# Patient Record
Sex: FEMALE | Race: BLACK OR AFRICAN AMERICAN | NOT HISPANIC OR LATINO | Employment: UNEMPLOYED | URBAN - METROPOLITAN AREA
[De-identification: names, ages, dates, MRNs, and addresses within clinical notes are randomized per-mention and may not be internally consistent; named-entity substitution may affect disease eponyms.]

---

## 2017-01-11 PROBLEM — Z98.84 S/P LAPAROSCOPIC SLEEVE GASTRECTOMY: Status: ACTIVE | Noted: 2017-01-11

## 2017-02-02 ENCOUNTER — TELEPHONE (OUTPATIENT)
Dept: OBSTETRICS AND GYNECOLOGY | Facility: CLINIC | Age: 47
End: 2017-02-02

## 2017-02-02 NOTE — TELEPHONE ENCOUNTER
Pt c/o spotting, cramping x 2 weeks, vaginal discharge and pressure x 2 days.  Appt made for 2/06/2017 at 9am with Sumi, per pt request.  Pt voiced understanding.  BEULAH DAVIS

## 2017-02-02 NOTE — TELEPHONE ENCOUNTER
----- Message from Karla Mejia sent at 2/2/2017  1:59 PM CST -----  needs to be worked in for spotting & cramping/not pregnant but hasn't had cycle..[phone] (refused to make appt, will see anyone)

## 2017-02-15 PROBLEM — N94.6 DYSMENORRHEA: Status: ACTIVE | Noted: 2017-02-15

## 2017-02-17 ENCOUNTER — OFFICE VISIT (OUTPATIENT)
Dept: OBSTETRICS AND GYNECOLOGY | Facility: CLINIC | Age: 47
End: 2017-02-17
Payer: MEDICAID

## 2017-02-17 VITALS
WEIGHT: 205.94 LBS | HEIGHT: 68 IN | SYSTOLIC BLOOD PRESSURE: 110 MMHG | BODY MASS INDEX: 31.21 KG/M2 | DIASTOLIC BLOOD PRESSURE: 70 MMHG

## 2017-02-17 DIAGNOSIS — N92.6 IRREGULAR MENSES: Primary | ICD-10-CM

## 2017-02-17 DIAGNOSIS — Z12.4 PAP SMEAR FOR CERVICAL CANCER SCREENING: ICD-10-CM

## 2017-02-17 PROCEDURE — 58100 BIOPSY OF UTERUS LINING: CPT | Mod: S$PBB,,, | Performed by: OBSTETRICS & GYNECOLOGY

## 2017-02-17 PROCEDURE — 88175 CYTOPATH C/V AUTO FLUID REDO: CPT

## 2017-02-17 PROCEDURE — 99213 OFFICE O/P EST LOW 20 MIN: CPT | Mod: PBBFAC | Performed by: OBSTETRICS & GYNECOLOGY

## 2017-02-17 PROCEDURE — 88305 TISSUE EXAM BY PATHOLOGIST: CPT | Performed by: PATHOLOGY

## 2017-02-17 PROCEDURE — 99214 OFFICE O/P EST MOD 30 MIN: CPT | Mod: 25,S$PBB,, | Performed by: OBSTETRICS & GYNECOLOGY

## 2017-02-17 PROCEDURE — 58100 BIOPSY OF UTERUS LINING: CPT | Mod: PBBFAC | Performed by: OBSTETRICS & GYNECOLOGY

## 2017-02-17 PROCEDURE — 99999 PR PBB SHADOW E&M-EST. PATIENT-LVL III: CPT | Mod: PBBFAC,,, | Performed by: OBSTETRICS & GYNECOLOGY

## 2017-02-17 PROCEDURE — 88305 TISSUE EXAM BY PATHOLOGIST: CPT | Mod: 26,,, | Performed by: PATHOLOGY

## 2017-02-17 RX ORDER — NORETHINDRONE 5 MG/1
5 TABLET ORAL DAILY
Qty: 12 TABLET | Refills: 0 | Status: SHIPPED | OUTPATIENT
Start: 2017-02-17 | End: 2017-07-25

## 2017-02-17 NOTE — PROGRESS NOTES
Subjective:       Patient ID: Joe Ceballos is a 47 y.o. female.    Chief Complaint:  Menorrhagia      History of Present Illness  HPI  Having irregular vaginal bleeding over the past 6 weeks.  Menses was regular for several years.   EMBx in , benign  Ultrasound in the past with small myomas.   Recent, 4 months ago, gastric sleeve, has lost 60 lbs to date.   Needs pap,   Breast screening is up to date at non [facility] facility   Not sexually active for over 2 years.     GYN & OB History  Patient's last menstrual period was 2016 (exact date).   Date of Last Pap: No result found    OB History    Para Term  AB SAB TAB Ectopic Multiple Living   2 2 2       1      # Outcome Date GA Lbr Grant/2nd Weight Sex Delivery Anes PTL Lv   2 Term            1 Term                   Review of Systems  Review of Systems        Objective:    Physical Exam:   Constitutional: She appears well-developed and well-nourished. No distress.      Neck: No JVD present. No thyroid mass and no thyromegaly present.    Cardiovascular: Normal rate and regular rhythm.          Abdominal: Soft. Normal appearance and bowel sounds are normal. There is no hepatosplenomegaly. No hernia. Hernia confirmed negative in the ventral area, confirmed negative in the right inguinal area and confirmed negative in the left inguinal area.     Genitourinary: Rectum normal, vagina normal and uterus normal. There is no rash, tenderness, lesion or injury on the right labia. There is no rash, tenderness, lesion or injury on the left labia. Uterus is not deviated, not enlarged, not fixed, not tender and not experiencing uterine prolapse. Cervix is normal. Right adnexum displays no mass, no tenderness and no fullness. Left adnexum displays no mass, no tenderness and no fullness. No erythema, tenderness or bleeding in the vagina. No foreign body in the vagina. No vaginal discharge found. Labial bartholins normal.Cervix exhibits no motion  tenderness, no discharge and no friability. Additional cervical findings: pap smear done                     Endometrial biopsy    Verbal consent for biopsy done, explained risk of uterine perforation and level of discomfort from the procedure. No alternative available.     Cervix visualized,   Anterior cervical lip grasped with tenaculum   Pipelle passed to 6 cm without resistance   Adequate tissue removed and sent to pathology for evaluation  Instruments remove  Less than 1 cc blood loss     Patient tolerated the procedure well.     Assessment:        1. Irregular menses    2. Pap smear for cervical cancer screening                Plan:      Joe Savage was seen today for menorrhagia.    Diagnoses and all orders for this visit:    Irregular menses  -     Tissue Specimen To Pathology, Obstetrics/Gynecology  -     Endometrial biopsy; Future  -     norethindrone (AYGESTIN) 5 mg Tab; Take 1 tablet (5 mg total) by mouth once daily.    Pap smear for cervical cancer screening  -     Liquid-based pap smear, screening    Will start on one cycle of Aygestin, if biopsy is benign will follow bleeding pattern and restart Aygestin for irregular pattern

## 2017-02-22 ENCOUNTER — TELEPHONE (OUTPATIENT)
Dept: OBSTETRICS AND GYNECOLOGY | Facility: CLINIC | Age: 47
End: 2017-02-22

## 2017-02-22 NOTE — TELEPHONE ENCOUNTER
Pt called to get results for procedure, was notified that colpo biopsy's are still in process and that we will call her if any abnormal results and as far as the medication was advised that it is a hormone and could increase your apatite but is not likely that the medication alone causes you to gain weight ..xochiltt

## 2017-02-22 NOTE — TELEPHONE ENCOUNTER
----- Message from Shavon Ruvalcaba sent at 2/22/2017  8:55 AM CST -----  Patient would like to know the result of her procedure last week.  Also, the prescription she was given states that it could cause you to gain weight.  She has just had bariatric surgery and does not want to take the medication.   Call her at [phone].                                          agu[place]

## 2017-03-14 ENCOUNTER — HOSPITAL ENCOUNTER (EMERGENCY)
Facility: HOSPITAL | Age: 47
Discharge: HOME | End: 2017-03-14
Attending: EMERGENCY MEDICINE
Payer: MEDICAID

## 2017-03-14 VITALS
HEART RATE: 75 BPM | BODY MASS INDEX: 28.44 KG/M2 | HEIGHT: 69 IN | SYSTOLIC BLOOD PRESSURE: 180 MMHG | WEIGHT: 192 LBS | TEMPERATURE: 99 F | DIASTOLIC BLOOD PRESSURE: 108 MMHG | OXYGEN SATURATION: 99 % | RESPIRATION RATE: 19 BRPM

## 2017-03-14 DIAGNOSIS — F41.9 ANXIETY: ICD-10-CM

## 2017-03-14 DIAGNOSIS — Z48.89 ENCOUNTER FOR POST SURGICAL WOUND CHECK: Primary | ICD-10-CM

## 2017-03-14 PROCEDURE — 99283 EMERGENCY DEPT VISIT LOW MDM: CPT

## 2017-03-14 NOTE — ED AVS SNAPSHOT
[facility]  [address]  [place] [address]               Joe Ceballos   3/14/2017  9:51 PM   ED    Description:  Female : 1970   Department:  [facility]            Your Care was Coordinated By:     Provider Role From To    Cheli Franco MD Attending Provider [phone] --      Reason for Visit     Dressing Change           Diagnoses this Visit        Comments    Encounter for post surgical wound check    -  Primary     Anxiety           ED Disposition     None           To Do List           [facility]     [facility] Care Line -  Assistance  Registered nurses in the [facility] provide clinical advisement, health education, appointment booking, and other advisory services.  Call for this free service at [phone].             Medications           Message regarding Medications     Verify the changes and/or additions to your medication regime listed below are the same as discussed with your clinician today.  If any of these changes or additions are incorrect, please notify your healthcare provider.             Verify that the below list of medications is an accurate representation of the medications you are currently taking.  If none reported, the list may be blank. If incorrect, please contact your healthcare provider. Carry this list with you in case of emergency.           Current Medications     cetirizine (ZYRTEC) 10 MG tablet Take 10 mg by mouth.    lisinopril (PRINIVIL,ZESTRIL) 40 MG tablet Take 40 mg by mouth once daily.    methyldopa (ALDOMET) 500 MG tablet Take 500 mg by mouth 2 (two) times daily.    multivitamin (THERAGRAN) per tablet Take 1 tablet by mouth once daily.    norethindrone (AYGESTIN) 5 mg Tab Take 1 tablet (5 mg total) by mouth once daily.           Clinical Reference Information           Your Vitals Were     BP Pulse Temp Resp Height Weight    180/108 (BP Location:  "Right arm, Patient Position: Sitting) 75 98.9 °F (37.2 °C) (Oral) 19 5' 9" (1.753 m) 87.1 kg (192 lb)    Last Period SpO2 BMI          12/31/2016 (Exact Date) 99% 28.35 kg/m2        Allergies as of 3/14/2017        Reactions    Sulfa (Sulfonamide Antibiotics) Anaphylaxis      Immunizations Administered on Date of Encounter - 3/14/2017     None      ED Micro, Lab, POCT     None      ED Imaging Orders     None      Discharge References/Attachments     POST OP WOUND CHECK, PAIN (ENGLISH)      Smoking Cessation     If you would like to quit smoking:   You may be eligible for free services if you are a [place] resident and started smoking cigarettes before September 1, 1988.  Call the [employer]oking [employer]essation [employer] ([facility]) toll free at ([phone] or [phone].   Call 4-299-QUIT-NOW if you do not meet the above criteria.             [facility]  complies with applicable Federal civil rights laws and does not discriminate on the basis of race, color, national origin, age, disability, or sex.        Language Assistance Services     ATTENTION: Language assistance services are available, free of charge. Please call [phone].      ATENCIÓN: Si habla español, tiene a snyder disposición servicios gratuitos de asistencia lingüística. Llame al [phone].     CHÚ Ý: N?u b?n nói Ti?ng Vi?t, có các d?ch v? h? tr? ngôn ng? mi?n phí dành cho b?n. G?i s? [phone].        "

## 2017-03-15 NOTE — ED PROVIDER NOTES
SCRIBE #1 NOTE: I, Alex Paulson, am scribing for, and in the presence of, Cheli Franco MD. I have scribed the entire note.      History      Chief Complaint   Patient presents with    Dressing Change     reports needing new splinting to left arm/wrist.        Review of patient's allergies indicates:   Allergen Reactions    Sulfa (sulfonamide antibiotics) Anaphylaxis        HPI   HPI    3/14/2017, 10:21 PM   History obtained from the patient      History of Present Illness: Joe Ceballos is a 47 y.o. female patient who presents to the Emergency Department for dressing change. Pt reports recent left hand surgery and reports f/u with ortho in 3 days. Pt reports her dressing has moved and the hard part of her splint is now resting on her stitches. Pt requesting dressing change. Sxs are constant and moderate in severity. There are no mitigating or exacerbating factors noted.  Pt denies any fever, N/V/D, new trauma, numbness, arm pain, HA,  and all other sxs at this time. No further complaints or concerns at this time.     Arrival mode: Personal vehicle      PCP: Josse Cloud MD       Past Medical History:  Past Medical History:   Diagnosis Date    Anxiety     Edema     Hypertension     Initial insomnia     Tobacco use        Past Surgical History:  Past Surgical History:   Procedure Laterality Date    BREAST SURGERY       SECTION      x 2    CYST REMOVAL      from left tube    gastric sleeve      TONSILLECTOMY           Family History:  Family History   Problem Relation Age of Onset    Adopted: Yes    Hypertension Mother     Thyroid disease Paternal Aunt     Hypertension Paternal Grandmother     Breast cancer Neg Hx     Colon cancer Neg Hx     Ovarian cancer Neg Hx        Social History:  Social History     Social History Main Topics    Smoking status: Former Smoker     Packs/day: 0.50     Types: Cigarettes    Smokeless tobacco: unknown    Alcohol use No    Drug use:  No    Sexual activity: Not Currently     Partners: Male     Birth control/ protection: None       ROS   Review of Systems   Constitutional: Negative for fever.   HENT: Negative for sore throat.    Respiratory: Negative for shortness of breath.    Cardiovascular: Negative for chest pain.   Gastrointestinal: Negative for nausea.   Genitourinary: Negative for dysuria.   Musculoskeletal: Negative for back pain.   Skin: Negative for rash.        (+) left wrist splint   Neurological: Negative for weakness.   Hematological: Does not bruise/bleed easily.       Physical Exam    Initial Vitals   BP Pulse Resp Temp SpO2   03/14/17 2106 03/14/17 2106 03/14/17 2106 03/14/17 2106 03/14/17 2106   180/108 75 19 98.9 °F (37.2 °C) 99 %      Physical Exam  Nursing Notes and Vital Signs Reviewed.  Constitutional: Patient is in no acute distress. Awake and alert. Well-developed and well-nourished.  Head: Atraumatic. Normocephalic.  Eyes: PERRL. EOM intact. Conjunctivae are not pale. No scleral icterus.  ENT: Mucous membranes are moist. Oropharynx is clear and symmetric.    Neck: Supple. Full ROM. No lymphadenopathy.  Cardiovascular: Regular rate. Regular rhythm. No murmurs, rubs, or gallops. Distal pulses are 2+ and symmetric.  Pulmonary/Chest: No respiratory distress. Clear to auscultation bilaterally. No wheezing, rales, or rhonchi.  Abdominal: Soft and non-distended.  There is no tenderness.  No rebound, guarding, or rigidity. Good bowel sounds.  Genitourinary: No CVA tenderness  Musculoskeletal: Moves all extremities. No obvious deformities. No edema. No calf tenderness.  Skin: Warm and dry. Splint to left wrist taken down 3 small sutures to the inner left wrist, no evidence of swelling or infection. NVI.   Neurological:  Alert, awake, and appropriate.  Normal speech.  No acute focal neurological deficits are appreciated.  Psychiatric: Normal affect. Good eye contact. Appropriate in content.    ED Course    Procedures  ED Vital  "Signs:  Vitals:    03/14/17 2106   BP: (!) 180/108   Pulse: 75   Resp: 19   Temp: 98.9 °F (37.2 °C)   TempSrc: Oral   SpO2: 99%   Weight: 87.1 kg (192 lb)   Height: 5' 9" (1.753 m)              The Emergency Provider reviewed the vital signs and test results, which are outlined above.    ED Discussion     10:31 PM: Discussed plan of treatment with pt. Gave pt all f/u and return to the ED instructions. All questions and concerns were addressed at this time. Pt understands and agrees to plan as discussed. Pt is stable for discharge.     Pre-hypertension/Hypertension: The pt has been informed that they may have pre-hypertension or hypertension based on a blood pressure reading in the ED. I recommend that the pt call the PCP listed on their discharge instructions or a physician of their choice this week to arrange f/u for further evaluation of possible pre-hypertension or hypertension.     ED Medication(s):  Medications - No data to display    Discharge Medication List as of 3/14/2017 10:28 PM                Medical Decision Making              Scribe Attestation:   Scribe #1: I performed the above scribed service and the documentation accurately describes the services I performed. I attest to the accuracy of the note.    Attending:   Physician Attestation Statement for Scribe #1: I, Cheli Franco MD, personally performed the services described in this documentation, as scribed by Alex Paulson, in my presence, and it is both accurate and complete.          Clinical Impression       ICD-10-CM ICD-9-CM   1. Encounter for post surgical wound check Z48.89 V58.49   2. Anxiety F41.9 300.00       Disposition:   Disposition: Discharged  Condition: Stable         Cheli Franco MD  03/15/17 0045    "

## 2017-06-28 PROBLEM — R13.19 ESOPHAGEAL DYSPHAGIA: Status: ACTIVE | Noted: 2017-06-28

## 2017-07-13 ENCOUNTER — TELEPHONE (OUTPATIENT)
Dept: SURGERY | Facility: CLINIC | Age: 47
End: 2017-07-13

## 2017-07-13 NOTE — TELEPHONE ENCOUNTER
----- Message from Quinton Julian sent at 7/13/2017 11:03 AM CDT -----  Contact: Pt   Pt called wanted to know if something was available sooner that 8- pt would like to be called back please..[phone] (home)

## 2017-07-14 ENCOUNTER — TELEPHONE (OUTPATIENT)
Dept: RADIOLOGY | Facility: HOSPITAL | Age: 47
End: 2017-07-14

## 2017-07-14 DIAGNOSIS — Z98.84 S/P LAPAROSCOPIC SLEEVE GASTRECTOMY: Primary | ICD-10-CM

## 2017-07-18 ENCOUNTER — HOSPITAL ENCOUNTER (OUTPATIENT)
Dept: RADIOLOGY | Facility: HOSPITAL | Age: 47
Discharge: HOME | End: 2017-07-18
Attending: SURGERY
Payer: MEDICAID

## 2017-07-18 ENCOUNTER — OFFICE VISIT (OUTPATIENT)
Dept: SURGERY | Facility: CLINIC | Age: 47
End: 2017-07-18
Payer: MEDICAID

## 2017-07-18 VITALS
BODY MASS INDEX: 28.03 KG/M2 | DIASTOLIC BLOOD PRESSURE: 97 MMHG | HEART RATE: 73 BPM | WEIGHT: 189.81 LBS | SYSTOLIC BLOOD PRESSURE: 175 MMHG | TEMPERATURE: 99 F

## 2017-07-18 DIAGNOSIS — K22.2 ESOPHAGEAL STRICTURE: Primary | ICD-10-CM

## 2017-07-18 DIAGNOSIS — Z98.84 S/P LAPAROSCOPIC SLEEVE GASTRECTOMY: ICD-10-CM

## 2017-07-18 PROCEDURE — 99204 OFFICE O/P NEW MOD 45 MIN: CPT | Mod: S$PBB,,, | Performed by: SURGERY

## 2017-07-18 PROCEDURE — 99999 PR PBB SHADOW E&M-EST. PATIENT-LVL III: CPT | Mod: PBBFAC,,, | Performed by: SURGERY

## 2017-07-18 PROCEDURE — 74240 X-RAY XM UPR GI TRC 1CNTRST: CPT | Mod: 26,,, | Performed by: RADIOLOGY

## 2017-07-18 PROCEDURE — 99213 OFFICE O/P EST LOW 20 MIN: CPT | Mod: PBBFAC,25,PO | Performed by: SURGERY

## 2017-07-18 NOTE — PROGRESS NOTES
Joe is 47-year-old -American female patient who is being seen for the evaluation of inability to swallow.  In June 2016, she did undergo laparoscopic sleeve gastrectomy.  The surgery was [facility].  She has been followed by her surgeon since the initial surgery.  According to the patient, 6 weeks after the initial surgery, she began to notice difficulty to keep things down when she resumed soft diet.  With that difficulty, she is been followed by her surgeon who was scheduled to perform upper endoscopic examination.  1 day when she showed up for consultation, she was informed that the surgeon has left the institution.  She continues to have a problem and she had no were to turn.  She saw Dr. Dias her son's pediatrician for help.  The patient was seen today and also had an upper GI study completed.  The upper GI images were carefully reviewed with the radiologist.  The concern is that the patient had a stenosis or stricture at the GE junction.  As to the cause of the stenosis unknown.  It appears that there could be an extrinsic compression causing the stenosis.  This was discussed with the patient.  She will be scheduled for abdominal CT scan with oral contrast.  Once all information saw and, the patient and I will discuss the options again.  Total time approximately 40 minute was spent for this patient, and more than 50% of that was spent for treatment planning and counseling.      Corby Moeller

## 2017-07-24 ENCOUNTER — TELEPHONE (OUTPATIENT)
Dept: RADIOLOGY | Facility: HOSPITAL | Age: 47
End: 2017-07-24

## 2017-07-25 ENCOUNTER — HOSPITAL ENCOUNTER (OUTPATIENT)
Dept: RADIOLOGY | Facility: HOSPITAL | Age: 47
Discharge: HOME | End: 2017-07-25
Attending: SURGERY
Payer: MEDICAID

## 2017-07-25 ENCOUNTER — OFFICE VISIT (OUTPATIENT)
Dept: SURGERY | Facility: CLINIC | Age: 47
End: 2017-07-25
Payer: MEDICAID

## 2017-07-25 VITALS
BODY MASS INDEX: 28.75 KG/M2 | HEART RATE: 90 BPM | TEMPERATURE: 99 F | DIASTOLIC BLOOD PRESSURE: 70 MMHG | WEIGHT: 194.69 LBS | SYSTOLIC BLOOD PRESSURE: 117 MMHG

## 2017-07-25 DIAGNOSIS — K22.2 ESOPHAGEAL STRICTURE: ICD-10-CM

## 2017-07-25 DIAGNOSIS — K22.2 BENIGN ESOPHAGEAL STRICTURE: Primary | ICD-10-CM

## 2017-07-25 PROCEDURE — 99999 PR PBB SHADOW E&M-EST. PATIENT-LVL III: CPT | Mod: PBBFAC,,, | Performed by: SURGERY

## 2017-07-25 PROCEDURE — 74178 CT ABD&PLV WO CNTR FLWD CNTR: CPT | Mod: 26,,, | Performed by: RADIOLOGY

## 2017-07-25 PROCEDURE — 99213 OFFICE O/P EST LOW 20 MIN: CPT | Mod: PBBFAC,25,PO | Performed by: SURGERY

## 2017-07-25 PROCEDURE — 99215 OFFICE O/P EST HI 40 MIN: CPT | Mod: S$PBB,,, | Performed by: SURGERY

## 2017-07-25 RX ORDER — SODIUM CHLORIDE 9 MG/ML
INJECTION, SOLUTION INTRAVENOUS CONTINUOUS
Status: CANCELLED | OUTPATIENT
Start: 2017-07-25

## 2017-07-25 RX ADMIN — IOHEXOL 100 ML: 350 INJECTION, SOLUTION INTRAVENOUS at 01:07

## 2017-07-25 RX ADMIN — IOHEXOL 30 ML: 350 INJECTION, SOLUTION INTRAVENOUS at 12:07

## 2017-07-25 NOTE — PROGRESS NOTES
Joe is 47-year-old -American female patient who was initially seen several weeks ago for known problem with swallowing.  She had her laparoscopic sleeve gastrectomy done by Dr. Jay Epperson in October 2016.  According to the patient, immediately following the surgery she was having difficulty of swallowing which she thought was due to edema from the surgery.  At that symptom persisted since then.  She had seen Dr. Epperson multiple times to address that situation.  She had hoarseness following the surgery which has not really improved over time.  Now the surgery is almost 10 months out.  She has extreme difficulty with nausea and inability to swallow.  She feels severe anterior chest pain following a meal.  And when she lays down she would feel regurgitation of food into her mouth.  She was evaluated last week which included upper GI and abdominal CT scan to delineate the tissue density that was found cause stenosis at the GE junction.  The GE junction stenosis was due to soft tissue density, likely due to scar tissues from previous surgery.  No staples were found in that area.   After reviewing the upper GI and the abdominal CT scan, the stenosis at the GE junction should be dilated.  Of the 2 choices such as balloon versus stent, I would proceed with placement of covered stent endoscopically to manage this a benign esophageal stricture.  If this procedure fails to keep the GE junction open, the patient will likely require resection with gastric interposition.  This is likely in the case left gastric artery was compromised.  This was discussed with the patient in detail by drawing several diagrams.  Her  was present in the room as well.  They both seemed to understand the pathogenesis of her symptoms.  I have proposed to proceed with endoscopic stent placement tomorrow.  But because of the family's home situation, this procedure will be scheduled for Monday on July 31 of 2017 at 11:30 AM.  Total  time approximately 40 minute was spent for this patient, and more than 50% of that was spent for treatment planning and counseling.    Corby Moeller

## 2017-07-26 NOTE — PRE ADMISSION SCREENING
Pre op instructions reviewed with patient per phone:    To confirm, Your surgeon has instructed you:  Surgery is scheduled 7/31/2017 at 1144.      Please report to [facility] main lobby by 1015.      INSTRUCTIONS IMPORTANT!!!  ¨ Do not eat, drink, or smoke after 12 midnight-including water. OK to brush teeth, no gum, candy or mints!    ¨ Take only these medicines with a small swallow of water-morning of surgery.  Lisinopril    ____  Do not wear makeup, including mascara.  ____  No powder, lotions or creams to surgical area.  ____  Please remove all jewelry, including piercings and leave at home.  ____  No money or valuables needed. Please leave at home.  ____  Please bring identification and insurance information to hospital.  ____  If going home the same day, arrange for a ride home. You will not be able to   drive if Anesthesia was used.  ____  Children, under 12 years old, must remain in the waiting room with an adult.  They are not allowed in patient areas.  ____  Wear loose fitting clothing. Allow for dressings, bandages.  ____  Stop Aspirin, Ibuprofen, Motrin and Aleve at least 5-7 days before surgery, unless otherwise instructed by your doctor, or the nurse.   You MAY use Tylenol/acetaminophen until day of surgery.  ____  If you take diabetic medication, do not take am of surgery unless instructed by   Doctor.  ____ Stop taking any Fish Oil supplement or any Vitamins that contain Vitamin E at least 5 days prior to surgery.          Bathing Instructions-- The night before surgery and the morning prior to coming to the hospital:   -Do not shave the surgical area.   -Shower and wash your hair and body as usual with anti-bacterial  soap and shampoo.   -Rinse your hair and body completely.   -Use one packet of hibiclens to wash the surgical site (using your hand) gently for 5 minutes.  Do not scrub you skin too hard.   -Do not use hibiclens on your head, face, or genitals.   -Do  not wash with anti-bacterial soap after you use the hibiclens.   -Rinse your body thoroughly.   -Dry with clean, soft towel.  Do not use lotion, cream, deodorant, or powders on   the surgical site.    Use antibacterial soap in place of hibiclens if your surgery is on the head, face or genitals.         Surgical Site Infection    Prevention of surgical site infections:     -Keep incisions clean and dry.   -Do not soak/submerge incisions in water until completely healed.   -Do not apply lotions, powders, creams, or deodorants to site.   -Always make sure hands are cleaned with antibacterial soap/ alcohol-based   prior to touching the surgical site.  (This includes doctors, nurses, staff, and yourself.)    Signs and symptoms:   -Redness and pain around the area where you had surgery   -Drainage of cloudy fluid from your surgical wound   -Fever over 100.4  I have read or had read and explained to me, and understand the above information.

## 2017-07-28 ENCOUNTER — LAB VISIT (OUTPATIENT)
Dept: LAB | Facility: HOSPITAL | Age: 47
End: 2017-07-28
Attending: SURGERY
Payer: MEDICAID

## 2017-07-28 ENCOUNTER — OFFICE VISIT (OUTPATIENT)
Dept: SURGERY | Facility: CLINIC | Age: 47
End: 2017-07-28
Payer: MEDICAID

## 2017-07-28 ENCOUNTER — ANESTHESIA EVENT (OUTPATIENT)
Dept: SURGERY | Facility: HOSPITAL | Age: 47
End: 2017-07-28
Payer: MEDICAID

## 2017-07-28 ENCOUNTER — CLINICAL SUPPORT (OUTPATIENT)
Dept: CARDIOLOGY | Facility: CLINIC | Age: 47
End: 2017-07-28
Payer: MEDICAID

## 2017-07-28 VITALS
WEIGHT: 192.44 LBS | SYSTOLIC BLOOD PRESSURE: 144 MMHG | HEART RATE: 87 BPM | TEMPERATURE: 99 F | DIASTOLIC BLOOD PRESSURE: 78 MMHG | BODY MASS INDEX: 30.6 KG/M2

## 2017-07-28 DIAGNOSIS — K22.2 BENIGN ESOPHAGEAL STRICTURE: Primary | ICD-10-CM

## 2017-07-28 DIAGNOSIS — K22.2 BENIGN ESOPHAGEAL STRICTURE: ICD-10-CM

## 2017-07-28 LAB
ANION GAP SERPL CALC-SCNC: 11 MMOL/L
BASOPHILS # BLD AUTO: 0.04 K/UL
BASOPHILS NFR BLD: 0.6 %
BUN SERPL-MCNC: 12 MG/DL
CALCIUM SERPL-MCNC: 9 MG/DL
CHLORIDE SERPL-SCNC: 109 MMOL/L
CO2 SERPL-SCNC: 23 MMOL/L
CREAT SERPL-MCNC: 0.9 MG/DL
DIFFERENTIAL METHOD: ABNORMAL
EOSINOPHIL # BLD AUTO: 0.1 K/UL
EOSINOPHIL NFR BLD: 1.1 %
ERYTHROCYTE [DISTWIDTH] IN BLOOD BY AUTOMATED COUNT: 13.9 %
EST. GFR  (AFRICAN AMERICAN): >60 ML/MIN/1.73 M^2
EST. GFR  (NON AFRICAN AMERICAN): >60 ML/MIN/1.73 M^2
GLUCOSE SERPL-MCNC: 88 MG/DL
HCT VFR BLD AUTO: 38.3 %
HGB BLD-MCNC: 13.2 G/DL
LYMPHOCYTES # BLD AUTO: 1.4 K/UL
LYMPHOCYTES NFR BLD: 18.9 %
MCH RBC QN AUTO: 34.5 PG
MCHC RBC AUTO-ENTMCNC: 34.5 G/DL
MCV RBC AUTO: 100 FL
MONOCYTES # BLD AUTO: 0.7 K/UL
MONOCYTES NFR BLD: 9.2 %
NEUTROPHILS # BLD AUTO: 5.1 K/UL
NEUTROPHILS NFR BLD: 70.1 %
PLATELET # BLD AUTO: 296 K/UL
PMV BLD AUTO: 10.5 FL
POTASSIUM SERPL-SCNC: 3.7 MMOL/L
RBC # BLD AUTO: 3.83 M/UL
SODIUM SERPL-SCNC: 143 MMOL/L
WBC # BLD AUTO: 7.2 K/UL

## 2017-07-28 PROCEDURE — 85025 COMPLETE CBC W/AUTO DIFF WBC: CPT

## 2017-07-28 PROCEDURE — 80048 BASIC METABOLIC PNL TOTAL CA: CPT

## 2017-07-28 PROCEDURE — 93010 ELECTROCARDIOGRAM REPORT: CPT | Mod: S$PBB,,, | Performed by: NUCLEAR MEDICINE

## 2017-07-28 PROCEDURE — 36415 COLL VENOUS BLD VENIPUNCTURE: CPT | Mod: PO

## 2017-07-28 PROCEDURE — 99214 OFFICE O/P EST MOD 30 MIN: CPT | Mod: S$PBB,,, | Performed by: SURGERY

## 2017-07-28 PROCEDURE — 99999 PR PBB SHADOW E&M-EST. PATIENT-LVL III: CPT | Mod: PBBFAC,,, | Performed by: SURGERY

## 2017-07-28 RX ORDER — NAPROXEN 500 MG/1
500 TABLET ORAL DAILY PRN
Status: ON HOLD | COMMUNITY
Start: 2016-09-04 | End: 2017-07-31 | Stop reason: HOSPADM

## 2017-07-28 NOTE — H&P
History & Physical    SUBJECTIVE:     History of Present Illness:  Patient is a 47 y.o. female presents with inability to swallow and difficulty and chest pain following a meal.  She has very complex recent surgical events.  In October 2016, she did undergo sleeve gastrectomy laparoscopic by Dr. Ochoa at an outside institution.  Immediately following the surgery she did well but gradually noted difficulty to swallow.  She had been seen by Dr. Ochoa several times to manage that difficulty.  When she was so unable to maintain anything down, Dr. Ochoa had left the institution.  She had no 1 to help her to evaluate and treat her condition.  She had been evaluated and was found to have esophageal stricture at the GE junction.    Chief Complaint   Patient presents with    Pre-op Exam       Review of patient's allergies indicates:   Allergen Reactions    Sulfa (sulfonamide antibiotics) Anaphylaxis       Current Outpatient Prescriptions   Medication Sig Dispense Refill    calcium citrate-vitamin D3 315-200 mg (CITRACAL+D) 315-200 mg-unit per tablet Take 1 tablet by mouth 2 (two) times daily.      cetirizine (ZYRTEC) 10 MG tablet Take 10 mg by mouth.      cyanocobalamin 2000 MCG tablet Take 2,000 mcg by mouth twice a week.      ferrous gluconate (FERGON) 324 MG tablet Take 324 mg by mouth daily with breakfast.      lisinopril (PRINIVIL,ZESTRIL) 40 MG tablet Take 40 mg by mouth once daily.  3    methyldopa (ALDOMET) 500 MG tablet Take 500 mg by mouth 2 (two) times daily.  6    multivitamin (THERAGRAN) per tablet Take 1 tablet by mouth once daily.      naproxen (NAPROSYN) 500 MG tablet Take 500 mg by mouth daily as needed.      promethazine (PHENERGAN) 25 MG tablet Take 1 tablet (25 mg total) by mouth every 6 (six) hours as needed for Nausea. 30 tablet 0     No current facility-administered medications for this visit.        Past Medical History:   Diagnosis Date    Anxiety     Edema     Hypertension      Initial insomnia     Sickle cell trait     Tobacco use      Past Surgical History:   Procedure Laterality Date    BREAST SURGERY       SECTION      x 2    CYST REMOVAL      from left tube    gastric sleeve      TONSILLECTOMY       Family History   Problem Relation Age of Onset    Adopted: Yes    Hypertension Mother     Thyroid disease Paternal Aunt     Hypertension Paternal Grandmother     Breast cancer Neg Hx     Colon cancer Neg Hx     Ovarian cancer Neg Hx      Social History   Substance Use Topics    Smoking status: Former Smoker     Packs/day: 0.50     Types: Cigarettes    Smokeless tobacco: Never Used    Alcohol use No        Review of Systems:  Review of Systems   Constitutional: Negative for chills and fever.   HENT: Negative for sore throat and trouble swallowing.    Eyes: Negative.    Respiratory: Negative for cough and shortness of breath.    Cardiovascular: Negative.    Gastrointestinal: Positive for abdominal pain, nausea and vomiting. Negative for abdominal distention.   Endocrine: Negative.    Genitourinary: Negative.    Musculoskeletal: Negative.    Skin: Negative.    Allergic/Immunologic: Negative.    Neurological: Negative.    Hematological: Does not bruise/bleed easily.   Psychiatric/Behavioral: Negative.        OBJECTIVE:     Vital Signs (Most Recent)  Temp: 99.1 °F (37.3 °C) (17 1202)  Pulse: 87 (17 1202)  BP: (!) 144/78 (17 1202)     87.3 kg (192 lb 7.4 oz)     Physical Exam:  Physical Exam   Constitutional: She is oriented to person, place, and time. She appears well-developed and well-nourished.   HENT:   Head: Normocephalic.   Right Ear: External ear normal.   Left Ear: External ear normal.   Nose: Nose normal.   Eyes: Pupils are equal, round, and reactive to light. No scleral icterus.   Neck: Normal range of motion. Neck supple. No thyromegaly present.   Cardiovascular: Normal rate, regular rhythm and normal heart sounds.    No murmur  heard.  Pulmonary/Chest: Effort normal and breath sounds normal.   Abdominal: Soft. Bowel sounds are normal. There is no tenderness. There is no guarding.   Visible scars from previous laparoscopic surgery.   Musculoskeletal: Normal range of motion.   Lymphadenopathy:     She has no cervical adenopathy.   Neurological: She is alert and oriented to person, place, and time.   Skin: Skin is warm and dry.       Laboratory  Lab Results   Component Value Date    WBC 8.29 10/13/2016    HGB 14.0 10/13/2016    HCT 41.3 10/13/2016     10/13/2016    ALT 27 10/04/2016    AST 22 10/04/2016     10/13/2016    K 3.7 10/13/2016     10/13/2016    CREATININE 0.69 10/13/2016    BUN 8 10/13/2016    CO2 27 10/13/2016    TSH 0.521 03/26/2014       Results for orders placed during the hospital encounter of 07/25/17   CT Abdomen Pelvis W Wo Contrast    Narrative CT of the abdomen and pelvis with and without contrast.    History: Stricture    Comparison: Upper GI series from 07/18/2017    Technique:CT of the abdomen and pelvis was acquired helically from the lung bases through the ischial tuberosities with and without IV contrast.  100 cc of Omnipaque 350 was administered without immediate complication. Oral contrast was administered. Axial and reformatted images were reviewed.     Findings:    ABDOMEN    Lung bases:There are some dependent changes noted within both lung bases.    Liver/gallbladder/biliary:The liver demonstrates no focal abnormality. The gallbladder is present and unremarkable.No biliary ductal dilation.    Pancreas:The pancreas is unremarkable in appearance.    Spleen:The spleen is not enlarged.    Adrenals:There is prominent nodular hypertrophic change associated with both adrenal and superior there is a coarse calcification seen within the superior aspect of the left adrenal gland.  The central portion of the left adrenal gland measures a maximum of 3.2 cm.  The largest nodular area involving the right  adrenal gland is seen involving the lateral, and measures up to 3.1 cm on the right.    Kidneys:The kidneys are equally perfused and demonstrate no solid masses.     Bowel/Mesentery:There is no evidence of bowel obstruction. Just above the level of the diaphragmatic hiatus is the same diverticulum that was seen on the upper GI series.  This diverticulum measures approximately 1.3 x 1.1 cm.  This diverticulum is located just above the level of the suture line along the greater curvature of the stomach.  Just above this diverticulum there is a shelf of soft tissue along the left side of the GE junction that results in narrowing of the lumen.  This is best appreciated on the coronal images series 601 image 76. No mesenteric stranding or adenopathy.    Retroperitoneum:No adenopathy.The aorta demonstrates a normal caliber.    PELVIS:    Genitourinary/Reproductive organs:Unremarkable    Adenopathy:None    Free Fluid:No free fluid    Osseus Structures/Soft tissues:No suspicious appearing osseus lesions. No significant soft tissue abnormality.    Impression 1. Narrowing in the region of the gastroesophageal junction secondary to a shelf of soft tissue along the left side of the GE junction.  Just inferior to this shelf of soft tissue is a diverticulum which is located just above the level of the suture line.    2.  Significant nodularity and enlargement associated with the both adrenal glands with some coarse calcification noted within the superior aspect of the left adrenal gland and also a small calcification seen within the more inferior aspect of the right adrenal gland.  Differential would include adrenal hyperplasia or adrenal involvement by a granulomatous process with remote hemorrhage and metastatic disease thought much less likely.    3.  Remaining findings as discussed above.          Electronically signed by: CHRIS MCGEE D.O.  Date:     07/25/17  Time:    13:40          Diagnostic Results:  Labs:  Reviewed  ECG: Reviewed  CT: Reviewed  Upper GI: Reviewed    None    ASSESSMENT/PLAN:     Esophageal stricture, status post sleeve gastrectomy, laparoscopic (October 2017 by Dr. Ochoa)    PLAN:Plan     The recommendation is proceed with esophageal stenting.  Considering that this is a benign esophageal stricture, the stent may be very effective.  If this stricture recurs, the next option would be surgical intervention with resection and colonic interposition.    Corby Moeller

## 2017-07-31 ENCOUNTER — HOSPITAL ENCOUNTER (OUTPATIENT)
Facility: HOSPITAL | Age: 47
Discharge: HOME | End: 2017-07-31
Attending: SURGERY | Admitting: SURGERY
Payer: MEDICAID

## 2017-07-31 ENCOUNTER — SURGERY (OUTPATIENT)
Age: 47
End: 2017-07-31

## 2017-07-31 ENCOUNTER — ANESTHESIA (OUTPATIENT)
Dept: SURGERY | Facility: HOSPITAL | Age: 47
End: 2017-07-31
Payer: MEDICAID

## 2017-07-31 VITALS
SYSTOLIC BLOOD PRESSURE: 152 MMHG | HEIGHT: 67 IN | RESPIRATION RATE: 23 BRPM | HEART RATE: 62 BPM | OXYGEN SATURATION: 100 % | BODY MASS INDEX: 29.66 KG/M2 | WEIGHT: 189 LBS | TEMPERATURE: 98 F | DIASTOLIC BLOOD PRESSURE: 95 MMHG

## 2017-07-31 DIAGNOSIS — K22.10 ULCER OF ESOPHAGUS WITHOUT BLEEDING: Primary | Chronic | ICD-10-CM

## 2017-07-31 DIAGNOSIS — K22.2 BENIGN ESOPHAGEAL STRICTURE: ICD-10-CM

## 2017-07-31 LAB
B-HCG UR QL: NEGATIVE
CTP QC/QA: YES

## 2017-07-31 PROCEDURE — 81025 URINE PREGNANCY TEST: CPT | Performed by: SURGERY

## 2017-07-31 PROCEDURE — 37000009 HC ANESTHESIA EA ADD 15 MINS: Performed by: SURGERY

## 2017-07-31 PROCEDURE — 63600175 PHARM REV CODE 636 W HCPCS: Performed by: NURSE ANESTHETIST, CERTIFIED REGISTERED

## 2017-07-31 PROCEDURE — 71000015 HC POSTOP RECOV 1ST HR: Performed by: SURGERY

## 2017-07-31 PROCEDURE — 71000033 HC RECOVERY, INTIAL HOUR: Performed by: SURGERY

## 2017-07-31 PROCEDURE — 25000003 PHARM REV CODE 250: Performed by: NURSE ANESTHETIST, CERTIFIED REGISTERED

## 2017-07-31 PROCEDURE — 37000008 HC ANESTHESIA 1ST 15 MINUTES: Performed by: SURGERY

## 2017-07-31 PROCEDURE — 36000706: Performed by: SURGERY

## 2017-07-31 PROCEDURE — 43235 EGD DIAGNOSTIC BRUSH WASH: CPT | Mod: ,,, | Performed by: SURGERY

## 2017-07-31 PROCEDURE — 36000707: Performed by: SURGERY

## 2017-07-31 RX ORDER — FENTANYL CITRATE 50 UG/ML
INJECTION, SOLUTION INTRAMUSCULAR; INTRAVENOUS
Status: DISCONTINUED | OUTPATIENT
Start: 2017-07-31 | End: 2017-07-31

## 2017-07-31 RX ORDER — MIDAZOLAM HYDROCHLORIDE 1 MG/ML
INJECTION, SOLUTION INTRAMUSCULAR; INTRAVENOUS
Status: DISCONTINUED | OUTPATIENT
Start: 2017-07-31 | End: 2017-07-31

## 2017-07-31 RX ORDER — DEXAMETHASONE SODIUM PHOSPHATE 4 MG/ML
INJECTION, SOLUTION INTRA-ARTICULAR; INTRALESIONAL; INTRAMUSCULAR; INTRAVENOUS; SOFT TISSUE
Status: DISCONTINUED | OUTPATIENT
Start: 2017-07-31 | End: 2017-07-31

## 2017-07-31 RX ORDER — PROPOFOL 10 MG/ML
VIAL (ML) INTRAVENOUS
Status: DISCONTINUED | OUTPATIENT
Start: 2017-07-31 | End: 2017-07-31

## 2017-07-31 RX ORDER — SODIUM CHLORIDE 0.9 % (FLUSH) 0.9 %
3 SYRINGE (ML) INJECTION
Status: DISCONTINUED | OUTPATIENT
Start: 2017-07-31 | End: 2017-07-31 | Stop reason: HOSPADM

## 2017-07-31 RX ORDER — ONDANSETRON 2 MG/ML
4 INJECTION INTRAMUSCULAR; INTRAVENOUS DAILY PRN
Status: DISCONTINUED | OUTPATIENT
Start: 2017-07-31 | End: 2017-07-31 | Stop reason: HOSPADM

## 2017-07-31 RX ORDER — MORPHINE SULFATE 10 MG/ML
2 INJECTION INTRAMUSCULAR; INTRAVENOUS; SUBCUTANEOUS EVERY 5 MIN PRN
Status: DISCONTINUED | OUTPATIENT
Start: 2017-07-31 | End: 2017-07-31 | Stop reason: HOSPADM

## 2017-07-31 RX ORDER — CEFAZOLIN SODIUM 2 G/50ML
2 SOLUTION INTRAVENOUS
Status: DISCONTINUED | OUTPATIENT
Start: 2017-07-31 | End: 2017-07-31 | Stop reason: HOSPADM

## 2017-07-31 RX ORDER — ROCURONIUM BROMIDE 10 MG/ML
INJECTION, SOLUTION INTRAVENOUS
Status: DISCONTINUED | OUTPATIENT
Start: 2017-07-31 | End: 2017-07-31

## 2017-07-31 RX ORDER — SODIUM CHLORIDE, SODIUM LACTATE, POTASSIUM CHLORIDE, CALCIUM CHLORIDE 600; 310; 30; 20 MG/100ML; MG/100ML; MG/100ML; MG/100ML
INJECTION, SOLUTION INTRAVENOUS CONTINUOUS
Status: DISCONTINUED | OUTPATIENT
Start: 2017-07-31 | End: 2017-07-31 | Stop reason: HOSPADM

## 2017-07-31 RX ORDER — METOCLOPRAMIDE HYDROCHLORIDE 5 MG/ML
10 INJECTION INTRAMUSCULAR; INTRAVENOUS EVERY 10 MIN PRN
Status: DISCONTINUED | OUTPATIENT
Start: 2017-07-31 | End: 2017-07-31 | Stop reason: HOSPADM

## 2017-07-31 RX ORDER — OXYCODONE HYDROCHLORIDE 5 MG/1
5 TABLET ORAL
Status: DISCONTINUED | OUTPATIENT
Start: 2017-07-31 | End: 2017-07-31 | Stop reason: HOSPADM

## 2017-07-31 RX ORDER — SUCCINYLCHOLINE CHLORIDE 20 MG/ML
INJECTION INTRAMUSCULAR; INTRAVENOUS
Status: DISCONTINUED | OUTPATIENT
Start: 2017-07-31 | End: 2017-07-31

## 2017-07-31 RX ORDER — SODIUM CHLORIDE 0.9 % (FLUSH) 0.9 %
3 SYRINGE (ML) INJECTION EVERY 8 HOURS
Status: DISCONTINUED | OUTPATIENT
Start: 2017-07-31 | End: 2017-07-31 | Stop reason: HOSPADM

## 2017-07-31 RX ORDER — LIDOCAINE HYDROCHLORIDE 10 MG/ML
INJECTION INFILTRATION; PERINEURAL
Status: DISCONTINUED | OUTPATIENT
Start: 2017-07-31 | End: 2017-07-31

## 2017-07-31 RX ORDER — SODIUM CHLORIDE 0.9 % (FLUSH) 0.9 %
3 SYRINGE (ML) INJECTION
Status: DISCONTINUED | OUTPATIENT
Start: 2017-07-31 | End: 2017-07-31

## 2017-07-31 RX ORDER — ONDANSETRON 2 MG/ML
INJECTION INTRAMUSCULAR; INTRAVENOUS
Status: DISCONTINUED | OUTPATIENT
Start: 2017-07-31 | End: 2017-07-31

## 2017-07-31 RX ORDER — HYDROCODONE BITARTRATE AND ACETAMINOPHEN 5; 325 MG/1; MG/1
1 TABLET ORAL EVERY 4 HOURS PRN
Status: CANCELLED | OUTPATIENT
Start: 2017-07-31

## 2017-07-31 RX ORDER — MEPERIDINE HYDROCHLORIDE 50 MG/ML
12.5 INJECTION INTRAMUSCULAR; INTRAVENOUS; SUBCUTANEOUS ONCE AS NEEDED
Status: DISCONTINUED | OUTPATIENT
Start: 2017-07-31 | End: 2017-07-31 | Stop reason: HOSPADM

## 2017-07-31 RX ORDER — SODIUM CHLORIDE 9 MG/ML
INJECTION, SOLUTION INTRAVENOUS CONTINUOUS
Status: DISCONTINUED | OUTPATIENT
Start: 2017-07-31 | End: 2017-07-31 | Stop reason: HOSPADM

## 2017-07-31 RX ORDER — OXYCODONE HYDROCHLORIDE 5 MG/1
5 TABLET ORAL
Status: DISCONTINUED | OUTPATIENT
Start: 2017-07-31 | End: 2017-07-31

## 2017-07-31 RX ORDER — SODIUM CHLORIDE, SODIUM LACTATE, POTASSIUM CHLORIDE, CALCIUM CHLORIDE 600; 310; 30; 20 MG/100ML; MG/100ML; MG/100ML; MG/100ML
INJECTION, SOLUTION INTRAVENOUS CONTINUOUS PRN
Status: DISCONTINUED | OUTPATIENT
Start: 2017-07-31 | End: 2017-07-31

## 2017-07-31 RX ORDER — HYDROMORPHONE HYDROCHLORIDE 2 MG/ML
0.2 INJECTION, SOLUTION INTRAMUSCULAR; INTRAVENOUS; SUBCUTANEOUS EVERY 5 MIN PRN
Status: DISCONTINUED | OUTPATIENT
Start: 2017-07-31 | End: 2017-07-31 | Stop reason: HOSPADM

## 2017-07-31 RX ORDER — SODIUM CHLORIDE 9 MG/ML
INJECTION, SOLUTION INTRAVENOUS CONTINUOUS
Status: CANCELLED | OUTPATIENT
Start: 2017-07-31

## 2017-07-31 RX ORDER — PANTOPRAZOLE SODIUM 40 MG/1
40 TABLET, DELAYED RELEASE ORAL DAILY
Qty: 30 TABLET | Refills: 11 | Status: SHIPPED | OUTPATIENT
Start: 2017-07-31 | End: 2018-02-15 | Stop reason: SDUPTHER

## 2017-07-31 RX ORDER — MORPHINE SULFATE 10 MG/ML
2 INJECTION INTRAMUSCULAR; INTRAVENOUS; SUBCUTANEOUS EVERY 5 MIN PRN
Status: DISCONTINUED | OUTPATIENT
Start: 2017-07-31 | End: 2017-07-31

## 2017-07-31 RX ORDER — HYDROCODONE BITARTRATE AND ACETAMINOPHEN 5; 325 MG/1; MG/1
1 TABLET ORAL EVERY 6 HOURS PRN
Qty: 20 TABLET | Refills: 0 | Status: SHIPPED | OUTPATIENT
Start: 2017-07-31 | End: 2017-08-10

## 2017-07-31 RX ADMIN — PROPOFOL 200 MG: 10 INJECTION, EMULSION INTRAVENOUS at 10:07

## 2017-07-31 RX ADMIN — LIDOCAINE HYDROCHLORIDE 80 MG: 10 INJECTION, SOLUTION INFILTRATION; PERINEURAL at 10:07

## 2017-07-31 RX ADMIN — ONDANSETRON 4 MG: 2 INJECTION, SOLUTION INTRAMUSCULAR; INTRAVENOUS at 11:07

## 2017-07-31 RX ADMIN — PROPOFOL 50 MG: 10 INJECTION, EMULSION INTRAVENOUS at 10:07

## 2017-07-31 RX ADMIN — MIDAZOLAM HYDROCHLORIDE 2 MG: 1 INJECTION, SOLUTION INTRAMUSCULAR; INTRAVENOUS at 10:07

## 2017-07-31 RX ADMIN — SODIUM CHLORIDE, SODIUM LACTATE, POTASSIUM CHLORIDE, AND CALCIUM CHLORIDE: 600; 310; 30; 20 INJECTION, SOLUTION INTRAVENOUS at 10:07

## 2017-07-31 RX ADMIN — ROCURONIUM BROMIDE 5 MG: 10 INJECTION, SOLUTION INTRAVENOUS at 10:07

## 2017-07-31 RX ADMIN — FENTANYL CITRATE 100 MCG: 50 INJECTION, SOLUTION INTRAMUSCULAR; INTRAVENOUS at 10:07

## 2017-07-31 RX ADMIN — DEXAMETHASONE SODIUM PHOSPHATE 8 MG: 4 INJECTION, SOLUTION INTRA-ARTICULAR; INTRALESIONAL; INTRAMUSCULAR; INTRAVENOUS; SOFT TISSUE at 10:07

## 2017-07-31 RX ADMIN — SUCCINYLCHOLINE CHLORIDE 140 MG: 20 INJECTION, SOLUTION INTRAMUSCULAR; INTRAVENOUS at 10:07

## 2017-07-31 NOTE — DISCHARGE SUMMARY
[facility]  Short Stay  Discharge Summary    Admit Date: 7/31/2017    Discharge Date and Time: 7/31/2017      Discharge Attending Physician: Corby Moeller MD     Reason for Admission: Esophageal stricture, post sleeve gastrectomy.    Hospital Course (synopsis of major diagnoses, care, treatment, and services provided during the course of the hospital stay): Uneventful and full recovery    Final Diagnoses:    Principal Problem: Ulcer of esophagus without bleeding   Secondary Diagnoses: Ulcer of esophagus without bleeding    Procedures Performed: Procedure(s) (LRB):  ESOPHAGOGASTRODUODENOSCOPY (EGD) (N/A)      Discharged Condition: good    Disposition: Home or Self Care    Discharge Condition: Stable    Follow up/Patient Instructions:  Follow-up Information     Corby Moeller MD In 6 weeks.    Specialty:  General Surgery  Why:  for a follow up visit.  Contact information:  [address]  [place] [address] [phone]                   Medications:      Medication List      START taking these medications    hydrocodone-acetaminophen 5-325mg 5-325 mg per tablet  Commonly known as:  NORCO  Take 1 tablet by mouth every 6 (six) hours as needed for Pain.     pantoprazole 40 MG tablet  Commonly known as:  PROTONIX  Take 1 tablet (40 mg total) by mouth once daily.        CONTINUE taking these medications    lisinopril 40 MG tablet  Commonly known as:  PRINIVIL,ZESTRIL        STOP taking these medications    calcium citrate-vitamin D3 315-200 mg 315-200 mg-unit per tablet  Commonly known as:  CITRACAL+D     cetirizine 10 MG tablet  Commonly known as:  ZYRTEC     cyanocobalamin 2000 MCG tablet     ferrous gluconate 324 MG tablet  Commonly known as:  FERGON     methyldopa 500 MG tablet  Commonly known as:  ALDOMET     multivitamin per tablet  Commonly known as:  THERAGRAN     naproxen 500 MG tablet  Commonly known as:  NAPROSYN     promethazine 25 MG tablet  Commonly known as:  PHENERGAN           Where to Get Your  Medications      These medications were sent to [facility] - [place], [place] - [address] [facility]  [address] [facility], [facility] [address]    Phone:  [phone]   · hydrocodone-acetaminophen 5-325mg 5-325 mg per tablet  · pantoprazole 40 MG tablet         Discharge Procedure Orders  Diet general       Corby Moeller

## 2017-07-31 NOTE — ANESTHESIA PREPROCEDURE EVALUATION
07/31/2017  Joe Ceballos is a 47 y.o., female.    Pre-op Assessment    I have reviewed the Patient Summary Reports.         Review of Systems  Anesthesia Hx:  History of prior surgery of interest to airway management or planning: Denies Family Hx of Anesthesia complications.   Denies Personal Hx of Anesthesia complications.   Cardiovascular:   Hypertension Denies MI.  Denies CAD.       Pulmonary:   Denies COPD.  Denies Asthma.  Denies Shortness of breath.    Renal/:   Denies Chronic Renal Disease.     Hepatic/GI:   Denies GERD.    Neurological:   Denies CVA. Denies Seizures.    Endocrine:   Denies Diabetes.        Physical Exam  General:  Well nourished    Airway/Jaw/Neck:  Airway Findings: Mouth Opening: Normal General Airway Assessment: Adult  Mallampati: II       Chest/Lungs:  Chest/Lungs Findings: Clear to auscultation, Normal Respiratory Rate     Heart/Vascular:  Heart Findings: Rate: Normal  Rhythm: Regular Rhythm             Anesthesia Plan  Type of Anesthesia, risks & benefits discussed:  Anesthesia Type:  general  Patient's Preference:   Intra-op Monitoring Plan:   Intra-op Monitoring Plan Comments:   Post Op Pain Control Plan:   Post Op Pain Control Plan Comments:   Induction:   IV  Beta Blocker:  Patient is not currently on a Beta-Blocker (No further documentation required).       Informed Consent: Patient understands risks and agrees with Anesthesia plan.  Questions answered.   ASA Score: 2     Day of Surgery Review of History & Physical:

## 2017-07-31 NOTE — OP NOTE
Operative Note       SURGERY DATE:  07/31/2017    PRE-OP DIAGNOSIS:  Benign esophageal stricture [K22.2]    POST-OP DIAGNOSIS:  Benign esophageal stricture [K22.2]    Procedure(s) (LRB):  ESOPHAGOGASTRODUODENOSCOPY (EGD) (N/A)    Surgeon(s) and Role:     * Corby Moeller MD - Primary    ASSISTANTS: None  ANESTHESIA: General    FINDINGS: The esophageal ulcers, two in number were found in the GE junction, likely due to severe reflux following sleeve gastrectomy.    ESTIMATED BLOOD LOSS: 5 mL              COMPLICATIONS:  None    SPECIMEN:  None    Implants: None    INDICATION:  Acute chest pain with inability to swallowing and regurgitation of food.    DESCRIPTION OF PROCEDURE:    The patient was taken to the operating room and under went general anesthesia with orotracheal intubation. The patient was placed in supine position, and the mouth guard was placed. Then EGD scope was advanced and the above findings were noted. Several pictures were taken for the record keeping. During the entire procedure, the patient was stable and the procedure was terminated. The EGD was advanced as far as the second portion of duodenum and pulled back for closer examination to find the above findings. No biopsy was done. The sleeve was found to be very well done. The patient was later awakened and extubated.           CONDITION: Good    DISPOSITION: PACU - hemodynamically stable.     Corby Moeller

## 2017-07-31 NOTE — TRANSFER OF CARE
"Anesthesia Transfer of Care Note    Patient: Joe Ceballos    Procedure(s) Performed: Procedure(s) (LRB):  ESOPHAGOGASTRODUODENOSCOPY (EGD) (N/A)    Patient location: PACU    Anesthesia Type: general    Transport from OR: Transported from OR on room air with adequate spontaneous ventilation    Post pain: adequate analgesia    Post assessment: no apparent anesthetic complications    Post vital signs: stable    Level of consciousness: awake and alert    Nausea/Vomiting: no nausea/vomiting    Complications: none    Transfer of care protocol was followed      Last vitals:   Visit Vitals  /77   Pulse (!) 59   Temp 36.7 °C (98.1 °F) (Temporal)   Resp 20   Ht 5' 6.5" (1.689 m)   Wt 85.7 kg (189 lb)   LMP 07/27/2017 (Approximate)   SpO2 96%   Breastfeeding? No   BMI 30.05 kg/m²     "

## 2017-07-31 NOTE — PLAN OF CARE
Pt resting c/o sore throat. Respirations even and unlabored on room air and tolerating well. VSS. See flow sheet for detailed assessment.

## 2017-07-31 NOTE — ANESTHESIA POSTPROCEDURE EVALUATION
"Anesthesia Post Evaluation    Patient: Joe Ceballos    Procedure(s) Performed: Procedure(s) (LRB):  ESOPHAGOGASTRODUODENOSCOPY (EGD) (N/A)    Final Anesthesia Type: general  Patient location during evaluation: PACU  Patient participation: Yes- Able to Participate  Level of consciousness: awake and alert  Post-procedure vital signs: reviewed and stable  Pain management: adequate  Airway patency: patent  PONV status at discharge: No PONV  Anesthetic complications: no      Cardiovascular status: blood pressure returned to baseline  Respiratory status: unassisted  Hydration status: euvolemic  Follow-up not needed.        Visit Vitals  BP (!) 152/95   Pulse 62   Temp 36.4 °C (97.5 °F) (Temporal)   Resp (!) 23   Ht 5' 6.5" (1.689 m)   Wt 85.7 kg (189 lb)   LMP 07/27/2017 (Approximate)   SpO2 100%   Breastfeeding? No   BMI 30.05 kg/m²       Pain/Roxy Score: Pain Assessment Performed: Yes (7/31/2017 11:57 AM)  Presence of Pain: denies (7/31/2017 11:57 AM)  Roxy Score: 10 (7/31/2017 11:57 AM)      "

## 2017-07-31 NOTE — ANESTHESIA PROCEDURE NOTES
Intubation    Diagnosis: esophageal stricture  Patient location during procedure: done in OR  Procedure start time: 7/31/2017 10:44 AM  Procedure end time: 7/31/2017 10:44 AM  Staffing  Resident/CRNA: ZI ZAVALA  Performed: resident/CRNA   Anesthesiologist was present at the time of the procedure.  Preanesthetic Checklist  Completed: patient identified, site marked, surgical consent, pre-op evaluation, timeout performed, IV checked, risks and benefits discussed, monitors and equipment checked and anesthesia consent given  Intubation  Indication: surgery  Pre-oxygenation. Induction: intravenous, mask ventilation: easy mask.  Intubation: postinduction, laryngoscopy direct, [place] 2.  Endotracheal Tube: oral, 7.0 mm ID, cuffed (inflated to minimal occlusive pressure)  Attempts: 1, Grade I - full view of cords  Tube secured at 21 cm at the lips.  Findings post-intubation: positive ETCO2, bilateral breath sounds, atraumatic / condition of teeth unchanged  Position Confirmation: auscultation  Eye Care: taped closed

## 2017-07-31 NOTE — DISCHARGE INSTRUCTIONS
General Information:    1.  Do not drink alcoholic beverages including beer for 24 hours or as long as you are on pain medication..  2.  Do not drive a motor vehicle, operate machinery or power tools, or signs legal papers for 24 hours or as long as you are on pain medication.   3.  You may experience light-headedness, dizziness, and sleepiness following surgery. Please do not stay alone. A responsible adult should be with you for this 24 hour period.  4.  Go home and rest.    5. Progress slowly to a normal diet unless instructed.  Otherwise, begin with liquids such as soft drinks, then soup and crackers working up to solid foods. Drink plenty of nonalcoholic fluids.  6.  Certain anesthetics and pain medications produce nausea and vomiting in certain       individuals. If nausea becomes a problem at home, call you doctor.    7. Several times every hour while you are awake, take 2-3 deep breaths and cough. If you had stomach surgery hold a pillow or rolled towel firmly against your stomach before you cough. This will help with any pain the cough might cause.  8. Several times every hour while you are awake, pump and flex your feet 5-6 times and do foot circles. This will help prevent blood clots.    9.Call your doctor for severe pain, bleeding, fever, or signs or symptoms of infection (pain, swelling, redness, foul odor, drainage).    10.You can contact your doctor anytime by [phone] for the [facility] (at [facility]) or [phone] for the [facility] on [facility].   [email or web address] is another way to contact your doctor if you are an active participant online with [facility].             Upper GI Endoscopy     During endoscopy, a long, flexible tube is used to view the inside of your upper GI tract.      Upper GI endoscopy allows your healthcare provider to look directly into the beginning of your gastrointestinal (GI) tract. The esophagus, stomach, and duodenum (the first part of the  small intestine) make up the upper GI tract.   Before the exam  Follow these and any other instructions you are given before your endoscopy. If you dont follow the healthcare providers instructions carefully, the test may need to be canceled or done over:  · Don't eat or drink anything after midnight the night before your exam. If your exam is in the afternoon, drink only clear liquids in the morning. Don't eat or drink anything for 8 hours before the exam. In some cases, you may be able to take medicines with sips of water until 2 hours before the procedure. Speak with your healthcare provider about this.   · Bring your X-rays and any other test results you have.  · Because you will be sedated, arrange for an adult to drive you home after the exam.  · Tell your healthcare provider before the exam if you are taking any medicines or have any medical problems.  The procedure  Here is what to expect:  · You will lie on the endoscopy table. Usually patients lie on the left side.  · You will be monitored and given oxygen.  · Your throat may be numbed with a spray or gargle. You are given medicine through an intravenous (IV) line that will help you relax and remain comfortable. You may be awake or asleep during the procedure.  · The healthcare provider will put the endoscope in your mouth and down your esophagus. It is thinner than most pieces of food that you swallow. It will not affect your breathing. The medicine helps keep you from gagging.  · Air is put into your GI tract to expand it. It can make you burp.  · During the procedure, the healthcare provider can take biopsies (tissue samples), remove abnormalities, such as polyps, or treat abnormalities through a variety of devices placed through the endoscope. You will not feel this.   · The endoscope carries images of your upper GI tract to a video screen. If you are awake, you may be able to look at the images.  · After the procedure is done, you will rest for a  time. An adult must drive you home.  When to call your healthcare provider  Contact your healthcare provider if you have:  · Black or tarry stools, or blood in your stool  · Fever  · Pain in your belly that does not go away  · Nausea and vomiting, or vomiting blood   Date Last Reviewed: 7/1/2016  © 1594-8930 [employer]. [address] [place] [address], [place] [address]. All rights reserved. This information is not intended as a substitute for professional medical care. Always follow your healthcare professional's instructions.              Pantoprazole tablets  What is this medicine?  PANTOPRAZOLE (pan TOE pra zole) prevents the production of acid in the stomach. It is used to treat gastroesophageal reflux disease (GERD), inflammation of the esophagus, and Zollinger-Mcarthur syndrome.  How should I use this medicine?  Take this medicine by mouth. Swallow the tablets whole with a drink of water. Follow the directions on the prescription label. Do not crush, break, or chew. Take your medicine at regular intervals. Do not take your medicine more often than directed.  Talk to your pediatrician regarding the use of this medicine in children. While this drug may be prescribed for children as young as 5 years for selected conditions, precautions do apply.  What side effects may I notice from receiving this medicine?  Side effects that you should report to your doctor or health care professional as soon as possible:  · allergic reactions like skin rash, itching or hives, swelling of the face, lips, or tongue  · bone, muscle or joint pain  · breathing problems  · chest pain or chest tightness  · dark yellow or brown urine  · dizziness  · fast, irregular heartbeat  · feeling faint or lightheaded  · fever or sore throat  · muscle spasm  · palpitations  · rash on cheeks or arms that gets worse in the sun  · redness, blistering, peeling or loosening of the skin, including inside the mouth  · seizures  · tremors  · unusual  bleeding or bruising  · unusually weak or tired  · yellowing of the eyes or skin  Side effects that usually do not require medical attention (Report these to your doctor or health care professional if they continue or are bothersome.):  · constipation  · diarrhea  · dry mouth  · headache  · nausea  What may interact with this medicine?  Do not take this medicine with any of the following medications:  · atazanavir  · nelfinavir  This medicine may also interact with the following medications:  · ampicillin  · delavirdine  · erlotinib  · iron salts  · medicines for fungal infections like ketoconazole, itraconazole and voriconazole  · methotrexate  · mycophenolate mofetil  · warfarin  What if I miss a dose?  If you miss a dose, take it as soon as you can. If it is almost time for your next dose, take only that dose. Do not take double or extra doses.  Where should I keep my medicine?  Keep out of the reach of children.  Store at room temperature between 15 and 30 degrees C (59 and 86 degrees F). Protect from light and moisture. Throw away any unused medicine after the expiration date.  What should I tell my health care provider before I take this medicine?  They need to know if you have any of these conditions:  · liver disease  · low levels of magnesium in the blood  · lupus  · an unusual or allergic reaction to omeprazole, lansoprazole, pantoprazole, rabeprazole, other medicines, foods, dyes, or preservatives  · pregnant or trying to get pregnant  · breast-feeding  What should I watch for while using this medicine?  It can take several days before your stomach pain gets better. Check with your doctor or health care professional if your condition does not start to get better, or if it gets worse.  You may need blood work done while you are taking this medicine.  Date Last Reviewed:   NOTE:This sheet is a summary. It may not cover all possible information. If you have questions about this medicine, talk to your doctor,  pharmacist, or health care provider. Copyright© 2016 [employer]

## 2017-08-07 ENCOUNTER — HOSPITAL ENCOUNTER (EMERGENCY)
Facility: HOSPITAL | Age: 47
Discharge: HOME | End: 2017-08-07
Attending: EMERGENCY MEDICINE
Payer: MEDICAID

## 2017-08-07 VITALS
TEMPERATURE: 99 F | SYSTOLIC BLOOD PRESSURE: 139 MMHG | HEIGHT: 68 IN | BODY MASS INDEX: 28.34 KG/M2 | DIASTOLIC BLOOD PRESSURE: 64 MMHG | RESPIRATION RATE: 17 BRPM | OXYGEN SATURATION: 100 % | WEIGHT: 187 LBS | HEART RATE: 67 BPM

## 2017-08-07 DIAGNOSIS — T78.3XXA ANGIOEDEMA, INITIAL ENCOUNTER: Primary | ICD-10-CM

## 2017-08-07 PROCEDURE — 25000003 PHARM REV CODE 250: Performed by: EMERGENCY MEDICINE

## 2017-08-07 PROCEDURE — 96375 TX/PRO/DX INJ NEW DRUG ADDON: CPT

## 2017-08-07 PROCEDURE — 99284 EMERGENCY DEPT VISIT MOD MDM: CPT | Mod: 25

## 2017-08-07 PROCEDURE — 63600175 PHARM REV CODE 636 W HCPCS: Performed by: EMERGENCY MEDICINE

## 2017-08-07 PROCEDURE — 96374 THER/PROPH/DIAG INJ IV PUSH: CPT

## 2017-08-07 PROCEDURE — S0028 INJECTION, FAMOTIDINE, 20 MG: HCPCS | Performed by: EMERGENCY MEDICINE

## 2017-08-07 RX ORDER — METHYLPREDNISOLONE SOD SUCC 125 MG
125 VIAL (EA) INJECTION
Status: COMPLETED | OUTPATIENT
Start: 2017-08-07 | End: 2017-08-07

## 2017-08-07 RX ORDER — DIPHENHYDRAMINE HYDROCHLORIDE 50 MG/ML
12.5 INJECTION INTRAMUSCULAR; INTRAVENOUS
Status: COMPLETED | OUTPATIENT
Start: 2017-08-07 | End: 2017-08-07

## 2017-08-07 RX ORDER — FAMOTIDINE 10 MG/ML
20 INJECTION INTRAVENOUS
Status: COMPLETED | OUTPATIENT
Start: 2017-08-07 | End: 2017-08-07

## 2017-08-07 RX ADMIN — DIPHENHYDRAMINE HYDROCHLORIDE 12.5 MG: 50 INJECTION, SOLUTION INTRAMUSCULAR; INTRAVENOUS at 12:08

## 2017-08-07 RX ADMIN — FAMOTIDINE 20 MG: 10 INJECTION, SOLUTION INTRAVENOUS at 12:08

## 2017-08-07 RX ADMIN — METHYLPREDNISOLONE SODIUM SUCCINATE 125 MG: 125 INJECTION, POWDER, FOR SOLUTION INTRAMUSCULAR; INTRAVENOUS at 12:08

## 2017-08-07 NOTE — ED NOTES
Pt sitting up in bed. No acute distress. Denies any needs. Patient okay for discharge per provider.

## 2017-08-07 NOTE — ED PROVIDER NOTES
SCRIBE #1 NOTE: I, Pia Chong, am scribing for, and in the presence of, Raad Del Toro MD. I have scribed the entire note.      History      Chief Complaint   Patient presents with    Allergic Reaction     pt is having a reaction to lisinopril at 6am       Review of patient's allergies indicates:   Allergen Reactions    Sulfa (sulfonamide antibiotics) Anaphylaxis        HPI   HPI    2017, 12:15 PM   History obtained from the patient      History of Present Illness: Joe Ceballos is a 47 y.o. female patient with HTN  who presents to the Emergency Department for upper and lower lip edema which onset gradually this morning. Pt states that she has been taking Lisinopril for years to tx HTN. Symptoms are constant and moderate in severity. No mitigating or exacerbating factors reported. No associated sxs reported. Patient denies fever, chills, CP, SOB, cough, sore throat, tongue/throat edema, voice change, stridor, wheezing, drooling, difficulty swallowing, and all other sxs at this time. No further complaints or concerns at this time.     Arrival mode: Personal vehicle    PCP: Josse Cloud MD       Past Medical History:  Past Medical History:   Diagnosis Date    Anxiety     Edema     Hypertension     Initial insomnia     Sickle cell trait     Tobacco use        Past Surgical History:  Past Surgical History:   Procedure Laterality Date    BREAST SURGERY       SECTION      x 2    CYST REMOVAL      from left tube    gastric sleeve      TONSILLECTOMY           Family History:  Family History   Problem Relation Age of Onset    Adopted: Yes    Hypertension Mother     Thyroid disease Paternal Aunt     Hypertension Paternal Grandmother     Breast cancer Neg Hx     Colon cancer Neg Hx     Ovarian cancer Neg Hx        Social History:  Social History     Social History Main Topics    Smoking status: Former Smoker     Packs/day: 0.50     Types: Cigarettes    Smokeless tobacco:  Never Used    Alcohol use No    Drug use: No    Sexual activity: Not Currently     Partners: Male     Birth control/ protection: None       ROS   Review of Systems   Constitutional: Negative for chills and fever.   HENT: Positive for facial swelling (upper and lower lip). Negative for drooling, sore throat, trouble swallowing and voice change.    Respiratory: Negative for cough, shortness of breath, wheezing and stridor.    Cardiovascular: Negative for chest pain.   Gastrointestinal: Negative for nausea.   Genitourinary: Negative for dysuria.   Musculoskeletal: Negative for back pain.   Skin: Negative for rash.   Neurological: Negative for weakness.   Hematological: Does not bruise/bleed easily.   All other systems reviewed and are negative.      Physical Exam      Initial Vitals [08/07/17 1148]   BP Pulse Resp Temp SpO2   96/65 68 18 99 °F (37.2 °C) 100 %      MAP       75.33          Physical Exam  Nursing Notes and Vital Signs Reviewed.  Constitutional: Patient is in no acute distress. Awake and alert. Well-developed and well-nourished.  Head: Atraumatic. Normocephalic.  Eyes: PERRL. EOM intact. Conjunctivae are not pale. No scleral icterus.  Mouth/ENT: Upper and lower lip edema. No tongue or oropharyngeal edema. Airway is clear and patent. Patient handles secretions normally. No stridor.  Neck: Supple. Full ROM. No lymphadenopathy.  Cardiovascular: Regular rate. Regular rhythm. No murmurs, rubs, or gallops.   Pulmonary/Chest: No respiratory distress. Clear to auscultation bilaterally. No wheezing, rales, or rhonchi.  Abdominal: Soft and non-distended.  There is no tenderness.  No rebound, guarding, or rigidity.  Good bowel sounds.    Musculoskeletal: Moves all extremities. No obvious deformities.   Skin: Warm and dry.  Neurological:  Alert, awake, and appropriate.  Normal speech.  No acute focal neurological deficits are appreciated.  Psychiatric: Normal affect. Good eye contact. Appropriate in content.    ED  "Course    Procedures  ED Vital Signs:  Vitals:    08/07/17 1148 08/07/17 1220 08/07/17 1229 08/07/17 1321   BP: 96/65  136/76 138/72   Pulse: 68 63 64 61   Resp: 18  18 18   Temp: 99 °F (37.2 °C)      TempSrc: Oral      SpO2: 100%  100% 100%   Weight: 84.8 kg (187 lb)      Height: 5' 8" (1.727 m)       08/07/17 1425   BP: 139/64   Pulse: 67   Resp: 17   Temp: 98.9 °F (37.2 °C)   TempSrc:    SpO2: 100%   Weight:    Height:        The Emergency Provider reviewed the vital signs and test results, which are outlined above.    ED Discussion     1:34 PM: Discussed pt dx. Patient's edema has improved. Pt does not want to go home on steroids secondary to her gastric sleeve procedure done in Oct 2017.  Informed patient to discontinue Lisinopril and to f/u with PCP to be started on another HTN medication.  All questions and concerns were addressed at this time. Pt expresses understanding of information and instructions, and is comfortable with plan to discharge. Pt is stable for discharge.    Patient is safe for discharge. There is no suggestion of airway or ENT emergency. Patient is hemodynamically stable and there is no suggestion of active anaphylaxis or progressive worsening of current symptoms.    ED Medication(s):  Medications   methylPREDNISolone sodium succinate injection 125 mg (125 mg Intravenous Given 8/7/17 1217)   famotidine (PF) injection 20 mg (20 mg Intravenous Given 8/7/17 1220)   diphenhydrAMINE injection 12.5 mg (12.5 mg Intravenous Given 8/7/17 1214)       Discharge Medication List as of 8/7/2017  1:33 PM          Follow-up Information     Josse Cloud MD. Call in 1 day.    Specialty:  Internal Medicine  Contact information:  [address] [facility] [address] [phone]             [facility].    Specialty:  Emergency Medicine  Why:  If symptoms worsen  Contact information:  [address]  [facility] [address] [phone]                  Medical " Decision Making              Scribe Attestation:   Scribe #1: I performed the above scribed service and the documentation accurately describes the services I performed. I attest to the accuracy of the note.    Attending:   Physician Attestation Statement for Scribe #1: I, Raad Del Toro MD, personally performed the services described in this documentation, as scribed by Pia Chong, in my presence, and it is both accurate and complete.          Clinical Impression       ICD-10-CM ICD-9-CM   1. Angioedema, initial encounter T78.3XXA 995.1       Disposition:   Disposition: Discharged  Condition: Stable         Raad Del Toro MD  08/15/17 0614

## 2017-08-07 NOTE — ED NOTES
Pt sitting up in bed. No acute distress. Respirations even and unlabored. Will continue to monitor.

## 2017-08-11 ENCOUNTER — TELEPHONE (OUTPATIENT)
Dept: SURGERY | Facility: CLINIC | Age: 47
End: 2017-08-11

## 2017-08-11 NOTE — TELEPHONE ENCOUNTER
Patient called stating that she has been having nausea and vomiting and the feeling of food stuck in chest after trying regular diet today, patient still taking protonix for ulcers, spoke with Dr. Moeller, new orders given for patient to go back on liquid diet and to schedule an appointment for patient to come in on Tuesday, patient already has an appointment scheduled.

## 2017-08-15 ENCOUNTER — TELEPHONE (OUTPATIENT)
Dept: SURGERY | Facility: CLINIC | Age: 47
End: 2017-08-15

## 2017-08-15 NOTE — TELEPHONE ENCOUNTER
S/W pt via phone in rg to bart appt, during conversation appt was bart to 8/18/17 @ 3:20PM with Dr. Moeller/Emre. Voiced understanding

## 2017-08-18 ENCOUNTER — OFFICE VISIT (OUTPATIENT)
Dept: SURGERY | Facility: CLINIC | Age: 47
End: 2017-08-18
Payer: MEDICAID

## 2017-08-18 VITALS
DIASTOLIC BLOOD PRESSURE: 106 MMHG | WEIGHT: 191.13 LBS | HEART RATE: 82 BPM | TEMPERATURE: 98 F | BODY MASS INDEX: 29.06 KG/M2 | SYSTOLIC BLOOD PRESSURE: 159 MMHG

## 2017-08-18 DIAGNOSIS — Z98.84 S/P LAPAROSCOPIC SLEEVE GASTRECTOMY: Primary | ICD-10-CM

## 2017-08-18 PROCEDURE — 3077F SYST BP >= 140 MM HG: CPT | Mod: ,,, | Performed by: SURGERY

## 2017-08-18 PROCEDURE — 99214 OFFICE O/P EST MOD 30 MIN: CPT | Mod: S$PBB,,, | Performed by: SURGERY

## 2017-08-18 PROCEDURE — 3080F DIAST BP >= 90 MM HG: CPT | Mod: ,,, | Performed by: SURGERY

## 2017-08-18 PROCEDURE — 3008F BODY MASS INDEX DOCD: CPT | Mod: ,,, | Performed by: SURGERY

## 2017-08-18 PROCEDURE — 99213 OFFICE O/P EST LOW 20 MIN: CPT | Mod: PBBFAC,PO | Performed by: SURGERY

## 2017-08-18 PROCEDURE — 99999 PR PBB SHADOW E&M-EST. PATIENT-LVL III: CPT | Mod: PBBFAC,,, | Performed by: SURGERY

## 2017-08-18 RX ORDER — SUCRALFATE 1 G/10ML
1 SUSPENSION ORAL
Qty: 200 ML | Refills: 6 | Status: SHIPPED | OUTPATIENT
Start: 2017-08-18 | End: 2018-02-15

## 2017-08-18 NOTE — PROGRESS NOTES
Joe has return to my office for a follow-up visit since the last encounter.  She did have a laparoscopic sleeve gastrectomy elsewhere and had developed GE junction stricture.  She was unable to swallow and had been struggling for several month.  She was seen and evaluated with EGD which showed that she had severe esophageal ulcer with swelling.  She had a good patency of the esophagus.  She was placed on Protonix.  And she was instructed to follow-up in 6 weeks for a follow-up EGD.  She has returned to me today for reexamination because of severe heartburn as well as inability to lay down on her back because of the regurgitation of food.  From the clinical presentation, it appears that patient has severe reflux as well.  This explains her condition.  She is currently on Protonix and will add Carafate to the drug regiment.  After the 6 weeks of treatment, she will have an evaluation with EGD.  If the acute stage is controlled, she will need to go through high-definition impedance manometry as well as pH monitoring.  Once the reflux is diagnosed, the patient's option is Raheel-en-Y gastric bypass.  Because of the complexity of the case, this patient may be need to be referred to tertiary care center.  Total time approximately half an hour was spent for this patient, and more than 50% of that was spent for treatment planning and counseling.    Corby Moellre

## 2017-08-20 ENCOUNTER — HOSPITAL ENCOUNTER (EMERGENCY)
Facility: HOSPITAL | Age: 47
Discharge: HOME | End: 2017-08-20
Attending: EMERGENCY MEDICINE
Payer: MEDICAID

## 2017-08-20 VITALS
SYSTOLIC BLOOD PRESSURE: 125 MMHG | DIASTOLIC BLOOD PRESSURE: 82 MMHG | HEART RATE: 69 BPM | TEMPERATURE: 98 F | OXYGEN SATURATION: 100 % | RESPIRATION RATE: 13 BRPM | HEIGHT: 68 IN

## 2017-08-20 DIAGNOSIS — R60.0 FACIAL EDEMA: Primary | ICD-10-CM

## 2017-08-20 PROCEDURE — 96372 THER/PROPH/DIAG INJ SC/IM: CPT

## 2017-08-20 PROCEDURE — 99283 EMERGENCY DEPT VISIT LOW MDM: CPT | Mod: 25

## 2017-08-20 PROCEDURE — 63600175 PHARM REV CODE 636 W HCPCS: Performed by: EMERGENCY MEDICINE

## 2017-08-20 RX ORDER — VALSARTAN 80 MG/1
80 TABLET ORAL DAILY
Qty: 90 TABLET | Refills: 3 | Status: SHIPPED | OUTPATIENT
Start: 2017-08-20 | End: 2018-02-15

## 2017-08-20 RX ORDER — METHYLPREDNISOLONE ACETATE 40 MG/ML
40 INJECTION, SUSPENSION INTRA-ARTICULAR; INTRALESIONAL; INTRAMUSCULAR; SOFT TISSUE
Status: COMPLETED | OUTPATIENT
Start: 2017-08-20 | End: 2017-08-20

## 2017-08-20 RX ORDER — DIPHENHYDRAMINE HYDROCHLORIDE 50 MG/ML
25 INJECTION INTRAMUSCULAR; INTRAVENOUS
Status: COMPLETED | OUTPATIENT
Start: 2017-08-20 | End: 2017-08-20

## 2017-08-20 RX ADMIN — METHYLPREDNISOLONE ACETATE 40 MG: 40 INJECTION, SUSPENSION INTRA-ARTICULAR; INTRALESIONAL; INTRAMUSCULAR; SOFT TISSUE at 11:08

## 2017-08-20 RX ADMIN — DIPHENHYDRAMINE HYDROCHLORIDE 25 MG: 50 INJECTION, SOLUTION INTRAMUSCULAR; INTRAVENOUS at 11:08

## 2017-08-20 NOTE — ED PROVIDER NOTES
"SCRIBE #1 NOTE: I, Vicki Jana, am scribing for, and in the presence of, Julio Brooks MD. I have scribed the entire note.      History      Chief Complaint   Patient presents with    Angioedema     " taking lisinopril"       Review of patient's allergies indicates:   Allergen Reactions    Lisinopril Anaphylaxis     This is only possible agent at the time.    Sulfa (sulfonamide antibiotics) Anaphylaxis        HPI   HPI    2017, 11:38 AM   History obtained from the patient      History of Present Illness: Joe Ceballos is a 47 y.o. female patient who presents to the Emergency Department for facial angioedema which onset suddenly this morning. Symptoms are moderate in severity. Associated sxs include SOB and diarrhea. No mitigating or exacerbating factors reported. Patient denies any trouble swallowing, n/v, fever, chills, skin rash, voice change, CP, and all other sxs at this time. Pt states she had a similar episode with the same sxs 6 days ago.  Pt takes lisinopril, and started taking Protonix 3 weeks ago. Pt was also prescribed Carafate by Dr. Moeller (Surgery), which pt has not filled. No further complaints or concerns at this time.         Arrival mode: Personal vehicle     PCP: Josse Cloud MD       Past Medical History:  Past Medical History:   Diagnosis Date    Anxiety     Edema     Hypertension     Initial insomnia     Sickle cell trait     Tobacco use        Past Surgical History:  Past Surgical History:   Procedure Laterality Date    BREAST SURGERY       SECTION      x 2    CYST REMOVAL      from left tube    gastric sleeve      TONSILLECTOMY           Family History:  Family History   Problem Relation Age of Onset    Adopted: Yes    Hypertension Mother     Thyroid disease Paternal Aunt     Hypertension Paternal Grandmother     Breast cancer Neg Hx     Colon cancer Neg Hx     Ovarian cancer Neg Hx        Social History:  Social History     Social History Main Topics "    Smoking status: Former Smoker     Packs/day: 0.50     Types: Cigarettes    Smokeless tobacco: Never Used    Alcohol use No    Drug use: No    Sexual activity: Not Currently     Partners: Male     Birth control/ protection: None       ROS   Review of Systems   Constitutional: Negative for chills and fever.   HENT: Positive for facial swelling. Negative for congestion, sore throat, trouble swallowing and voice change.         (-) tongue swelling     Respiratory: Positive for shortness of breath. Negative for cough.    Cardiovascular: Negative for chest pain.   Gastrointestinal: Positive for diarrhea. Negative for abdominal pain, nausea and vomiting.   Genitourinary: Negative for dysuria.   Musculoskeletal: Negative for back pain.   Skin: Negative for rash.   Neurological: Negative for weakness.   Hematological: Does not bruise/bleed easily.   All other systems reviewed and are negative.    Physical Exam      Initial Vitals [08/20/17 1027]   BP Pulse Resp Temp SpO2   120/63 97 18 98.1 °F (36.7 °C) 98 %      MAP       82          Physical Exam  Nursing Notes and Vital Signs Reviewed.  Vital signs and nursing notes reviewed.  Constitutional: Patient is in no acute distress. Awake and alert. Well-developed and well-nourished.  Head: Atraumatic. Normocephalic. Mild erythema to face and lips. No tenderness  Eyes: PERRL. EOM intact. Conjunctivae nl. No scleral icterus.  Ears: Right TM normal. Left TM normal. No erythema. No bulging. No effusion or air-fluid levels. No perforation.   Nose: Patent nares. Turbinates are normal. No drainage.   Throat: Moist mucous membranes. Posterior oropharynx is symmetric without erythema. Tonsillar exudate is not present. No trismus. Normal handling of secretions. No stridor.  Neck: Supple. Full ROM. No lymphadenopathy.  Cardiovascular: Regular rate and rhythm. No murmurs, rubs, or gallops. Distal pulses are 2+ and symmetric .  Pulmonary/Chest: No respiratory distress. Clear to  "auscultation bilaterally. No wheezing, rales, or rhonchi.  Abdominal: Soft. Non-distended. No TTP. No rebound, guarding, or rigidity. Good bowel sounds.  Genitourinary: No CVA tenderness  Musculoskeletal: Moves all extremities. No edema. No calf tenderness.  Skin: Warm and dry.  Neurological: Awake and alert. No acute focal neurological deficits are appreciated.  Psychiatric: Normal affect. Good eye contact. Appropriate in content.      ED Course    Procedures  ED Vital Signs:  Vitals:    08/20/17 1027 08/20/17 1128 08/20/17 1158 08/20/17 1212   BP: 120/63 (!) 93/57 (!) 108/58 120/83   Pulse: 97 83 72 84   Resp: 18 16 20 (!) 23   Temp: 98.1 °F (36.7 °C)      TempSrc: Oral      SpO2: 98% 99% 100% 99%   Height: 5' 8" (1.727 m)       08/20/17 1228 08/20/17 1236   BP: 125/82 125/82   Pulse: 69 69   Resp: (!) 7 13   Temp:     TempSrc:     SpO2: 100% 100%   Height:           All Lab Results:  Results for orders placed or performed during the hospital encounter of 07/31/17   POCT urine pregnancy   Result Value Ref Range    POC Preg Test, Ur Negative Negative     Acceptable Yes          Imaging Results:  None ordered.           The Emergency Provider reviewed the vital signs and test results, which are outlined above.    ED Discussion     12:27 PM: Reassessed pt at this time.  Pt states her condition has improved at this time. Discussed with pt all pertinent ED information and results. Discussed pt dx and plan of tx. Gave pt all f/u and return to the ED instructions. All questions and concerns were addressed at this time. Pt expresses understanding of information and instructions, and is comfortable with plan to discharge. Pt is stable for discharge.    I discussed with patient that evaluation in the ED does not suggest any emergent or life threatening medical conditions requiring immediate intervention beyond what was provided in the ED, and I believe patient is safe for discharge.  Regardless, an " unremarkable evaluation in the ED does not preclude the development or presence of a serious of life threatening condition. As such, patient was instructed to return immediately for any worsening or change in current symptoms.        ED Medication(s):  Medications   methylPREDNISolone acetate injection 40 mg (40 mg Intramuscular Given 8/20/17 1153)   diphenhydrAMINE injection 25 mg (25 mg Intramuscular Given 8/20/17 1152)       Discharge Medication List as of 8/20/2017 12:30 PM      START taking these medications    Details   valsartan (DIOVAN) 80 MG tablet Take 1 tablet (80 mg total) by mouth once daily., Starting Sun 8/20/2017, Until Mon 8/20/2018, Print             Follow-up Information     Kiersten Sharma MD In 2 days.    Specialty:  Family Medicine  Contact information:  [address] [facility] [address] [phone]             [facility]    Specialty:  Emergency Medicine  Why:  As needed, If symptoms worsen  Contact information:  [address] [facility] [address] [phone]                   Medical Decision Making    Medical Decision Making:   Clinical Tests:   Lab Tests: Ordered and Reviewed           Scribe Attestation:   Scribe #1: I performed the above scribed service and the documentation accurately describes the services I performed. I attest to the accuracy of the note.    Attending:   Physician Attestation Statement for Scribe #1: I, Julio Brooks MD, personally performed the services described in this documentation, as scribed by Vicki Bates, in my presence, and it is both accurate and complete.          Clinical Impression       ICD-10-CM ICD-9-CM   1. Facial edema R60.0 782.3       Disposition:   Disposition: Discharged  Condition: Stable         Julio Brooks MD  08/22/17 0524

## 2017-08-20 NOTE — ED NOTES
Asked patient if she had allergies and she gave no response, I waited a few moments while gathering my IV supplies and asked her again, she told me that she wanted someone else to come in that I was being rude to her, informed charge nurse,  Kay MITCHELL to go speak with patient.

## 2017-08-20 NOTE — ED NOTES
As charge nurse I was asked to go to the room bc the patient was refusing labs to be drawn. Upon entering the room the patient complained that the nurse was being rude by asking her what she was allergic to. I explained that was normal protocol and it was not to be rude. Pt  Stated she wanted another nurse.     Sandeep was then assigned to patient. The patient did not like the bathrooms condition near her room, she wanted to go to the [facility] restroom, sandeep and patient was stopped in the hallway, introduced and i explained where patient was going. Pt was stable at that time and remained stable. Holley gave report on the patients issues to sandeep.     Upon returning to the room. Dr. Brooks came in to assess patient and she said she was going to another hospital, before he could assess her.  (at this time the restroom near her room was being cleaned)     Pt went to registration and asked how she could give a complaint. I walked to the [facility] where she said she didn't know why I was even out there. Security handed patient the number to patient relations and pt asked for someone in charge, other than myself. Codi Toussaint was called and asked her to come speak to a patient that was not happy with her care/.     Pt had swelling to face but was in NAD.

## 2017-08-20 NOTE — ED NOTES
Patient came into the room and left out for the bathroom, stated she couldn't use the one we have in the ER, I explained I would have it addressed and was directing her to another bathroom when she walked passed me to the lobby.

## 2017-08-21 ENCOUNTER — TELEPHONE (OUTPATIENT)
Dept: SURGERY | Facility: CLINIC | Age: 47
End: 2017-08-21

## 2017-08-21 NOTE — TELEPHONE ENCOUNTER
----- Message from Sharif Mar sent at 8/21/2017  8:16 AM CDT -----  Contact: [phone]  Pt would like to consult with the nurse about a Rx medication and reaction and ER visit.  Please call back at [phone].  Thx-AMH

## 2017-08-21 NOTE — TELEPHONE ENCOUNTER
"Returned call pt stated,"she went to ED/[facility]- on 8/20/17 with swelling in the face and lips was told to stop the Protonix but to consult with Dr. Moeller first, and the "Carafate" that was prescribed by Dr. Moeller insurance does not cover it." Informed pt Dr. Moeller is not in clinic today will return on 8/22/2017,  upon his return this issue will be addressed, and a msg will be sent to him in regards to this matter. Voiced understanding  "

## 2017-08-23 ENCOUNTER — TELEPHONE (OUTPATIENT)
Dept: SURGERY | Facility: CLINIC | Age: 47
End: 2017-08-23

## 2017-08-23 NOTE — TELEPHONE ENCOUNTER
S/W pt via phone informed, the her PCP can have her to do a Release of information/Consent requesting her medical records. Voiced understanding

## 2017-08-23 NOTE — TELEPHONE ENCOUNTER
----- Message from Sharif Mar sent at 8/23/2017 12:22 PM CDT -----  Contact: [phone]   Returning call, please call back at [phone].  Please fax Doctors notes and diagnosis to Dr. Cloud office to [phone].  Please call back at [phone].  Thx-AMH

## 2017-08-25 ENCOUNTER — TELEPHONE (OUTPATIENT)
Dept: SURGERY | Facility: CLINIC | Age: 47
End: 2017-08-25

## 2017-08-28 ENCOUNTER — TELEPHONE (OUTPATIENT)
Dept: SURGERY | Facility: CLINIC | Age: 47
End: 2017-08-28

## 2017-08-28 NOTE — TELEPHONE ENCOUNTER
"Called the number provided no ans left msg via v/m the medication "Carafate" prescribed by Dr. Moeller was Approved for coverage by per Correspondence/Letter [facility] File # [id number]  "

## 2017-09-07 ENCOUNTER — TELEPHONE (OUTPATIENT)
Dept: SURGERY | Facility: CLINIC | Age: 47
End: 2017-09-07

## 2017-09-07 NOTE — TELEPHONE ENCOUNTER
----- Message from Brittney Mendez sent at 9/7/2017  3:43 PM CDT -----  Would like to consult with nurse regarding personal matter.Patient states call dropped. Please call back at [phone].      Thanks,  Brittney Mendez

## 2017-09-07 NOTE — TELEPHONE ENCOUNTER
"S/W pt stated,"she filled a consent to release MR to her PCP." Informed her consent to release MR has not been received, once received requested documents will be sent to her PCP. Voiced understanding   "

## 2017-09-07 NOTE — TELEPHONE ENCOUNTER
----- Message from Taylor Rubio sent at 9/7/2017  3:19 PM CDT -----  Call pt at [phone]//pt say call her would not tell me what she want because she say you all gave her the direct # to surgery//cassie betancur

## 2017-09-07 NOTE — TELEPHONE ENCOUNTER
S/W pt via phone in regards to clearance letter for employment, informed pt Dr. Moeller is not in clinic today will return on 9/12/17, and upon his return this matter will be addressed. Voiced understanding

## 2017-09-21 ENCOUNTER — TELEPHONE (OUTPATIENT)
Dept: SURGERY | Facility: CLINIC | Age: 47
End: 2017-09-21

## 2017-09-21 NOTE — TELEPHONE ENCOUNTER
----- Message from Taylor Rubio sent at 9/21/2017  3:54 PM CDT -----  Call pt at [phone]//calling to see where she need to go to get her vitiamins///cassie betancur

## 2017-09-29 ENCOUNTER — OFFICE VISIT (OUTPATIENT)
Dept: SURGERY | Facility: CLINIC | Age: 47
End: 2017-09-29
Payer: MEDICAID

## 2017-09-29 VITALS
BODY MASS INDEX: 28.89 KG/M2 | DIASTOLIC BLOOD PRESSURE: 93 MMHG | WEIGHT: 190.06 LBS | SYSTOLIC BLOOD PRESSURE: 148 MMHG | HEART RATE: 86 BPM | TEMPERATURE: 99 F

## 2017-09-29 DIAGNOSIS — K21.00 GASTROESOPHAGEAL REFLUX DISEASE WITH ESOPHAGITIS: Primary | ICD-10-CM

## 2017-09-29 PROCEDURE — 3008F BODY MASS INDEX DOCD: CPT | Mod: ,,, | Performed by: SURGERY

## 2017-09-29 PROCEDURE — 99212 OFFICE O/P EST SF 10 MIN: CPT | Mod: PBBFAC,PO | Performed by: SURGERY

## 2017-09-29 PROCEDURE — 3077F SYST BP >= 140 MM HG: CPT | Mod: ,,, | Performed by: SURGERY

## 2017-09-29 PROCEDURE — 99999 PR PBB SHADOW E&M-EST. PATIENT-LVL II: CPT | Mod: PBBFAC,,, | Performed by: SURGERY

## 2017-09-29 PROCEDURE — 3080F DIAST BP >= 90 MM HG: CPT | Mod: ,,, | Performed by: SURGERY

## 2017-09-29 PROCEDURE — 99213 OFFICE O/P EST LOW 20 MIN: CPT | Mod: S$PBB,,, | Performed by: SURGERY

## 2017-09-29 NOTE — PROGRESS NOTES
Joe is 47-year-old -American female patient who is well-known to me from previous encounters.  She did have laparoscopic sleeve gastrectomy by Dr. Ochoa several years ago and had a complicated with reflux and severe esophagitis.  She did undergo EGD evaluation was found to have an ulcerated esophagus and has been placed on Protonix with Carafate.  She had a symptomatic improvement but continues to have difficulty eating solid food.  The patient has lost a significant amount of weight as result.  She is currently back to work and able to function.  She is to be scheduled for diagnostic follow-up EGD on October 25 of 2017.  The patient is expected to follow-up.  Total time approximately 15 minute was spent for this patient, and more than 50% of that was spent for treatment planning and counseling.    Corby Moeller

## 2017-10-11 ENCOUNTER — TELEPHONE (OUTPATIENT)
Dept: SURGERY | Facility: CLINIC | Age: 47
End: 2017-10-11

## 2017-10-11 NOTE — TELEPHONE ENCOUNTER
S/W pt here at the [facility] informed pt Dr. Moeller is not in today will return on 10/13/17 upon his return this matter will be addressed, and advised pt to speak with Neela Chan to see if she can help with this issue. Voiced understanding

## 2017-10-11 NOTE — TELEPHONE ENCOUNTER
----- Message from Lili Landis sent at 10/11/2017 10:30 AM CDT -----  Contact: Pt  Pt called and stated she needed to speak to the nurse. She stated that she needs a [id number] slip today stating she does not have any restrictions. She can be reached at [phone],    Thanks,  TF

## 2017-10-25 ENCOUNTER — HOSPITAL ENCOUNTER (OUTPATIENT)
Facility: HOSPITAL | Age: 47
Discharge: HOME | End: 2017-10-25
Attending: SURGERY | Admitting: SURGERY
Payer: MEDICAID

## 2017-10-25 ENCOUNTER — ANESTHESIA (OUTPATIENT)
Dept: ENDOSCOPY | Facility: HOSPITAL | Age: 47
End: 2017-10-25
Payer: MEDICAID

## 2017-10-25 ENCOUNTER — SURGERY (OUTPATIENT)
Age: 47
End: 2017-10-25

## 2017-10-25 ENCOUNTER — ANESTHESIA EVENT (OUTPATIENT)
Dept: ENDOSCOPY | Facility: HOSPITAL | Age: 47
End: 2017-10-25
Payer: MEDICAID

## 2017-10-25 VITALS — RESPIRATION RATE: 18 BRPM

## 2017-10-25 DIAGNOSIS — K22.10 ULCER OF ESOPHAGUS WITHOUT BLEEDING: Primary | Chronic | ICD-10-CM

## 2017-10-25 DIAGNOSIS — R13.10 DYSPHAGIA, UNSPECIFIED(787.20): ICD-10-CM

## 2017-10-25 LAB
B-HCG UR QL: NEGATIVE
CTP QC/QA: YES

## 2017-10-25 PROCEDURE — 63600175 PHARM REV CODE 636 W HCPCS: Performed by: NURSE ANESTHETIST, CERTIFIED REGISTERED

## 2017-10-25 PROCEDURE — 43235 EGD DIAGNOSTIC BRUSH WASH: CPT | Mod: ,,, | Performed by: SURGERY

## 2017-10-25 PROCEDURE — 81025 URINE PREGNANCY TEST: CPT | Performed by: SURGERY

## 2017-10-25 PROCEDURE — 43235 EGD DIAGNOSTIC BRUSH WASH: CPT | Performed by: SURGERY

## 2017-10-25 PROCEDURE — 37000008 HC ANESTHESIA 1ST 15 MINUTES: Performed by: SURGERY

## 2017-10-25 PROCEDURE — 37000009 HC ANESTHESIA EA ADD 15 MINS: Performed by: SURGERY

## 2017-10-25 PROCEDURE — 25000003 PHARM REV CODE 250: Performed by: SURGERY

## 2017-10-25 RX ORDER — SODIUM CHLORIDE, SODIUM LACTATE, POTASSIUM CHLORIDE, CALCIUM CHLORIDE 600; 310; 30; 20 MG/100ML; MG/100ML; MG/100ML; MG/100ML
INJECTION, SOLUTION INTRAVENOUS CONTINUOUS
Status: DISCONTINUED | OUTPATIENT
Start: 2017-10-25 | End: 2017-10-25 | Stop reason: HOSPADM

## 2017-10-25 RX ORDER — PROPOFOL 10 MG/ML
INJECTION, EMULSION INTRAVENOUS
Status: DISCONTINUED | OUTPATIENT
Start: 2017-10-25 | End: 2017-10-25

## 2017-10-25 RX ORDER — LIDOCAINE HCL/PF 100 MG/5ML
SYRINGE (ML) INTRAVENOUS
Status: DISCONTINUED | OUTPATIENT
Start: 2017-10-25 | End: 2017-10-25

## 2017-10-25 RX ADMIN — PROPOFOL 50 MG: 10 INJECTION, EMULSION INTRAVENOUS at 12:10

## 2017-10-25 RX ADMIN — PROPOFOL 80 MG: 10 INJECTION, EMULSION INTRAVENOUS at 12:10

## 2017-10-25 RX ADMIN — PROPOFOL 30 MG: 10 INJECTION, EMULSION INTRAVENOUS at 12:10

## 2017-10-25 RX ADMIN — PROPOFOL 70 MG: 10 INJECTION, EMULSION INTRAVENOUS at 12:10

## 2017-10-25 RX ADMIN — PROPOFOL 40 MG: 10 INJECTION, EMULSION INTRAVENOUS at 12:10

## 2017-10-25 RX ADMIN — SODIUM CHLORIDE, POTASSIUM CHLORIDE, SODIUM LACTATE AND CALCIUM CHLORIDE: 600; 310; 30; 20 INJECTION, SOLUTION INTRAVENOUS at 11:10

## 2017-10-25 RX ADMIN — LIDOCAINE HYDROCHLORIDE 40 MG: 20 INJECTION, SOLUTION INTRAVENOUS at 12:10

## 2017-10-25 NOTE — H&P
Short Stay Endoscopy History and Physical    PCP - Josse Cloud MD    Procedure - EGD for surveillance  ASA - 2  Mallampati - per anesthesia  History of Anesthesia problems - no  Family history Anesthesia problems -  no     HPI:  This is a 47 y.o.female here for evaluation of :     Reflux - no  Dysphagia - no  Abdominal pain - no  Diarrhea - no  Anemia - no  GI bleeding - no  Nausea and vomiting-no  Early satiety-no  aversion to sight or smell of food-no    ROS:  Constitutional: No fevers, chills, No weight loss  ENT: No allergies  CV: No chest pain  Pulm: No cough, No shortness of breath  Ophtho: No vision changes  GI: see HPI  Derm: No rash  Heme: No lymphadenopathy, No bruising  MSK: No arthritis  : No dysuria, No hematuria  Endo: No hot or cold intolerance  Neuro: No syncope, No seizure  Psych: No anxiety, No depression    Medical History:  Past Medical History:   Diagnosis Date    Anxiety     Edema     Hypertension     Initial insomnia     Sickle cell trait     Tobacco use        Surgical History:  Past Surgical History:   Procedure Laterality Date    BREAST SURGERY       SECTION      x 2    CYST REMOVAL      from left tube    gastric sleeve      TONSILLECTOMY         Family History:  Family History   Problem Relation Age of Onset    Adopted: Yes    Hypertension Mother     Thyroid disease Paternal Aunt     Hypertension Paternal Grandmother     Breast cancer Neg Hx     Colon cancer Neg Hx     Ovarian cancer Neg Hx        Social History:  Social History     Social History    Marital status: Single     Spouse name: N/A    Number of children: N/A    Years of education: N/A     Occupational History    Not on file.     Social History Main Topics    Smoking status: Former Smoker     Packs/day: 0.50     Types: Cigarettes    Smokeless tobacco: Never Used    Alcohol use No    Drug use: No    Sexual activity: Not Currently     Partners: Male     Birth control/ protection: None      Other Topics Concern    Not on file     Social History Narrative    No narrative on file       Allergies:   Review of patient's allergies indicates:   Allergen Reactions    Lisinopril Anaphylaxis     This is only possible agent at the time.    Sulfa (sulfonamide antibiotics) Anaphylaxis       Medications:   No current facility-administered medications on file prior to encounter.      Current Outpatient Prescriptions on File Prior to Encounter   Medication Sig Dispense Refill    pantoprazole (PROTONIX) 40 MG tablet Take 1 tablet (40 mg total) by mouth once daily. 30 tablet 11    sucralfate (CARAFATE) 100 mg/mL suspension Take 10 mLs (1 g total) by mouth 4 (four) times daily with meals and nightly. 200 mL 6    valsartan (DIOVAN) 80 MG tablet Take 1 tablet (80 mg total) by mouth once daily. 90 tablet 3       Objective Findings:    Vital Signs:  Vitals:    10/25/17 1146   BP: (!) 177/102   Pulse: 84   Resp: 18   Temp: 98.7 °F (37.1 °C)           Physical Exam:  General Appearance: Well appearing in no acute distress  Eyes:    No scleral icterus  ENT: Neck supple, Lips, mucosa, and tongue normal; teeth and gums normal  Lungs: CTA bilaterally in anterior and posterior fields, no wheezes, no crackles.  Heart:  Regular rate, S1, S2 normal, no murmurs heard.  Abdomen: Soft, non tender, non distended with normal bowel sounds. No hepatosplenomegaly, ascites, or mass.  Extremities: No clubbing, cyanosis or edema  Skin: No rash    Labs:  Reviewed    Plan:  I have explained the risks and benefits of endoscopy procedures to the patient including but not limited to bleeding, perforation, infection, and death. The patient wishes to proceed.     Corby Moeller

## 2017-10-25 NOTE — DISCHARGE INSTRUCTIONS

## 2017-10-25 NOTE — ANESTHESIA POSTPROCEDURE EVALUATION
"Anesthesia Post Evaluation    Patient: Joe Ceballos    Procedure(s) Performed: Procedure(s) (LRB):  ESOPHAGOGASTRODUODENOSCOPY (EGD) (N/A)    Final Anesthesia Type: MAC  Patient location during evaluation: GI PACU  Patient participation: Yes- Able to Participate  Level of consciousness: awake and alert, oriented and awake  Post-procedure vital signs: reviewed and stable  Pain management: adequate  Airway patency: patent  PONV status at discharge: No PONV  Anesthetic complications: no      Cardiovascular status: blood pressure returned to baseline and hemodynamically stable  Respiratory status: unassisted, spontaneous ventilation and room air  Hydration status: euvolemic  Follow-up not needed.        Visit Vitals  BP (!) 177/102 (BP Location: Right arm, Patient Position: Lying)   Pulse 84   Temp 37.1 °C (98.7 °F) (Oral)   Resp 18   Ht 5' 8.5" (1.74 m)   Wt 86.2 kg (190 lb)   LMP 09/25/2017   SpO2 97%   Breastfeeding? No   BMI 28.47 kg/m²       Pain/Roxy Score: Pain Assessment Performed: Yes (10/25/2017 12:55 PM)  Presence of Pain: non-verbal indicators absent (10/25/2017 12:55 PM)  Roxy Score: 9 (10/25/2017 12:55 PM)      "

## 2017-10-25 NOTE — TRANSFER OF CARE
"Anesthesia Transfer of Care Note    Patient: Joe Ceballos    Procedure(s) Performed: Procedure(s) (LRB):  ESOPHAGOGASTRODUODENOSCOPY (EGD) (N/A)    Patient location: GI    Anesthesia Type: MAC    Transport from OR: Transported from OR on room air with adequate spontaneous ventilation    Post pain: adequate analgesia    Post assessment: no apparent anesthetic complications and tolerated procedure well    Post vital signs: stable    Level of consciousness: awake, alert and oriented    Nausea/Vomiting: no nausea/vomiting    Complications: none    Transfer of care protocol was followed      Last vitals:   Visit Vitals  BP (!) 177/102 (BP Location: Right arm, Patient Position: Lying)   Pulse 84   Temp 37.1 °C (98.7 °F) (Oral)   Resp 18   Ht 5' 8.5" (1.74 m)   Wt 86.2 kg (190 lb)   LMP 09/25/2017   SpO2 97%   Breastfeeding? No   BMI 28.47 kg/m²     "

## 2017-10-25 NOTE — DISCHARGE SUMMARY
[facility]  Short Stay  Discharge Summary    Admit Date: 10/25/2017    Discharge Date and Time: 10/25/2017      Discharge Attending Physician: Corby Moeller MD     Reason for Admission: reflux with severe esophagitis following sleeve gastrectomy.    Hospital Course (synopsis of major diagnoses, care, treatment, and services provided during the course of the hospital stay): Uneventful and full recovery    Final Diagnoses:    Principal Problem: <principal problem not specified>   Secondary Diagnoses: <principal problem not specified>    Procedures Performed: Procedure(s) (LRB):  ESOPHAGOGASTRODUODENOSCOPY (EGD) (N/A)      Discharged Condition: good    Disposition: Home or Self Care    Discharge Condition: Stable    Follow up/Patient Instructions:      Medications:      Medication List      ASK your doctor about these medications    pantoprazole 40 MG tablet  Commonly known as:  PROTONIX  Take 1 tablet (40 mg total) by mouth once daily.     sucralfate 100 mg/mL suspension  Commonly known as:  CARAFATE  Take 10 mLs (1 g total) by mouth 4 (four) times daily with meals and nightly.     valsartan 80 MG tablet  Commonly known as:  DIOVAN  Take 1 tablet (80 mg total) by mouth once daily.          No discharge procedures on file.    Corby Moeller

## 2017-10-25 NOTE — ANESTHESIA PREPROCEDURE EVALUATION
10/25/2017  Joe Ceballos is a 47 y.o., female.    Anesthesia Evaluation    I have reviewed the Patient Summary Reports.        Review of Systems  Anesthesia Hx:  History of prior surgery of interest to airway management or planning: Denies Family Hx of Anesthesia complications.   Denies Personal Hx of Anesthesia complications.   Cardiovascular:   Hypertension Denies MI.  Denies CAD.       Pulmonary:   Denies COPD.  Denies Asthma.  Denies Shortness of breath.    Renal/:   Denies Chronic Renal Disease.     Hepatic/GI:   Denies GERD.    Neurological:   Denies CVA. Denies Seizures.    Endocrine:   Denies Diabetes.        Physical Exam  General:  Well nourished    Airway/Jaw/Neck:  Airway Findings: Mouth Opening: Normal General Airway Assessment: Adult  Mallampati: II      Dental:  Dental Findings: In tact   Chest/Lungs:  Chest/Lungs Findings: Clear to auscultation, Normal Respiratory Rate     Heart/Vascular:  Heart Findings: Rate: Normal  Rhythm: Regular Rhythm             Anesthesia Plan  Type of Anesthesia, risks & benefits discussed:  Anesthesia Type:  MAC  Patient's Preference:   Intra-op Monitoring Plan: standard ASA monitors  Intra-op Monitoring Plan Comments:   Post Op Pain Control Plan:   Post Op Pain Control Plan Comments:   Induction:   IV  Beta Blocker:  Patient is not currently on a Beta-Blocker (No further documentation required).       Informed Consent: Patient understands risks and agrees with Anesthesia plan.  Questions answered. Anesthesia consent signed with patient.  ASA Score: 2     Day of Surgery Review of History & Physical: I have interviewed and examined the patient. I have reviewed the patient's H&P dated:            Ready For Surgery From Anesthesia Perspective.

## 2017-10-26 VITALS
SYSTOLIC BLOOD PRESSURE: 180 MMHG | OXYGEN SATURATION: 98 % | HEIGHT: 69 IN | HEART RATE: 72 BPM | TEMPERATURE: 99 F | BODY MASS INDEX: 28.14 KG/M2 | WEIGHT: 190 LBS | DIASTOLIC BLOOD PRESSURE: 92 MMHG | RESPIRATION RATE: 18 BRPM

## 2017-10-30 ENCOUNTER — TELEPHONE (OUTPATIENT)
Dept: SURGERY | Facility: CLINIC | Age: 47
End: 2017-10-30

## 2017-10-30 NOTE — TELEPHONE ENCOUNTER
"S/W pt via phone in rg to problems post EGD, pt stated,"she is experiencing burning of the throat, stomach/gurgling sound, N/V, unable to keep solid foods down,  has to sleep sitting up in chair, and needs something called in for N/V. Informed pt Dr. Moeller is not in clinic today will ret on 10/31/17, upon his this matter will be addressed, and a msg sent to Dr. Moeller. Encouraged pt if conditions worsen seek medical treatment. Pt voiced understanding. A msg was sent to Dr Moeller in rg to this matter  "

## 2017-10-30 NOTE — TELEPHONE ENCOUNTER
----- Message from Luis Daniel Trejo sent at 10/30/2017  1:45 PM CDT -----  Contact: Pt  Pt is calling to speak with nurse regarding issue she's having after her procedure. Please give pt a call at ..[phone] (home)

## 2017-11-03 ENCOUNTER — OFFICE VISIT (OUTPATIENT)
Dept: SURGERY | Facility: CLINIC | Age: 47
End: 2017-11-03
Payer: MEDICAID

## 2017-11-03 VITALS
HEART RATE: 78 BPM | BODY MASS INDEX: 28.08 KG/M2 | SYSTOLIC BLOOD PRESSURE: 140 MMHG | WEIGHT: 187.38 LBS | DIASTOLIC BLOOD PRESSURE: 100 MMHG

## 2017-11-03 DIAGNOSIS — R13.10 DYSPHAGIA, UNSPECIFIED TYPE: Primary | ICD-10-CM

## 2017-11-03 DIAGNOSIS — K21.00 GASTRO-ESOPHAGEAL REFLUX DISEASE WITH ESOPHAGITIS: Primary | ICD-10-CM

## 2017-11-03 DIAGNOSIS — Z98.84 S/P LAPAROSCOPIC SLEEVE GASTRECTOMY: ICD-10-CM

## 2017-11-03 PROCEDURE — 99999 PR PBB SHADOW E&M-EST. PATIENT-LVL III: CPT | Mod: PBBFAC,,, | Performed by: SURGERY

## 2017-11-03 PROCEDURE — 99213 OFFICE O/P EST LOW 20 MIN: CPT | Mod: PBBFAC,PO | Performed by: SURGERY

## 2017-11-03 PROCEDURE — 99214 OFFICE O/P EST MOD 30 MIN: CPT | Mod: S$PBB,,, | Performed by: SURGERY

## 2017-11-03 NOTE — PROGRESS NOTES
Joe is 47-year-old -American female patient who is well known to me from previous encounters.  She is status post laparoscopic sleeve gastrectomy for weight control.  She had her initial surgery at another institution and developed a severe reflux esophagitis.  Because of her surgeons departure from the practice, the patient had no other surgeon to follow-up with instead.  The patient was seen by me and had been working closely to treat her condition.  She had multiple endoscopic procedures to monitor her reflux esophagitis.  She had severe esophagitis with ulceration.   At this point, her next step is to proceed with gastric bypass, Raheel-en-Y.  This would be the only procedure that would prevent further reflux and to prevent recurrence of esophagitis.  She was instructed to continue with Protonix and Carafate by mouth intake.  Because of her insurance status, the surgery for Raheel-en-Y gastric bypass is not offered.  I have reached out to other bariatric surgeons who had been performing Raheel-en-Y gastric bypass and wall is [facility] also.  But because of the insurance, the patient was declined.  I have also reached out to the  and outpatient referral service to coordinate the proper care for the patient.  The patient was instructed to follow-up with me on a when necessary basis.  She was strongly recommended to proceed with Raheel-en-Y gastric bypass if that is available at other institution.  Total time approximately half an hour was spent for this patient, and more than 50% of that was spent for treatment planning and counseling.    Corby Moeller

## 2017-11-07 ENCOUNTER — TELEPHONE (OUTPATIENT)
Dept: SURGERY | Facility: CLINIC | Age: 47
End: 2017-11-07

## 2017-11-07 NOTE — TELEPHONE ENCOUNTER
"Informed pt nurse called and spoke with Deisy/ with Dr. Audi Ryan's/[facility] @ [phone] in rg to a referral via Dr. Moeller/[facility]. Deisy/, stated,"Bariatric program is closed as of now,  a referral would be needed from PCP with most recent BMI , and Dr. Ryan is not doing Gastric Bypass Sx." Encouraged pt to call with any other questions/concerns. Voiced understanding  "

## 2017-11-07 NOTE — TELEPHONE ENCOUNTER
----- Message from Lili Landis sent at 11/7/2017  9:51 AM CST -----  Contact: Pt  Pt called and stated she needed to speak to the nurse. She stated that she needs to discuss info she has regarding a bariatric doctor. She can be reached at [phone].    Thanks,  TF

## 2017-11-07 NOTE — TELEPHONE ENCOUNTER
----- Message from Nay Sharma sent at 11/7/2017  9:48 AM CST -----  Contact: justice/[facility]   Call caller regarding getting a referral for pt to see another doctor.   ..[phone]

## 2017-11-07 NOTE — TELEPHONE ENCOUNTER
----- Message from Jairo Baker sent at 11/7/2017 10:25 AM CST -----  Contact: Pt  Pt states she is returning the nurse call, please contact the pt [phone]

## 2017-11-09 ENCOUNTER — TELEPHONE (OUTPATIENT)
Dept: SURGERY | Facility: CLINIC | Age: 47
End: 2017-11-09

## 2017-11-09 NOTE — TELEPHONE ENCOUNTER
----- Message from Jann Oconnor sent at 11/9/2017  8:49 AM CST -----  Contact: Jillian--[facility] [phone]  Would like to consult with nurse regarding patient's treatment plan.  Please call back Jillian [phone].   Md Yancy

## 2017-11-09 NOTE — TELEPHONE ENCOUNTER
"Returned call to Bryan with [facility] in regards to a referral/Bryan Salter, stated," I am here utilization and we will continue the search in finding a Bariatric Surgeon who accepts patient's insurance." informed Bryan Moeller is not in clinic today will return on 11/14/17, and upon his return this matter will be addressed. Verbalized understanding  "

## 2017-11-13 ENCOUNTER — OUTPATIENT CASE MANAGEMENT (OUTPATIENT)
Dept: ADMINISTRATIVE | Facility: OTHER | Age: 47
End: 2017-11-13

## 2017-11-13 NOTE — PROGRESS NOTES
Please note the following patient information has been forwarded to [facility]/ Medicaid for Case Management or .    Please see the media section of the patient's chart for additional details.    Please contact Outpatient Complex Care Management at ext [phone] with any questions.    Thank you    Elham Mack, SSC

## 2017-12-06 ENCOUNTER — TELEPHONE (OUTPATIENT)
Dept: SURGERY | Facility: CLINIC | Age: 47
End: 2017-12-06

## 2017-12-06 NOTE — TELEPHONE ENCOUNTER
----- Message from Feli Evans sent at 12/6/2017  1:03 PM CST -----  Pt at [phone]//states she saw a Dr today in [place]//which her insurance covers//he has diagnosed her with a Hiatal Hernia//and she is saying Dr Moeller has not told her she has a Hiatal Hernia////she is wanting to discuss with your office//please call asap///misha/becky

## 2017-12-06 NOTE — TELEPHONE ENCOUNTER
"S/W pt via phone pt stated," I just seen a doctor here in [place] and he told me I have a Hiatal Hernia and Dr. Moeller never told me that." Informed pt Dr. Moeller is not in clinic will return on 12/19/2017, and upon his return this issue will be addressed. Patient voiced understanding  "

## 2017-12-23 ENCOUNTER — HOSPITAL ENCOUNTER (EMERGENCY)
Facility: HOSPITAL | Age: 47
Discharge: HOME | End: 2017-12-23
Payer: MEDICAID

## 2017-12-23 VITALS
WEIGHT: 188.06 LBS | RESPIRATION RATE: 18 BRPM | BODY MASS INDEX: 27.85 KG/M2 | SYSTOLIC BLOOD PRESSURE: 166 MMHG | OXYGEN SATURATION: 99 % | TEMPERATURE: 99 F | HEART RATE: 84 BPM | HEIGHT: 69 IN | DIASTOLIC BLOOD PRESSURE: 106 MMHG

## 2017-12-23 DIAGNOSIS — J32.9 SINUSITIS, UNSPECIFIED CHRONICITY, UNSPECIFIED LOCATION: Primary | ICD-10-CM

## 2017-12-23 DIAGNOSIS — B34.9 VIRAL ILLNESS: ICD-10-CM

## 2017-12-23 DIAGNOSIS — K08.89 ODONTALGIA: ICD-10-CM

## 2017-12-23 LAB
FLUAV AG SPEC QL IA: NEGATIVE
FLUBV AG SPEC QL IA: NEGATIVE
SPECIMEN SOURCE: NORMAL

## 2017-12-23 PROCEDURE — 25000003 PHARM REV CODE 250: Performed by: PHYSICIAN ASSISTANT

## 2017-12-23 PROCEDURE — 99284 EMERGENCY DEPT VISIT MOD MDM: CPT

## 2017-12-23 PROCEDURE — 87400 INFLUENZA A/B EACH AG IA: CPT

## 2017-12-23 RX ORDER — GUAIFENESIN/DEXTROMETHORPHAN 100-10MG/5
5 SYRUP ORAL 4 TIMES DAILY PRN
Qty: 120 ML | Refills: 0 | Status: SHIPPED | OUTPATIENT
Start: 2017-12-23 | End: 2018-01-02

## 2017-12-23 RX ORDER — ONDANSETRON 4 MG/1
4 TABLET, FILM COATED ORAL EVERY 6 HOURS
Qty: 12 TABLET | Refills: 0 | Status: SHIPPED | OUTPATIENT
Start: 2017-12-23 | End: 2018-02-15

## 2017-12-23 RX ORDER — ONDANSETRON 8 MG/1
8 TABLET, ORALLY DISINTEGRATING ORAL
Status: COMPLETED | OUTPATIENT
Start: 2017-12-23 | End: 2017-12-23

## 2017-12-23 RX ORDER — METHYLPREDNISOLONE 4 MG/1
TABLET ORAL
Qty: 1 PACKAGE | Refills: 0 | Status: SHIPPED | OUTPATIENT
Start: 2017-12-23 | End: 2018-01-13

## 2017-12-23 RX ORDER — AMOXICILLIN AND CLAVULANATE POTASSIUM 875; 125 MG/1; MG/1
1 TABLET, FILM COATED ORAL 2 TIMES DAILY
Qty: 20 TABLET | Refills: 0 | Status: SHIPPED | OUTPATIENT
Start: 2017-12-23 | End: 2018-01-02

## 2017-12-23 RX ADMIN — ONDANSETRON 8 MG: 8 TABLET, ORALLY DISINTEGRATING ORAL at 05:12

## 2017-12-23 NOTE — ED PROVIDER NOTES
"Encounter Date: 2017       History     Chief Complaint   Patient presents with    General Illness     body aches, diarrhea, one episode of blood in stool this morning, stomach "turning"; scheduled for gastric bypass in January due to ulcer and history of gastric sleeve     Patient reports that she has been feeling bad x2 days. C/o facial pain and dental pain. She had one episode of diarrhea with small amount of blood. She also complains of nausea and generalized abdominal pain which she describes as crampy.         URI   The primary symptoms include fever, fatigue, headaches, sore throat, cough, abdominal pain, nausea, vomiting and myalgias. Primary symptoms do not include ear pain, swollen glands, wheezing, arthralgias or rash. The current episode started several days ago. This is a new problem. The problem has been gradually worsening. The maximum temperature recorded prior to her arrival was unknown.   The sore throat is not accompanied by trouble swallowing, drooling or hoarse voice.   The cough is non-productive.   The abdominal pain is generalized. The abdominal pain does not radiate.   The myalgias are not associated with weakness.     Review of patient's allergies indicates:   Allergen Reactions    Lisinopril Anaphylaxis     This is only possible agent at the time.    Sulfa (sulfonamide antibiotics) Anaphylaxis     Past Medical History:   Diagnosis Date    Anxiety     Edema     Hypertension     Initial insomnia     Sickle cell trait     Tobacco use      Past Surgical History:   Procedure Laterality Date    BREAST SURGERY       SECTION      x 2    CYST REMOVAL      from left tube    gastric sleeve      TONSILLECTOMY       Family History   Problem Relation Age of Onset    Adopted: Yes    Hypertension Mother     Thyroid disease Paternal Aunt     Hypertension Paternal Grandmother     Breast cancer Neg Hx     Colon cancer Neg Hx     Ovarian cancer Neg Hx      Social History "   Substance Use Topics    Smoking status: Former Smoker     Packs/day: 0.50     Types: Cigarettes    Smokeless tobacco: Never Used    Alcohol use No     Review of Systems   Constitutional: Positive for fatigue and fever.   HENT: Positive for sore throat. Negative for drooling, ear pain, hoarse voice and trouble swallowing.    Eyes: Negative.    Respiratory: Positive for cough. Negative for shortness of breath and wheezing.    Cardiovascular: Negative.  Negative for chest pain.   Gastrointestinal: Positive for abdominal pain, blood in stool, diarrhea, nausea and vomiting. Negative for abdominal distention.   Endocrine: Negative.    Genitourinary: Negative.  Negative for dysuria and pelvic pain.   Musculoskeletal: Positive for myalgias. Negative for arthralgias and back pain.   Skin: Negative for rash.   Allergic/Immunologic: Negative.    Neurological: Positive for headaches. Negative for weakness and numbness.   Hematological: Negative.  Does not bruise/bleed easily.   Psychiatric/Behavioral: Negative.    All other systems reviewed and are negative.      Physical Exam     Initial Vitals [12/23/17 1609]   BP Pulse Resp Temp SpO2   (!) 167/119 84 18 99.2 °F (37.3 °C) 99 %      MAP       135         Physical Exam    Nursing note and vitals reviewed.  Constitutional: She appears well-developed and well-nourished. She is not diaphoretic. No distress.   HENT:   Head: Normocephalic and atraumatic.   Right Ear: External ear normal.   Left Ear: External ear normal.   Nose: Nose normal.   Mouth/Throat: Oropharynx is clear and moist. No oropharyngeal exudate.   Eyes: Conjunctivae and EOM are normal. Pupils are equal, round, and reactive to light. Right eye exhibits no discharge. Left eye exhibits no discharge. No scleral icterus.   Neck: Normal range of motion. Neck supple.   Cardiovascular: Normal rate, regular rhythm and normal heart sounds.   No murmur heard.  Pulmonary/Chest: Breath sounds normal. No respiratory  distress. She has no wheezes. She has no rhonchi. She has no rales.   Abdominal: Soft. Bowel sounds are normal. She exhibits no distension and no mass. There is generalized tenderness. There is no rigidity, no rebound, no guarding and negative Jordan's sign.   Musculoskeletal: Normal range of motion. She exhibits no edema or tenderness.   Lymphadenopathy:     She has no cervical adenopathy.   Neurological: She is alert and oriented to person, place, and time. She has normal strength. She displays normal reflexes. No sensory deficit.   Skin: Skin is warm and dry. Capillary refill takes less than 2 seconds.   Psychiatric: She has a normal mood and affect. Her behavior is normal. Thought content normal.         ED Course   Procedures  Labs Reviewed   INFLUENZA A AND B ANTIGEN           Patient offered CXR and CT Abdomen/pelvis. Declined. She only wants to be tested for flu. No imaging at this time. Explained the risks to patient and gave return to ED instructions. Patient still declined imaging at this time. Will return to ED if symptoms worsen.     Medical Decision Making:   Clinical Tests:   Lab Tests: Ordered and Reviewed  ED Management:  Patient declined all imaging.                    ED Course      Clinical Impression:   The primary encounter diagnosis was Sinusitis, unspecified chronicity, unspecified location. Diagnoses of Odontalgia and Viral illness were also pertinent to this visit.    Disposition:   Disposition: Discharged  Condition: Stable                        Leonora Lu PA-C  12/24/17 2034

## 2018-02-07 ENCOUNTER — TELEPHONE (OUTPATIENT)
Dept: SURGERY | Facility: CLINIC | Age: 48
End: 2018-02-07

## 2018-02-07 NOTE — TELEPHONE ENCOUNTER
----- Message from Daisy Castro sent at 2/6/2018  4:43 PM CST -----  Contact: patient  Returning your call please call patient @ [phone]. Thanks, bell

## 2018-02-07 NOTE — TELEPHONE ENCOUNTER
S/W pt via phone in regards to cont. Care with Dr. Moeller. Informed pt Dr. Moeller is not in clinic today will return in mid March and upon his return this matter will be addressed. Patient verbalized an understanding

## 2018-02-15 ENCOUNTER — OFFICE VISIT (OUTPATIENT)
Dept: SURGERY | Facility: CLINIC | Age: 48
End: 2018-02-15
Payer: MEDICAID

## 2018-02-15 VITALS
HEART RATE: 71 BPM | SYSTOLIC BLOOD PRESSURE: 144 MMHG | WEIGHT: 173.5 LBS | BODY MASS INDEX: 25.7 KG/M2 | TEMPERATURE: 100 F | HEIGHT: 69 IN | DIASTOLIC BLOOD PRESSURE: 90 MMHG

## 2018-02-15 DIAGNOSIS — E66.3 OVERWEIGHT (BMI 25.0-29.9): Primary | ICD-10-CM

## 2018-02-15 PROCEDURE — 99999 PR PBB SHADOW E&M-EST. PATIENT-LVL III: CPT | Mod: PBBFAC,,, | Performed by: SURGERY

## 2018-02-15 PROCEDURE — 99213 OFFICE O/P EST LOW 20 MIN: CPT | Mod: PBBFAC,PO | Performed by: SURGERY

## 2018-02-15 PROCEDURE — 3008F BODY MASS INDEX DOCD: CPT | Mod: ,,, | Performed by: SURGERY

## 2018-02-15 PROCEDURE — 99212 OFFICE O/P EST SF 10 MIN: CPT | Mod: S$PBB,,, | Performed by: SURGERY

## 2018-02-15 RX ORDER — VALSARTAN 40 MG/1
40 TABLET ORAL DAILY
COMMUNITY
End: 2020-08-16 | Stop reason: CLARIF

## 2018-02-15 RX ORDER — NICOTINE 7MG/24HR
1 PATCH, TRANSDERMAL 24 HOURS TRANSDERMAL DAILY
Qty: 14 PATCH | Refills: 0 | Status: SHIPPED | OUTPATIENT
Start: 2018-02-15 | End: 2018-02-15 | Stop reason: SDUPTHER

## 2018-02-15 RX ORDER — PANTOPRAZOLE SODIUM 40 MG/1
40 TABLET, DELAYED RELEASE ORAL DAILY
Qty: 30 TABLET | Refills: 11 | Status: SHIPPED | OUTPATIENT
Start: 2018-02-15 | End: 2020-08-16

## 2018-02-15 RX ORDER — NICOTINE 7MG/24HR
1 PATCH, TRANSDERMAL 24 HOURS TRANSDERMAL DAILY
Qty: 14 PATCH | Refills: 1 | Status: SHIPPED | OUTPATIENT
Start: 2018-02-15 | End: 2019-02-05 | Stop reason: ALTCHOICE

## 2018-02-15 NOTE — PROGRESS NOTES
Chief Complaint   Patient presents with    Wound Check       HISTORY OF PRESENT ILLNESS: Joe Ceballos is a 48 y.o. female is s/p gastric bypass in [place] 2/2/18.  C/o pain after eating.    PHYSICAL EXAMINATION:  CONSTITUTIONAL:  Well-nourished, well developed, in no acute distress.  CARDIOVASCULAR:  No extremity edema or varicosities.  RESPIRATORY:   Good respiratory effort.  MUSCULOSKELETAL:  Normal strength and tone in all four extremities.  SKIN:  Incisions c/d/i  NEUROLOGIC:  Normal gait.  PSYCH:  Sound judgment and insight, oriented to time, place, and person.    ASSESSMENT/PLAN:    1. Overweight (BMI 25.0-29.9)  FL Upper GI Water Soluble Without KUB

## 2018-02-16 ENCOUNTER — TELEPHONE (OUTPATIENT)
Dept: SURGERY | Facility: CLINIC | Age: 48
End: 2018-02-16

## 2018-02-16 NOTE — TELEPHONE ENCOUNTER
Fax number   [phone]    Patient has a follow-up appointment with Dr. Rubi on 02/19/2018 @ 9:15    Dr. Rubi's office is wanting to notes from the patient's visit with you on 02/15/2018.

## 2018-03-27 ENCOUNTER — TELEPHONE (OUTPATIENT)
Dept: SURGERY | Facility: CLINIC | Age: 48
End: 2018-03-27

## 2018-03-27 NOTE — TELEPHONE ENCOUNTER
----- Message from Rocío Bah LPN sent at 3/27/2018 12:11 PM CDT -----  Contact: Pt      ----- Message -----  From: Luis Daniel Trejo  Sent: 3/27/2018   8:44 AM  To: Sunni Tavarez Staff    Please give pt a call at [phone] regarding her appt, she has to pick her son up from school and wants to see if she can be a few minutes late.

## 2018-03-28 ENCOUNTER — TELEPHONE (OUTPATIENT)
Dept: SURGERY | Facility: CLINIC | Age: 48
End: 2018-03-28

## 2018-03-28 NOTE — TELEPHONE ENCOUNTER
----- Message from Nay Sharma sent at 3/27/2018  1:28 PM CDT -----  Contact: pt   Call pt regarding appt. Pt states that son is very sick after leaving urgent care and was told to take him home and not to bring him around other patients. Pt states that she need to get a sooner appt than 05/01/18.    .[phone] (home)

## 2018-03-28 NOTE — TELEPHONE ENCOUNTER
S/W pt via phone in regards to bart missed appt 3/27/18/Dr. Moeller, during the conversation appt was bart to 4/3/18 @ 2:40 PM/Dr. Moeller-Jc. Encouraged pt to call with any other questions/concerns. Pt verbalized an understanding

## 2018-03-28 NOTE — TELEPHONE ENCOUNTER
"S/W Onah/Pharmacist with [facility] in regards to Rx "Carafate" is not cover under insurance. Informed Onah Dr. Moeller is not in clinic today, and a msg will be sent to him in regards to this issue. Verbalized an understanding  "

## 2018-03-28 NOTE — TELEPHONE ENCOUNTER
----- Message from Lyla Sharma sent at 3/28/2018  1:28 PM CDT -----  Contact: Pharmacy  Rx Carafate is not cover under her insurance. Please call the pharmacy  at [phone].    Thanks  td

## 2018-04-03 ENCOUNTER — OFFICE VISIT (OUTPATIENT)
Dept: SURGERY | Facility: CLINIC | Age: 48
End: 2018-04-03
Payer: MEDICAID

## 2018-04-03 VITALS
BODY MASS INDEX: 24.25 KG/M2 | HEART RATE: 74 BPM | WEIGHT: 164.25 LBS | TEMPERATURE: 99 F | DIASTOLIC BLOOD PRESSURE: 95 MMHG | SYSTOLIC BLOOD PRESSURE: 140 MMHG

## 2018-04-03 DIAGNOSIS — Z98.84 STATUS POST GASTRIC BYPASS FOR OBESITY: Primary | ICD-10-CM

## 2018-04-03 PROCEDURE — 99999 PR PBB SHADOW E&M-EST. PATIENT-LVL II: CPT | Mod: PBBFAC,,, | Performed by: SURGERY

## 2018-04-03 PROCEDURE — 99212 OFFICE O/P EST SF 10 MIN: CPT | Mod: PBBFAC,PO | Performed by: SURGERY

## 2018-04-03 PROCEDURE — 99214 OFFICE O/P EST MOD 30 MIN: CPT | Mod: S$PBB,,, | Performed by: SURGERY

## 2018-04-03 RX ORDER — ERGOCALCIFEROL 1.25 MG/1
50000 CAPSULE ORAL
COMMUNITY
End: 2020-08-16 | Stop reason: CLARIF

## 2018-04-03 RX ORDER — BUSPIRONE HYDROCHLORIDE 15 MG/1
15 TABLET ORAL 3 TIMES DAILY
COMMUNITY
End: 2019-08-14

## 2018-04-03 RX ORDER — CITALOPRAM 10 MG/1
10 TABLET ORAL DAILY
COMMUNITY
End: 2019-08-14

## 2018-04-03 RX ORDER — HYDROCHLOROTHIAZIDE 25 MG/1
25 TABLET ORAL DAILY
COMMUNITY
End: 2020-08-16 | Stop reason: CLARIF

## 2018-04-03 NOTE — PROGRESS NOTES
Joe is 48-year-old -American female patient who is known to me from previous encounters.  She had sleeve gastrectomy from outside institution by another surgeon and had a severe reflux.  Because of the her medical insurance status being medicated, the patient could not be operated at this institution.  She was then referred to have Raheel-en-Y gastric bypass at another outside institution in [place].  The surgery was performed recently less than a month ago.  She has returned to my office for postop follow-up.    The patient was advised that the right postop follow-up should be with her surgeon in [place].  I would be happy to see her for other surgical issues.  The patient had a many questions regarding medication such as vitamins following the operation.  I have answered most questions as best as I can.  But still the patient was recommended to follow-up with her primary surgeon.  Total time approximately half an hour was spent for this patient, and more than 50% of that was spent for treatment planning and counseling.    Corby Moeller

## 2018-04-16 ENCOUNTER — TELEPHONE (OUTPATIENT)
Dept: UROLOGY | Facility: CLINIC | Age: 48
End: 2018-04-16

## 2018-04-16 NOTE — TELEPHONE ENCOUNTER
----- Message from Darvin Coleman sent at 4/16/2018  1:21 PM CDT -----  Pt is returning a call from nurse.        Please call pt back at [phone]

## 2018-10-05 ENCOUNTER — HOSPITAL ENCOUNTER (EMERGENCY)
Facility: HOSPITAL | Age: 48
Discharge: HOME | End: 2018-10-06
Attending: EMERGENCY MEDICINE
Payer: MEDICAID

## 2018-10-05 VITALS
DIASTOLIC BLOOD PRESSURE: 89 MMHG | BODY MASS INDEX: 27.52 KG/M2 | RESPIRATION RATE: 20 BRPM | TEMPERATURE: 99 F | OXYGEN SATURATION: 99 % | HEIGHT: 68 IN | SYSTOLIC BLOOD PRESSURE: 177 MMHG | WEIGHT: 181.56 LBS | HEART RATE: 69 BPM

## 2018-10-05 DIAGNOSIS — M25.572 ACUTE BILATERAL ANKLE PAIN: Primary | ICD-10-CM

## 2018-10-05 DIAGNOSIS — S92.511A CLOSED DISPLACED FRACTURE OF PROXIMAL PHALANX OF LESSER TOE OF RIGHT FOOT, INITIAL ENCOUNTER: ICD-10-CM

## 2018-10-05 DIAGNOSIS — M25.571 ACUTE BILATERAL ANKLE PAIN: Primary | ICD-10-CM
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
## 2018-10-05 PROCEDURE — 99283 EMERGENCY DEPT VISIT LOW MDM: CPT | Mod: 25

## 2018-10-05 PROCEDURE — 28510 TREATMENT OF TOE FRACTURE: CPT | Mod: T9

## 2018-10-05 RX ORDER — HYDROCODONE BITARTRATE AND ACETAMINOPHEN 5; 325 MG/1; MG/1
1 TABLET ORAL EVERY 4 HOURS PRN
Qty: 18 TABLET | Refills: 0 | Status: SHIPPED | OUTPATIENT
Start: 2018-10-05 | End: 2018-10-24 | Stop reason: SDUPTHER

## 2018-10-05 RX ORDER — ACETAMINOPHEN 500 MG
1000 TABLET ORAL
Status: DISCONTINUED | OUTPATIENT
Start: 2018-10-06 | End: 2018-10-06 | Stop reason: HOSPADM

## 2018-10-06 NOTE — ED PROVIDER NOTES
SCRIBE #1 NOTE: I, Liana Sandhu, am scribing for, and in the presence of, Radames Chong Jr., MD. I have scribed the entire note.         History     Chief Complaint   Patient presents with    Fall     pt states she slipped in bathtub and having pain to R pinky toe and L ankle       Review of patient's allergies indicates:   Allergen Reactions    Lisinopril Anaphylaxis     This is only possible agent at the time.    Sulfa (sulfonamide antibiotics) Anaphylaxis       History of Present Illness   HPI    10/5/2018, 10:58 PM  History obtained from the patient      History of Present Illness: Joe Ceballos is a 48 y.o. female patient who presents to the Emergency Department for evaluation of bilat ankle pain and R fifth toe pain which onset suddenly 2 hours ago after her fall in the bathtub. Symptoms are constant and moderate in severity.  No mitigating or exacerbating factors reported. Patient denies any head injury/trauma, LOC, HA, dizziness, back pain, neck pain, knee pain, hip pain, abd pain, CP, SOB, and all other sxs at this time. No tx PTA. No further complaints or concerns at this time.         Arrival mode: Personal vehicle     PCP: Josse Cloud MD        Past Medical History:  Past Medical History:   Diagnosis Date    Anxiety     Edema     Hypertension     Initial insomnia     Sickle cell trait     Tobacco use        Past Surgical History:  Past Surgical History:   Procedure Laterality Date    BREAST SURGERY       SECTION      x 2    CYST REMOVAL      from left tube    ESOPHAGOGASTRODUODENOSCOPY (EGD) N/A 10/25/2017    [facility]    ESOPHAGOGASTRODUODENOSCOPY (EGD) N/A 2017    [facility] OR    GASTRECTOMY-SLEEVE-LAPAROSCOPIC N/A 10/12/2016    [facility]    gastric sleeve      TONSILLECTOMY           Family History:  Family History   Adopted: Yes   Problem Relation Age of Onset     Hypertension Mother     Thyroid disease Paternal Aunt     Hypertension Paternal Grandmother     Breast cancer Neg Hx     Colon cancer Neg Hx     Ovarian cancer Neg Hx        Social History:  Social History     Tobacco Use    Smoking status: Former Smoker     Packs/day: 0.50     Types: Cigarettes    Smokeless tobacco: Never Used   Substance and Sexual Activity    Alcohol use: No    Drug use: No    Sexual activity: Not Currently     Partners: Male     Birth control/protection: None        Review of Systems   Review of Systems   Constitutional: Negative for activity change, appetite change, chills, diaphoresis, fatigue and fever.   HENT: Negative for congestion, ear pain, nosebleeds, rhinorrhea, sinus pain, sore throat and trouble swallowing.    Eyes: Negative for pain and discharge.   Respiratory: Negative for cough, chest tightness, shortness of breath, wheezing and stridor.    Cardiovascular: Negative for chest pain, palpitations and leg swelling.   Gastrointestinal: Negative for abdominal distention, abdominal pain, blood in stool, constipation, diarrhea, nausea and vomiting.   Genitourinary: Negative for difficulty urinating, dysuria, flank pain, frequency, hematuria and urgency.   Musculoskeletal: Negative for back pain, myalgias and neck pain.        (+) bilat ankle pain  (+) R fifth toe pain  (-) knee pain  (-) hip pain   Skin: Negative for pallor, rash and wound.   Neurological: Negative for dizziness, syncope, weakness, light-headedness, numbness and headaches.        (-) head injury/trauma   Hematological: Does not bruise/bleed easily.   Psychiatric/Behavioral: Negative for confusion and self-injury.   All other systems reviewed and are negative.       Physical Exam     Initial Vitals [10/05/18 2205]   BP Pulse Resp Temp SpO2   (!) 177/89 69 20 98.9 °F (37.2 °C) 99 %      MAP       --          Physical Exam  Nursing Notes and Vital Signs Reviewed.  Constitutional: Patient is in no acute distress.  "Well-developed and well-nourished.  Head: Atraumatic. Normocephalic.  Eyes: PERRL. EOM intact. Conjunctivae are not pale. No scleral icterus.  ENT: Mucous membranes are moist. Oropharynx is clear and symmetric.    Neck: Supple. Full ROM. No lymphadenopathy.  Cardiovascular: Regular rate. Regular rhythm. No murmurs, rubs, or gallops. Distal pulses are 2+ and symmetric.  Pulmonary/Chest: No respiratory distress. Clear to auscultation bilaterally. No wheezing or rales.  Abdominal: Soft and non-distended.  There is no tenderness.  No rebound, guarding, or rigidity. Good bowel sounds.  Genitourinary: No CVA tenderness  Musculoskeletal: Moves all extremities. No obvious deformities. No edema. No calf tenderness. Bilat ankle tenderness but no fracture. Fracture to the R fifth toe proximal phalanx. Able to ambulate.   Skin: Warm and dry.  Neurological:  Alert, awake, and appropriate.  Normal speech.  No acute focal neurological deficits are appreciated.  Psychiatric: Normal affect. Good eye contact. Appropriate in content.     ED Course   Procedures  ED Vital Signs:  Vitals:    10/05/18 2205   BP: (!) 177/89   Pulse: 69   Resp: 20   Temp: 98.9 °F (37.2 °C)   TempSrc: Oral   SpO2: 99%   Weight: 82.3 kg (181 lb 8.8 oz)   Height: 5' 8" (1.727 m)       Abnormal Lab Results:  Labs Reviewed - No data to display         Imaging Results:  Imaging Results          X-Ray Toe 2 or More Views Right (In process)                X-Ray Ankle Complete Bilateral (In process)               X-Ray Toe 2 or More View Right:   Fracture to the R fifth toe proximal phalanx.  Read by: Dr. Chong    X-Ray Ankle Complete Bilateral:  No fractures  Read by: Dr. Chong         The Emergency Provider reviewed the vital signs and test results, which are outlined above.     ED Discussion     11:53 PM: Re-evaluated pt. Pt is placed in a walking boot. Pt is resting comfortably and is in no acute distress.  D/w pt all pertinent results. D/w pt any concerns " expressed at this time. Answered all questions. Pt expresses understanding at this time.    12:08 AM: Reassessed pt at this time.  Pt states her condition has improved at this time. Discussed with pt all pertinent ED information and results. Discussed pt dx and plan of tx. Gave pt all f/u and return to the ED instructions. All questions and concerns were addressed at this time. Pt expresses understanding of information and instructions, and is comfortable with plan to discharge. Pt is stable for discharge.      ED Medication(s):  Medications   acetaminophen tablet 1,000 mg (not administered)     Current Discharge Medication List   none       Follow-up Information     Josse Cloud MD. Call in 3 days.    Specialty:  Internal Medicine  Why:  to schedule appt as needed to obtaine referral to see orthopedic specialist  Contact information:  [address] [facility] [address]  [phone]             Taurus Her Sr, MD. Call in 3 days.    Specialty:  Orthopedic Surgery  Why:  Call Dr. Her or make appt with Orthopedic Specialist as refrred to by Dr. Cloud for recheck of your ankles and for treatment of left 5th toe fracture  Contact information:  [address] [facility] [address]  [phone]                      Medical Decision Making     Medical Decision Making:   Clinical Tests:   Radiological Study: Ordered and Reviewed             Scribe Attestation:   Scribe #1: I performed the above scribed service and the documentation accurately describes the services I performed. I attest to the accuracy of the note. 10/05/2018     Attending:   Physician Attestation Statement for Scribe #1: I, Radames Chong Jr., MD, personally performed the services described in this documentation, as scribed by Liana Sandhu, in my presence, and it is both accurate and complete.           Clinical Impression       ICD-10-CM ICD-9-CM   1. Acute bilateral ankle pain M25.571 719.47    M25.572 338.19   2. Fall W19.XXXA E888.9   3. Closed  displaced fracture of proximal phalanx of lesser toe of right foot, initial encounter S92.511A 826.0       Disposition:   Disposition: Discharged  Condition: Stable             Radames Chong Jr., MD  10/06/18 0154

## 2018-10-15 ENCOUNTER — HOSPITAL ENCOUNTER (OUTPATIENT)
Dept: RADIOLOGY | Facility: HOSPITAL | Age: 48
Discharge: HOME | End: 2018-10-15
Attending: PHYSICIAN ASSISTANT
Payer: MEDICAID

## 2018-10-15 ENCOUNTER — TELEPHONE (OUTPATIENT)
Dept: ORTHOPEDICS | Facility: CLINIC | Age: 48
End: 2018-10-15

## 2018-10-15 ENCOUNTER — OFFICE VISIT (OUTPATIENT)
Dept: ORTHOPEDICS | Facility: CLINIC | Age: 48
End: 2018-10-15
Payer: MEDICAID

## 2018-10-15 VITALS — DIASTOLIC BLOOD PRESSURE: 111 MMHG | HEART RATE: 79 BPM | RESPIRATION RATE: 12 BRPM | SYSTOLIC BLOOD PRESSURE: 164 MMHG

## 2018-10-15 DIAGNOSIS — M79.671 RIGHT FOOT PAIN: ICD-10-CM

## 2018-10-15 DIAGNOSIS — M79.671 RIGHT FOOT PAIN: Primary | ICD-10-CM

## 2018-10-15 DIAGNOSIS — S92.511A CLOSED DISPLACED FRACTURE OF PROXIMAL PHALANX OF LESSER TOE OF RIGHT FOOT, INITIAL ENCOUNTER: Primary | ICD-10-CM

## 2018-10-15 PROCEDURE — 28510 TREATMENT OF TOE FRACTURE: CPT | Mod: PBBFAC | Performed by: PHYSICIAN ASSISTANT

## 2018-10-15 PROCEDURE — 99999 PR PBB SHADOW E&M-EST. PATIENT-LVL IV: CPT | Mod: PBBFAC,,, | Performed by: PHYSICIAN ASSISTANT

## 2018-10-15 PROCEDURE — 99214 OFFICE O/P EST MOD 30 MIN: CPT | Mod: PBBFAC,25 | Performed by: PHYSICIAN ASSISTANT

## 2018-10-15 PROCEDURE — 28510 TREATMENT OF TOE FRACTURE: CPT | Mod: S$PBB,T9,, | Performed by: PHYSICIAN ASSISTANT

## 2018-10-15 PROCEDURE — 73630 X-RAY EXAM OF FOOT: CPT | Mod: TC,RT

## 2018-10-15 PROCEDURE — 99204 OFFICE O/P NEW MOD 45 MIN: CPT | Mod: S$PBB,57,, | Performed by: PHYSICIAN ASSISTANT

## 2018-10-15 PROCEDURE — 73630 X-RAY EXAM OF FOOT: CPT | Mod: 26,RT,, | Performed by: RADIOLOGY

## 2018-10-15 NOTE — TELEPHONE ENCOUNTER
Spoke with the supervisor she put patient back on the schedule to be seen at [facility]. F/U with  if the patient is surgical...

## 2018-10-15 NOTE — LETTER
October 15, 2018      [facility]  [address] [place] [id number]  Phone: [phone]  Fax: [phone]       Patient: Joe Ceballos   YOB: 1970  Date of Visit: 10/15/2018    To Whom It May Concern:    Maria Isabel Ceballos  was at [facility] on 10/15/2018. She may return to work after being assessed after her next visit. If you have any questions or concerns, or if I can be of further assistance, please do not hesitate to contact me.    Sincerely,        Vadim Caldera PA-C

## 2018-10-15 NOTE — PROGRESS NOTES
Subjective:     Patient ID: Joe Ceballos is a 48 y.o. female.    Chief Complaint: Pain and Injury of the Right Foot    HPI  The patient is seen today for evaluation of R little toe pain. She is an ER follow-up. She was informed to follow-up with Dr. Her on her paperwork however there was some miscommunication with scheduling her today.   She hit her right foot on the bed in the dark on Friday. States that her little toe turned to the right (ER) and she reduced it (she thought it was okay). Then, on Saturday, she took a shower, slipped and fell. The patient was seen in the ED that evening. She states it was swollen, painful and very bruised.     Her current pain is an 8/10. She was given cherelle tape and CAM boot while in the ER. 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 The patient also has a special needs child (besides working two other jobs).    X-rays were taken.   Past Medical History:   Diagnosis Date    Anxiety     Edema     Hypertension     Initial insomnia     Sickle cell trait     Tobacco use      Past Surgical History:   Procedure Laterality Date    BREAST SURGERY       SECTION      x 2    CYST REMOVAL      from left tube    ESOPHAGOGASTRODUODENOSCOPY (EGD) N/A 10/25/2017    [facility]    ESOPHAGOGASTRODUODENOSCOPY (EGD) N/A 2017    [facility] at [facility] OR    GASTRECTOMY-SLEEVE-LAPAROSCOPIC N/A 10/12/2016    [facility] by [facility] at [facility] OR    gastric sleeve      TONSILLECTOMY       Family History   Adopted: Yes   Problem Relation Age of Onset    Hypertension Mother     Thyroid disease Paternal Aunt     Hypertension Paternal Grandmother     Breast cancer Neg Hx     Colon cancer Neg Hx     Ovarian cancer Neg Hx      Social History     Socioeconomic History    Marital status: Single     Spouse name: Not on file    Number of children: Not on file    Years of  education: Not on file    Highest education level: Not on file   Social Needs    Financial resource strain: Not on file    Food insecurity - worry: Not on file    Food insecurity - inability: Not on file    Transportation needs - medical: Not on file    Transportation needs - non-medical: Not on file   Occupational History    Not on file   Tobacco Use    Smoking status: Former Smoker     Packs/day: 0.50     Types: Cigarettes    Smokeless tobacco: Never Used   Substance and Sexual Activity    Alcohol use: No    Drug use: No    Sexual activity: Not Currently     Partners: Male     Birth control/protection: None   Other Topics Concern    Not on file   Social History Narrative    Not on file        Medication List           Accurate as of 10/15/18 11:59 PM. If you have any questions, ask your nurse or doctor.               CONTINUE taking these medications    busPIRone 15 MG tablet  Commonly known as:  BUSPAR     citalopram 10 MG tablet  Commonly known as:  CELEXA     hydroCHLOROthiazide 25 MG tablet  Commonly known as:  HYDRODIURIL     HYDROcodone-acetaminophen 5-325 mg per tablet  Commonly known as:  NORCO  Take 1 tablet by mouth every 4 (four) hours as needed for Pain.     nicotine 7 mg/24 hr  Commonly known as:  NICODERM CQ  Place 1 patch onto the skin once daily.     pantoprazole 40 MG tablet  Commonly known as:  PROTONIX  Take 1 tablet (40 mg total) by mouth once daily.     valsartan 80 MG tablet  Commonly known as:  DIOVAN     VITAMIN D2 50,000 unit Cap  Generic drug:  ergocalciferol          Review of patient's allergies indicates:   Allergen Reactions    Lisinopril Anaphylaxis     This is only possible agent at the time.    Sulfa (sulfonamide antibiotics) Anaphylaxis     Review of Systems   Constitution: Negative for chills and fever.   Musculoskeletal: Positive for falls, joint pain and joint swelling.   Gastrointestinal: Negative for abdominal pain, diarrhea, nausea and vomiting.        Objective:   There is no height or weight on file to calculate BMI.  Vitals:    10/15/18 1154   BP: (!) 164/111   Pulse:    Resp: 12       General: Joe Savage is well-developed, well-nourished, appears stated age, in no acute distress, alert and oriented to time, place and person.       General    Nursing note and vitals reviewed.  Constitutional: She is oriented to person, place, and time. She appears well-developed and well-nourished.   HENT:   Head: Atraumatic.   Eyes: EOM are normal.   Neck: Neck supple.   Pulmonary/Chest: Effort normal.   Neurological: She is alert and oriented to person, place, and time.   Psychiatric: She has a normal mood and affect. Her behavior is normal.     General Musculoskeletal Exam   Gait: abnormal     Right Ankle/Foot Exam     Inspection   Erythema: absent    Tests   Heel Walk: able to perform  Tiptoe Walk: unable to perform  Squeeze Test: negative    Other   Sensation: normal    Comments:  +EHL/FHL/DF/PF    Pain, swelling and tenderness over fracture site - proximal phalanx of fifth toe.          Vascular Exam     Right Pulses  Dorsalis Pedis:      2+            EXAMINATION:  XR FOOT COMPLETE 3 VIEW RIGHT    CLINICAL HISTORY:  . Pain in right foot    TECHNIQUE:  AP, lateral, and oblique views of the right foot were performed.    COMPARISON:  10/05/2018 right 5th toe radiograph    FINDINGS:  Again seen is a obliquely oriented fracture through the proximal phalanx 5th digit with evidence of interval healing.  No new abnormality.      Impression       As above       Assessment:     Encounter Diagnosis   Name Primary?    Closed displaced fracture of proximal phalanx of lesser toe of right foot, initial encounter Yes        Plan:     1. [place] tape little toe to fourth toe (at the base)- using tape provided in clinic    2. Patient can be heel weightbearing as tolerated and Use CAM boot on right foot    3. Apply ice several times a day- 30 min on/ 30 min off    4. Follow-up in  2.5 -3 weeks and repeat x-rays

## 2018-10-15 NOTE — LETTER
October 16, 2018      Radames Chong Jr., MD  [id number] [place] [address] Dr Fallon IBANEZ [id number]           '[facility]  [address] [place] [address]  [place] [address]  Phone: [phone]  Fax: [phone]          Patient: Joe Ceballos   MR Number: [id number]   YOB: 1970   Date of Visit: 10/15/2018       Dear Dr. Radames Chong Jr.:    Thank you for referring Joe Ceballos to me for evaluation. Attached you will find relevant portions of my assessment and plan of care.    If you have questions, please do not hesitate to call me. I look forward to following Joe Ceballos along with you.    Sincerely,    Vadim Caldera PA-C    Enclosure  CC:  No Recipients    If you would like to receive this communication electronically, please contact [email or web address] or [phone] to request more information on [employer] Link access.    For providers and/or their staff who would like to refer a patient to [facility], please contact us through our one-stop-shop provider referral line, [facility], at [phone].    If you feel you have received this communication in error or would no longer like to receive these types of communications, please e-mail [email or web address]

## 2018-10-24 ENCOUNTER — TELEPHONE (OUTPATIENT)
Dept: ORTHOPEDICS | Facility: CLINIC | Age: 48
End: 2018-10-24

## 2018-10-24 RX ORDER — HYDROCODONE BITARTRATE AND ACETAMINOPHEN 5; 325 MG/1; MG/1
1 TABLET ORAL EVERY 6 HOURS PRN
Qty: 20 TABLET | Refills: 0 | Status: SHIPPED | OUTPATIENT
Start: 2018-10-24 | End: 2018-11-27 | Stop reason: SDUPTHER

## 2018-10-24 NOTE — TELEPHONE ENCOUNTER
----- Message from Nuria Han MA sent at 10/24/2018  1:43 PM CDT -----  Contact: pt    medication  ----- Message -----  From: Nay Sharma  Sent: 10/24/2018  12:59 PM  To: Verenice GILL Staff    1. What is the name of the medication you are requesting? HYDROcodone-acetaminophen (NORCO)  2. What is the dose? 5-325 mg per tablet  3. How do you take the medication? Orally, topically, etc? Oral   4. How often do you take this medication?   5. Do you need a 30 day or 90 day supply?   6. How many refills are you requesting? 1  7. What is your preferred pharmacy and location of the pharmacy?   [facility] [address] [address] [facility] - [place] - [address] [facility]  [address] [facility] [address]  Phone: [phone] Fax: [phone]  8. Who can we contact with further questions? .the patient

## 2018-10-24 NOTE — TELEPHONE ENCOUNTER
I informed her that we are sending her medication request. Pt understood.      ----- Message from Nay Zachary sent at 10/24/2018 12:59 PM CDT -----  Contact: pt   1. What is the name of the medication you are requesting? HYDROcodone-acetaminophen (NORCO)  2. What is the dose? 5-325 mg per tablet  3. How do you take the medication? Orally, topically, etc? Oral   4. How often do you take this medication?   5. Do you need a 30 day or 90 day supply?   6. How many refills are you requesting? 1  7. What is your preferred pharmacy and location of the pharmacy?   [facility] [address] [address] [facility] - [place] [address]  [address]  [facility] [place] [address]  Phone: [phone] Fax: [phone]  8. Who can we contact with further questions? .the patient

## 2018-10-24 NOTE — TELEPHONE ENCOUNTER
----- Message from Brittney Mendez sent at 10/24/2018  3:00 PM CDT -----  Contact: [facility] pharmacy  Requesting call back for diagnosis or PA for hydrocodone 5 w/ tylenol. Please call back at   [phone].      Pt uses:  [facility] [address] [address] [facility] - [place] - [address] [facility]  [address] [facility] [address]  Phone: [phone] Fax: [phone]        Thanks,  Brittney Mendez

## 2018-10-25 ENCOUNTER — TELEPHONE (OUTPATIENT)
Dept: ORTHOPEDICS | Facility: CLINIC | Age: 48
End: 2018-10-25

## 2018-10-25 NOTE — TELEPHONE ENCOUNTER
Called pharmacy back, they informed us that the code we gave them didn't work for the pt insurance. Pharmacy stated that we can do a prior authorization or the pt will have to pay out of pocket. I informed the pharmacy they can fax us over everything and we will handle it. Pharmacy understood and will be faxing over everything shortly.        ----- Message from Zeyad Luna sent at 10/25/2018  4:23 PM CDT -----  Contact: [facility]  Caller is requesting a call back from the nurse in regards to them needing a diagnosis code for the pt [facility] [address] [address] [employer] - [place] - [address] [facility]  [address] [facility] [place] [address]  Phone: [phone] Fax: [phone]

## 2018-10-30 NOTE — PROGRESS NOTES
"Subjective:     Patient ID: Joe Ceballos is a 48 y.o. female.    Chief Complaint: Pain and Swelling of the Right Foot    HPI  The patient is seen today for a follow-up regarding proximal phalanx fracture of fifth toe.   Patient has been compliant wearing her tall CAM boot. She expressed to me that she wanted another boot because her current boot was "cheesy" and she is in a wedding on Saturday and will have to bedazzle her boot. Patient also expressed frustration about getting her Norco 5's. She was prescribed this medication in the ER and after her last office visit. Our office had to do a prior authorization for it but we haven't received any further information on the status of it. According to the pharmacist at [facility], the patient could not get any add'l pain medication because she exceeded 28 tablets in 26 days.     She is an ER follow-up. She was informed to follow-up with Dr. Her on her paperwork however there was some miscommunication with scheduling her during her office visit 2.5 weeks ago.   She hit her right foot on the bed in the dark on Friday (10/12/18). States that her little toe turned to the right (ER) and she reduced it (she thought it was okay). Then, on Saturday, she took a shower, slipped and fell. The patient was seen in the ED that evening. She states it was swollen, painful and very bruised.     X-rays were obtained today.     Past Medical History:   Diagnosis Date    Anxiety     Edema     Hypertension     Initial insomnia     Sickle cell trait     Tobacco use      Past Surgical History:   Procedure Laterality Date    BREAST SURGERY       SECTION      x 2    CYST REMOVAL      from left tube    ESOPHAGOGASTRODUODENOSCOPY (EGD) N/A 10/25/2017    [facility] at [facility] ENDO    ESOPHAGOGASTRODUODENOSCOPY (EGD) N/A 2017    [facility] by [facility]eller MD at [facility] OR    GASTRECTOMY-SLEEVE-LAPAROSCOPIC N/A 10/12/2016    Performed by Jay Epperson MD at " [facility] OR    gastric sleeve      TONSILLECTOMY       Family History   Adopted: Yes   Problem Relation Age of Onset    Hypertension Mother     Thyroid disease Paternal Aunt     Hypertension Paternal Grandmother     Breast cancer Neg Hx     Colon cancer Neg Hx     Ovarian cancer Neg Hx      Social History     Socioeconomic History    Marital status: Single     Spouse name: Not on file    Number of children: Not on file    Years of education: Not on file    Highest education level: Not on file   Social Needs    Financial resource strain: Not on file    Food insecurity - worry: Not on file    Food insecurity - inability: Not on file    Transportation needs - medical: Not on file    Transportation needs - non-medical: Not on file   Occupational History    Not on file   Tobacco Use    Smoking status: Former Smoker     Packs/day: 0.50     Types: Cigarettes    Smokeless tobacco: Never Used   Substance and Sexual Activity    Alcohol use: No    Drug use: No    Sexual activity: Not Currently     Partners: Male     Birth control/protection: None   Other Topics Concern    Not on file   Social History Narrative    Not on file        Medication List           Accurate as of 10/31/18 11:36 AM. If you have any questions, ask your nurse or doctor.               CONTINUE taking these medications    busPIRone 15 MG tablet  Commonly known as:  BUSPAR     citalopram 10 MG tablet  Commonly known as:  CELEXA     hydroCHLOROthiazide 25 MG tablet  Commonly known as:  HYDRODIURIL     HYDROcodone-acetaminophen 5-325 mg per tablet  Commonly known as:  NORCO  Take 1 tablet by mouth every 6 (six) hours as needed for Pain.     nicotine 7 mg/24 hr  Commonly known as:  NICODERM CQ  Place 1 patch onto the skin once daily.     pantoprazole 40 MG tablet  Commonly known as:  PROTONIX  Take 1 tablet (40 mg total) by mouth once daily.     valsartan 80 MG tablet  Commonly known as:  DIOVAN     VITAMIN D2 50,000 unit Cap  Generic  drug:  ergocalciferol          Review of patient's allergies indicates:   Allergen Reactions    Lisinopril Anaphylaxis     This is only possible agent at the time.    Sulfa (sulfonamide antibiotics) Anaphylaxis     Review of Systems   Constitution: Negative for chills and fever.   Cardiovascular: Negative for chest pain.   Respiratory: Negative for shortness of breath.    Skin: Negative for color change.   Musculoskeletal: Positive for joint pain and joint swelling.   Gastrointestinal: Negative for abdominal pain, diarrhea, nausea and vomiting.   Neurological: Negative for numbness and paresthesias.       Objective:   Body mass index is 26.3 kg/m².  Vitals:    10/31/18 1032   BP: (!) 145/98   Pulse: 75   Resp: 18       General: Joe Savage is well-developed, well-nourished, appears stated age, in no acute distress, alert and oriented to time, place and person.       General    Constitutional: She is oriented to person, place, and time. She appears well-developed and well-nourished.   Neck: Normal range of motion.   Cardiovascular: Normal rate and regular rhythm.    Pulmonary/Chest: Effort normal.   Abdominal: Soft.   Neurological: She is alert and oriented to person, place, and time.   Psychiatric: She has a normal mood and affect. Her behavior is normal.     General Musculoskeletal Exam   Gait: normal     Right Ankle/Foot Exam     Inspection   Deformity: absent  Erythema: absent  Effusion: Ankle - absent Foot - absent    Tests   Squeeze Test: negative    Other   Sensation: normal    Comments:  +DF/PF/EHL/FHL    Mild tenderness over 5th MTP and IP joint.   No rotational deformity of fifth metatarsal. Swelling has greatly improved. Patient is able to walk today without any significant pain.         Vascular Exam     Right Pulses  Dorsalis Pedis:      2+          Edema  Right Lower Leg: absent       EXAMINATION:  XR FOOT COMPLETE 3 VIEW RIGHT    CLINICAL HISTORY:  . Pain in right foot    TECHNIQUE:  AP, lateral,  and oblique views of the right foot were performed.    COMPARISON:  Exam from 16 days prior    FINDINGS:  Again seen is a fracture of the proximal phalanx 5th digit fracture which does not appear significantly changed in appearance since the comparison study.  There is slight decrease surrounding soft tissue swelling.  No new abnormality.      Impression       As above         Assessment:     Encounter Diagnoses   Name Primary?    Closed displaced fracture of proximal phalanx of lesser toe of right foot with routine healing, subsequent encounter Yes    Right foot pain         Plan:     1. Patient will be WBAT on RLE in her CAM boot or Darco shoe. She was issued a Darco shoe today. I would like for her to use either of these for the next two weeks and then transition to good, orthopedic shoes if she is comfortable doing so. She was advised to not wear any flip-flops or sandals.     2. She can still use ice- applying to area for 30 min on and 30 min off.     3. She will follow-up at our [place] location in 3.5 weeks and repeat x-rays.

## 2018-10-31 ENCOUNTER — OFFICE VISIT (OUTPATIENT)
Dept: ORTHOPEDICS | Facility: CLINIC | Age: 48
End: 2018-10-31
Payer: MEDICAID

## 2018-10-31 ENCOUNTER — HOSPITAL ENCOUNTER (OUTPATIENT)
Dept: RADIOLOGY | Facility: HOSPITAL | Age: 48
Discharge: HOME | End: 2018-10-31
Attending: PHYSICIAN ASSISTANT
Payer: MEDICAID

## 2018-10-31 VITALS
RESPIRATION RATE: 18 BRPM | WEIGHT: 173 LBS | HEART RATE: 75 BPM | DIASTOLIC BLOOD PRESSURE: 98 MMHG | BODY MASS INDEX: 26.22 KG/M2 | HEIGHT: 68 IN | SYSTOLIC BLOOD PRESSURE: 145 MMHG

## 2018-10-31 DIAGNOSIS — S92.511D CLOSED DISPLACED FRACTURE OF PROXIMAL PHALANX OF LESSER TOE OF RIGHT FOOT WITH ROUTINE HEALING, SUBSEQUENT ENCOUNTER: Primary | ICD-10-CM

## 2018-10-31 DIAGNOSIS — M79.671 RIGHT FOOT PAIN: ICD-10-CM

## 2018-10-31 PROCEDURE — 73630 X-RAY EXAM OF FOOT: CPT | Mod: TC,RT

## 2018-10-31 PROCEDURE — 99213 OFFICE O/P EST LOW 20 MIN: CPT | Mod: PBBFAC,25 | Performed by: PHYSICIAN ASSISTANT

## 2018-10-31 PROCEDURE — 99999 PR PBB SHADOW E&M-EST. PATIENT-LVL III: CPT | Mod: PBBFAC,,, | Performed by: PHYSICIAN ASSISTANT

## 2018-10-31 PROCEDURE — 99024 POSTOP FOLLOW-UP VISIT: CPT | Mod: S$PBB,,, | Performed by: PHYSICIAN ASSISTANT

## 2018-10-31 PROCEDURE — 73630 X-RAY EXAM OF FOOT: CPT | Mod: 26,RT,, | Performed by: RADIOLOGY

## 2018-11-05 ENCOUNTER — TELEPHONE (OUTPATIENT)
Dept: ORTHOPEDICS | Facility: CLINIC | Age: 48
End: 2018-11-05

## 2018-11-05 NOTE — TELEPHONE ENCOUNTER
Called pharmacy to give them the DX code. Pharmacy stated that it was accepted by pt insurance        ----- Message from Jessica Pierce sent at 11/5/2018 12:41 PM CST -----  Contact: [facility] pharmacy - [place]  Stated they are calling for DX code for pt medication, and can be reached at [phone] Thanks

## 2018-11-27 ENCOUNTER — HOSPITAL ENCOUNTER (OUTPATIENT)
Dept: RADIOLOGY | Facility: HOSPITAL | Age: 48
Discharge: HOME | End: 2018-11-27
Attending: FAMILY MEDICINE
Payer: MEDICAID

## 2018-11-27 ENCOUNTER — OFFICE VISIT (OUTPATIENT)
Dept: ORTHOPEDICS | Facility: CLINIC | Age: 48
End: 2018-11-27
Payer: MEDICAID

## 2018-11-27 VITALS — BODY MASS INDEX: 26.22 KG/M2 | WEIGHT: 173 LBS | HEIGHT: 68 IN | HEART RATE: 72 BPM

## 2018-11-27 DIAGNOSIS — S92.511D CLOSED DISPLACED FRACTURE OF PROXIMAL PHALANX OF LESSER TOE OF RIGHT FOOT WITH ROUTINE HEALING, SUBSEQUENT ENCOUNTER: ICD-10-CM

## 2018-11-27 DIAGNOSIS — S92.511D CLOSED DISPLACED FRACTURE OF PROXIMAL PHALANX OF LESSER TOE OF RIGHT FOOT WITH ROUTINE HEALING, SUBSEQUENT ENCOUNTER: Primary | ICD-10-CM

## 2018-11-27 PROCEDURE — 73630 X-RAY EXAM OF FOOT: CPT | Mod: 26,RT,, | Performed by: RADIOLOGY

## 2018-11-27 PROCEDURE — 99213 OFFICE O/P EST LOW 20 MIN: CPT | Mod: PBBFAC,25 | Performed by: FAMILY MEDICINE

## 2018-11-27 PROCEDURE — 99999 PR PBB SHADOW E&M-EST. PATIENT-LVL III: CPT | Mod: PBBFAC,,, | Performed by: FAMILY MEDICINE

## 2018-11-27 PROCEDURE — 73630 X-RAY EXAM OF FOOT: CPT | Mod: TC,RT

## 2018-11-27 PROCEDURE — 99214 OFFICE O/P EST MOD 30 MIN: CPT | Mod: S$PBB,,, | Performed by: FAMILY MEDICINE

## 2018-11-27 RX ORDER — HYDROCODONE BITARTRATE AND ACETAMINOPHEN 5; 325 MG/1; MG/1
1 TABLET ORAL EVERY 12 HOURS PRN
Qty: 12 TABLET | Refills: 0 | Status: SHIPPED | OUTPATIENT
Start: 2018-11-27 | End: 2019-07-15

## 2018-11-27 NOTE — PROGRESS NOTES
Subjective:     Patient ID: Joe Ceballos is a 48 y.o. female.    Chief Complaint: Injury of the Right Foot and Follow-up    Patient is 48-year-old female presents today for follow-up of the right 5th proximal phalanx fracture.  Patient has been wearing a hard-soled postop shoe.  Patient states that in the hard-soled postop shoe or in her walking boot, she does not have symptoms.  States his been mildly painful to try to put on a regular tennis shoe.  States she is trying to transition from the walking boot to a normal shoe.  States she really only needs the boot while she is at work and having to stand on her feet all day.         Past Medical History:   Diagnosis Date    Anxiety     Edema     Hypertension     Initial insomnia     Sickle cell trait     Tobacco use      Past Surgical History:   Procedure Laterality Date    BREAST SURGERY       SECTION      x 2    CYST REMOVAL      from left tube    ESOPHAGOGASTRODUODENOSCOPY (EGD) N/A 10/25/2017    [facility]    ESOPHAGOGASTRODUODENOSCOPY (EGD) N/A 2017    [facility] OR    GASTRECTOMY-SLEEVE-LAPAROSCOPIC N/A 10/12/2016    [facility]    gastric sleeve      TONSILLECTOMY       Family History   Adopted: Yes   Problem Relation Age of Onset    Hypertension Mother     Thyroid disease Paternal Aunt     Hypertension Paternal Grandmother     Breast cancer Neg Hx     Colon cancer Neg Hx     Ovarian cancer Neg Hx      Social History     Socioeconomic History    Marital status: Single     Spouse name: Not on file    Number of children: Not on file    Years of education: Not on file    Highest education level: Not on file   Social Needs    Financial resource strain: Not on file    Food insecurity - worry: Not on file    Food insecurity - inability: Not on file    Transportation needs - medical: Not on file    Transportation needs - non-medical:  Not on file   Occupational History    Not on file   Tobacco Use    Smoking status: Former Smoker     Packs/day: 0.50     Types: Cigarettes    Smokeless tobacco: Never Used   Substance and Sexual Activity    Alcohol use: No    Drug use: No    Sexual activity: Not Currently     Partners: Male     Birth control/protection: None   Other Topics Concern    Not on file   Social History Narrative    Not on file        Medication List           Accurate as of 11/27/18 11:59 PM. If you have any questions, ask your nurse or doctor.               CHANGE how you take these medications    HYDROcodone-acetaminophen 5-325 mg per tablet  Commonly known as:  NORCO  Take 1 tablet by mouth every 12 (twelve) hours as needed for Pain.  What changed:  when to take this  Changed by:  Live Mluligan MD        CONTINUE taking these medications    busPIRone 15 MG tablet  Commonly known as:  BUSPAR     citalopram 10 MG tablet  Commonly known as:  CELEXA     hydroCHLOROthiazide 25 MG tablet  Commonly known as:  HYDRODIURIL     nicotine 7 mg/24 hr  Commonly known as:  NICODERM CQ  Place 1 patch onto the skin once daily.     pantoprazole 40 MG tablet  Commonly known as:  PROTONIX  Take 1 tablet (40 mg total) by mouth once daily.     valsartan 80 MG tablet  Commonly known as:  DIOVAN     VITAMIN D2 50,000 unit Cap  Generic drug:  ergocalciferol           Where to Get Your Medications      These medications were sent to [place] [id number] [place] [facility] [place] [facility] [place] [address]  [address] [address]    Phone:  [phone]   · HYDROcodone-acetaminophen 5-325 mg per tablet       Review of patient's allergies indicates:   Allergen Reactions    Lisinopril Anaphylaxis     This is only possible agent at the time.    Sulfa (sulfonamide antibiotics) Anaphylaxis     Review of Systems   Constitutional: Negative for chills and fever.   Cardiovascular: Negative for leg swelling.   Gastrointestinal: Negative for nausea  "and vomiting.   Musculoskeletal: Positive for joint pain. Negative for back pain, falls and myalgias.   Skin: Negative for rash.   Neurological: Negative for tingling, sensory change, focal weakness and weakness.        Objective:   Body mass index is 26.3 kg/m².  Vitals:    11/27/18 1127   Pulse: 72   Weight: 78.5 kg (173 lb)   Height: 5' 8" (1.727 m)   PainSc:   8   PainLoc: Foot           General    Nursing note and vitals reviewed.  Constitutional: She is oriented to person, place, and time. She appears well-developed and well-nourished. No distress.   Eyes: Conjunctivae are normal. No scleral icterus.   Pulmonary/Chest: Effort normal.   Neurological: She is alert and oriented to person, place, and time.   Psychiatric: She has a normal mood and affect. Her behavior is normal. Judgment and thought content normal.         Right Ankle/Foot Exam     Inspection   Erythema: absent  Bruising: Foot - absent  Effusion: Foot - absent    Range of Motion   The patient has normal right ankle ROM.    Other   Sensation: normal    Comments:  Mild tenderness to palpation over proximal phalanx of the 5th toe           Narrative     EXAMINATION:  XR FOOT COMPLETE 3 VIEW RIGHT    CLINICAL HISTORY:  . Displaced fracture of proximal phalanx of right lesser toe(s), subsequent encounter for fracture with routine healing    TECHNIQUE:  AP, lateral, and oblique views of the right foot were performed.    COMPARISON:  10/31/2018    FINDINGS:  Previously described 5th proximal phalanx fracture again noted.  Fragment positioning is unchanged.  Increased interval marginal callus production noted.  No new fractures or dislocations visualized.  Joint spaces are preserved.  No erosive changes demonstrated.      Impression       As above.      Electronically signed by: Live Atkinson MD  Date: 11/27/2018  Time: 12:18           Joe Savage was seen today for follow-up and injury.    Diagnoses and all orders for this visit:    Closed displaced " fracture of proximal phalanx of lesser toe of right foot with routine healing, subsequent encounter  -     X-Ray Foot Complete Right; Future  -     HYDROcodone-acetaminophen (NORCO) 5-325 mg per tablet; Take 1 tablet by mouth every 12 (twelve) hours as needed for Pain.    -advised patient that her fracture appears to be well healing.  Advised her that she does not need a boot, but may wear one for comfort, however, instructed her that if she does not transition to a normal shoe, she will have a difficult time getting full range of motion and trust back in the toe.  -what the patient follow up in 4 weeks for recheck    -right foot five view Xray images were independently viewed and read by me showing a well healing (with good callus formation), minimally displaced fracture of the proximal 5th phalanx.  -Formal read by radiologist is as described above  -Discussed findings with patient  -Treatment options and alternatives were discussed with the patient. Patient expressed understanding. Patient was given the opportunity to ask questions and be an active participant in their medical care. Patient had no further questions or concerns at this time.   -Patient is an overall moderate risk for health complications from their medical conditions.

## 2018-12-11 ENCOUNTER — TELEPHONE (OUTPATIENT)
Dept: ORTHOPEDICS | Facility: CLINIC | Age: 48
End: 2018-12-11

## 2018-12-11 NOTE — TELEPHONE ENCOUNTER
----- Message from Stephanie Samaniego sent at 12/11/2018  8:27 AM CST -----  Contact: self [phone]  States that she needs a letter of release from [employer]. Please call back at [phone]//thank you acc

## 2018-12-29 ENCOUNTER — HOSPITAL ENCOUNTER (EMERGENCY)
Facility: HOSPITAL | Age: 48
Discharge: HOME | End: 2018-12-29
Attending: EMERGENCY MEDICINE
Payer: MEDICAID

## 2018-12-29 VITALS
HEART RATE: 83 BPM | RESPIRATION RATE: 20 BRPM | SYSTOLIC BLOOD PRESSURE: 168 MMHG | WEIGHT: 180.56 LBS | BODY MASS INDEX: 27.36 KG/M2 | DIASTOLIC BLOOD PRESSURE: 98 MMHG | HEIGHT: 68 IN | OXYGEN SATURATION: 96 % | TEMPERATURE: 99 F

## 2018-12-29 DIAGNOSIS — J40 BRONCHITIS: Primary | ICD-10-CM

## 2018-12-29 DIAGNOSIS — I10 HYPERTENSION, UNSPECIFIED TYPE: ICD-10-CM

## 2018-12-29 DIAGNOSIS — J32.9 SINUSITIS, UNSPECIFIED CHRONICITY, UNSPECIFIED LOCATION: ICD-10-CM

## 2018-12-29 PROCEDURE — 99283 EMERGENCY DEPT VISIT LOW MDM: CPT

## 2018-12-29 RX ORDER — AZITHROMYCIN 250 MG/1
250 TABLET, FILM COATED ORAL DAILY
Qty: 6 TABLET | Refills: 0 | Status: SHIPPED | OUTPATIENT
Start: 2018-12-29 | End: 2019-02-05 | Stop reason: ALTCHOICE

## 2018-12-29 RX ORDER — PROMETHAZINE HYDROCHLORIDE AND DEXTROMETHORPHAN HYDROBROMIDE 6.25; 15 MG/5ML; MG/5ML
5 SYRUP ORAL 4 TIMES DAILY PRN
Qty: 240 ML | Refills: 0 | Status: SHIPPED | OUTPATIENT
Start: 2018-12-29 | End: 2019-01-08

## 2018-12-29 NOTE — ED NOTES
Patient examined, evaluated, and educated on discharge instructions and prescriptions by Aayush DAVIS without nursing assistance. Patient discharged to [facility] by PA.

## 2018-12-29 NOTE — ED PROVIDER NOTES
Encounter Date: 2018       History     Chief Complaint   Patient presents with    Generalized Body Aches     The history is provided by the patient.   URI   The primary symptoms include fatigue, sore throat, cough and myalgias. Primary symptoms do not include fever, headaches, abdominal pain, nausea, arthralgias or rash. The current episode started several days ago. This is a new problem. The problem has been gradually worsening.   The cough is productive. The sputum is green.   The myalgias are generalized. The myalgias are associated with weakness.   The onset of the illness is associated with exposure to sick contacts. Symptoms associated with the illness include plugged ear sensation, facial pain, sinus pressure, congestion and rhinorrhea. The illness is not associated with chills. The following treatments were addressed: Acetaminophen was effective.     Review of patient's allergies indicates:   Allergen Reactions    Lisinopril Anaphylaxis     This is only possible agent at the time.    Sulfa (sulfonamide antibiotics) Anaphylaxis     Past Medical History:   Diagnosis Date    Anxiety     Edema     Hypertension     Initial insomnia     Sickle cell trait     Tobacco use      Past Surgical History:   Procedure Laterality Date    BREAST SURGERY       SECTION      x 2    CYST REMOVAL      from left tube    ESOPHAGOGASTRODUODENOSCOPY (EGD) N/A 10/25/2017    [facility] ENDO    ESOPHAGOGASTRODUODENOSCOPY (EGD) N/A 2017    [facility] at [facility] OR    GASTRECTOMY-SLEEVE-LAPAROSCOPIC N/A 10/12/2016    [facility] at [facility] OR    gastric sleeve      TONSILLECTOMY       Family History   Adopted: Yes   Problem Relation Age of Onset    Hypertension Mother     Thyroid disease Paternal Aunt     Hypertension Paternal Grandmother     Breast cancer Neg Hx     Colon cancer Neg Hx     Ovarian cancer Neg Hx      Social History      Tobacco Use    Smoking status: Former Smoker     Packs/day: 0.50     Types: Cigarettes    Smokeless tobacco: Never Used   Substance Use Topics    Alcohol use: No    Drug use: No     Review of Systems   Constitutional: Positive for fatigue. Negative for chills and fever.   HENT: Positive for congestion, rhinorrhea, sinus pressure and sore throat.    Eyes: Negative for pain, discharge and itching.   Respiratory: Positive for cough. Negative for shortness of breath.    Cardiovascular: Negative for chest pain.   Gastrointestinal: Negative for abdominal pain, diarrhea and nausea.   Genitourinary: Negative for dysuria.   Musculoskeletal: Positive for myalgias. Negative for arthralgias and back pain.   Skin: Negative for rash.   Neurological: Positive for weakness. Negative for headaches.   Hematological: Does not bruise/bleed easily.   Psychiatric/Behavioral: The patient is not nervous/anxious.    All other systems reviewed and are negative.      Physical Exam     Initial Vitals [12/29/18 1331]   BP Pulse Resp Temp SpO2   (!) 168/98 83 20 98.7 °F (37.1 °C) 96 %      MAP       --         Physical Exam    Nursing note and vitals reviewed.  Constitutional: Vital signs are normal. She appears well-developed and well-nourished. No distress.   HENT:   Head: Normocephalic and atraumatic.   Right Ear: External ear normal.   Left Ear: External ear normal.   Nose: Mucosal edema and rhinorrhea present. Right sinus exhibits frontal sinus tenderness. Left sinus exhibits frontal sinus tenderness.   Mouth/Throat: Oropharynx is clear and moist. No oropharyngeal exudate, posterior oropharyngeal edema or posterior oropharyngeal erythema.   Eyes: Conjunctivae, EOM and lids are normal. Pupils are equal, round, and reactive to light. Right eye exhibits no discharge. Left eye exhibits no discharge. No scleral icterus.   Neck: Normal range of motion and full passive range of motion without pain. Neck supple.   Cardiovascular: Normal  rate, regular rhythm, S1 normal, S2 normal, normal heart sounds, intact distal pulses and normal pulses.   Pulmonary/Chest: Breath sounds normal. No respiratory distress. She has no wheezes. She has no rales.   Abdominal: Soft. Normal appearance and bowel sounds are normal. She exhibits no distension. There is no tenderness.   Musculoskeletal: Normal range of motion.   Lymphadenopathy:     She has no cervical adenopathy.   Neurological: She is alert and oriented to person, place, and time. She has normal strength. No cranial nerve deficit or sensory deficit. Coordination and gait normal.   Skin: Skin is warm, dry and intact.   Psychiatric: She has a normal mood and affect. Her speech is normal and behavior is normal. Judgment and thought content normal. Cognition and memory are normal.         ED Course   Procedures  Labs Reviewed - No data to display       Imaging Results    None             Additional MDM:   Hypertension: The patient has hypertension (no treatment required at this time). The patient's condition was felt to be stable.                    Clinical Impression:   The primary encounter diagnosis was Bronchitis. Diagnoses of Sinusitis, unspecified chronicity, unspecified location and Hypertension, unspecified type were also pertinent to this visit.      Disposition:   Disposition: Discharged  Condition: Stable                        RYAN Jean Baptiste  12/29/18 6093

## 2019-02-04 ENCOUNTER — TELEPHONE (OUTPATIENT)
Dept: SURGERY | Facility: CLINIC | Age: 49
End: 2019-02-04

## 2019-02-04 NOTE — TELEPHONE ENCOUNTER
----- Message from Radha Clayton sent at 2/4/2019  8:36 AM CST -----  Contact: [phone]  WOULD LIKE  TO CONSULT WITH THE NURSE, PATIENTS WOULD LIKE  TO GET AN APPT TO BE SEEN, PATIENTS STATES, HER STOMACH IS SWOLLEN AND HURTING,  PATIENTS WOULD LIKE TO COME IN AS SOON AS POSSIBLE, PATIENTS STATES SHE ALSO HAS HEMORRHOIDS, PLEASE CALL BACK -[phone], THANKS SJ

## 2019-02-04 NOTE — TELEPHONE ENCOUNTER
"Returned call pt stated," My stomach is hurting/swollen, and hemorrhoids is out."  During conversation appt was scheduled for 02/05/19 @ 2:20PM with Dr Moeller/LILI. Patient verbalized an understanding.   "

## 2019-02-05 ENCOUNTER — OFFICE VISIT (OUTPATIENT)
Dept: SURGERY | Facility: CLINIC | Age: 49
End: 2019-02-05
Payer: MEDICAID

## 2019-02-05 VITALS
SYSTOLIC BLOOD PRESSURE: 182 MMHG | TEMPERATURE: 99 F | DIASTOLIC BLOOD PRESSURE: 108 MMHG | HEIGHT: 68 IN | WEIGHT: 177.94 LBS | HEART RATE: 77 BPM | BODY MASS INDEX: 26.97 KG/M2

## 2019-02-05 DIAGNOSIS — Z98.0 STATUS POST BYPASS GASTROJEJUNOSTOMY: Primary | ICD-10-CM

## 2019-02-05 PROCEDURE — 99213 PR OFFICE/OUTPT VISIT, EST, LEVL III, 20-29 MIN: ICD-10-PCS | Mod: S$PBB,,, | Performed by: SURGERY

## 2019-02-05 PROCEDURE — 99213 OFFICE O/P EST LOW 20 MIN: CPT | Mod: S$PBB,,, | Performed by: SURGERY

## 2019-02-05 PROCEDURE — 99213 OFFICE O/P EST LOW 20 MIN: CPT | Mod: PBBFAC,PN | Performed by: SURGERY

## 2019-02-05 PROCEDURE — 99999 PR PBB SHADOW E&M-EST. PATIENT-LVL III: CPT | Mod: PBBFAC,,, | Performed by: SURGERY

## 2019-02-05 PROCEDURE — 99999 PR PBB SHADOW E&M-EST. PATIENT-LVL III: ICD-10-PCS | Mod: PBBFAC,,, | Performed by: SURGERY

## 2019-02-05 NOTE — PROGRESS NOTES
Joe is well known to me from previous encounters.  She was initially seen for the complications following her initial sleeve gastrectomy done by another surgeon at an outside institution.  She was managed any evaluated then referred for completion surgery:  Raheel-en-Y gastric bypass.  She was able to secure a surgeon in the [facility] and underwent Raheel-en-Y gastric bypass and all her symptoms has improved since.  She comes to my office today with various questions about the surgery as well as her symptoms related to her recent flu.  Patient was advised that she needs to follow up with her primary surgeon as well as her primary care provider to address those 2 issues.  For her acute situation, she was recommended to follow up in the emergency department for further evaluation.  Total time approximately 15 min was spent with this patient, and more than 50% of that was spent for treatment planning and counseling.    Corby Moeller

## 2019-03-27 ENCOUNTER — PATIENT MESSAGE (OUTPATIENT)
Dept: OPHTHALMOLOGY | Facility: CLINIC | Age: 49
End: 2019-03-27

## 2019-04-02 ENCOUNTER — OFFICE VISIT (OUTPATIENT)
Dept: OBSTETRICS AND GYNECOLOGY | Facility: CLINIC | Age: 49
End: 2019-04-02
Payer: MEDICAID

## 2019-04-02 VITALS — BODY MASS INDEX: 27.05 KG/M2 | SYSTOLIC BLOOD PRESSURE: 144 MMHG | HEIGHT: 68 IN | DIASTOLIC BLOOD PRESSURE: 86 MMHG

## 2019-04-02 DIAGNOSIS — N95.1 PERIMENOPAUSE: ICD-10-CM

## 2019-04-02 DIAGNOSIS — Z01.419 WELL WOMAN EXAM WITH ROUTINE GYNECOLOGICAL EXAM: Primary | ICD-10-CM

## 2019-04-02 PROCEDURE — 81025 URINE PREGNANCY TEST: CPT | Mod: PBBFAC,PN | Performed by: OBSTETRICS & GYNECOLOGY

## 2019-04-02 PROCEDURE — 99999 PR PBB SHADOW E&M-EST. PATIENT-LVL II: CPT | Mod: PBBFAC,,, | Performed by: OBSTETRICS & GYNECOLOGY

## 2019-04-02 PROCEDURE — 99212 OFFICE O/P EST SF 10 MIN: CPT | Mod: PBBFAC,PN | Performed by: OBSTETRICS & GYNECOLOGY

## 2019-04-02 PROCEDURE — 99396 PR PREVENTIVE VISIT,EST,40-64: ICD-10-PCS | Mod: S$PBB,,, | Performed by: OBSTETRICS & GYNECOLOGY

## 2019-04-02 PROCEDURE — 81002 URINALYSIS NONAUTO W/O SCOPE: CPT | Mod: PBBFAC,PN | Performed by: OBSTETRICS & GYNECOLOGY

## 2019-04-02 PROCEDURE — 99396 PREV VISIT EST AGE 40-64: CPT | Mod: S$PBB,,, | Performed by: OBSTETRICS & GYNECOLOGY

## 2019-04-02 PROCEDURE — 99999 PR PBB SHADOW E&M-EST. PATIENT-LVL II: ICD-10-PCS | Mod: PBBFAC,,, | Performed by: OBSTETRICS & GYNECOLOGY

## 2019-04-02 RX ORDER — IBUPROFEN 200 MG
TABLET ORAL
Refills: 2 | COMMUNITY
Start: 2019-03-24 | End: 2020-08-16 | Stop reason: CLARIF

## 2019-04-02 RX ORDER — GENTAMICIN SULFATE 3 MG/ML
SOLUTION/ DROPS OPHTHALMIC
Refills: 0 | COMMUNITY
Start: 2019-02-28 | End: 2019-04-09

## 2019-04-02 RX ORDER — CHLORTHALIDONE 25 MG/1
25 TABLET ORAL DAILY
Refills: 11 | COMMUNITY
Start: 2019-03-01 | End: 2019-08-14
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
## 2019-04-09 ENCOUNTER — OFFICE VISIT (OUTPATIENT)
Dept: OPHTHALMOLOGY | Facility: CLINIC | Age: 49
End: 2019-04-09
Attending: OPHTHALMOLOGY
Payer: MEDICAID

## 2019-04-09 DIAGNOSIS — H52.13 MYOPIA OF BOTH EYES: Primary | ICD-10-CM

## 2019-04-09 PROCEDURE — 99999 PR PBB SHADOW E&M-EST. PATIENT-LVL II: ICD-10-PCS | Mod: PBBFAC,,, | Performed by: OPHTHALMOLOGY

## 2019-04-09 PROCEDURE — 99241 PR OFFICE CONSULT, COSMETIC: ICD-10-PCS | Mod: S$PBB,CSM,, | Performed by: OPHTHALMOLOGY

## 2019-04-09 PROCEDURE — 99999 PR PBB SHADOW E&M-EST. PATIENT-LVL II: CPT | Mod: PBBFAC,,, | Performed by: OPHTHALMOLOGY

## 2019-04-09 PROCEDURE — 99241 PR OFFICE CONSULT, COSMETIC: CPT | Mod: S$PBB,CSM,, | Performed by: OPHTHALMOLOGY

## 2019-04-09 PROCEDURE — 99212 OFFICE O/P EST SF 10 MIN: CPT | Mod: PBBFAC | Performed by: OPHTHALMOLOGY

## 2019-04-09 NOTE — PROGRESS NOTES
HPI     49 y.o pt here for lasik eval    Pt states that they are running out of contacts for eyes, and due to job   have to wear safety goggles and have trouble seeing. Also have trouble   with allergy eyes today itching a little. And have white stuff in the a.m.       Eye med(s) - ran out    none    Last edited by Ching Padilla MA on 4/9/2019 11:55 AM. (History)            Assessment /Plan     For exam results, see Encounter Report.    Myopia of both eyes      Asymetric astigmatism, not a candidate for LASIK  Needs referal to optometry in [place].

## 2019-07-15 ENCOUNTER — HOSPITAL ENCOUNTER (EMERGENCY)
Facility: HOSPITAL | Age: 49
Discharge: HOME | End: 2019-07-15
Attending: EMERGENCY MEDICINE
Payer: MEDICAID

## 2019-07-15 VITALS
WEIGHT: 186.5 LBS | TEMPERATURE: 98 F | DIASTOLIC BLOOD PRESSURE: 88 MMHG | HEIGHT: 68 IN | OXYGEN SATURATION: 100 % | RESPIRATION RATE: 18 BRPM | HEART RATE: 88 BPM | SYSTOLIC BLOOD PRESSURE: 168 MMHG | BODY MASS INDEX: 28.26 KG/M2

## 2019-07-15 DIAGNOSIS — S00.83XA CONTUSION OF FACE, INITIAL ENCOUNTER: Primary | ICD-10-CM

## 2019-07-15 PROCEDURE — 99284 EMERGENCY DEPT VISIT MOD MDM: CPT | Mod: 25

## 2019-07-15 PROCEDURE — 25000003 PHARM REV CODE 250: Performed by: EMERGENCY MEDICINE

## 2019-07-15 RX ORDER — KETOROLAC TROMETHAMINE 10 MG/1
10 TABLET, FILM COATED ORAL
Status: COMPLETED | OUTPATIENT
Start: 2019-07-15 | End: 2019-07-15

## 2019-07-15 RX ORDER — KETOROLAC TROMETHAMINE 10 MG/1
10 TABLET, FILM COATED ORAL EVERY 8 HOURS PRN
Qty: 9 TABLET | Refills: 0 | Status: SHIPPED | OUTPATIENT
Start: 2019-07-15 | End: 2019-08-14

## 2019-07-15 RX ADMIN — KETOROLAC TROMETHAMINE 10 MG: 10 TABLET, FILM COATED ORAL at 06:07
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
## 2019-07-15 NOTE — ED NOTES
Pt resting with her eyes closed in the bed. NAD noted, VVS, RR are equal and unlabored at this time. Side rails up x 2 and call light within reach.

## 2020-03-16 ENCOUNTER — OFFICE VISIT (OUTPATIENT)
Dept: INTERNAL MEDICINE | Facility: CLINIC | Age: 50
End: 2020-03-16
Payer: MEDICAID

## 2020-03-16 VITALS
DIASTOLIC BLOOD PRESSURE: 92 MMHG | OXYGEN SATURATION: 97 % | BODY MASS INDEX: 28.97 KG/M2 | SYSTOLIC BLOOD PRESSURE: 130 MMHG | TEMPERATURE: 97 F | WEIGHT: 191.13 LBS | HEIGHT: 68 IN | HEART RATE: 91 BPM

## 2020-03-16 DIAGNOSIS — B37.0 THRUSH: ICD-10-CM

## 2020-03-16 DIAGNOSIS — J06.9 VIRAL URI WITH COUGH: Primary | ICD-10-CM

## 2020-03-16 DIAGNOSIS — J10.1 INFLUENZA B: ICD-10-CM

## 2020-03-16 LAB
GROUP A STREP, MOLECULAR: NEGATIVE
INFLUENZA A, MOLECULAR: NEGATIVE
INFLUENZA B, MOLECULAR: POSITIVE
SPECIMEN SOURCE: ABNORMAL

## 2020-03-16 PROCEDURE — 87502 INFLUENZA DNA AMP PROBE: CPT

## 2020-03-16 PROCEDURE — 99214 OFFICE O/P EST MOD 30 MIN: CPT | Mod: S$PBB,,, | Performed by: PHYSICIAN ASSISTANT

## 2020-03-16 PROCEDURE — 99214 OFFICE O/P EST MOD 30 MIN: CPT | Mod: PBBFAC | Performed by: PHYSICIAN ASSISTANT

## 2020-03-16 PROCEDURE — 99999 PR PBB SHADOW E&M-EST. PATIENT-LVL IV: CPT | Mod: PBBFAC,,, | Performed by: PHYSICIAN ASSISTANT

## 2020-03-16 PROCEDURE — 99214 PR OFFICE/OUTPT VISIT, EST, LEVL IV, 30-39 MIN: ICD-10-PCS | Mod: S$PBB,,, | Performed by: PHYSICIAN ASSISTANT

## 2020-03-16 PROCEDURE — 99999 PR PBB SHADOW E&M-EST. PATIENT-LVL IV: ICD-10-PCS | Mod: PBBFAC,,, | Performed by: PHYSICIAN ASSISTANT

## 2020-03-16 PROCEDURE — 87651 STREP A DNA AMP PROBE: CPT

## 2020-03-16 RX ORDER — NYSTATIN 100000 [USP'U]/ML
4 SUSPENSION ORAL 4 TIMES DAILY
Qty: 160 ML | Refills: 0 | Status: SHIPPED | OUTPATIENT
Start: 2020-03-16 | End: 2020-03-26

## 2020-03-16 RX ORDER — OSELTAMIVIR PHOSPHATE 75 MG/1
75 CAPSULE ORAL 2 TIMES DAILY
Qty: 10 CAPSULE | Refills: 0 | Status: SHIPPED | OUTPATIENT
Start: 2020-03-16 | End: 2020-03-21

## 2020-03-16 NOTE — PROGRESS NOTES
Subjective:      Patient ID: Joe Ceballos is a 50 y.o. female.    Chief Complaint: Generalized Body Aches; Nasal Congestion; and Cough    URI    This is a new problem. The current episode started in the past 7 days. The problem has been gradually worsening. The maximum temperature recorded prior to her arrival was 101 - 101.9 F. Associated symptoms include chest pain, congestion, coughing, headaches, a plugged ear sensation, rhinorrhea and a sore throat. Pertinent negatives include no abdominal pain, diarrhea, dysuria, joint pain, joint swelling, nausea, neck pain, rash, sinus pain, sneezing, swollen glands, vomiting or wheezing. Treatments tried: otc multisymptom relief meds.   Pt reports having fever this morning of 100.9. She states that she took a tylenol and her fever resolved.   Concerned about COVID19. Denies any know exposure or travel.    Patient Active Problem List   Diagnosis    Essential hypertension    Metrorrhagia    Overweight (BMI 25.0-29.9)    S/P laparoscopic sleeve gastrectomy    Dysmenorrhea    Esophageal dysphagia    Benign esophageal stricture    Ulcer of esophagus without bleeding       Review of Systems   Constitutional: Positive for chills and fever. Negative for activity change, appetite change, diaphoresis, fatigue and unexpected weight change.   HENT: Positive for congestion, postnasal drip, rhinorrhea, sinus pressure and sore throat. Negative for hearing loss, sinus pain, sneezing, trouble swallowing and voice change.    Eyes: Negative.  Negative for visual disturbance.   Respiratory: Positive for cough. Negative for apnea, choking, chest tightness, shortness of breath, wheezing and stridor.    Cardiovascular: Positive for chest pain. Negative for palpitations and leg swelling.   Gastrointestinal: Negative for abdominal distention, abdominal pain, blood in stool, constipation, diarrhea, nausea and vomiting.   Endocrine: Negative for cold intolerance, heat intolerance,  "polydipsia and polyuria.   Genitourinary: Negative.  Negative for difficulty urinating, dysuria and frequency.   Musculoskeletal: Negative for arthralgias, back pain, gait problem, joint pain, joint swelling, myalgias and neck pain.   Skin: Negative for color change, pallor, rash and wound.   Neurological: Positive for headaches. Negative for dizziness, tremors, weakness, light-headedness and numbness.   Hematological: Negative for adenopathy.   Psychiatric/Behavioral: Negative for behavioral problems, confusion, self-injury, sleep disturbance and suicidal ideas. The patient is not nervous/anxious.      Objective:   BP (!) 130/92 (BP Location: Left arm, Patient Position: Sitting, BP Method: Medium (Manual))   Pulse 91   Temp 96.7 °F (35.9 °C) (Tympanic)   Ht 5' 8" (1.727 m)   Wt 86.7 kg (191 lb 2.2 oz)   SpO2 97%   BMI 29.06 kg/m²     Physical Exam   Constitutional: She is oriented to person, place, and time. She appears well-developed and well-nourished. No distress.   HENT:   Head: Normocephalic and atraumatic.   Right Ear: Hearing, external ear and ear canal normal. No tenderness. Tympanic membrane is not erythematous and not bulging. A middle ear effusion is present.   Left Ear: Hearing, external ear and ear canal normal. No tenderness. Tympanic membrane is not erythematous and not bulging. A middle ear effusion is present.   Nose: Mucosal edema and rhinorrhea present. No sinus tenderness. Right sinus exhibits no maxillary sinus tenderness and no frontal sinus tenderness. Left sinus exhibits no maxillary sinus tenderness and no frontal sinus tenderness.   Mouth/Throat: Uvula is midline and mucous membranes are normal. No oral lesions. Normal dentition. No dental abscesses, uvula swelling or dental caries. Oropharyngeal exudate and posterior oropharyngeal erythema present. No posterior oropharyngeal edema or tonsillar abscesses. No tonsillar exudate.   Eyes: Pupils are equal, round, and reactive to light. " Conjunctivae and EOM are normal. Right eye exhibits no discharge. Left eye exhibits no discharge.   Neck: Normal range of motion. Neck supple.   Cardiovascular: Normal rate, regular rhythm and normal heart sounds. Exam reveals no gallop and no friction rub.   No murmur heard.  Pulmonary/Chest: Effort normal and breath sounds normal. No respiratory distress. She has no wheezes. She has no rales.   Lymphadenopathy:     She has no cervical adenopathy.   Neurological: She is alert and oriented to person, place, and time.   Skin: Skin is warm. No rash noted. She is not diaphoretic. No erythema. No pallor.   Psychiatric: She has a normal mood and affect. Her behavior is normal. Judgment and thought content normal.   Nursing note and vitals reviewed.    Office Visit on 03/16/2020   Component Date Value Ref Range Status    Influenza A, Molecular 03/16/2020 Negative  Negative Final    Influenza B, Molecular 03/16/2020 Positive* Negative Corrected    Comment: called to Sherrie Trentgers @15:54  Corrected result; previously reported as Negative on 03/16/2020 at   15:30.      Flu A & B Source 03/16/2020 Nasal swab   Final    Group A Strep, Molecular 03/16/2020 Negative  Negative Final       Assessment:     1. Viral URI with cough    2. Thrush    3. Influenza B      Plan:   Viral URI with cough  -     Influenza A & B by Molecular  -     Group A Strep, Molecular  -OTC multi symptom relief medications.   -COVID testing not meeting criteria. Positive for influenza B. Tamiflu sent to pharmacy on file.     Thrush  -     nystatin (MYCOSTATIN) 100,000 unit/mL suspension; Take 4 mLs (400,000 Units total) by mouth 4 (four) times daily. for 10 days  Dispense: 160 mL; Refill: 0    Follow up if symptoms worsen or fail to improve.

## 2020-03-17 ENCOUNTER — TELEPHONE (OUTPATIENT)
Dept: INTERNAL MEDICINE | Facility: CLINIC | Age: 50
End: 2020-03-17

## 2020-03-17 NOTE — TELEPHONE ENCOUNTER
S/w patient. Patient states she received a call from someone on yesterday. Advised it was the ENT doctor calling for her son. Patient verbalized understanding.

## 2020-03-17 NOTE — TELEPHONE ENCOUNTER
----- Message from Gabby Peters sent at 3/17/2020 11:14 AM CDT -----  Contact: pt  .Type:  Patient Returning Call    Who Called: pt  Who Left Message for Patient:   Does the patient know what this is regarding?: no   Would the patient rather a call back or a response via [employer]?  Call back   Best Call Back Number: [phone] (home)   Additional Information:

## 2020-08-16 ENCOUNTER — HOSPITAL ENCOUNTER (EMERGENCY)
Facility: HOSPITAL | Age: 50
Discharge: HOME | End: 2020-08-17
Attending: EMERGENCY MEDICINE
Payer: MEDICAID

## 2020-08-16 VITALS
HEART RATE: 58 BPM | SYSTOLIC BLOOD PRESSURE: 170 MMHG | DIASTOLIC BLOOD PRESSURE: 102 MMHG | TEMPERATURE: 98 F | RESPIRATION RATE: 15 BRPM | OXYGEN SATURATION: 99 %

## 2020-08-16 DIAGNOSIS — I10 ESSENTIAL HYPERTENSION: Primary | ICD-10-CM

## 2020-08-16 DIAGNOSIS — S82.831A CLOSED FRACTURE OF DISTAL END OF RIGHT FIBULA, UNSPECIFIED FRACTURE MORPHOLOGY, INITIAL ENCOUNTER: ICD-10-CM

## 2020-08-16 DIAGNOSIS — R55 SYNCOPE: ICD-10-CM

## 2020-08-16 DIAGNOSIS — R52 PAIN: ICD-10-CM

## 2020-08-16 DIAGNOSIS — M79.604 ACUTE LEG PAIN, RIGHT: ICD-10-CM

## 2020-08-16 LAB
ALBUMIN SERPL BCP-MCNC: 3.2 G/DL (ref 3.5–5.2)
ALP SERPL-CCNC: 104 U/L (ref 55–135)
ALT SERPL W/O P-5'-P-CCNC: 25 U/L (ref 10–44)
ANION GAP SERPL CALC-SCNC: 11 MMOL/L (ref 8–16)
AST SERPL-CCNC: 23 U/L (ref 10–40)
BASOPHILS # BLD AUTO: 0.04 K/UL (ref 0–0.2)
BASOPHILS NFR BLD: 0.3 % (ref 0–1.9)
BILIRUB SERPL-MCNC: 0.6 MG/DL (ref 0.1–1)
BNP SERPL-MCNC: 182 PG/ML (ref 0–99)
BUN SERPL-MCNC: 19 MG/DL (ref 6–20)
CALCIUM SERPL-MCNC: 9 MG/DL (ref 8.7–10.5)
CHLORIDE SERPL-SCNC: 109 MMOL/L (ref 95–110)
CO2 SERPL-SCNC: 24 MMOL/L (ref 23–29)
CREAT SERPL-MCNC: 0.8 MG/DL (ref 0.5–1.4)
D DIMER PPP IA.FEU-MCNC: 3.42 MG/L FEU
DIFFERENTIAL METHOD: ABNORMAL
EOSINOPHIL # BLD AUTO: 0 K/UL (ref 0–0.5)
EOSINOPHIL NFR BLD: 0.3 % (ref 0–8)
ERYTHROCYTE [DISTWIDTH] IN BLOOD BY AUTOMATED COUNT: 17.9 % (ref 11.5–14.5)
EST. GFR  (AFRICAN AMERICAN): >60 ML/MIN/1.73 M^2
EST. GFR  (NON AFRICAN AMERICAN): >60 ML/MIN/1.73 M^2
GLUCOSE SERPL-MCNC: 90 MG/DL (ref 70–110)
HCT VFR BLD AUTO: 31.9 % (ref 37–48.5)
HGB BLD-MCNC: 10 G/DL (ref 12–16)
IMM GRANULOCYTES # BLD AUTO: 0.07 K/UL (ref 0–0.04)
IMM GRANULOCYTES NFR BLD AUTO: 0.6 % (ref 0–0.5)
LYMPHOCYTES # BLD AUTO: 1.2 K/UL (ref 1–4.8)
LYMPHOCYTES NFR BLD: 10.6 % (ref 18–48)
MAGNESIUM SERPL-MCNC: 2 MG/DL (ref 1.6–2.6)
MCH RBC QN AUTO: 25.3 PG (ref 27–31)
MCHC RBC AUTO-ENTMCNC: 31.3 G/DL (ref 32–36)
MCV RBC AUTO: 81 FL (ref 82–98)
MONOCYTES # BLD AUTO: 0.8 K/UL (ref 0.3–1)
MONOCYTES NFR BLD: 6.5 % (ref 4–15)
NEUTROPHILS # BLD AUTO: 9.4 K/UL (ref 1.8–7.7)
NEUTROPHILS NFR BLD: 81.7 % (ref 38–73)
NRBC BLD-RTO: 0 /100 WBC
PLATELET # BLD AUTO: 371 K/UL (ref 150–350)
PMV BLD AUTO: 9.5 FL (ref 9.2–12.9)
POTASSIUM SERPL-SCNC: 2.9 MMOL/L (ref 3.5–5.1)
PROT SERPL-MCNC: 7.4 G/DL (ref 6–8.4)
RBC # BLD AUTO: 3.95 M/UL (ref 4–5.4)
SODIUM SERPL-SCNC: 144 MMOL/L (ref 136–145)
TROPONIN I SERPL DL<=0.01 NG/ML-MCNC: 0.02 NG/ML (ref 0–0.03)
WBC # BLD AUTO: 11.56 K/UL (ref 3.9–12.7)

## 2020-08-16 PROCEDURE — 80053 COMPREHEN METABOLIC PANEL: CPT

## 2020-08-16 PROCEDURE — 29515 APPLICATION SHORT LEG SPLINT: CPT | Mod: RT

## 2020-08-16 PROCEDURE — 25000003 PHARM REV CODE 250: Performed by: EMERGENCY MEDICINE

## 2020-08-16 PROCEDURE — 84484 ASSAY OF TROPONIN QUANT: CPT

## 2020-08-16 PROCEDURE — 96375 TX/PRO/DX INJ NEW DRUG ADDON: CPT

## 2020-08-16 PROCEDURE — 85379 FIBRIN DEGRADATION QUANT: CPT

## 2020-08-16 PROCEDURE — 99285 EMERGENCY DEPT VISIT HI MDM: CPT | Mod: 25

## 2020-08-16 PROCEDURE — 83735 ASSAY OF MAGNESIUM: CPT

## 2020-08-16 PROCEDURE — 96374 THER/PROPH/DIAG INJ IV PUSH: CPT | Mod: 59

## 2020-08-16 PROCEDURE — 93005 ELECTROCARDIOGRAM TRACING: CPT | Mod: 59

## 2020-08-16 PROCEDURE — 25500020 PHARM REV CODE 255: Performed by: EMERGENCY MEDICINE

## 2020-08-16 PROCEDURE — 93010 ELECTROCARDIOGRAM REPORT: CPT | Mod: ,,, | Performed by: INTERNAL MEDICINE

## 2020-08-16 PROCEDURE — 83880 ASSAY OF NATRIURETIC PEPTIDE: CPT

## 2020-08-16 PROCEDURE — 85025 COMPLETE CBC W/AUTO DIFF WBC: CPT

## 2020-08-16 PROCEDURE — 63600175 PHARM REV CODE 636 W HCPCS: Performed by: EMERGENCY MEDICINE

## 2020-08-16 PROCEDURE — 93010 EKG 12-LEAD: ICD-10-PCS | Mod: ,,, | Performed by: INTERNAL MEDICINE

## 2020-08-16 RX ORDER — BUMETANIDE 1 MG/1
1 TABLET ORAL DAILY
COMMUNITY
End: 2021-09-29 | Stop reason: ALTCHOICE

## 2020-08-16 RX ORDER — POTASSIUM CHLORIDE 1.5 G/1.58G
40 POWDER, FOR SOLUTION ORAL
Status: DISCONTINUED | OUTPATIENT
Start: 2020-08-16 | End: 2020-08-17 | Stop reason: HOSPADM

## 2020-08-16 RX ORDER — AMOXICILLIN AND CLAVULANATE POTASSIUM 875; 125 MG/1; MG/1
1 TABLET, FILM COATED ORAL 2 TIMES DAILY
Qty: 14 TABLET | Refills: 0 | Status: SHIPPED | OUTPATIENT
Start: 2020-08-16 | End: 2020-08-23

## 2020-08-16 RX ORDER — CARVEDILOL 25 MG/1
25 TABLET ORAL
COMMUNITY
End: 2020-08-16 | Stop reason: CLARIF

## 2020-08-16 RX ORDER — FLUCONAZOLE 100 MG/1
100 TABLET ORAL DAILY
Qty: 7 TABLET | Refills: 0 | Status: SHIPPED | OUTPATIENT
Start: 2020-08-16 | End: 2020-08-23

## 2020-08-16 RX ORDER — PANTOPRAZOLE SODIUM 20 MG/1
20 TABLET, DELAYED RELEASE ORAL
COMMUNITY
End: 2021-11-17 | Stop reason: SDUPTHER

## 2020-08-16 RX ORDER — POTASSIUM CHLORIDE 1.5 G/1.58G
20 POWDER, FOR SOLUTION ORAL
COMMUNITY
End: 2020-08-16 | Stop reason: CLARIF

## 2020-08-16 RX ORDER — ONDANSETRON 2 MG/ML
4 INJECTION INTRAMUSCULAR; INTRAVENOUS
Status: COMPLETED | OUTPATIENT
Start: 2020-08-16 | End: 2020-08-16

## 2020-08-16 RX ORDER — BUMETANIDE 1 MG/1
1 TABLET ORAL
COMMUNITY
End: 2020-08-16 | Stop reason: CLARIF

## 2020-08-16 RX ORDER — CLONIDINE HYDROCHLORIDE 0.2 MG/1
0.1 TABLET ORAL
COMMUNITY
End: 2020-08-16 | Stop reason: CLARIF

## 2020-08-16 RX ORDER — MORPHINE SULFATE 4 MG/ML
4 INJECTION, SOLUTION INTRAMUSCULAR; INTRAVENOUS
Status: COMPLETED | OUTPATIENT
Start: 2020-08-16 | End: 2020-08-16

## 2020-08-16 RX ORDER — HYDRALAZINE HYDROCHLORIDE 20 MG/ML
10 INJECTION INTRAMUSCULAR; INTRAVENOUS
Status: COMPLETED | OUTPATIENT
Start: 2020-08-16 | End: 2020-08-16

## 2020-08-16 RX ORDER — POTASSIUM CHLORIDE 1500 MG/1
20 TABLET, EXTENDED RELEASE ORAL 2 TIMES DAILY
Qty: 6 TABLET | Refills: 0 | Status: SHIPPED | OUTPATIENT
Start: 2020-08-16 | End: 2020-08-19

## 2020-08-16 RX ORDER — HYDROCODONE BITARTRATE AND ACETAMINOPHEN 5; 325 MG/1; MG/1
1 TABLET ORAL
Status: COMPLETED | OUTPATIENT
Start: 2020-08-16 | End: 2020-08-16

## 2020-08-16 RX ORDER — AMOXICILLIN AND CLAVULANATE POTASSIUM 875; 125 MG/1; MG/1
1 TABLET, FILM COATED ORAL 2 TIMES DAILY
Qty: 14 TABLET | Refills: 0 | Status: SHIPPED | OUTPATIENT
Start: 2020-08-16 | End: 2020-08-16 | Stop reason: SDUPTHER

## 2020-08-16 RX ORDER — PROMETHAZINE HYDROCHLORIDE 25 MG/1
25 TABLET ORAL EVERY 4 HOURS
COMMUNITY
End: 2021-11-17 | Stop reason: SDUPTHER

## 2020-08-16 RX ORDER — POTASSIUM CHLORIDE 1500 MG/1
20 TABLET, EXTENDED RELEASE ORAL ONCE
COMMUNITY
End: 2020-08-16 | Stop reason: SDUPTHER

## 2020-08-16 RX ORDER — HYDROCODONE BITARTRATE AND ACETAMINOPHEN 5; 325 MG/1; MG/1
1 TABLET ORAL EVERY 6 HOURS PRN
Qty: 12 TABLET | Refills: 0 | Status: SHIPPED | OUTPATIENT
Start: 2020-08-16 | End: 2021-08-10

## 2020-08-16 RX ORDER — ERGOCALCIFEROL 1.25 MG/1
50000 CAPSULE ORAL
COMMUNITY
End: 2021-11-17 | Stop reason: SDUPTHER

## 2020-08-16 RX ORDER — NYSTATIN 100000 [USP'U]/ML
500000 SUSPENSION ORAL 4 TIMES DAILY
Qty: 60 ML | Refills: 0 | Status: SHIPPED | OUTPATIENT
Start: 2020-08-16 | End: 2022-03-18

## 2020-08-16 RX ORDER — CLONIDINE HYDROCHLORIDE 0.2 MG/1
0.2 TABLET ORAL 2 TIMES DAILY
COMMUNITY
End: 2021-11-17 | Stop reason: SDUPTHER

## 2020-08-16 RX ORDER — IBUPROFEN 800 MG/1
800 TABLET ORAL EVERY 6 HOURS PRN
COMMUNITY
End: 2020-08-16 | Stop reason: CLARIF

## 2020-08-16 RX ORDER — CARVEDILOL 25 MG/1
25 TABLET ORAL 2 TIMES DAILY WITH MEALS
COMMUNITY
End: 2021-09-29 | Stop reason: ALTCHOICE

## 2020-08-16 RX ADMIN — IOHEXOL 100 ML: 350 INJECTION, SOLUTION INTRAVENOUS at 10:08

## 2020-08-16 RX ADMIN — MORPHINE SULFATE 4 MG: 4 INJECTION, SOLUTION INTRAMUSCULAR; INTRAVENOUS at 11:08

## 2020-08-16 RX ADMIN — HYDROCODONE BITARTRATE AND ACETAMINOPHEN 1 TABLET: 5; 325 TABLET ORAL at 08:08

## 2020-08-16 RX ADMIN — ONDANSETRON 4 MG: 2 INJECTION INTRAMUSCULAR; INTRAVENOUS at 11:08

## 2020-08-16 RX ADMIN — HYDRALAZINE HYDROCHLORIDE 10 MG: 20 INJECTION INTRAMUSCULAR; INTRAVENOUS at 10:08

## 2020-08-17 NOTE — ED NOTES
Patient placed on continuous cardiac monitor, automatic blood pressure cuff and continuous pulse oximeter.     Tawnya Bird, Patient Care Assistant  08/16/20 1954

## 2020-08-17 NOTE — ED NOTES
When I went back to place the patient on the monitor and get labs the patient apologized.  She is now pleasant.

## 2020-08-17 NOTE — ED NOTES
Anuradha is at the bedside looking at the pt's laceration and she is becoming very aggressive and yelling.  She ripped off her blood pressure cuff and I told her the cuff has to stay on and she yelled that because she is in too much pain right now she will put it back on when Anuradha is done looking at her head.

## 2020-08-17 NOTE — ED NOTES
"I just knocked on the door to bring a patient a blanket that she requested and she yelled "I'm peeing"  "
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
## 2020-08-17 NOTE — ED NOTES
Pt became very combative and aggressive when I tried to offer her a bedside commode because pt states she cannot walk.  She cut me off and began yelling at me before I could finish offering and stated she did not want me to be her nurse.  I went to Krysta to tell her what had happened and then Krysta went to speak to her.

## 2020-08-17 NOTE — ED PROVIDER NOTES
"SCRIBE #1 NOTE: I, Marleen Garcia, am scribing for, and in the presence of, Salas Velez MD. I have scribed the entire note.       History     Chief Complaint   Patient presents with    Fall     Fall at home from standing position. Possible syncopal episodes. Reports hitting head, HA, and right ankle pain - 10/10.      Review of patient's allergies indicates:   Allergen Reactions    Lisinopril Anaphylaxis     This is only possible agent at the time.    Sulfa (sulfonamide antibiotics) Anaphylaxis         History of Present Illness     HPI    8/16/2020, 7:15 PM  History obtained from the patient      History of Present Illness: Joe Ceballos is a 50 y.o. female patient with a PMHx of Anxiety, Edema, and HTN, who presents to the Emergency Department for evaluation s/p a fall and syncopal episode that occurred PTA. Pt reports that she felt like she was having chest pressure earlier today before syncope.She stood up from a seated position and then she woke up on the floor to her child standing over her but cannot recall anything before that. Pt notes head impact, stating that she blood on the couch but is unsure where the source of the blood is. Pt reports that she saw "blue specks", had a headache, couldn't stand up, and felt like she needed to "pop her ears" after the fall. Associated sxs include dizziness, visual disturbance, headaches, difficulty ambulating, RLE pain, and R ankle swelling. Pt also c/o of decreased hearing in the L ear, feeling "off-balance" when she walks, and oral thrush with dental and sinus pain. Patient is adamant that she get antibiotics for her teeth and thrush medication. No mitigating or exacerbating factors reported. Patient denies any n/v/d, CP, SOB, fever, chills, weakness, speech difficulty, numbness, and all other sxs at this time. Pt reports a history of similar sxs (dizziness, falls, headaches, and possible syncope), stating that she's been having episodes of dizziness since " she changed medications last month. Per pt's PCP's recommendation, she increased her dosage of Clonidine to 0.2 mg BID and started taking Bumex 1 mg BID and Carvedilol 25 mg BID. Pt was recently diagnosed with a right midfoot fracture of the R first metatarsal, and fracture of the distal L radius.  She denies having any pain at these locations, but states that she does have pain in her right fibula region now. This pain is new from this most recent fall.  No further complaints or concerns at this time.     Arrival mode: EMS    PCP: Josse Cloud MD        Past Medical History:  Past Medical History:   Diagnosis Date    Anxiety     Edema     Hypertension     Initial insomnia     Sickle cell trait     Tobacco use        Past Surgical History:  Past Surgical History:   Procedure Laterality Date    BREAST SURGERY       SECTION      x 2    CYST REMOVAL      from left tube    gastric sleeve      Raheel-en-Y gastric bypass      TONSILLECTOMY           Family History:  Family History   Adopted: Yes   Problem Relation Age of Onset    Hypertension Mother     Thyroid disease Paternal Aunt     Hypertension Paternal Grandmother     Breast cancer Neg Hx     Colon cancer Neg Hx     Ovarian cancer Neg Hx        Social History:  Social History     Tobacco Use    Smoking status: Former Smoker     Packs/day: 0.50     Types: Cigarettes    Smokeless tobacco: Never Used   Substance and Sexual Activity    Alcohol use: Yes    Drug use: No    Sexual activity: Not Currently     Partners: Male     Birth control/protection: None        Review of Systems     Review of Systems   Constitutional: Negative for chills and fever.   HENT: Positive for hearing loss (L ear), sinus pressure and sinus pain. Negative for sore throat.         (+) oral thrush with pain   Eyes: Positive for visual disturbance.   Respiratory: Negative for cough and shortness of breath.    Cardiovascular: Negative for chest pain.    Gastrointestinal: Negative for diarrhea, nausea and vomiting.   Genitourinary: Negative for dysuria.   Musculoskeletal: Positive for gait problem (difficulty ambulating s/p fall), joint swelling (L ankle) and myalgias (LLE). Negative for back pain.   Skin: Negative for rash.   Neurological: Positive for dizziness, syncope (possible) and headaches. Negative for speech difficulty, weakness and numbness.        (+) head injury   Hematological: Does not bruise/bleed easily.   All other systems reviewed and are negative.     Physical Exam     Initial Vitals [08/16/20 1831]   BP Pulse Resp Temp SpO2   (!) 197/98 86 20 99.5 °F (37.5 °C) 98 %      MAP       --          Physical Exam  Nursing Notes and Vital Signs Reviewed.  Constitutional: Patient is in no acute distress. Well-developed and well-nourished.  Head: normocephalic, no scalp laceration, minor scalp abrasion  Eyes: PERRL. EOM intact. Conjunctivae are not pale. No scleral icterus.  Ears: Right TM normal. Left TM normal. No erythema. No bulging. No effusion or air-fluid levels. No perforation.   Nose: Patent nares. Turbinates are normal. No drainage.   Throat: Moist mucous membranes. Posterior oropharynx is symmetric without erythema. Tonsillar exudate is not present. No trismus. Normal handling of secretions. No stridor.  Mouth: No evident facial swelling. Poor dentition throughout with gingival edema. Sinus TTP. No dental abscess.    Neck: Supple. Full ROM. No lymphadenopathy.  Cardiovascular: Regular rate. Regular rhythm. No murmurs, rubs, or gallops. Distal pulses are 2+ and symmetric.  Pulmonary/Chest: No respiratory distress. Clear to auscultation bilaterally. No wheezing or rales.  Abdominal: Soft and non-distended.  There is no tenderness.  No rebound, guarding, or rigidity.  Musculoskeletal: right lateral malleoli edema and TTP. No TTP anywhere else MSL  Skin: Warm and dry.  Neurological:  Alert, awake, and appropriate.  Normal speech.  No acute focal  neurological deficits are appreciated.  Psychiatric: Normal affect. Good eye contact. Appropriate in content.     ED Course   Splint Application    Date/Time: 2020 9:00 PM  Performed by: Salas Velez MD  Authorized by: Salas Velez MD   Consent Done: Yes  Consent: Verbal consent obtained.  Risks and benefits: risks, benefits and alternatives were discussed  Consent given by: patient  Patient understanding: patient states understanding of the procedure being performed  Patient consent: the patient's understanding of the procedure matches consent given  Procedure consent: procedure consent matches procedure scheduled  Relevant documents: relevant documents present and verified  Test results: test results available and properly labeled  Site marked: the operative site was marked  Imaging studies: imaging studies available  Required items: required blood products, implants, devices, and special equipment available  Patient identity confirmed: provided demographic data, , MRN, verbally with patient and name  Location details: right leg  Splint type: Short leg posterior splint with ankle stirrup.  Supplies used: Ortho-Glass  Post-procedure: The splinted body part was neurovascularly unchanged following the procedure.  Patient tolerance: Patient tolerated the procedure well with no immediate complications        ED Vital Signs:  Vitals:    20 1831 20 1930 20 19420   BP: (!) 197/98 (!) 228/125 (!) 208/88 (!) 226/96   Pulse: 86   (!) 57   Resp: 20   20   Temp: 99.5 °F (37.5 °C)      TempSrc: Oral      SpO2: 98%   97%    20   BP:   (!) 209/101 (!) 220/100   Pulse:  60 (!) 57 (!) 54   Resp: 18      Temp:       TempSrc:       SpO2:  99% 100% 99%    20   BP: (!) 208/107 (!) 213/104 (!) 216/96    Pulse: (!) 58 (!) 54 61    Resp:  20 18 20   Temp:       TempSrc:       SpO2: 99% 99%  99%     08/16/20 2315 08/16/20 2322 08/16/20 2340   BP: (!) 157/112  (!) 170/102   Pulse: 84  (!) 58   Resp:  18 15   Temp:   98 °F (36.7 °C)   TempSrc:   Oral   SpO2: 99%  99%       Abnormal Lab Results:  Labs Reviewed   B-TYPE NATRIURETIC PEPTIDE - Abnormal; Notable for the following components:       Result Value     (*)     All other components within normal limits   COMPREHENSIVE METABOLIC PANEL - Abnormal; Notable for the following components:    Potassium 2.9 (*)     Albumin 3.2 (*)     All other components within normal limits   CBC W/ AUTO DIFFERENTIAL - Abnormal; Notable for the following components:    RBC 3.95 (*)     Hemoglobin 10.0 (*)     Hematocrit 31.9 (*)     Mean Corpuscular Volume 81 (*)     Mean Corpuscular Hemoglobin 25.3 (*)     Mean Corpuscular Hemoglobin Conc 31.3 (*)     RDW 17.9 (*)     Platelets 371 (*)     Immature Granulocytes 0.6 (*)     Gran # (ANC) 9.4 (*)     Immature Grans (Abs) 0.07 (*)     Gran% 81.7 (*)     Lymph% 10.6 (*)     All other components within normal limits   D DIMER, QUANTITATIVE - Abnormal; Notable for the following components:    D-Dimer 3.42 (*)     All other components within normal limits   TROPONIN I   MAGNESIUM        All Lab Results:  Results for orders placed or performed during the hospital encounter of 08/16/20   Brain natriuretic peptide   Result Value Ref Range     (H) 0 - 99 pg/mL   Comprehensive metabolic panel   Result Value Ref Range    Sodium 144 136 - 145 mmol/L    Potassium 2.9 (L) 3.5 - 5.1 mmol/L    Chloride 109 95 - 110 mmol/L    CO2 24 23 - 29 mmol/L    Glucose 90 70 - 110 mg/dL    BUN, Bld 19 6 - 20 mg/dL    Creatinine 0.8 0.5 - 1.4 mg/dL    Calcium 9.0 8.7 - 10.5 mg/dL    Total Protein 7.4 6.0 - 8.4 g/dL    Albumin 3.2 (L) 3.5 - 5.2 g/dL    Total Bilirubin 0.6 0.1 - 1.0 mg/dL    Alkaline Phosphatase 104 55 - 135 U/L    AST 23 10 - 40 U/L    ALT 25 10 - 44 U/L    Anion Gap 11 8 - 16 mmol/L    eGFR if African American >60 >60  mL/min/1.73 m^2    eGFR if non African American >60 >60 mL/min/1.73 m^2   CBC auto differential   Result Value Ref Range    WBC 11.56 3.90 - 12.70 K/uL    RBC 3.95 (L) 4.00 - 5.40 M/uL    Hemoglobin 10.0 (L) 12.0 - 16.0 g/dL    Hematocrit 31.9 (L) 37.0 - 48.5 %    Mean Corpuscular Volume 81 (L) 82 - 98 fL    Mean Corpuscular Hemoglobin 25.3 (L) 27.0 - 31.0 pg    Mean Corpuscular Hemoglobin Conc 31.3 (L) 32.0 - 36.0 g/dL    RDW 17.9 (H) 11.5 - 14.5 %    Platelets 371 (H) 150 - 350 K/uL    MPV 9.5 9.2 - 12.9 fL    Immature Granulocytes 0.6 (H) 0.0 - 0.5 %    Gran # (ANC) 9.4 (H) 1.8 - 7.7 K/uL    Immature Grans (Abs) 0.07 (H) 0.00 - 0.04 K/uL    Lymph # 1.2 1.0 - 4.8 K/uL    Mono # 0.8 0.3 - 1.0 K/uL    Eos # 0.0 0.0 - 0.5 K/uL    Baso # 0.04 0.00 - 0.20 K/uL    nRBC 0 0 /100 WBC    Gran% 81.7 (H) 38.0 - 73.0 %    Lymph% 10.6 (L) 18.0 - 48.0 %    Mono% 6.5 4.0 - 15.0 %    Eosinophil% 0.3 0.0 - 8.0 %    Basophil% 0.3 0.0 - 1.9 %    Differential Method Automated    Troponin I   Result Value Ref Range    Troponin I 0.024 0.000 - 0.026 ng/mL   D dimer, quantitative   Result Value Ref Range    D-Dimer 3.42 (H) <0.50 mg/L FEU   Magnesium   Result Value Ref Range    Magnesium 2.0 1.6 - 2.6 mg/dL         Imaging Results:  Imaging Results          CTA Chest Non-Coronary (PE Study) (Final result)  Result time 08/16/20 23:10:50    Final result by Paresh Germain MD (08/16/20 23:10:50)                 Impression:      No CT evidence of pulmonary thromboembolus.    Small area of soft tissue consolidation in the left lower lobe concerning for aspiration or pneumonia.  There are bibasilar dependent atelectatic changes.    Stable, lobular low-density enlargement of both adrenal glands as described on prior CTs.    Postsurgical changes of a Raheel-en-Y gastric bypass.  Small hiatal hernia.    All CT scans at this facility use dose modulation, iterative reconstruction, and/or weight based dosing when appropriate to reduce radiation dose  to as low as reasonable achievable.      Electronically signed by: Paresh Germain  Date:    08/16/2020  Time:    23:10             Narrative:    EXAMINATION:  CTA CHEST NON CORONARY    CLINICAL HISTORY:  PE suspected, intermediate prob, positive D-dimer;    TECHNIQUE:  Low dose axial images, sagittal and coronal reformations were obtained from the thoracic inlet to the lung bases.  One hundred cc Omnipaque 350 intravenous contrast administered according to PE protocol..    COMPARISON:  Chest radiograph earlier same date; CT abdomen pelvis 07/25/2017    FINDINGS:  Base of Neck: No significant abnormality.    Aorta: Left-sided aortic arch.  No aneurysm or dissection.  No significant atherosclerotic calcification.    Heart: Normal in size.  Mild coronary atherosclerotic disease.  No pericardial effusion.    Pulmonary vasculature: Pulmonary arteries distribute normally.  Suboptimal opacification of the pulmonary arteries.  No filling defects identified to suggest pulmonary thromboembolus.    Lizette/Mediastinum: No pathologic damari enlargement.    Trachea and Proximal airways: Patent.    Lungs/Pleura: Lungs are symmetrically expanded.  Respiratory motion artifact slightly limits evaluation.  There is a small area of consolidation within the left lower lobe.  Small paraseptal blebs identified within the left upper lobe.  There are bibasilar dependent atelectatic changes.  No pleural effusion or pneumothorax.    Esophagus: Postsurgical changes of a Raheel-en-Y gastric bypass.  There is a small hiatal hernia.  Esophagus is otherwise within normal limits.    Upper Abdomen: Significant lobular enlargement of both adrenal glands measuring 4.8 x 3.2 cm on the right and 5.4 x 3.3 cm on the left.  There are associated calcifications.  Appearance is similar to prior CT abdomen pelvis 07/25/2017    Bones: No acute fracture. No suspicious lytic or sclerotic lesions.    Thoracic soft tissues: Small calcifications in the left breast.                                CT Head Without Contrast (Final result)  Result time 08/16/20 20:33:49    Final result by Paresh Germain MD (08/16/20 20:33:49)                 Impression:      No CT evidence of acute intracranial abnormality.    Mild chronic microvascular ischemic changes.    All CT scans at this facility use dose modulation, iterative reconstruction, and/or weight base dosing when appropriate to reduce radiation dose to as low as reasonably achievable.      Electronically signed by: Paresh Germain  Date:    08/16/2020  Time:    20:33             Narrative:    EXAMINATION:  CT HEAD WITHOUT CONTRAST    CLINICAL HISTORY:  Headache, post traumatic;    TECHNIQUE:  Contiguous axial images were obtained from the skull base through the vertex without intravenous contrast.    COMPARISON:  Head CT 11/10/2013    FINDINGS:  No intracranial hemorrhage. No mass effect or midline shift. Few small areas of periventricular and subcortical hypodensity are nonspecific, but most suggestive of chronic microvascular ischemic change.  No abnormal parenchymal hypoattenuation to suggest acute or recent major vascular territory cerebral infarct.  The ventricles and sulci are normal in size and configuration. Minor mucosal thickening at the base of the right maxillary sinus.  Remaining visualized paranasal sinuses and bilateral mastoid air cells are predominantly clear.  No concerning osseous findings.                               X-Ray Wrist Complete Left (Final result)  Result time 08/16/20 20:27:31    Final result by Paresh Germain MD (08/16/20 20:27:31)                 Impression:      No acute radiographic abnormality of the left wrist.    Healing fracture of the distal radial metaphysis.  Additional corticated, ossific fragment projects dorsally on the lateral oblique view concerning for additional, likely remote triquetral fracture.      Electronically signed by: Paresh Germain  Date:    08/16/2020  Time:    20:27              Narrative:    EXAMINATION:  XR WRIST COMPLETE 3 VIEWS LEFT    CLINICAL HISTORY:  Pain, unspecified    TECHNIQUE:  PA, lateral, and oblique views of the left wrist were performed.    COMPARISON:  None    FINDINGS:  Healing fracture of the distal left radial metaphysis.  Tiny, corticated focus visualized on lateral oblique radiographs concerning for possible additional remote carpal fracture.  Joint alignment is anatomic.  No acute fracture appreciated.                               X-Ray Chest AP Portable (Final result)  Result time 08/16/20 20:20:42    Final result by Paresh Germain MD (08/16/20 20:20:42)                 Impression:      No acute radiographic abnormality in the chest.      Electronically signed by: Paresh Germain  Date:    08/16/2020  Time:    20:20             Narrative:    EXAMINATION:  XR CHEST AP PORTABLE    CLINICAL HISTORY:  chest pain;    TECHNIQUE:  Single frontal view of the chest was performed.    COMPARISON:  Chest radiograph 10/04/2016    FINDINGS:  Cardiac leads project over the chest.  Cardiomediastinal silhouette is within normal limits and unchanged.  Lungs are symmetrically expanded.  No acute or new infiltrate.  No large effusion.  No pneumothorax.  The visualized osseous structures appear intact.                               X-Ray Ankle Complete Right (Final result)  Result time 08/16/20 20:30:16    Final result by Paresh Germain MD (08/16/20 20:30:16)                 Impression:      Acute, minimally displaced fracture of the distal fibula above the level of the talar dome.  Minor widening of the tibiofibular syndesmosis.      Electronically signed by: Paresh Germain  Date:    08/16/2020  Time:    20:30             Narrative:    EXAMINATION:  XR ANKLE COMPLETE 3 VIEW RIGHT    CLINICAL HISTORY:  Pain in right leg    TECHNIQUE:  AP, lateral, and oblique images of the right ankle were performed.    COMPARISON:  None    FINDINGS:  Acute, minimally displaced  fracture of the distal fibula above the level of the talar dome.  Minor widening of the tibiofibular syndesmosis.  The medial malleolus is intact.  Prominent soft tissue swelling noted of the ankle.                               X-Ray Tibia Fibula 2 View Right (Final result)  Result time 08/16/20 20:30:36    Final result by Paresh Germain MD (08/16/20 20:30:36)                 Impression:      Acute, minimally displaced fracture of the distal fibula above the level of the talar dome.      Electronically signed by: Paresh Germain  Date:    08/16/2020  Time:    20:30             Narrative:    EXAMINATION:  XR TIBIA FIBULA 2 VIEW RIGHT    CLINICAL HISTORY:  Pain in right leg    TECHNIQUE:  AP and lateral views of the right tibia and fibula were performed.    COMPARISON:  None.    FINDINGS:  New joint appears intact.  Acute fracture of the distal fibula above the level of the talar dome.  Tibia appears intact.  No radiopaque foreign body.  Soft tissue swelling at the ankle.                                 The EKG was ordered, reviewed, and independently interpreted by the ED provider.  Interpretation time: 1926  Rate: 57 BPM  Rhythm: sinus bradycardia  Interpretation: No acute ST changes. No STEMI.           The Emergency Provider reviewed the vital signs and test results, which are outlined above.     ED Discussion     11:39 PM: Reassessed pt at this time.  Pt states her condition has improved at this time. Discussed with pt all pertinent ED information and results. Discussed pt dx and plan of tx. Gave pt all f/u and return to the ED instructions. All questions and concerns were addressed at this time. Pt expresses understanding of information and instructions, and is comfortable with plan to discharge. Pt is stable for discharge.    I discussed with patient and/or family/caretaker that evaluation in the ED does not suggest any emergent or life threatening medical conditions requiring immediate intervention beyond  what was provided in the ED, and I believe patient is safe for discharge.  Regardless, an unremarkable evaluation in the ED does not preclude the development or presence of a serious of life threatening condition. As such, patient was instructed to return immediately for any worsening or change in current symptoms.         Medical Decision Making:   Clinical Tests:   Lab Tests: Ordered and Reviewed  Radiological Study: Ordered and Reviewed  Medical Tests: Ordered and Reviewed           ED Medication(s):  Medications   HYDROcodone-acetaminophen 5-325 mg per tablet 1 tablet (1 tablet Oral Given 8/16/20 2048)   hydrALAZINE injection 10 mg (10 mg Intravenous Given 8/16/20 2237)   iohexoL (OMNIPAQUE 350) injection 100 mL (100 mLs Intravenous Given 8/16/20 2252)   morphine injection 4 mg (4 mg Intravenous Given 8/16/20 2322)   ondansetron injection 4 mg (4 mg Intravenous Given 8/16/20 2322)       Discharge Medication List as of 8/16/2020 11:46 PM      START taking these medications    Details   HYDROcodone-acetaminophen (NORCO) 5-325 mg per tablet Take 1 tablet by mouth every 6 (six) hours as needed for Pain., Starting Sun 8/16/2020, Print      nystatin (MYCOSTATIN) 100,000 unit/mL suspension Take 5 mLs (500,000 Units total) by mouth 4 (four) times daily., Starting Sun 8/16/2020, Print      potassium chloride (K-TAB) 20 mEq Take 1 tablet (20 mEq total) by mouth 2 (two) times daily. for 3 days, Starting Sun 8/16/2020, Until Wed 8/19/2020, Print      amoxicillin-clavulanate 875-125mg (AUGMENTIN) 875-125 mg per tablet Take 1 tablet by mouth 2 (two) times daily. for 7 days, Starting Sun 8/16/2020, Until Sun 8/23/2020, Print             Follow-up Information     Schedule an appointment as soon as possible for a visit  with Follow-up with your orthopedic.           [facility]    Specialty: Emergency Medicine  Why: As needed, If symptoms worsen  Contact information:  [address]  [place]  [place] [address]  [phone]           Follow-up with primary care physician.                     Scribe Attestation:   Scribe #1: I performed the above scribed service and the documentation accurately describes the services I performed. I attest to the accuracy of the note.     Attending:   Physician Attestation Statement for Scribe #1: I, Salas Velez MD, personally performed the services described in this documentation, as scribed by Marleen Garcia, in my presence, and it is both accurate and complete.           Clinical Impression       ICD-10-CM ICD-9-CM   1. Essential hypertension  I10 401.9   2. Syncope  R55 780.2   3. Acute leg pain, right  M79.604 729.5   4. Pain  R52 780.96   5. Closed fracture of distal end of right fibula, unspecified fracture morphology, initial encounter  S82.831A 824.8       Disposition:   Disposition: Discharged  Condition: Stable         Salas Velez MD  08/17/20 [id number]

## 2020-08-17 NOTE — ED NOTES
Pt states she fell at home and was found by her son's home health nurse.  She has a laceration on the top of her head, her right ankle is swollen and she has a bruise on her upper right leg.  She also states that she has thrush and was prescribed antibiotics to treat it by urgent care.

## 2020-09-04 ENCOUNTER — HOSPITAL ENCOUNTER (EMERGENCY)
Facility: HOSPITAL | Age: 50
Discharge: LEFT BEFORE DISCHARGE | End: 2020-09-04
Attending: EMERGENCY MEDICINE
Payer: MEDICAID

## 2020-09-04 VITALS
RESPIRATION RATE: 20 BRPM | TEMPERATURE: 99 F | HEART RATE: 67 BPM | OXYGEN SATURATION: 100 % | DIASTOLIC BLOOD PRESSURE: 104 MMHG | SYSTOLIC BLOOD PRESSURE: 196 MMHG

## 2020-09-04 DIAGNOSIS — M23.91 ACUTE INTERNAL DERANGEMENT OF RIGHT KNEE: ICD-10-CM

## 2020-09-04 DIAGNOSIS — I16.0 HYPERTENSIVE URGENCY: ICD-10-CM

## 2020-09-04 DIAGNOSIS — R55 SYNCOPE: Primary | ICD-10-CM

## 2020-09-04 LAB
ALBUMIN SERPL BCP-MCNC: 3.2 G/DL (ref 3.5–5.2)
ALP SERPL-CCNC: 106 U/L (ref 55–135)
ALT SERPL W/O P-5'-P-CCNC: 19 U/L (ref 10–44)
ANION GAP SERPL CALC-SCNC: 8 MMOL/L (ref 8–16)
AST SERPL-CCNC: 16 U/L (ref 10–40)
BASOPHILS # BLD AUTO: 0.05 K/UL (ref 0–0.2)
BASOPHILS NFR BLD: 0.7 % (ref 0–1.9)
BILIRUB SERPL-MCNC: 0.2 MG/DL (ref 0.1–1)
BUN SERPL-MCNC: 16 MG/DL (ref 6–20)
CALCIUM SERPL-MCNC: 8.7 MG/DL (ref 8.7–10.5)
CHLORIDE SERPL-SCNC: 108 MMOL/L (ref 95–110)
CO2 SERPL-SCNC: 25 MMOL/L (ref 23–29)
CREAT SERPL-MCNC: 1.1 MG/DL (ref 0.5–1.4)
DIFFERENTIAL METHOD: ABNORMAL
EOSINOPHIL # BLD AUTO: 0 K/UL (ref 0–0.5)
EOSINOPHIL NFR BLD: 0.3 % (ref 0–8)
ERYTHROCYTE [DISTWIDTH] IN BLOOD BY AUTOMATED COUNT: 17.8 % (ref 11.5–14.5)
EST. GFR  (AFRICAN AMERICAN): >60 ML/MIN/1.73 M^2
EST. GFR  (NON AFRICAN AMERICAN): 59 ML/MIN/1.73 M^2
GLUCOSE SERPL-MCNC: 111 MG/DL (ref 70–110)
HCT VFR BLD AUTO: 31.9 % (ref 37–48.5)
HGB BLD-MCNC: 10 G/DL (ref 12–16)
IMM GRANULOCYTES # BLD AUTO: 0.04 K/UL (ref 0–0.04)
IMM GRANULOCYTES NFR BLD AUTO: 0.5 % (ref 0–0.5)
LIPASE SERPL-CCNC: 21 U/L (ref 4–60)
LYMPHOCYTES # BLD AUTO: 1.5 K/UL (ref 1–4.8)
LYMPHOCYTES NFR BLD: 19.3 % (ref 18–48)
MCH RBC QN AUTO: 25.1 PG (ref 27–31)
MCHC RBC AUTO-ENTMCNC: 31.3 G/DL (ref 32–36)
MCV RBC AUTO: 80 FL (ref 82–98)
MONOCYTES # BLD AUTO: 0.6 K/UL (ref 0.3–1)
MONOCYTES NFR BLD: 8.1 % (ref 4–15)
NEUTROPHILS # BLD AUTO: 5.3 K/UL (ref 1.8–7.7)
NEUTROPHILS NFR BLD: 71.1 % (ref 38–73)
NRBC BLD-RTO: 0 /100 WBC
PLATELET # BLD AUTO: 370 K/UL (ref 150–350)
PMV BLD AUTO: 9.5 FL (ref 9.2–12.9)
POTASSIUM SERPL-SCNC: 3.8 MMOL/L (ref 3.5–5.1)
PROT SERPL-MCNC: 7.4 G/DL (ref 6–8.4)
RBC # BLD AUTO: 3.99 M/UL (ref 4–5.4)
SODIUM SERPL-SCNC: 141 MMOL/L (ref 136–145)
TROPONIN I SERPL DL<=0.01 NG/ML-MCNC: <0.006 NG/ML (ref 0–0.03)
WBC # BLD AUTO: 7.5 K/UL (ref 3.9–12.7)

## 2020-09-04 PROCEDURE — 96374 THER/PROPH/DIAG INJ IV PUSH: CPT

## 2020-09-04 PROCEDURE — 93010 EKG 12-LEAD: ICD-10-PCS | Mod: ,,, | Performed by: INTERNAL MEDICINE

## 2020-09-04 PROCEDURE — 99291 CRITICAL CARE FIRST HOUR: CPT | Mod: 25

## 2020-09-04 PROCEDURE — 93005 ELECTROCARDIOGRAM TRACING: CPT

## 2020-09-04 PROCEDURE — 80053 COMPREHEN METABOLIC PANEL: CPT

## 2020-09-04 PROCEDURE — 93010 ELECTROCARDIOGRAM REPORT: CPT | Mod: ,,, | Performed by: INTERNAL MEDICINE

## 2020-09-04 PROCEDURE — 84484 ASSAY OF TROPONIN QUANT: CPT

## 2020-09-04 PROCEDURE — 85025 COMPLETE CBC W/AUTO DIFF WBC: CPT

## 2020-09-04 PROCEDURE — 83690 ASSAY OF LIPASE: CPT

## 2020-09-04 PROCEDURE — 63600175 PHARM REV CODE 636 W HCPCS: Performed by: EMERGENCY MEDICINE

## 2020-09-04 PROCEDURE — 25000003 PHARM REV CODE 250: Performed by: EMERGENCY MEDICINE

## 2020-09-04 RX ORDER — CLONIDINE HYDROCHLORIDE 0.2 MG/1
0.2 TABLET ORAL
Status: COMPLETED | OUTPATIENT
Start: 2020-09-04 | End: 2020-09-04

## 2020-09-04 RX ORDER — HYDRALAZINE HYDROCHLORIDE 20 MG/ML
10 INJECTION INTRAMUSCULAR; INTRAVENOUS
Status: COMPLETED | OUTPATIENT
Start: 2020-09-04 | End: 2020-09-04

## 2020-09-04 RX ORDER — ACETAMINOPHEN 500 MG
1000 TABLET ORAL
Status: COMPLETED | OUTPATIENT
Start: 2020-09-04 | End: 2020-09-04

## 2020-09-04 RX ADMIN — CLONIDINE HYDROCHLORIDE 0.2 MG: 0.2 TABLET ORAL at 08:09

## 2020-09-04 RX ADMIN — ACETAMINOPHEN 1000 MG: 500 TABLET ORAL at 08:09

## 2020-09-04 RX ADMIN — HYDRALAZINE HYDROCHLORIDE 10 MG: 20 INJECTION INTRAMUSCULAR; INTRAVENOUS at 07:09

## 2020-09-04 NOTE — ED NOTES
MD notified of BP. States check again in 15 minutes and if BP still high then place order for 20mg hydralazine.

## 2020-09-04 NOTE — ED NOTES
Pt states she needs to use restroom before anything is done. Pt assisted to restroom via wheelchair.

## 2020-09-04 NOTE — ED PROVIDER NOTES
SCRIBE #1 NOTE: I, Freedom Rosas, am scribing for, and in the presence of, Rafiq Bryant Jr., MD. I have scribed the entire note.      History      Chief Complaint   Patient presents with    Fall     dizziness, fall, HTN en route       Review of patient's allergies indicates:   Allergen Reactions    Lisinopril Anaphylaxis     This is only possible agent at the time.  This is only possible agent at the time.  This is only possible agent at the time.    Sulfa (sulfonamide antibiotics) Anaphylaxis        HPI   HPI    2020, 6:51 AM   History obtained from the patient      History of Present Illness: Joe Ceballos is a 50 y.o. female patient who presents to the Emergency Department for evaluation following a fall just PTA. Pt states that she had a fall on 20 after becoming dizzy, and presented to the ED for evaluation. Pt was dx with a closed R fibula fracture, and was discharged for outpatient Orthopedic Surgery follow up. Pt states that she followed with Cardiology 4 days ago and had her BP medications adjusted to prevent further dizzy spells, but that she became dizzy again this morning and fell. Pt currently reports RLE pain, R knee pain, and R knee swelling. Symptoms are constant and moderate in severity. No mitigating or exacerbating factors reported. Patient denies any LOC, fever, chills, n/v/d ,SOB, CP, weakness, numbness, headache, and all other sxs at this time. Pt is scheduled to follow with Orthopedic Surgery on 20. No further complaints or concerns at this time.     Arrival mode: EMS    PCP: Josse Cloud MD       Past Medical History:  Past Medical History:   Diagnosis Date    Anxiety     Edema     Hypertension     Initial insomnia     Sickle cell trait     Tobacco use        Past Surgical History:  Past Surgical History:   Procedure Laterality Date    BREAST SURGERY       SECTION      x 2    CYST REMOVAL      from left tube    gastric sleeve      Raheel-en-Y gastric  bypass  02/[id number]    TONSILLECTOMY           Family History:  Family History   Adopted: Yes   Problem Relation Age of Onset    Hypertension Mother     Thyroid disease Paternal Aunt     Hypertension Paternal Grandmother     Breast cancer Neg Hx     Colon cancer Neg Hx     Ovarian cancer Neg Hx        Social History:  Social History     Tobacco Use    Smoking status: Former Smoker     Packs/day: 0.50     Types: Cigarettes    Smokeless tobacco: Never Used   Substance and Sexual Activity    Alcohol use: Yes    Drug use: No    Sexual activity: Not Currently     Partners: Male     Birth control/protection: None       ROS   Review of Systems   Constitutional: Negative for chills and fever.   HENT: Negative for sore throat.    Respiratory: Negative for shortness of breath.    Cardiovascular: Negative for chest pain.   Gastrointestinal: Negative for diarrhea, nausea and vomiting.   Genitourinary: Negative for dysuria.   Musculoskeletal: Positive for arthralgias (R knee), joint swelling (R knee) and myalgias (RLE). Negative for back pain.   Skin: Negative for rash.   Neurological: Negative for dizziness, weakness, light-headedness, numbness and headaches.   Hematological: Does not bruise/bleed easily.   All other systems reviewed and are negative.    Physical Exam      Initial Vitals [09/04/20 0637]   BP Pulse Resp Temp SpO2   (!) 195/80 (!) 58 18 99 °F (37.2 °C) 99 %      MAP       --          Physical Exam  Nursing Notes and Vital Signs Reviewed.  Constitutional: Patient is in no acute distress. Well-developed and well-nourished.  Head: Atraumatic. Normocephalic.  Eyes: EOM intact. Conjunctivae are not pale. No scleral icterus.  ENT: Mucous membranes are moist.  Nares clear  Neck: Supple. Full ROM. No lymphadenopathy.  Cardiovascular: Bradycardic. Regular rhythm. No murmurs, rubs, or gallops. Distal pulses are 2+ and symmetric.  Pulmonary/Chest: No respiratory distress. Clear to auscultation bilaterally. No  wheezing or rales.  Abdominal: Soft and non-distended.  There is no tenderness.  No rebound, guarding, or rigidity.   Musculoskeletal: Moves all extremities. No obvious deformities.  RLE: No evident deformity. Patient arrives with a cam walker boot on the right leg which she has removed for exam.  There is swelling to the right knee with diffuse joint line tenderness.  No crepitus.  Decreased range of motion secondary to pain.  She has tenderness over the lateral malleolus.  Has normal pulses in the foot.  No hip tenderness.  Skin: Warm and dry.  No rash or laceration.  Neurological:  Alert, awake, and appropriate.  Normal speech.  No acute focal neurological deficits are appreciated.  Psychiatric: Normal affect. Good eye contact. Appropriate in content.    ED Course    Critical Care    Date/Time: 9/4/2020 8:38 AM  Performed by: Rafiq Bryant Jr., MD  Authorized by: Rafiq Bryant Jr., MD   Direct patient critical care time: 14 minutes  Additional history critical care time: 4 minutes  Ordering / reviewing critical care time: 6 minutes  Documentation critical care time: 10 minutes  Total critical care time (exclusive of procedural time) : 34 minutes  Critical care time was exclusive of separately billable procedures and treating other patients and teaching time.  Critical care was necessary to treat or prevent imminent or life-threatening deterioration of the following conditions: cardiac failure.  Critical care was time spent personally by me on the following activities: blood draw for specimens, evaluation of patient's response to treatment, examination of patient, obtaining history from patient or surrogate, ordering and performing treatments and interventions, ordering and review of laboratory studies, ordering and review of radiographic studies, pulse oximetry, re-evaluation of patient's condition and review of old charts.        ED Vital Signs:  Vitals:    09/04/20 0637 09/04/20 0719 09/04/20 0731  09/04/20 0737   BP: (!) 195/80 (!) 237/121 (!) 190/110 (!) 199/120   Pulse: (!) 58 (!) 57 (!) 55 61   Resp: 18 18 20    Temp: 99 °F (37.2 °C)      TempSrc: Oral      SpO2: 99% 100% 100% 100%    09/04/20 0739 09/04/20 0748 09/04/20 0815   BP: (!) 194/112 (!) 227/92 (!) 196/104   Pulse: 65 68 67   Resp:  20 20   Temp:      TempSrc:      SpO2: 100% 100% 100%       Abnormal Lab Results:  Labs Reviewed   CBC W/ AUTO DIFFERENTIAL - Abnormal; Notable for the following components:       Result Value    RBC 3.99 (*)     Hemoglobin 10.0 (*)     Hematocrit 31.9 (*)     Mean Corpuscular Volume 80 (*)     Mean Corpuscular Hemoglobin 25.1 (*)     Mean Corpuscular Hemoglobin Conc 31.3 (*)     RDW 17.8 (*)     Platelets 370 (*)     All other components within normal limits   COMPREHENSIVE METABOLIC PANEL - Abnormal; Notable for the following components:    Glucose 111 (*)     Albumin 3.2 (*)     eGFR if non  59 (*)     All other components within normal limits   TROPONIN I   LIPASE        All Lab Results:  Results for orders placed or performed during the hospital encounter of 09/04/20   CBC auto differential   Result Value Ref Range    WBC 7.50 3.90 - 12.70 K/uL    RBC 3.99 (L) 4.00 - 5.40 M/uL    Hemoglobin 10.0 (L) 12.0 - 16.0 g/dL    Hematocrit 31.9 (L) 37.0 - 48.5 %    Mean Corpuscular Volume 80 (L) 82 - 98 fL    Mean Corpuscular Hemoglobin 25.1 (L) 27.0 - 31.0 pg    Mean Corpuscular Hemoglobin Conc 31.3 (L) 32.0 - 36.0 g/dL    RDW 17.8 (H) 11.5 - 14.5 %    Platelets 370 (H) 150 - 350 K/uL    MPV 9.5 9.2 - 12.9 fL    Immature Granulocytes 0.5 0.0 - 0.5 %    Gran # (ANC) 5.3 1.8 - 7.7 K/uL    Immature Grans (Abs) 0.04 0.00 - 0.04 K/uL    Lymph # 1.5 1.0 - 4.8 K/uL    Mono # 0.6 0.3 - 1.0 K/uL    Eos # 0.0 0.0 - 0.5 K/uL    Baso # 0.05 0.00 - 0.20 K/uL    nRBC 0 0 /100 WBC    Gran% 71.1 38.0 - 73.0 %    Lymph% 19.3 18.0 - 48.0 %    Mono% 8.1 4.0 - 15.0 %    Eosinophil% 0.3 0.0 - 8.0 %    Basophil% 0.7 0.0 - 1.9 %     Differential Method Automated    Comprehensive metabolic panel   Result Value Ref Range    Sodium 141 136 - 145 mmol/L    Potassium 3.8 3.5 - 5.1 mmol/L    Chloride 108 95 - 110 mmol/L    CO2 25 23 - 29 mmol/L    Glucose 111 (H) 70 - 110 mg/dL    BUN, Bld 16 6 - 20 mg/dL    Creatinine 1.1 0.5 - 1.4 mg/dL    Calcium 8.7 8.7 - 10.5 mg/dL    Total Protein 7.4 6.0 - 8.4 g/dL    Albumin 3.2 (L) 3.5 - 5.2 g/dL    Total Bilirubin 0.2 0.1 - 1.0 mg/dL    Alkaline Phosphatase 106 55 - 135 U/L    AST 16 10 - 40 U/L    ALT 19 10 - 44 U/L    Anion Gap 8 8 - 16 mmol/L    eGFR if African American >60 >60 mL/min/1.73 m^2    eGFR if non African American 59 (A) >60 mL/min/1.73 m^2   Troponin I   Result Value Ref Range    Troponin I <0.006 0.000 - 0.026 ng/mL   Lipase   Result Value Ref Range    Lipase 21 4 - 60 U/L     Imaging Results:  Imaging Results          X-Ray Knee 1 or 2 View Right (Final result)  Result time 09/04/20 08:23:09    Final result by Salas Hurst MD (09/04/20 08:23:09)                 Impression:      Small joint effusion.    Incidental bipartite patella.    Moderate soft tissue edema.      Electronically signed by: Salas Hurst MD  Date:    09/04/2020  Time:    08:23             Narrative:    EXAMINATION:  XR KNEE 1 OR 2 VIEW RIGHT    CLINICAL HISTORY:  fall;    COMPARISON:  08/16/2020.    FINDINGS:  Results:  No evidence of acute fracture or dislocation.  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  Bony mineralization is normal.  Diffuse soft tissue swelling.  Lateral view of the right knee demonstrates small joint effusion.    Mild medial compartment narrowing and sclerosis.                               X-Ray Chest AP Portable (Final result)  Result time 09/04/20 08:18:55    Final result by Jared Mathis III, MD (09/04/20 08:18:55)                 Impression:      No acute abnormality identified in the chest.      Electronically signed by: Jared Mathis MD  Date:    09/04/2020  Time:    08:18        "      Narrative:    EXAMINATION:  XR CHEST AP PORTABLE    CLINICAL HISTORY:  syncope;    COMPARISON:  08/16/2020    FINDINGS:  The cardiomediastinal silhouette is within normal limits for AP technique. The lungs appear clear of active disease. No acute appearing infiltrate, pleural effusion or pneumothorax identified.                               The EKG was ordered, reviewed, and independently interpreted by the ED provider.  Interpretation time: 07:35  Rate: 56 BPM  Rhythm: sinus bradycardia  Interpretation: Possible left atrial enlargement. No STEMI.           The Emergency Provider reviewed the vital signs and test results, which are outlined above.    ED Discussion     8:41 AM   patient is stable nontoxic.  Patient is syncopal episode with hypertensive urgency.  Attempted blood pressure control without success here in the department.  The patient became agitated and was screaming and multiple people in the department.  We attempted to understand what her underlying issue is however she refuses all of us of being racist and "not giving her a fair shake "period attempted discussed with findings to this point however she stated she was leaving here abruptly.  I discontinued her IV and she walked out of the department.  I am unsure what her underlying issue is.  I believe there may be a psychiatric component of this however she did not make committal criteria at this time.  She is alert oriented person place and time not a danger to herself or others.  The patient's departure was so abrupt that in AMA was not obtained.  She has eloped.    8:43 AM: Patient has informed nursing staff She is leaving.Patient disconnected self from monitor, walked out without waiting for further workup. Able to ambulate without assistance or difficulty. Patient has eloped at this time.   The patient has been verbally combative with most of her staff throughout her stay here.  She has accused me personally of being a "racist "as she was " "leaving.  She states that she "cannot get a fair shake here "and that she is going somewhere else.  I am unsure why she has been this way throughout her stay.  After discharge, the patient called patient relations and threatened to "kick the ass of "our charge nurse.         ED Medication(s):  Medications   hydrALAZINE injection 10 mg (10 mg Intravenous Given 9/4/20 0723)   cloNIDine tablet 0.2 mg (0.2 mg Oral Given 9/4/20 0828)   acetaminophen tablet 1,000 mg (1,000 mg Oral Given 9/4/20 0828)          Discharge Medication List as of 9/4/2020  8:40 AM            Medical Decision Making    Medical Decision Making:   Clinical Tests:   Lab Tests: Ordered and Reviewed  Radiological Study: Ordered and Reviewed  Medical Tests: Ordered and Reviewed           Scribe Attestation:   Scribe #1: I performed the above scribed service and the documentation accurately describes the services I performed. I attest to the accuracy of the note.    Attending:   Physician Attestation Statement for Scribe #1: I, Rafiq Bryant Jr., MD, personally performed the services described in this documentation, as scribed by Freedom Rosas, in my presence, and it is both accurate and complete.          Clinical Impression       ICD-10-CM ICD-9-CM   1. Syncope  R55 780.2   2. Acute internal derangement of right knee  M23.91 717.9   3. Hypertensive urgency  I16.0 401.9       Disposition:   Disposition: Eloped         Rafiq Bryant Jr., MD  09/04/20 0845       Rafiq Bryant Jr., MD  09/04/20 0926    "

## 2020-09-04 NOTE — ED NOTES
MD notified of BP and states to give 0.2mg clonidine. Pt requesting tylenol; MD states to place order for 1g tylenol

## 2020-09-04 NOTE — ED NOTES
Pt verbally aggressive to staff and agitated due to being told her knee is not broken. Security called. Pt took self off all monitors and walked out of room stating she wants to leave due to us not helping her and calling the police on her instead. MD notified.

## 2020-09-07 NOTE — ED NOTES
9/7/2020: Elope status noted, as well as patient threat to staff which has been escalated to security.

## 2020-09-09 ENCOUNTER — TELEPHONE (OUTPATIENT)
Dept: ORTHOPEDICS | Facility: CLINIC | Age: 50
End: 2020-09-09

## 2020-09-09 DIAGNOSIS — S92.511D CLOSED DISPLACED FRACTURE OF PROXIMAL PHALANX OF LESSER TOE OF RIGHT FOOT WITH ROUTINE HEALING, SUBSEQUENT ENCOUNTER: Primary | ICD-10-CM

## 2020-09-14 ENCOUNTER — HOSPITAL ENCOUNTER (OUTPATIENT)
Dept: RADIOLOGY | Facility: HOSPITAL | Age: 50
Discharge: HOME | End: 2020-09-14
Attending: FAMILY MEDICINE
Payer: MEDICAID

## 2020-09-14 ENCOUNTER — OFFICE VISIT (OUTPATIENT)
Dept: ORTHOPEDICS | Facility: CLINIC | Age: 50
End: 2020-09-14
Payer: MEDICAID

## 2020-09-14 VITALS
HEIGHT: 68 IN | HEART RATE: 98 BPM | DIASTOLIC BLOOD PRESSURE: 78 MMHG | SYSTOLIC BLOOD PRESSURE: 134 MMHG | BODY MASS INDEX: 28.97 KG/M2 | WEIGHT: 191.13 LBS

## 2020-09-14 DIAGNOSIS — S92.511D CLOSED DISPLACED FRACTURE OF PROXIMAL PHALANX OF LESSER TOE OF RIGHT FOOT WITH ROUTINE HEALING, SUBSEQUENT ENCOUNTER: ICD-10-CM

## 2020-09-14 DIAGNOSIS — M79.605 LEFT LEG PAIN: Primary | ICD-10-CM

## 2020-09-14 PROCEDURE — 99214 OFFICE O/P EST MOD 30 MIN: CPT | Mod: PBBFAC,25 | Performed by: FAMILY MEDICINE

## 2020-09-14 PROCEDURE — 73590 X-RAY EXAM OF LOWER LEG: CPT | Mod: TC,RT

## 2020-09-14 PROCEDURE — 99499 UNLISTED E&M SERVICE: CPT | Mod: S$PBB,,, | Performed by: FAMILY MEDICINE

## 2020-09-14 PROCEDURE — 99999 PR PBB SHADOW E&M-EST. PATIENT-LVL IV: CPT | Mod: PBBFAC,,, | Performed by: FAMILY MEDICINE

## 2020-09-14 PROCEDURE — 73590 XR TIBIA FIBULA 2 VIEW RIGHT: ICD-10-PCS | Mod: 26,RT,, | Performed by: RADIOLOGY

## 2020-09-14 PROCEDURE — 99499 NO LOS: ICD-10-PCS | Mod: S$PBB,,, | Performed by: FAMILY MEDICINE

## 2020-09-14 PROCEDURE — 73590 X-RAY EXAM OF LOWER LEG: CPT | Mod: 26,RT,, | Performed by: RADIOLOGY

## 2020-09-14 PROCEDURE — 99999 PR PBB SHADOW E&M-EST. PATIENT-LVL IV: ICD-10-PCS | Mod: PBBFAC,,, | Performed by: FAMILY MEDICINE

## 2020-09-14 RX ORDER — IBUPROFEN 800 MG/1
800 TABLET ORAL 3 TIMES DAILY
COMMUNITY
Start: 2020-08-21 | End: 2020-12-31

## 2020-09-14 NOTE — PROGRESS NOTES
Subjective:     Patient ID: Joe Ceballos is a 50 y.o. female.    Chief Complaint: Pain of the Right Knee      HPI:  Problem - patient was scheduled for right fibula fracture but stated to nurse that her knee was the biggest problem and she wanted it evaluated today.    Duration -     Severity -    Pain -    Limitations -     Previous Treatment -     Other Information -       Past Medical History:   Diagnosis Date    Anxiety     Edema     Hypertension     Initial insomnia     Sickle cell trait     Tobacco use      Past Surgical History:   Procedure Laterality Date    BREAST SURGERY       SECTION      x 2    CYST REMOVAL      from left tube    gastric sleeve      Raheel-en-Y gastric bypass      TONSILLECTOMY       Family History   Adopted: Yes   Problem Relation Age of Onset    Hypertension Mother     Thyroid disease Paternal Aunt     Hypertension Paternal Grandmother     Breast cancer Neg Hx     Colon cancer Neg Hx     Ovarian cancer Neg Hx      Social History     Socioeconomic History    Marital status: Single     Spouse name: Not on file    Number of children: Not on file    Years of education: Not on file    Highest education level: Not on file   Occupational History    Not on file   Social Needs    Financial resource strain: Not on file    Food insecurity     Worry: Not on file     Inability: Not on file    Transportation needs     Medical: Not on file     Non-medical: Not on file   Tobacco Use    Smoking status: Former Smoker     Packs/day: 0.50     Types: Cigarettes    Smokeless tobacco: Never Used   Substance and Sexual Activity    Alcohol use: Yes    Drug use: No    Sexual activity: Not Currently     Partners: Male     Birth control/protection: None   Lifestyle    Physical activity     Days per week: Not on file     Minutes per session: Not on file    Stress: Not on file   Relationships    Social connections     Talks on phone: Not on file     Gets  together: Not on file     Attends Holiness service: Not on file     Active member of club or organization: Not on file     Attends meetings of clubs or organizations: Not on file     Relationship status: Not on file   Other Topics Concern    Not on file   Social History Narrative    Not on file       Current Outpatient Medications:     cholecalciferol, vitamin D3, 50,000 unit capsule, TAKE 1 CAPSULE ONCE A WEEK ORALLY 28 DAYS, Disp: , Rfl: 3    cloNIDine (CATAPRES) 0.2 MG tablet, Take 0.2 mg by mouth 2 (two) times daily., Disp: , Rfl:     baclofen (LIORESAL) 10 MG tablet, Take 1 tablet (10 mg total) by mouth 3 (three) times daily. (Patient not taking: Reported on 9/14/2020), Disp: 90 tablet, Rfl: 0    bumetanide (BUMEX) 1 MG tablet, Take 1 mg by mouth once daily., Disp: , Rfl:     carvediloL (COREG) 25 MG tablet, Take 25 mg by mouth 2 (two) times daily with meals., Disp: , Rfl:     cetirizine (ZYRTEC) 10 MG tablet, Take 10 mg by mouth once daily., Disp: , Rfl: 2    ergocalciferol (ERGOCALCIFEROL) 50,000 unit Cap, Take 50,000 Units by mouth., Disp: , Rfl:     HYDROcodone-acetaminophen (NORCO) 5-325 mg per tablet, Take 1 tablet by mouth every 6 (six) hours as needed for Pain. (Patient not taking: Reported on 9/14/2020), Disp: 12 tablet, Rfl: 0    ibuprofen (ADVIL,MOTRIN) 800 MG tablet, Take 800 mg by mouth 3 (three) times daily., Disp: , Rfl:     nystatin (MYCOSTATIN) 100,000 unit/mL suspension, Take 5 mLs (500,000 Units total) by mouth 4 (four) times daily. (Patient not taking: Reported on 9/14/2020), Disp: 60 mL, Rfl: 0    pantoprazole (PROTONIX) 20 MG tablet, Take 20 mg by mouth., Disp: , Rfl:     promethazine (PHENERGAN) 25 MG tablet, Take 25 mg by mouth every 4 (four) hours., Disp: , Rfl:     sucralfate (CARAFATE) 1 gram tablet, TAKE 1 TABLET BY MOUTH 4 TIMES A DAY BEFORE MEALS AND AT BEDTIME, Disp: , Rfl: 3  Review of patient's allergies indicates:   Allergen Reactions    Lisinopril Anaphylaxis      This is only possible agent at the time.  This is only possible agent at the time.  This is only possible agent at the time.    Sulfa (sulfonamide antibiotics) Anaphylaxis    Beta-blockers (beta-adrenergic blocking agts)      ROS    Objective:   Body mass index is 29.06 kg/m².  Vitals:    09/14/20 1540   BP: 134/78   Pulse: 98           Ortho/SPM Exam  General - A&O, NAD.     Respiratory Effort - Normal    Extremity (Body Part) -      -No acute deformity/swelling    ROM -     TTP -     Stability -    Strength -     Neurovascular Intact -     Other -     Psychiatric - Affect & Cognition WNL      Plan for Improvement -       IMAGING: X-Ray Tibia Fibula 2 View Right  Narrative: EXAMINATION:  XR TIBIA FIBULA 2 VIEW RIGHT    CLINICAL HISTORY:  Displaced fracture of proximal phalanx of right lesser toe(s), subsequent encounter for fracture with routine healing    TECHNIQUE:  AP and lateral views of the right tibia and fibula were performed.    COMPARISON:  08/16/2020    FINDINGS:  There is redemonstration of the known fracture of the distal fibula as seen on dedicated ankle radiographs.  Regional soft tissue edema is present.  Remaining visualized osseous structures appear intact.  Bipartite patella noted.  Impression: As above    Electronically signed by: Live Atkinson MD  Date:    09/14/2020  Time:    15:59     Note-20 min spent by me face-to-face with patient trying to be helpful in evaluating her knee and leg problem and offering recommendations and treatment but patient was very belligerent to nurse from the time she  walked in to the office to the time that she left before the end of the visit, using profanity towards me and aggressive physical gestures  and the clinic visit was not completed since she walked out of her own accord and under her on power without difficulty in her walking boot.    Security was notified.  Also clinic supervisors were notified and appropriate paperwork  completed.        Radiographs / Imaging : Relevant imaging results reviewed by me and interpreted by me, discussed with the patient and / or family     Note-patient verbally abusive to physician and staff and left without signing out AMA and clinic supervisors notified.  Assessment:     No diagnosis found.     Plan:   There are no diagnoses linked to this encounter.    The patient verbalized good understanding of the medical issues discussed today and expressed appreciation for the care provided.  Patient was given the opportunity to ask questions and be an active participant in their medical care. Patient had no further questions or concerns at this time.     The patient was encouraged to participate in appropriate physical activity.      Raj Swenson M.D.  [facility]        This note was dictated using voice recognition software and may have sound a like errors.

## 2020-12-08 ENCOUNTER — CLINICAL SUPPORT (OUTPATIENT)
Dept: URGENT CARE | Facility: CLINIC | Age: 50
End: 2020-12-08
Payer: MEDICAID

## 2020-12-08 DIAGNOSIS — Z03.818 ENCOUNTER FOR OBSERVATION FOR SUSPECTED EXPOSURE TO OTHER BIOLOGICAL AGENTS RULED OUT: Primary | ICD-10-CM

## 2020-12-08 LAB
CTP QC/QA: YES
SARS-COV-2 RDRP RESP QL NAA+PROBE: NEGATIVE

## 2020-12-08 PROCEDURE — 99211 OFF/OP EST MAY X REQ PHY/QHP: CPT | Mod: S$GLB,,, | Performed by: PHYSICIAN ASSISTANT

## 2020-12-08 PROCEDURE — 99211 PR OFFICE/OUTPT VISIT, EST, LEVL I: ICD-10-PCS | Mod: S$GLB,,, | Performed by: PHYSICIAN ASSISTANT

## 2020-12-08 PROCEDURE — 87635: ICD-10-PCS | Mod: QW,S$GLB,, | Performed by: PHYSICIAN ASSISTANT

## 2020-12-08 PROCEDURE — 87635 SARS-COV-2 COVID-19 AMP PRB: CPT | Mod: QW,S$GLB,, | Performed by: PHYSICIAN ASSISTANT

## 2020-12-08 NOTE — PROGRESS NOTES
CDC Testing and Quarantine Guidelines for patients with exposure to a known-positive COVID-19 person:  A close exposure is defined as anyone who has had an exposure (masked or unmasked) to a known COVID -19 positive person within 6 ft for longer than 15 minutes. If your exposure meets this definition you are required by CDC guidelines to quarantine for at least 7-10 days from time of exposure. The CDC states that a test can be performed for an asymptomatic patient (someone who does not have any symptoms) after a close exposure, and that a test should be done if you develop symptoms after a close exposure as described above.  Specifically, you can test at day 5 or later if asymptomatic, in order to get released from quarantine on day 7 or later.  If you develop symptoms sooner, you should test when your symptoms start.    If you meet the definition of a close exposure, it will not matter whether you are experiencing symptoms- a quarantine for at least 7-10 days after a close exposure is required by CDC guidelines.    Please note, if you decide to test as an asymptomatic during your quarantine and you are positive, you will be restarting your quarantine and moving from a possible 10 day quarantine (if you do not test), to a 11 day or greater quarantine.    The CDC also suggests people still monitor for symptoms for a full 14 days and remember that the shorter quarantine options do not replace initial CDC guidance.  The CDC continues to recommend quarantining for 14 days as the best way to reduce risk for spreading COVID-19 - however, this is only a recommendation.    If your exposure does not meet the above definition, you can return to your normal daily activities to include social distancing, wearing a mask and frequent handwashing.

## 2020-12-30 ENCOUNTER — OFFICE VISIT (OUTPATIENT)
Dept: URGENT CARE | Facility: CLINIC | Age: 50
End: 2020-12-30
Payer: MEDICAID

## 2020-12-30 ENCOUNTER — HOSPITAL ENCOUNTER (OUTPATIENT)
Dept: RADIOLOGY | Facility: CLINIC | Age: 50
Discharge: HOME | End: 2020-12-30
Attending: EMERGENCY MEDICINE
Payer: MEDICAID

## 2020-12-30 VITALS
OXYGEN SATURATION: 99 % | HEART RATE: 70 BPM | RESPIRATION RATE: 18 BRPM | WEIGHT: 172 LBS | DIASTOLIC BLOOD PRESSURE: 91 MMHG | TEMPERATURE: 98 F | SYSTOLIC BLOOD PRESSURE: 171 MMHG | BODY MASS INDEX: 26.07 KG/M2 | HEIGHT: 68 IN

## 2020-12-30 DIAGNOSIS — M25.561 ACUTE PAIN OF RIGHT KNEE: Primary | ICD-10-CM

## 2020-12-30 DIAGNOSIS — M25.571 ACUTE RIGHT ANKLE PAIN: ICD-10-CM

## 2020-12-30 PROCEDURE — 73610 X-RAY EXAM OF ANKLE: CPT | Mod: RT,S$GLB,, | Performed by: RADIOLOGY

## 2020-12-30 PROCEDURE — 73610 XR ANKLE COMPLETE 3 VIEW RIGHT: ICD-10-PCS | Mod: RT,S$GLB,, | Performed by: RADIOLOGY

## 2020-12-30 PROCEDURE — 99213 PR OFFICE/OUTPT VISIT, EST, LEVL III, 20-29 MIN: ICD-10-PCS | Mod: S$GLB,,, | Performed by: EMERGENCY MEDICINE

## 2020-12-30 PROCEDURE — 73562 X-RAY EXAM OF KNEE 3: CPT | Mod: RT,S$GLB,, | Performed by: RADIOLOGY

## 2020-12-30 PROCEDURE — 99213 OFFICE O/P EST LOW 20 MIN: CPT | Mod: S$GLB,,, | Performed by: EMERGENCY MEDICINE

## 2020-12-30 PROCEDURE — 73562 XR KNEE 3 VIEW RIGHT: ICD-10-PCS | Mod: RT,S$GLB,, | Performed by: RADIOLOGY

## 2020-12-31 RX ORDER — IBUPROFEN 800 MG/1
TABLET ORAL
Qty: 21 TABLET | Refills: 0 | Status: SHIPPED | OUTPATIENT
Start: 2020-12-31 | End: 2021-08-10

## 2020-12-31 NOTE — PATIENT INSTRUCTIONS
Continue ibuprofen as previously prescribed with food on the stomach.  Wear the boot for up to 1 more week while there is still pain in the ankle.  If you still have pain in the ankle 1 week from now, please see your orthopedist and have the area re-x-rayed.

## 2020-12-31 NOTE — PROGRESS NOTES
"Subjective:       Patient ID: Joe Ceballos is a 50 y.o. female.    Vitals:  height is 5' 8" (1.727 m) and weight is 78 kg (172 lb). Her tympanic temperature is 98.2 °F (36.8 °C). Her blood pressure is 171/91 (abnormal) and her pulse is 70. Her respiration is 18 and oxygen saturation is 99%.     Chief Complaint: Leg Injury    Patient presents complaining of right knee and ankle pain after slipping in her shower and striking her leg on the wall of the shower.  She has been ambulatory since that time.  States that the pain was worse this morning in the knee and ankle.  She reports that she has been compliant with her blood pressure medication although it is elevated today.  Has been taking ibuprofen for pain without relief    Leg Pain   The incident occurred 12 to 24 hours ago. The incident occurred at home. The injury mechanism was a twisting injury. The pain is present in the right ankle, right knee and right leg. The quality of the pain is described as shooting and aching. The pain is at a severity of 10/10. The pain is severe. The pain has been constant since onset. Associated symptoms include an inability to bear weight. She reports no foreign bodies present. The symptoms are aggravated by movement and weight bearing. She has tried nothing for the symptoms. The treatment provided no relief.       Constitution: Negative for chills, fatigue and fever.   HENT: Negative for congestion and sore throat.    Neck: Negative for painful lymph nodes.   Cardiovascular: Negative for chest pain and leg swelling.   Eyes: Negative for double vision and blurred vision.   Respiratory: Negative for cough and shortness of breath.    Gastrointestinal: Negative for nausea, vomiting and diarrhea.   Genitourinary: Negative for dysuria, frequency, urgency and history of kidney stones.   Musculoskeletal: Positive for pain, trauma, joint pain, joint swelling and abnormal ROM of joint. Negative for muscle cramps and muscle ache.        " Right leg injury   Skin: Negative for color change, pale, rash and bruising.   Allergic/Immunologic: Negative for seasonal allergies.   Neurological: Negative for dizziness, history of vertigo, light-headedness, passing out and headaches.   Hematologic/Lymphatic: Negative for swollen lymph nodes.   Psychiatric/Behavioral: Negative for nervous/anxious, sleep disturbance and depression. The patient is not nervous/anxious.        Objective:      Physical Exam   Constitutional: She is oriented to person, place, and time.   HENT:   Head: Normocephalic and atraumatic.   Cardiovascular: Normal rate, regular rhythm and normal pulses.   Pulmonary/Chest: Effort normal.   Abdominal: Normal appearance.   Musculoskeletal:      Comments: No joint laxity of the right knee.  There is some warmth with a little bit of soft tissue swelling on the medial aspect of the knee.  The ankle has moderate soft tissue swelling laterally without definite bony tenderness.  No joint laxity there either   Neurological: She is alert and oriented to person, place, and time.   Skin: Skin is warm and dry.   Nursing note and vitals reviewed.        Assessment:       1. Acute pain of right knee    2. Acute right ankle pain        Xr Knee 3 View Right    Result Date: 12/30/2020  EXAMINATION: XR KNEE 3 VIEW RIGHT CLINICAL HISTORY: Pain in right knee TECHNIQUE: AP, lateral, and Merchant views of the right knee were performed. COMPARISON: Right knee series 09/04/2020 FINDINGS: Bipartite patella similar to prior.  Bones are well mineralized.  Overall alignment is within normal limits.  No displaced fracture, dislocation or destructive osseous process.  No large suprapatellar joint effusion.  Joint spaces appear maintained.  No subcutaneous emphysema or radiodense retained foreign body.     No acute displaced fracture-dislocation identified. Electronically signed by: Johan Zaragoza MD Date:    12/30/2020 Time:    20:57    Xr Ankle Complete 3 View Right    Result  Date: 12/30/2020  EXAMINATION: XR ANKLE COMPLETE 3 VIEW RIGHT CLINICAL HISTORY: Pain in right ankle and joints of right foot TECHNIQUE: AP, lateral, and oblique images of the right ankle were performed. COMPARISON: 09/14/2020, 08/16/2020 FINDINGS: There is a fracture of the distal fibular shaft with associated callus formation indicating interval healing from the prior study.  The fracture line remains visible, similar to prior studies and could be associated with incomplete healing. An acute on chronic injury is not completely excluded.  Alignment and configuration of the osseous structures is similar to prior studies with stable minimal displacement. Soft tissue thickening/edema is noted laterally.     Incompletely healed fracture of the distal fibular shaft with associated callus formation.  No detrimental changes in the osseous structures.  See above comments. Soft tissue edema/thickening laterally, increased from the prior study. Electronically signed by: Adarsh Camarena Date:    12/30/2020 Time:    21:02    Plan:         Acute pain of right knee  -     XR KNEE 3 VIEW RIGHT; Future; Expected date: 12/30/2020  -     ibuprofen (ADVIL,MOTRIN) 800 MG tablet; Take 1 tablet by mouth every 8 (eight) hours as needed for up to 7 days  Dispense: 21 tablet; Refill: 0    Acute right ankle pain  -     XR ANKLE COMPLETE 3 VIEW RIGHT; Future; Expected date: 12/30/2020  -     AIR CAST WALKER BOOT FOR HOME USE  -     ibuprofen (ADVIL,MOTRIN) 800 MG tablet; Take 1 tablet by mouth every 8 (eight) hours as needed for up to 7 days  Dispense: 21 tablet; Refill: 0

## 2021-02-02 ENCOUNTER — TELEPHONE (OUTPATIENT)
Dept: OTOLARYNGOLOGY | Facility: CLINIC | Age: 51
End: 2021-02-02

## 2021-02-02 ENCOUNTER — TELEPHONE (OUTPATIENT)
Dept: ORTHOPEDICS | Facility: CLINIC | Age: 51
End: 2021-02-02

## 2021-02-03 ENCOUNTER — TELEPHONE (OUTPATIENT)
Dept: CARDIOLOGY | Facility: CLINIC | Age: 51
End: 2021-02-03

## 2021-02-03 ENCOUNTER — TELEPHONE (OUTPATIENT)
Dept: ORTHOPEDICS | Facility: CLINIC | Age: 51
End: 2021-02-03

## 2021-02-04 ENCOUNTER — TELEPHONE (OUTPATIENT)
Dept: OBSTETRICS AND GYNECOLOGY | Facility: CLINIC | Age: 51
End: 2021-02-04

## 2021-08-05 ENCOUNTER — OFFICE VISIT (OUTPATIENT)
Dept: PRIMARY CARE CLINIC | Facility: CLINIC | Age: 51
End: 2021-08-05
Payer: MEDICAID

## 2021-08-05 VITALS
HEIGHT: 68 IN | DIASTOLIC BLOOD PRESSURE: 78 MMHG | HEART RATE: 88 BPM | SYSTOLIC BLOOD PRESSURE: 137 MMHG | TEMPERATURE: 99 F | BODY MASS INDEX: 29.98 KG/M2 | WEIGHT: 197.81 LBS | OXYGEN SATURATION: 96 %

## 2021-08-05 DIAGNOSIS — Z13.220 ENCOUNTER FOR LIPID SCREENING FOR CARDIOVASCULAR DISEASE: ICD-10-CM

## 2021-08-05 DIAGNOSIS — M25.571 ACUTE RIGHT ANKLE PAIN: ICD-10-CM

## 2021-08-05 DIAGNOSIS — I10 ESSENTIAL HYPERTENSION: Primary | ICD-10-CM

## 2021-08-05 DIAGNOSIS — R60.9 EDEMA, UNSPECIFIED TYPE: ICD-10-CM

## 2021-08-05 DIAGNOSIS — S72.051A CLOSED FRACTURE OF HEAD OF RIGHT FEMUR, INITIAL ENCOUNTER: ICD-10-CM

## 2021-08-05 DIAGNOSIS — Z86.39 HISTORY OF METABOLIC AND NUTRITIONAL DISORDER: ICD-10-CM

## 2021-08-05 DIAGNOSIS — M87.00 AVASCULAR NECROSIS: ICD-10-CM

## 2021-08-05 DIAGNOSIS — M25.561 ACUTE PAIN OF RIGHT KNEE: ICD-10-CM

## 2021-08-05 DIAGNOSIS — Z13.6 ENCOUNTER FOR LIPID SCREENING FOR CARDIOVASCULAR DISEASE: ICD-10-CM

## 2021-08-05 PROCEDURE — 99214 PR OFFICE/OUTPT VISIT, EST, LEVL IV, 30-39 MIN: ICD-10-PCS | Mod: S$PBB,,, | Performed by: NURSE PRACTITIONER

## 2021-08-05 PROCEDURE — 99999 PR PBB SHADOW E&M-EST. PATIENT-LVL III: CPT | Mod: PBBFAC,,, | Performed by: NURSE PRACTITIONER

## 2021-08-05 PROCEDURE — 99214 OFFICE O/P EST MOD 30 MIN: CPT | Mod: S$PBB,,, | Performed by: NURSE PRACTITIONER

## 2021-08-05 PROCEDURE — 99999 PR PBB SHADOW E&M-EST. PATIENT-LVL III: ICD-10-PCS | Mod: PBBFAC,,, | Performed by: NURSE PRACTITIONER

## 2021-08-05 PROCEDURE — 99213 OFFICE O/P EST LOW 20 MIN: CPT | Mod: PBBFAC,PN | Performed by: NURSE PRACTITIONER

## 2021-08-05 RX ORDER — FUROSEMIDE 40 MG/1
40 TABLET ORAL 2 TIMES DAILY
Qty: 6 TABLET | Refills: 0 | Status: SHIPPED | OUTPATIENT
Start: 2021-08-05 | End: 2021-11-17 | Stop reason: SDUPTHER

## 2021-08-05 RX ORDER — FUROSEMIDE 40 MG/1
40 TABLET ORAL DAILY
COMMUNITY
Start: 2021-07-20 | End: 2021-08-05 | Stop reason: SDUPTHER

## 2021-08-06 ENCOUNTER — TELEPHONE (OUTPATIENT)
Dept: ORTHOPEDICS | Facility: CLINIC | Age: 51
End: 2021-08-06

## 2021-08-06 ENCOUNTER — TELEPHONE (OUTPATIENT)
Dept: PRIMARY CARE CLINIC | Facility: CLINIC | Age: 51
End: 2021-08-06

## 2021-08-09 ENCOUNTER — TELEPHONE (OUTPATIENT)
Dept: ORTHOPEDICS | Facility: CLINIC | Age: 51
End: 2021-08-09

## 2021-08-09 ENCOUNTER — TELEPHONE (OUTPATIENT)
Dept: PRIMARY CARE CLINIC | Facility: CLINIC | Age: 51
End: 2021-08-09

## 2021-08-09 ENCOUNTER — PATIENT OUTREACH (OUTPATIENT)
Dept: ADMINISTRATIVE | Facility: HOSPITAL | Age: 51
End: 2021-08-09

## 2021-08-09 ENCOUNTER — LAB VISIT (OUTPATIENT)
Dept: LAB | Facility: HOSPITAL | Age: 51
End: 2021-08-09
Attending: NURSE PRACTITIONER
Payer: MEDICAID

## 2021-08-09 ENCOUNTER — PATIENT MESSAGE (OUTPATIENT)
Dept: ADMINISTRATIVE | Facility: HOSPITAL | Age: 51
End: 2021-08-09

## 2021-08-09 DIAGNOSIS — I10 ESSENTIAL HYPERTENSION: ICD-10-CM

## 2021-08-09 DIAGNOSIS — Z11.59 ENCOUNTER FOR HEPATITIS C SCREENING TEST FOR LOW RISK PATIENT: ICD-10-CM

## 2021-08-09 DIAGNOSIS — R60.9 EDEMA, UNSPECIFIED TYPE: ICD-10-CM

## 2021-08-09 DIAGNOSIS — Z86.39 HISTORY OF METABOLIC AND NUTRITIONAL DISORDER: ICD-10-CM

## 2021-08-09 DIAGNOSIS — Z13.6 ENCOUNTER FOR LIPID SCREENING FOR CARDIOVASCULAR DISEASE: ICD-10-CM

## 2021-08-09 DIAGNOSIS — Z11.59 ENCOUNTER FOR HEPATITIS C SCREENING TEST FOR LOW RISK PATIENT: Primary | ICD-10-CM

## 2021-08-09 DIAGNOSIS — Z13.220 ENCOUNTER FOR LIPID SCREENING FOR CARDIOVASCULAR DISEASE: ICD-10-CM

## 2021-08-09 PROCEDURE — 80061 LIPID PANEL: CPT | Performed by: NURSE PRACTITIONER

## 2021-08-09 PROCEDURE — 84439 ASSAY OF FREE THYROXINE: CPT | Performed by: NURSE PRACTITIONER

## 2021-08-09 PROCEDURE — 83880 ASSAY OF NATRIURETIC PEPTIDE: CPT | Performed by: NURSE PRACTITIONER

## 2021-08-09 PROCEDURE — 36415 COLL VENOUS BLD VENIPUNCTURE: CPT | Performed by: NURSE PRACTITIONER

## 2021-08-09 PROCEDURE — 85025 COMPLETE CBC W/AUTO DIFF WBC: CPT | Performed by: NURSE PRACTITIONER

## 2021-08-09 PROCEDURE — 84443 ASSAY THYROID STIM HORMONE: CPT | Performed by: NURSE PRACTITIONER

## 2021-08-09 PROCEDURE — 84481 FREE ASSAY (FT-3): CPT | Performed by: NURSE PRACTITIONER

## 2021-08-09 PROCEDURE — 80053 COMPREHEN METABOLIC PANEL: CPT | Performed by: NURSE PRACTITIONER

## 2021-08-09 PROCEDURE — 83036 HEMOGLOBIN GLYCOSYLATED A1C: CPT | Performed by: NURSE PRACTITIONER

## 2021-08-09 PROCEDURE — 86803 HEPATITIS C AB TEST: CPT | Performed by: FAMILY MEDICINE

## 2021-08-10 ENCOUNTER — TELEPHONE (OUTPATIENT)
Dept: PRIMARY CARE CLINIC | Facility: CLINIC | Age: 51
End: 2021-08-10

## 2021-08-10 ENCOUNTER — PATIENT MESSAGE (OUTPATIENT)
Dept: PRIMARY CARE CLINIC | Facility: CLINIC | Age: 51
End: 2021-08-10

## 2021-08-10 DIAGNOSIS — M25.559 HIP PAIN: ICD-10-CM

## 2021-08-10 DIAGNOSIS — S72.051A CLOSED FRACTURE OF HEAD OF RIGHT FEMUR, INITIAL ENCOUNTER: Primary | ICD-10-CM

## 2021-08-10 LAB
ALBUMIN SERPL BCP-MCNC: 3.4 G/DL (ref 3.5–5.2)
ALP SERPL-CCNC: 152 U/L (ref 55–135)
ALT SERPL W/O P-5'-P-CCNC: 20 U/L (ref 10–44)
ANION GAP SERPL CALC-SCNC: 15 MMOL/L (ref 8–16)
AST SERPL-CCNC: 34 U/L (ref 10–40)
BASOPHILS # BLD AUTO: 0.07 K/UL (ref 0–0.2)
BASOPHILS NFR BLD: 0.7 % (ref 0–1.9)
BILIRUB SERPL-MCNC: 0.4 MG/DL (ref 0.1–1)
BNP SERPL-MCNC: 70 PG/ML (ref 0–99)
BUN SERPL-MCNC: 20 MG/DL (ref 6–20)
CALCIUM SERPL-MCNC: 9.6 MG/DL (ref 8.7–10.5)
CHLORIDE SERPL-SCNC: 102 MMOL/L (ref 95–110)
CHOLEST SERPL-MCNC: 166 MG/DL (ref 120–199)
CHOLEST/HDLC SERPL: 3 {RATIO} (ref 2–5)
CO2 SERPL-SCNC: 18 MMOL/L (ref 23–29)
CREAT SERPL-MCNC: 1.2 MG/DL (ref 0.5–1.4)
DIFFERENTIAL METHOD: ABNORMAL
EOSINOPHIL # BLD AUTO: 0 K/UL (ref 0–0.5)
EOSINOPHIL NFR BLD: 0.4 % (ref 0–8)
ERYTHROCYTE [DISTWIDTH] IN BLOOD BY AUTOMATED COUNT: 19.7 % (ref 11.5–14.5)
EST. GFR  (AFRICAN AMERICAN): >60 ML/MIN/1.73 M^2
EST. GFR  (NON AFRICAN AMERICAN): 52.8 ML/MIN/1.73 M^2
ESTIMATED AVG GLUCOSE: 114 MG/DL (ref 68–131)
GLUCOSE SERPL-MCNC: 86 MG/DL (ref 70–110)
HBA1C MFR BLD: 5.6 % (ref 4–5.6)
HCT VFR BLD AUTO: 29.8 % (ref 37–48.5)
HDLC SERPL-MCNC: 55 MG/DL (ref 40–75)
HDLC SERPL: 33.1 % (ref 20–50)
HGB BLD-MCNC: 8.7 G/DL (ref 12–16)
IMM GRANULOCYTES # BLD AUTO: 0.06 K/UL (ref 0–0.04)
IMM GRANULOCYTES NFR BLD AUTO: 0.6 % (ref 0–0.5)
LDLC SERPL CALC-MCNC: 80.6 MG/DL (ref 63–159)
LYMPHOCYTES # BLD AUTO: 1.5 K/UL (ref 1–4.8)
LYMPHOCYTES NFR BLD: 16.2 % (ref 18–48)
MCH RBC QN AUTO: 22.4 PG (ref 27–31)
MCHC RBC AUTO-ENTMCNC: 29.2 G/DL (ref 32–36)
MCV RBC AUTO: 77 FL (ref 82–98)
MONOCYTES # BLD AUTO: 0.8 K/UL (ref 0.3–1)
MONOCYTES NFR BLD: 8.3 % (ref 4–15)
NEUTROPHILS # BLD AUTO: 7 K/UL (ref 1.8–7.7)
NEUTROPHILS NFR BLD: 73.8 % (ref 38–73)
NONHDLC SERPL-MCNC: 111 MG/DL
NRBC BLD-RTO: 1 /100 WBC
PLATELET # BLD AUTO: 463 K/UL (ref 150–450)
PMV BLD AUTO: 10.7 FL (ref 9.2–12.9)
POTASSIUM SERPL-SCNC: 4.2 MMOL/L (ref 3.5–5.1)
PROT SERPL-MCNC: 8.3 G/DL (ref 6–8.4)
RBC # BLD AUTO: 3.88 M/UL (ref 4–5.4)
SODIUM SERPL-SCNC: 135 MMOL/L (ref 136–145)
T3FREE SERPL-MCNC: 2.4 PG/ML (ref 2.3–4.2)
T4 FREE SERPL-MCNC: 0.78 NG/DL (ref 0.71–1.51)
TRIGL SERPL-MCNC: 152 MG/DL (ref 30–150)
TSH SERPL DL<=0.005 MIU/L-ACNC: 0.54 UIU/ML (ref 0.4–4)
WBC # BLD AUTO: 9.49 K/UL (ref 3.9–12.7)

## 2021-08-10 RX ORDER — HYDROCODONE BITARTRATE AND ACETAMINOPHEN 7.5; 325 MG/1; MG/1
1 TABLET ORAL EVERY 8 HOURS PRN
Qty: 21 TABLET | Refills: 0 | Status: SHIPPED | OUTPATIENT
Start: 2021-08-10 | End: 2021-08-17

## 2021-08-10 RX ORDER — IBUPROFEN 800 MG/1
800 TABLET ORAL 3 TIMES DAILY
Qty: 30 TABLET | Refills: 0 | Status: SHIPPED | OUTPATIENT
Start: 2021-08-10 | End: 2021-08-20

## 2021-08-11 DIAGNOSIS — D64.9 ANEMIA, UNSPECIFIED TYPE: Primary | ICD-10-CM

## 2021-08-11 LAB — HCV AB SERPL QL IA: NEGATIVE

## 2021-08-11 RX ORDER — FERROUS SULFATE 325(65) MG
325 TABLET ORAL 2 TIMES DAILY
Qty: 60 TABLET | Refills: 3 | Status: SHIPPED | OUTPATIENT
Start: 2021-08-11 | End: 2021-11-17 | Stop reason: SDUPTHER

## 2021-08-15 ENCOUNTER — NURSE TRIAGE (OUTPATIENT)
Dept: ADMINISTRATIVE | Facility: CLINIC | Age: 51
End: 2021-08-15

## 2021-08-15 RX ORDER — FUROSEMIDE 40 MG/1
40 TABLET ORAL 2 TIMES DAILY
Qty: 6 TABLET | Refills: 0 | OUTPATIENT
Start: 2021-08-15 | End: 2021-08-18

## 2021-08-17 ENCOUNTER — OFFICE VISIT (OUTPATIENT)
Dept: ORTHOPEDICS | Facility: CLINIC | Age: 51
End: 2021-08-17
Payer: MEDICAID

## 2021-08-17 ENCOUNTER — TELEPHONE (OUTPATIENT)
Dept: PRIMARY CARE CLINIC | Facility: CLINIC | Age: 51
End: 2021-08-17

## 2021-08-17 DIAGNOSIS — M87.051 AVASCULAR NECROSIS OF BONE OF HIP, RIGHT: Primary | ICD-10-CM

## 2021-08-17 PROCEDURE — 99213 OFFICE O/P EST LOW 20 MIN: CPT | Mod: S$PBB,,, | Performed by: ORTHOPAEDIC SURGERY

## 2021-08-17 PROCEDURE — 99213 PR OFFICE/OUTPT VISIT, EST, LEVL III, 20-29 MIN: ICD-10-PCS | Mod: S$PBB,,, | Performed by: ORTHOPAEDIC SURGERY

## 2021-08-19 ENCOUNTER — OFFICE VISIT (OUTPATIENT)
Dept: PRIMARY CARE CLINIC | Facility: CLINIC | Age: 51
End: 2021-08-19
Payer: MEDICAID

## 2021-08-19 DIAGNOSIS — M87.00 AVASCULAR NECROSIS: Primary | ICD-10-CM
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
## 2021-08-19 DIAGNOSIS — S72.051A CLOSED FRACTURE OF HEAD OF RIGHT FEMUR, INITIAL ENCOUNTER: ICD-10-CM

## 2021-08-19 DIAGNOSIS — M25.559 HIP PAIN: ICD-10-CM

## 2021-08-19 PROCEDURE — 99213 PR OFFICE/OUTPT VISIT, EST, LEVL III, 20-29 MIN: ICD-10-PCS | Mod: S$PBB,,, | Performed by: NURSE PRACTITIONER

## 2021-08-19 PROCEDURE — 99999 PR PBB SHADOW E&M-EST. PATIENT-LVL II: CPT | Mod: PBBFAC,,, | Performed by: NURSE PRACTITIONER

## 2021-08-19 PROCEDURE — 99213 OFFICE O/P EST LOW 20 MIN: CPT | Mod: S$PBB,,, | Performed by: NURSE PRACTITIONER

## 2021-08-19 PROCEDURE — 99999 PR PBB SHADOW E&M-EST. PATIENT-LVL II: ICD-10-PCS | Mod: PBBFAC,,, | Performed by: NURSE PRACTITIONER

## 2021-08-19 PROCEDURE — 99212 OFFICE O/P EST SF 10 MIN: CPT | Mod: PBBFAC,PN | Performed by: NURSE PRACTITIONER

## 2021-08-20 ENCOUNTER — TELEPHONE (OUTPATIENT)
Dept: PRIMARY CARE CLINIC | Facility: CLINIC | Age: 51
End: 2021-08-20

## 2021-08-20 RX ORDER — FUROSEMIDE 40 MG/1
40 TABLET ORAL 2 TIMES DAILY
Qty: 6 TABLET | Refills: 0 | OUTPATIENT
Start: 2021-08-20 | End: 2021-08-23

## 2021-08-23 ENCOUNTER — TELEPHONE (OUTPATIENT)
Dept: PRIMARY CARE CLINIC | Facility: CLINIC | Age: 51
End: 2021-08-23

## 2021-08-23 DIAGNOSIS — M87.00 AVASCULAR NECROSIS: Primary | ICD-10-CM

## 2021-08-23 DIAGNOSIS — M25.559 HIP PAIN: ICD-10-CM
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
## 2021-08-24 ENCOUNTER — TELEPHONE (OUTPATIENT)
Dept: PRIMARY CARE CLINIC | Facility: CLINIC | Age: 51
End: 2021-08-24

## 2021-08-25 ENCOUNTER — TELEPHONE (OUTPATIENT)
Dept: PRIMARY CARE CLINIC | Facility: CLINIC | Age: 51
End: 2021-08-25

## 2021-08-26 RX ORDER — OXYCODONE AND ACETAMINOPHEN 7.5; 325 MG/1; MG/1
TABLET ORAL
Status: CANCELLED | OUTPATIENT
Start: 2021-08-26

## 2021-08-27 ENCOUNTER — HOSPITAL ENCOUNTER (EMERGENCY)
Facility: HOSPITAL | Age: 51
Discharge: HOME | End: 2021-08-28
Attending: FAMILY MEDICINE
Payer: MEDICAID

## 2021-08-27 VITALS
HEIGHT: 68 IN | DIASTOLIC BLOOD PRESSURE: 81 MMHG | OXYGEN SATURATION: 100 % | SYSTOLIC BLOOD PRESSURE: 175 MMHG | BODY MASS INDEX: 26.53 KG/M2 | TEMPERATURE: 100 F | HEART RATE: 90 BPM | RESPIRATION RATE: 18 BRPM | WEIGHT: 175.06 LBS

## 2021-08-27 DIAGNOSIS — M25.559 HIP PAIN: Primary | ICD-10-CM

## 2021-08-27 PROCEDURE — 85025 COMPLETE CBC W/AUTO DIFF WBC: CPT | Performed by: FAMILY MEDICINE

## 2021-08-27 PROCEDURE — 63600175 PHARM REV CODE 636 W HCPCS: Performed by: FAMILY MEDICINE

## 2021-08-27 PROCEDURE — 25000003 PHARM REV CODE 250: Performed by: FAMILY MEDICINE

## 2021-08-27 PROCEDURE — 96372 THER/PROPH/DIAG INJ SC/IM: CPT

## 2021-08-27 PROCEDURE — 99284 EMERGENCY DEPT VISIT MOD MDM: CPT | Mod: 25

## 2021-08-27 RX ORDER — HYDRALAZINE HYDROCHLORIDE 25 MG/1
100 TABLET, FILM COATED ORAL
Status: DISCONTINUED | OUTPATIENT
Start: 2021-08-27 | End: 2021-08-27

## 2021-08-27 RX ORDER — HYDRALAZINE HYDROCHLORIDE 20 MG/ML
10 INJECTION INTRAMUSCULAR; INTRAVENOUS
Status: DISCONTINUED | OUTPATIENT
Start: 2021-08-27 | End: 2021-08-28 | Stop reason: HOSPADM

## 2021-08-27 RX ORDER — DICLOFENAC SODIUM 75 MG/1
75 TABLET, DELAYED RELEASE ORAL 2 TIMES DAILY PRN
Qty: 30 TABLET | Refills: 2 | Status: SHIPPED | OUTPATIENT
Start: 2021-08-27 | End: 2022-03-18

## 2021-08-27 RX ORDER — KETOROLAC TROMETHAMINE 30 MG/ML
60 INJECTION, SOLUTION INTRAMUSCULAR; INTRAVENOUS
Status: COMPLETED | OUTPATIENT
Start: 2021-08-27 | End: 2021-08-27

## 2021-08-27 RX ORDER — HYDRALAZINE HYDROCHLORIDE 25 MG/1
100 TABLET, FILM COATED ORAL
Status: COMPLETED | OUTPATIENT
Start: 2021-08-27 | End: 2021-08-27

## 2021-08-27 RX ADMIN — KETOROLAC TROMETHAMINE 60 MG: 60 INJECTION, SOLUTION INTRAMUSCULAR at 09:08

## 2021-08-27 RX ADMIN — HYDRALAZINE HYDROCHLORIDE 100 MG: 25 TABLET, FILM COATED ORAL at 09:08

## 2021-08-28 LAB
ANISOCYTOSIS BLD QL SMEAR: SLIGHT
BASOPHILS # BLD AUTO: 0.06 K/UL (ref 0–0.2)
BASOPHILS NFR BLD: 0.6 % (ref 0–1.9)
DACRYOCYTES BLD QL SMEAR: ABNORMAL
DIFFERENTIAL METHOD: ABNORMAL
EOSINOPHIL # BLD AUTO: 0 K/UL (ref 0–0.5)
EOSINOPHIL NFR BLD: 0.2 % (ref 0–8)
ERYTHROCYTE [DISTWIDTH] IN BLOOD BY AUTOMATED COUNT: 19.5 % (ref 11.5–14.5)
HCT VFR BLD AUTO: 31.7 % (ref 37–48.5)
HGB BLD-MCNC: 9.4 G/DL (ref 12–16)
HYPOCHROMIA BLD QL SMEAR: ABNORMAL
IMM GRANULOCYTES # BLD AUTO: 0.05 K/UL (ref 0–0.04)
IMM GRANULOCYTES NFR BLD AUTO: 0.5 % (ref 0–0.5)
LYMPHOCYTES # BLD AUTO: 1.9 K/UL (ref 1–4.8)
LYMPHOCYTES NFR BLD: 17.7 % (ref 18–48)
MCH RBC QN AUTO: 22.2 PG (ref 27–31)
MCHC RBC AUTO-ENTMCNC: 29.7 G/DL (ref 32–36)
MCV RBC AUTO: 75 FL (ref 82–98)
MONOCYTES # BLD AUTO: 0.8 K/UL (ref 0.3–1)
MONOCYTES NFR BLD: 7.3 % (ref 4–15)
NEUTROPHILS # BLD AUTO: 7.9 K/UL (ref 1.8–7.7)
NEUTROPHILS NFR BLD: 73.7 % (ref 38–73)
NRBC BLD-RTO: 0 /100 WBC
OVALOCYTES BLD QL SMEAR: ABNORMAL
PLATELET # BLD AUTO: 510 K/UL (ref 150–450)
PLATELET BLD QL SMEAR: ABNORMAL
PMV BLD AUTO: 9.5 FL (ref 9.2–12.9)
POIKILOCYTOSIS BLD QL SMEAR: SLIGHT
POLYCHROMASIA BLD QL SMEAR: ABNORMAL
RBC # BLD AUTO: 4.23 M/UL (ref 4–5.4)
STOMATOCYTES BLD QL SMEAR: PRESENT
TARGETS BLD QL SMEAR: ABNORMAL
WBC # BLD AUTO: 10.72 K/UL (ref 3.9–12.7)

## 2021-09-01 ENCOUNTER — TELEPHONE (OUTPATIENT)
Dept: PRIMARY CARE CLINIC | Facility: CLINIC | Age: 51
End: 2021-09-01

## 2021-09-22 ENCOUNTER — TELEPHONE (OUTPATIENT)
Dept: PRIMARY CARE CLINIC | Facility: CLINIC | Age: 51
End: 2021-09-22

## 2021-09-29 ENCOUNTER — OFFICE VISIT (OUTPATIENT)
Dept: PRIMARY CARE CLINIC | Facility: CLINIC | Age: 51
End: 2021-09-29
Payer: MEDICAID

## 2021-09-29 VITALS
BODY MASS INDEX: 29.88 KG/M2 | WEIGHT: 197.19 LBS | HEART RATE: 90 BPM | OXYGEN SATURATION: 99 % | HEIGHT: 68 IN | SYSTOLIC BLOOD PRESSURE: 188 MMHG | TEMPERATURE: 98 F | DIASTOLIC BLOOD PRESSURE: 109 MMHG

## 2021-09-29 DIAGNOSIS — Z86.39 HISTORY OF METABOLIC AND NUTRITIONAL DISORDER: ICD-10-CM

## 2021-09-29 DIAGNOSIS — I82.5Y9 CHRONIC DEEP VEIN THROMBOSIS (DVT) OF PROXIMAL VEIN OF LOWER EXTREMITY, UNSPECIFIED LATERALITY: ICD-10-CM

## 2021-09-29 DIAGNOSIS — M79.89 RIGHT LEG SWELLING: ICD-10-CM

## 2021-09-29 DIAGNOSIS — M87.00 AVASCULAR NECROSIS: ICD-10-CM

## 2021-09-29 DIAGNOSIS — F41.1 GAD (GENERALIZED ANXIETY DISORDER): ICD-10-CM

## 2021-09-29 DIAGNOSIS — E87.6 DIURETIC-INDUCED HYPOKALEMIA: ICD-10-CM

## 2021-09-29 DIAGNOSIS — Z96.641 HISTORY OF TOTAL RIGHT HIP ARTHROPLASTY: ICD-10-CM

## 2021-09-29 DIAGNOSIS — M25.551 ACUTE HIP PAIN, RIGHT: ICD-10-CM

## 2021-09-29 DIAGNOSIS — T50.2X5A DIURETIC-INDUCED HYPOKALEMIA: ICD-10-CM

## 2021-09-29 DIAGNOSIS — Z09 HOSPITAL DISCHARGE FOLLOW-UP: Primary | ICD-10-CM

## 2021-09-29 DIAGNOSIS — B37.9 YEAST INFECTION: ICD-10-CM

## 2021-09-29 DIAGNOSIS — Z98.84 HISTORY OF GASTRIC BYPASS: ICD-10-CM

## 2021-09-29 PROCEDURE — 99215 OFFICE O/P EST HI 40 MIN: CPT | Mod: S$PBB,,, | Performed by: FAMILY MEDICINE

## 2021-09-29 PROCEDURE — 99214 OFFICE O/P EST MOD 30 MIN: CPT | Mod: PBBFAC,PN | Performed by: FAMILY MEDICINE

## 2021-09-29 PROCEDURE — 99215 PR OFFICE/OUTPT VISIT, EST, LEVL V, 40-54 MIN: ICD-10-PCS | Mod: S$PBB,,, | Performed by: FAMILY MEDICINE

## 2021-09-29 PROCEDURE — 99999 PR PBB SHADOW E&M-EST. PATIENT-LVL IV: CPT | Mod: PBBFAC,,, | Performed by: FAMILY MEDICINE

## 2021-09-29 PROCEDURE — 99999 PR PBB SHADOW E&M-EST. PATIENT-LVL IV: ICD-10-PCS | Mod: PBBFAC,,, | Performed by: FAMILY MEDICINE

## 2021-09-29 RX ORDER — CYCLOBENZAPRINE HCL 10 MG
10 TABLET ORAL 3 TIMES DAILY PRN
COMMUNITY
End: 2021-09-29 | Stop reason: SDUPTHER

## 2021-09-29 RX ORDER — POTASSIUM CHLORIDE 20 MEQ/1
20 TABLET, EXTENDED RELEASE ORAL DAILY
Qty: 30 TABLET | Refills: 2 | Status: SHIPPED | OUTPATIENT
Start: 2021-09-29 | End: 2021-10-29

## 2021-09-29 RX ORDER — LORAZEPAM 0.5 MG/1
0.5 TABLET ORAL DAILY PRN
Qty: 14 TABLET | Refills: 0 | Status: SHIPPED | OUTPATIENT
Start: 2021-09-29 | End: 2021-11-17 | Stop reason: SDUPTHER

## 2021-09-29 RX ORDER — LORAZEPAM 0.5 MG/1
0.5 TABLET ORAL 2 TIMES DAILY
COMMUNITY
End: 2021-09-29 | Stop reason: DRUGHIGH

## 2021-09-29 RX ORDER — DULOXETIN HYDROCHLORIDE 30 MG/1
30 CAPSULE, DELAYED RELEASE ORAL DAILY
Qty: 30 CAPSULE | Refills: 2 | Status: SHIPPED | OUTPATIENT
Start: 2021-09-29 | End: 2021-11-17 | Stop reason: SDUPTHER

## 2021-09-29 RX ORDER — FLUCONAZOLE 150 MG/1
150 TABLET ORAL
Qty: 3 TABLET | Refills: 0 | Status: SHIPPED | OUTPATIENT
Start: 2021-09-29 | End: 2021-10-06

## 2021-09-29 RX ORDER — CYCLOBENZAPRINE HCL 10 MG
10 TABLET ORAL EVERY 8 HOURS PRN
Qty: 90 TABLET | Refills: 0 | Status: SHIPPED | OUTPATIENT
Start: 2021-09-29 | End: 2021-10-29

## 2021-09-30 DIAGNOSIS — Z12.31 OTHER SCREENING MAMMOGRAM: ICD-10-CM

## 2021-10-01 ENCOUNTER — TELEPHONE (OUTPATIENT)
Dept: PRIMARY CARE CLINIC | Facility: CLINIC | Age: 51
End: 2021-10-01

## 2021-10-05 ENCOUNTER — PATIENT MESSAGE (OUTPATIENT)
Dept: ADMINISTRATIVE | Facility: HOSPITAL | Age: 51
End: 2021-10-05

## 2021-10-06 ENCOUNTER — TELEPHONE (OUTPATIENT)
Dept: PRIMARY CARE CLINIC | Facility: CLINIC | Age: 51
End: 2021-10-06

## 2021-10-12 RX ORDER — IBUPROFEN 800 MG/1
800 TABLET ORAL 3 TIMES DAILY
Qty: 30 TABLET | Refills: 0 | Status: SHIPPED | OUTPATIENT
Start: 2021-10-12 | End: 2022-03-18

## 2021-10-19 ENCOUNTER — TELEPHONE (OUTPATIENT)
Dept: PRIMARY CARE CLINIC | Facility: CLINIC | Age: 51
End: 2021-10-19

## 2021-10-19 DIAGNOSIS — I10 ESSENTIAL HYPERTENSION: Primary | ICD-10-CM

## 2021-10-19 RX ORDER — AMLODIPINE BESYLATE 5 MG/1
5 TABLET ORAL DAILY
Qty: 30 TABLET | Refills: 2 | Status: SHIPPED | OUTPATIENT
Start: 2021-10-19 | End: 2021-11-17

## 2021-10-20 ENCOUNTER — PATIENT OUTREACH (OUTPATIENT)
Dept: ADMINISTRATIVE | Facility: HOSPITAL | Age: 51
End: 2021-10-20

## 2021-10-20 DIAGNOSIS — Z12.11 COLON CANCER SCREENING: Primary | ICD-10-CM

## 2021-10-26 ENCOUNTER — DOCUMENTATION ONLY (OUTPATIENT)
Dept: PRIMARY CARE CLINIC | Facility: CLINIC | Age: 51
End: 2021-10-26
Payer: MEDICAID

## 2021-10-26 ENCOUNTER — TELEPHONE (OUTPATIENT)
Dept: PRIMARY CARE CLINIC | Facility: CLINIC | Age: 51
End: 2021-10-26
Payer: MEDICAID

## 2021-11-16 ENCOUNTER — TELEPHONE (OUTPATIENT)
Dept: PRIMARY CARE CLINIC | Facility: CLINIC | Age: 51
End: 2021-11-16
Payer: MEDICAID

## 2021-11-16 DIAGNOSIS — R19.7 DIARRHEA, UNSPECIFIED TYPE: Primary | ICD-10-CM

## 2021-11-17 ENCOUNTER — OFFICE VISIT (OUTPATIENT)
Dept: PRIMARY CARE CLINIC | Facility: CLINIC | Age: 51
End: 2021-11-17
Payer: MEDICAID

## 2021-11-17 VITALS
OXYGEN SATURATION: 97 % | HEART RATE: 78 BPM | TEMPERATURE: 97 F | DIASTOLIC BLOOD PRESSURE: 92 MMHG | WEIGHT: 202.38 LBS | BODY MASS INDEX: 30.67 KG/M2 | HEIGHT: 68 IN | SYSTOLIC BLOOD PRESSURE: 188 MMHG

## 2021-11-17 DIAGNOSIS — F41.9 ANXIETY: ICD-10-CM

## 2021-11-17 DIAGNOSIS — J31.0 RHINITIS, UNSPECIFIED TYPE: ICD-10-CM

## 2021-11-17 DIAGNOSIS — E55.9 VITAMIN D DEFICIENCY: ICD-10-CM

## 2021-11-17 DIAGNOSIS — D64.9 ANEMIA, UNSPECIFIED TYPE: ICD-10-CM

## 2021-11-17 DIAGNOSIS — R60.9 EDEMA, UNSPECIFIED TYPE: ICD-10-CM

## 2021-11-17 DIAGNOSIS — F41.1 GAD (GENERALIZED ANXIETY DISORDER): ICD-10-CM

## 2021-11-17 DIAGNOSIS — F39 MOOD DISORDER: ICD-10-CM

## 2021-11-17 DIAGNOSIS — I10 ESSENTIAL HYPERTENSION: Primary | ICD-10-CM

## 2021-11-17 DIAGNOSIS — E87.6 DIURETIC-INDUCED HYPOKALEMIA: ICD-10-CM

## 2021-11-17 DIAGNOSIS — K21.9 GASTROESOPHAGEAL REFLUX DISEASE, UNSPECIFIED WHETHER ESOPHAGITIS PRESENT: ICD-10-CM

## 2021-11-17 DIAGNOSIS — R11.0 NAUSEA: ICD-10-CM

## 2021-11-17 DIAGNOSIS — T50.2X5A DIURETIC-INDUCED HYPOKALEMIA: ICD-10-CM

## 2021-11-17 PROCEDURE — 99999 PR PBB SHADOW E&M-EST. PATIENT-LVL III: ICD-10-PCS | Mod: PBBFAC,,, | Performed by: NURSE PRACTITIONER

## 2021-11-17 PROCEDURE — 99213 PR OFFICE/OUTPT VISIT, EST, LEVL III, 20-29 MIN: ICD-10-PCS | Mod: S$PBB,,, | Performed by: NURSE PRACTITIONER

## 2021-11-17 PROCEDURE — 99213 OFFICE O/P EST LOW 20 MIN: CPT | Mod: PBBFAC,PN | Performed by: NURSE PRACTITIONER

## 2021-11-17 PROCEDURE — 99213 OFFICE O/P EST LOW 20 MIN: CPT | Mod: S$PBB,,, | Performed by: NURSE PRACTITIONER

## 2021-11-17 PROCEDURE — 99999 PR PBB SHADOW E&M-EST. PATIENT-LVL III: CPT | Mod: PBBFAC,,, | Performed by: NURSE PRACTITIONER

## 2021-11-17 RX ORDER — FERROUS SULFATE 325(65) MG
325 TABLET ORAL 2 TIMES DAILY
Qty: 60 TABLET | Refills: 3 | Status: SHIPPED | OUTPATIENT
Start: 2021-11-17 | End: 2022-03-18

## 2021-11-17 RX ORDER — PROMETHAZINE HYDROCHLORIDE 25 MG/1
25 TABLET ORAL EVERY 6 HOURS PRN
Qty: 30 TABLET | Refills: 1 | Status: SHIPPED | OUTPATIENT
Start: 2021-11-17 | End: 2022-03-18

## 2021-11-17 RX ORDER — POTASSIUM CHLORIDE 20 MEQ/1
20 TABLET, EXTENDED RELEASE ORAL DAILY
Qty: 30 TABLET | Refills: 1 | Status: SHIPPED | OUTPATIENT
Start: 2021-11-17 | End: 2021-12-17

## 2021-11-17 RX ORDER — PANTOPRAZOLE SODIUM 20 MG/1
20 TABLET, DELAYED RELEASE ORAL DAILY
Qty: 30 TABLET | Refills: 3 | Status: SHIPPED | OUTPATIENT
Start: 2021-11-17 | End: 2022-07-12 | Stop reason: ALTCHOICE

## 2021-11-17 RX ORDER — SUCRALFATE 1 G/1
TABLET ORAL
Qty: 120 TABLET | Refills: 3 | Status: SHIPPED | OUTPATIENT
Start: 2021-11-17 | End: 2023-01-04 | Stop reason: SDUPTHER

## 2021-11-17 RX ORDER — APIXABAN 5 MG/1
5 TABLET, FILM COATED ORAL 2 TIMES DAILY
Qty: 30 TABLET | Refills: 3 | Status: SHIPPED | OUTPATIENT
Start: 2021-11-17 | End: 2022-03-18

## 2021-11-17 RX ORDER — CETIRIZINE HYDROCHLORIDE 10 MG/1
10 TABLET ORAL NIGHTLY
Qty: 30 TABLET | Refills: 2 | Status: SHIPPED | OUTPATIENT
Start: 2021-11-17 | End: 2022-03-18

## 2021-11-17 RX ORDER — LORAZEPAM 0.5 MG/1
0.5 TABLET ORAL EVERY 8 HOURS PRN
Qty: 30 TABLET | Refills: 1 | Status: SHIPPED | OUTPATIENT
Start: 2021-11-17 | End: 2022-01-25 | Stop reason: SDUPTHER

## 2021-11-17 RX ORDER — ERGOCALCIFEROL 1.25 MG/1
50000 CAPSULE ORAL
Qty: 4 CAPSULE | Refills: 3 | Status: SHIPPED | OUTPATIENT
Start: 2021-11-17 | End: 2022-03-18

## 2021-11-17 RX ORDER — FUROSEMIDE 40 MG/1
40 TABLET ORAL DAILY
Qty: 30 TABLET | Refills: 1 | Status: SHIPPED | OUTPATIENT
Start: 2021-11-17 | End: 2022-02-22

## 2021-11-17 RX ORDER — DULOXETIN HYDROCHLORIDE 30 MG/1
30 CAPSULE, DELAYED RELEASE ORAL DAILY
Qty: 30 CAPSULE | Refills: 2 | Status: SHIPPED | OUTPATIENT
Start: 2021-11-17 | End: 2021-12-17

## 2021-11-17 RX ORDER — CLONIDINE HYDROCHLORIDE 0.2 MG/1
0.2 TABLET ORAL 3 TIMES DAILY
Qty: 90 TABLET | Refills: 3 | Status: SHIPPED | OUTPATIENT
Start: 2021-11-17 | End: 2022-03-18

## 2021-12-20 ENCOUNTER — PATIENT MESSAGE (OUTPATIENT)
Dept: PRIMARY CARE CLINIC | Facility: CLINIC | Age: 51
End: 2021-12-20
Payer: MEDICAID

## 2021-12-22 ENCOUNTER — TELEPHONE (OUTPATIENT)
Dept: PRIMARY CARE CLINIC | Facility: CLINIC | Age: 51
End: 2021-12-22
Payer: MEDICAID

## 2021-12-22 DIAGNOSIS — I10 ESSENTIAL HYPERTENSION: Primary | ICD-10-CM

## 2021-12-22 RX ORDER — FELODIPINE 5 MG/1
5 TABLET, EXTENDED RELEASE ORAL DAILY
Qty: 90 TABLET | Refills: 1 | Status: SHIPPED | OUTPATIENT
Start: 2021-12-22 | End: 2022-03-18

## 2022-01-24 ENCOUNTER — TELEPHONE (OUTPATIENT)
Dept: PRIMARY CARE CLINIC | Facility: CLINIC | Age: 52
End: 2022-01-24
Payer: MEDICAID

## 2022-01-24 NOTE — TELEPHONE ENCOUNTER
Called patient to notify her that an appointment is needed so that her ortho referral can be sent. She can schedule a virtual or in clinic appointment with a provider in our office.         ----- Message from Jeff Torres DNP sent at 1/24/2022  3:49 PM CST -----  Regarding: RE: Ortho referral  Contact: Pt [phone]  Patient needs appointment  ----- Message -----  From: Asia Hughes MA  Sent: 1/19/2022   4:48 PM CST  To: Jeff Torres DNP  Subject: Ortho referral                                   Spoke with patient, she needs an ortho referral for post surgical care and check ups. Per the patient: the orthopedic doctor who did her surgery no longer accepts her insurance.   ----- Message -----  From: Annita Marcus  Sent: 1/19/2022   4:01 PM CST  To: Sly Shelley Staff    Patient would like to get a referral.  Referral to what specialty:  Orthopedic Doctor  Does the patient want the referral with a specific physician:  one that take Medicaid  Is the specialist an [facility]:    Reason (be specific):  Recent hip surgery  Does the patient already have the specialty clinic appointment scheduled:  no  If yes, what date is the appointment scheduled:     Is the insurance listed in Epic correct? (this is important for a referral):  yes  Advised patient that once provider approves this either a nurse or  will return their call?:   Would the patient like a call back, or a response through their [facility] portal?:   Call back  Comments:      Please call and advise.    Thank You

## 2022-01-25 ENCOUNTER — NURSE TRIAGE (OUTPATIENT)
Dept: ADMINISTRATIVE | Facility: CLINIC | Age: 52
End: 2022-01-25
Payer: MEDICAID

## 2022-01-25 DIAGNOSIS — F41.1 GAD (GENERALIZED ANXIETY DISORDER): ICD-10-CM

## 2022-01-25 RX ORDER — LORAZEPAM 0.5 MG/1
0.5 TABLET ORAL EVERY 8 HOURS PRN
Qty: 30 TABLET | Refills: 1 | Status: SHIPPED | OUTPATIENT
Start: 2022-01-25 | End: 2022-03-18

## 2022-01-25 RX ORDER — CYCLOBENZAPRINE HCL 10 MG
10 TABLET ORAL 3 TIMES DAILY PRN
Qty: 30 TABLET | Refills: 2 | Status: SHIPPED | OUTPATIENT
Start: 2022-01-25 | End: 2022-02-04

## 2022-01-25 NOTE — TELEPHONE ENCOUNTER
OOC RN Patient.    B/p is high  192/102 after meds.  7:00,  Have been having HA, starting 2 weeks during the covid.    Went to UC,  For ab pain, throat, tired, coughing.  About 2-3 weeks ago UC.  COVID POSITIVE.  1/1/22  Patient rec'd a call while she was talking.  She was returning a doctor's call about her Rehab.   I asked her if the doctor would address her b/p,  If she does not to call back.   Patient states that she needed to go and hung up the connection.    Could not complete triage.   Asked her to call back.      Reason for Disposition   Caller hangs up    Protocols used: DIFFICULT CALLER-A-AH

## 2022-01-26 DIAGNOSIS — I10 ESSENTIAL HYPERTENSION: Primary | ICD-10-CM

## 2022-01-26 RX ORDER — ISOSORBIDE MONONITRATE 30 MG/1
30 TABLET, EXTENDED RELEASE ORAL DAILY
Qty: 30 TABLET | Refills: 2 | Status: SHIPPED | OUTPATIENT
Start: 2022-01-26 | End: 2022-02-25

## 2022-01-26 NOTE — TELEPHONE ENCOUNTER
Placed call to patient to assess blood pressure over the phone the reading was 159/102- patient has not taken hypertensive medication.

## 2022-02-16 NOTE — TELEPHONE ENCOUNTER
----- Message from Tangela Peña sent at 11/9/2017  9:15 AM CST -----  Contact: [phone]  Would like to consult with nurse about pt visit on 11/3; has questions about procedure pt needs. Please call bk at [phone]. thx lj    Clinic Care Coordination Contact  Community Health Worker Initial Outreach     CHW Initial Information Gathering:  Referral Source: PCP  Preferred Hospital: [facility]  [phone]  Preferred Urgent Care: [facility] [place], [phone]  Current living arrangement:: I live in a private home with family  Type of residence:: Apartment  Community Resources: None  Supplies used at home:: None  Equipment Currently Used at Home: none  Informal Support system:: Family  No PCP office visit in Past Year: No  Transportation means:: Friend  CHW Additional Questions  If ED/Hospital discharge, follow-up appointment scheduled as recommended?: N/A  Medication changes made following ED/Hospital discharge?: N/A  MyChart active?: No  Patient agreeable to assistance with activating MyChart?: No     Patient accepts CC: Yes. Patient scheduled for assessment with CCC SW on 3/07/2022 at 4:00 PM. Patient noted desire to discuss Aeh Deonte is giving birth tomorrow. She wants to give her baby away (to a relative?). Please offer adoption resources, postpartum depression, domestic partner violence resources.         Patient was no showed with CCC SW on 2/14/2022, patient agreed and would like to reschedule to meet with CCC SW.

## 2022-03-09 ENCOUNTER — TELEPHONE (OUTPATIENT)
Dept: PRIMARY CARE CLINIC | Facility: CLINIC | Age: 52
End: 2022-03-09
Payer: MEDICAID

## 2022-03-09 NOTE — TELEPHONE ENCOUNTER
Informed pt that we received message. Pt said she is waiting to hear back from the neuro medical liaison to see if she gets approved. I told her if she doesn't to give us a list of where she would like to go and we would try to find out where she would be approved.    ----- Message from Daphney Heart MD sent at 3/9/2022  4:18 PM CST -----  Regarding: FW: Pt in [facility]  Contact: [phone] Patient  -Thank you for the update. We will be able to follow up with you when you are discharged from the rehab. Let us know if you have any particular question.  ----- Message -----  From: Genesis Baez MA  Sent: 3/9/2022   9:52 AM CST  To: Daphney Heart MD  Subject: Pt in [facility]                                      Please advise.  ----- Message -----  From: Judy Chavarria  Sent: 3/9/2022   9:27 AM CST  To: Sly Shelley Staff    Pt states she is in [facility] in [facility]. Pt states she had a stroke effecting her left side. Pt states [facility] is getting a care plan together for her. Pt also states [facility] is trying to get the pt in Neuro Medical in  for inpatient rehab. Pt states she is under the care of Dr Raad Zapien in cardiology. Please call and advise.

## 2022-03-15 ENCOUNTER — TELEPHONE (OUTPATIENT)
Dept: PRIMARY CARE CLINIC | Facility: CLINIC | Age: 52
End: 2022-03-15
Payer: MEDICAID

## 2022-03-15 NOTE — TELEPHONE ENCOUNTER
Attempted to call and check on patient as well as get an update on her BP no answer. Unable to leave message.

## 2022-03-15 NOTE — TELEPHONE ENCOUNTER
Spoke with Patty who states patient called yesterday and BP was 99/53, double vision and feeling dizzy. She stated she was on the phone with patient and 15 minutes later BP was 130/? But was in normal range. She advised Shari to call and inform provider.  She will  Advise Toya or [facility]  staff to call with an update. Provider informed.    ----- Message from Judy Chavarria sent at 3/15/2022  4:19 PM CDT -----  Contact: Shari with [facility]    Shari is requesting a call back about the pt blood pressure issues and swelling in the pt lower extremities since the pt was taken off of her b/p medication.

## 2022-03-16 ENCOUNTER — TELEPHONE (OUTPATIENT)
Dept: PRIMARY CARE CLINIC | Facility: CLINIC | Age: 52
End: 2022-03-16
Payer: MEDICAID

## 2022-03-16 NOTE — TELEPHONE ENCOUNTER
Received incoming call from Sudha Home Health Nurse  Who was present with Patient Joe Ceballos. Per Home Health Nurse she requested refill on medications. Patient needed to be scheduled for Hospital F/u after having stroke and elevated blood pressure patient is scheduled for Friday @8:30      ----- Message from Lorena Segovia sent at 3/16/2022 10:46 AM CDT -----  Contact: Ministerio Ceballos @[phone]  Please call patient in ref to her Lasix and Muscle relaxer. She requested a fluid pill. Just released from hospital from a stroke.           [facility]  Phone: [phone] Fax: [phone]  / Home health and PT are there with and stated what she needed.  Please call ASAP/.

## 2022-03-17 ENCOUNTER — TELEPHONE (OUTPATIENT)
Dept: PRIMARY CARE CLINIC | Facility: CLINIC | Age: 52
End: 2022-03-17
Payer: MEDICAID

## 2022-03-17 NOTE — TELEPHONE ENCOUNTER
----- Message from Lorena Segovia sent at 3/17/2022  3:08 PM CDT -----  Contact: Joe King @ [phone]  Patient had a stroke in [place] last. Need to referral to seer a Neurologist and Cardiologist. Please call her.

## 2022-03-18 ENCOUNTER — OFFICE VISIT (OUTPATIENT)
Dept: PRIMARY CARE CLINIC | Facility: CLINIC | Age: 52
End: 2022-03-18
Payer: MEDICAID

## 2022-03-18 VITALS
RESPIRATION RATE: 18 BRPM | OXYGEN SATURATION: 95 % | DIASTOLIC BLOOD PRESSURE: 96 MMHG | WEIGHT: 216 LBS | HEIGHT: 68 IN | HEART RATE: 89 BPM | TEMPERATURE: 97 F | SYSTOLIC BLOOD PRESSURE: 158 MMHG | BODY MASS INDEX: 32.74 KG/M2

## 2022-03-18 DIAGNOSIS — I10 ESSENTIAL HYPERTENSION: ICD-10-CM

## 2022-03-18 DIAGNOSIS — Z86.73 RECENT CEREBROVASCULAR ACCIDENT: ICD-10-CM

## 2022-03-18 DIAGNOSIS — M25.472 ANKLE EDEMA, BILATERAL: ICD-10-CM

## 2022-03-18 DIAGNOSIS — Z09 HOSPITAL DISCHARGE FOLLOW-UP: Primary | ICD-10-CM

## 2022-03-18 DIAGNOSIS — M62.838 MUSCLE SPASM: ICD-10-CM

## 2022-03-18 DIAGNOSIS — M25.471 ANKLE EDEMA, BILATERAL: ICD-10-CM

## 2022-03-18 PROCEDURE — 3080F PR MOST RECENT DIASTOLIC BLOOD PRESSURE >= 90 MM HG: ICD-10-PCS | Mod: CPTII,,, | Performed by: NURSE PRACTITIONER

## 2022-03-18 PROCEDURE — 3080F DIAST BP >= 90 MM HG: CPT | Mod: CPTII,,, | Performed by: NURSE PRACTITIONER

## 2022-03-18 PROCEDURE — 99214 OFFICE O/P EST MOD 30 MIN: CPT | Mod: S$PBB,,, | Performed by: NURSE PRACTITIONER

## 2022-03-18 PROCEDURE — 99215 OFFICE O/P EST HI 40 MIN: CPT | Mod: PBBFAC,PN | Performed by: NURSE PRACTITIONER

## 2022-03-18 PROCEDURE — 3077F SYST BP >= 140 MM HG: CPT | Mod: CPTII,,, | Performed by: NURSE PRACTITIONER

## 2022-03-18 PROCEDURE — 99214 PR OFFICE/OUTPT VISIT, EST, LEVL IV, 30-39 MIN: ICD-10-PCS | Mod: S$PBB,,, | Performed by: NURSE PRACTITIONER

## 2022-03-18 PROCEDURE — 3008F PR BODY MASS INDEX (BMI) DOCUMENTED: ICD-10-PCS | Mod: CPTII,,, | Performed by: NURSE PRACTITIONER

## 2022-03-18 PROCEDURE — 4010F ACE/ARB THERAPY RXD/TAKEN: CPT | Mod: CPTII,,, | Performed by: NURSE PRACTITIONER

## 2022-03-18 PROCEDURE — 1160F PR REVIEW ALL MEDS BY PRESCRIBER/CLIN PHARMACIST DOCUMENTED: ICD-10-PCS | Mod: CPTII,,, | Performed by: NURSE PRACTITIONER

## 2022-03-18 PROCEDURE — 3077F PR MOST RECENT SYSTOLIC BLOOD PRESSURE >= 140 MM HG: ICD-10-PCS | Mod: CPTII,,, | Performed by: NURSE PRACTITIONER

## 2022-03-18 PROCEDURE — 3008F BODY MASS INDEX DOCD: CPT | Mod: CPTII,,, | Performed by: NURSE PRACTITIONER

## 2022-03-18 PROCEDURE — 1160F RVW MEDS BY RX/DR IN RCRD: CPT | Mod: CPTII,,, | Performed by: NURSE PRACTITIONER

## 2022-03-18 PROCEDURE — 1159F MED LIST DOCD IN RCRD: CPT | Mod: CPTII,,, | Performed by: NURSE PRACTITIONER

## 2022-03-18 PROCEDURE — 99999 PR PBB SHADOW E&M-EST. PATIENT-LVL V: CPT | Mod: PBBFAC,,, | Performed by: NURSE PRACTITIONER

## 2022-03-18 PROCEDURE — 4010F PR ACE/ARB THEARPY RXD/TAKEN: ICD-10-PCS | Mod: CPTII,,, | Performed by: NURSE PRACTITIONER

## 2022-03-18 PROCEDURE — 1159F PR MEDICATION LIST DOCUMENTED IN MEDICAL RECORD: ICD-10-PCS | Mod: CPTII,,, | Performed by: NURSE PRACTITIONER

## 2022-03-18 PROCEDURE — 99999 PR PBB SHADOW E&M-EST. PATIENT-LVL V: ICD-10-PCS | Mod: PBBFAC,,, | Performed by: NURSE PRACTITIONER

## 2022-03-18 RX ORDER — HYDROCHLOROTHIAZIDE 12.5 MG/1
12.5 TABLET ORAL DAILY
Qty: 30 TABLET | Refills: 1 | Status: SHIPPED | OUTPATIENT
Start: 2022-03-18 | End: 2022-07-12 | Stop reason: ALTCHOICE

## 2022-03-18 RX ORDER — CYCLOBENZAPRINE HCL 10 MG
10 TABLET ORAL 3 TIMES DAILY PRN
Qty: 30 TABLET | Refills: 0 | Status: SHIPPED | OUTPATIENT
Start: 2022-03-18 | End: 2022-07-12 | Stop reason: ALTCHOICE

## 2022-03-18 RX ORDER — PANTOPRAZOLE SODIUM 40 MG/1
40 TABLET, DELAYED RELEASE ORAL DAILY
COMMUNITY
Start: 2022-03-12 | End: 2022-07-12 | Stop reason: SDUPTHER

## 2022-03-18 RX ORDER — CLOPIDOGREL BISULFATE 75 MG/1
75 TABLET ORAL DAILY
COMMUNITY
Start: 2022-03-12 | End: 2022-07-12 | Stop reason: SDUPTHER

## 2022-03-18 RX ORDER — AMLODIPINE BESYLATE 10 MG/1
10 TABLET ORAL DAILY
COMMUNITY
Start: 2022-03-12 | End: 2022-06-07

## 2022-03-18 RX ORDER — ISOSORBIDE MONONITRATE 60 MG/1
60 TABLET, EXTENDED RELEASE ORAL DAILY
COMMUNITY
Start: 2022-03-12 | End: 2022-07-12 | Stop reason: SDUPTHER

## 2022-03-18 RX ORDER — ASPIRIN 81 MG/1
81 TABLET ORAL DAILY
COMMUNITY
End: 2022-07-12 | Stop reason: SDUPTHER

## 2022-03-18 RX ORDER — ATORVASTATIN CALCIUM 80 MG/1
80 TABLET, FILM COATED ORAL NIGHTLY
COMMUNITY
Start: 2022-03-12 | End: 2022-07-12 | Stop reason: SDUPTHER

## 2022-03-18 RX ORDER — LOSARTAN POTASSIUM 100 MG/1
100 TABLET ORAL DAILY
COMMUNITY
Start: 2022-03-12 | End: 2022-07-12 | Stop reason: SDUPTHER

## 2022-03-18 RX ORDER — DULOXETIN HYDROCHLORIDE 60 MG/1
60 CAPSULE, DELAYED RELEASE ORAL 2 TIMES DAILY
COMMUNITY
Start: 2022-03-12 | End: 2022-07-12 | Stop reason: SDUPTHER

## 2022-03-18 NOTE — PATIENT INSTRUCTIONS
Follow up with your cardiology and neurology referrals. Call [phone].  Take all medications as prescribed.  Elevate your legs as needed to help with swelling.  Continue with rehab therapy and home health.  Report to the ER for substernal crushing chest pain, loss of consciousness or numbness and weakness of either side.

## 2022-03-18 NOTE — PROGRESS NOTES
"Subjective:      Patient ID: Joe Ceballos is a 52 y.o. female.    Chief Complaint: No chief complaint on file.    BP (!) 158/96 (BP Location: Left arm, Patient Position: Sitting, BP Method: Large (Automatic))   Pulse 89   Temp 97 °F (36.1 °C) (Temporal)   Resp 18   Ht 5' 8" (1.727 m)   Wt 98 kg (216 lb)   SpO2 95%   BMI 32.84 kg/m²     Pt presenting for f/u for hospital discharge after having a stroke with left sided weakness a little over one week ago. Pt states she is currently being seen by home health and receiving inpatient rehab. Also says her medication was adjusted by the physician in the hospital and she is feeling much better than she did before she had the stroke. Requesting a referral to cardiology and neurology as well as refill of muscle relaxant for muscle spasms of left hand and left leg that occurs after PT/OT. Also mentioned she is having a breast reduction soon but was told she only needed cardiology clearance prior to having it. Pt also requesting referral for smoking cessation.      Review of patient's allergies indicates:   Allergen Reactions    Lisinopril Anaphylaxis     This is only possible agent at the time.  This is only possible agent at the time.  This is only possible agent at the time.    Sulfa (sulfonamide antibiotics) Anaphylaxis    Beta-blockers (beta-adrenergic blocking agts)      Other reaction(s): Other (See Comments)  Syncope & Collapse        Review of Systems   Constitutional: Negative for chills and fever.   HENT: Negative for congestion and sore throat.    Respiratory: Negative for cough and shortness of breath.    Cardiovascular: Negative for chest pain and palpitations.   Gastrointestinal: Negative for nausea and vomiting.   Genitourinary: Negative.    Skin: Negative.    Neurological: Negative for headaches.      Objective:      Physical Exam  Vitals reviewed.   Constitutional:       Appearance: She is well-developed.   HENT:      Head: Normocephalic and " atraumatic.      Right Ear: External ear normal.      Left Ear: External ear normal.      Nose: Nose normal. No mucosal edema or rhinorrhea.      Mouth/Throat:      Mouth: Mucous membranes are moist.      Pharynx: Uvula midline.   Eyes:      General: Lids are normal.      Conjunctiva/sclera: Conjunctivae normal.      Pupils: Pupils are equal, round, and reactive to light.   Cardiovascular:      Rate and Rhythm: Normal rate and regular rhythm.      Heart sounds: Normal heart sounds.   Pulmonary:      Effort: Pulmonary effort is normal.      Breath sounds: Normal breath sounds. No decreased breath sounds or wheezing.   Musculoskeletal:         General: Normal range of motion.      Cervical back: Normal range of motion and neck supple.   Lymphadenopathy:      Cervical: No cervical adenopathy.   Skin:     General: Skin is warm and dry.      Capillary Refill: Capillary refill takes less than 2 seconds.      Coloration: Skin is not cyanotic or pale.   Neurological:      Mental Status: She is alert and oriented to person, place, and time.      Cranial Nerves: No cranial nerve deficit.      Comments: Left sided weakness noted with bilateral hand    Psychiatric:         Attention and Perception: Attention normal.         Mood and Affect: Mood normal.         Speech: Speech normal.         Behavior: Behavior normal.         Thought Content: Thought content normal.         Cognition and Memory: Cognition normal.         Assessment:       1. Hospital discharge follow-up    2. Recent cerebrovascular accident    3. Essential hypertension    4. Ankle edema, bilateral    5. Muscle spasm        Plan:     Hospital discharge follow-up  -     Ambulatory referral/consult to Cardiology; Future; Expected date: 03/25/2022  -     Ambulatory referral/consult to Neurology; Future; Expected date: 03/25/2022    Recent cerebrovascular accident  -     Ambulatory referral/consult to Cardiology; Future; Expected date: 03/25/2022  -      Ambulatory referral/consult to Neurology; Future; Expected date: 03/25/2022  -     Ambulatory referral/consult to Smoking Cessation Program; Future; Expected date: 03/25/2022    Essential hypertension  -     Ambulatory referral/consult to Cardiology; Future; Expected date: 03/25/2022  -     hydroCHLOROthiazide (HYDRODIURIL) 12.5 MG Tab; Take 1 tablet (12.5 mg total) by mouth once daily.  Dispense: 30 tablet; Refill: 1  -     Ambulatory referral/consult to Smoking Cessation Program; Future; Expected date: 03/25/2022    Ankle edema, bilateral  -     hydroCHLOROthiazide (HYDRODIURIL) 12.5 MG Tab; Take 1 tablet (12.5 mg total) by mouth once daily.  Dispense: 30 tablet; Refill: 1    Muscle spasm  Comments:  left upper and lower extremity  Orders:  -     cyclobenzaprine (FLEXERIL) 10 MG tablet; Take 1 tablet (10 mg total) by mouth 3 (three) times daily as needed for Muscle spasms.  Dispense: 30 tablet; Refill: 0    -Cardiology, neurology, and smoking cessation referrals placed.   -HCTZ prescribed  -Cyclobenzaprine refilled.    Advised pt to:  Follow up with your cardiology and neurology referrals. Call [phone].  Take all medications as prescribed.  Elevate your legs as needed to help with swelling.  Continue with rehab therapy and home health.  Report to the ER for substernal crushing chest pain, loss of consciousness or numbness and weakness of either side.  Follow up in two weeks.

## 2022-03-23 ENCOUNTER — PATIENT OUTREACH (OUTPATIENT)
Dept: ADMINISTRATIVE | Facility: OTHER | Age: 52
End: 2022-03-23
Payer: MEDICAID

## 2022-03-24 ENCOUNTER — HOSPITAL ENCOUNTER (OUTPATIENT)
Dept: CARDIOLOGY | Facility: HOSPITAL | Age: 52
Discharge: HOME | End: 2022-03-24
Attending: INTERNAL MEDICINE
Payer: MEDICAID

## 2022-03-24 ENCOUNTER — OFFICE VISIT (OUTPATIENT)
Dept: CARDIOLOGY | Facility: CLINIC | Age: 52
End: 2022-03-24
Payer: MEDICAID

## 2022-03-24 VITALS
OXYGEN SATURATION: 97 % | RESPIRATION RATE: 16 BRPM | HEIGHT: 68 IN | HEART RATE: 91 BPM | SYSTOLIC BLOOD PRESSURE: 146 MMHG | WEIGHT: 211 LBS | DIASTOLIC BLOOD PRESSURE: 82 MMHG | BODY MASS INDEX: 31.98 KG/M2

## 2022-03-24 DIAGNOSIS — Z01.810 PREOP CARDIOVASCULAR EXAM: ICD-10-CM

## 2022-03-24 DIAGNOSIS — Z76.89 ESTABLISHING CARE WITH NEW DOCTOR, ENCOUNTER FOR: ICD-10-CM

## 2022-03-24 DIAGNOSIS — R13.19 ESOPHAGEAL DYSPHAGIA: ICD-10-CM

## 2022-03-24 DIAGNOSIS — I10 ESSENTIAL HYPERTENSION: ICD-10-CM

## 2022-03-24 DIAGNOSIS — Z98.84 S/P LAPAROSCOPIC SLEEVE GASTRECTOMY: ICD-10-CM

## 2022-03-24 DIAGNOSIS — I50.30 HEART FAILURE WITH PRESERVED EJECTION FRACTION, UNSPECIFIED HF CHRONICITY: Primary | ICD-10-CM

## 2022-03-24 DIAGNOSIS — Z86.73 RECENT CEREBROVASCULAR ACCIDENT: ICD-10-CM

## 2022-03-24 DIAGNOSIS — Z09 HOSPITAL DISCHARGE FOLLOW-UP: ICD-10-CM

## 2022-03-24 DIAGNOSIS — Z76.89 ESTABLISHING CARE WITH NEW DOCTOR, ENCOUNTER FOR: Primary | ICD-10-CM

## 2022-03-24 DIAGNOSIS — K22.2 BENIGN ESOPHAGEAL STRICTURE: ICD-10-CM

## 2022-03-24 PROCEDURE — 4010F ACE/ARB THERAPY RXD/TAKEN: CPT | Mod: CPTII,,, | Performed by: INTERNAL MEDICINE

## 2022-03-24 PROCEDURE — 99999 PR PBB SHADOW E&M-EST. PATIENT-LVL IV: CPT | Mod: PBBFAC,,, | Performed by: INTERNAL MEDICINE

## 2022-03-24 PROCEDURE — 3008F BODY MASS INDEX DOCD: CPT | Mod: CPTII,,, | Performed by: INTERNAL MEDICINE

## 2022-03-24 PROCEDURE — 3079F PR MOST RECENT DIASTOLIC BLOOD PRESSURE 80-89 MM HG: ICD-10-PCS | Mod: CPTII,,, | Performed by: INTERNAL MEDICINE

## 2022-03-24 PROCEDURE — 99214 OFFICE O/P EST MOD 30 MIN: CPT | Mod: PBBFAC | Performed by: INTERNAL MEDICINE

## 2022-03-24 PROCEDURE — 3008F PR BODY MASS INDEX (BMI) DOCUMENTED: ICD-10-PCS | Mod: CPTII,,, | Performed by: INTERNAL MEDICINE

## 2022-03-24 PROCEDURE — 3077F PR MOST RECENT SYSTOLIC BLOOD PRESSURE >= 140 MM HG: ICD-10-PCS | Mod: CPTII,,, | Performed by: INTERNAL MEDICINE

## 2022-03-24 PROCEDURE — 99204 OFFICE O/P NEW MOD 45 MIN: CPT | Mod: S$PBB,25,, | Performed by: INTERNAL MEDICINE

## 2022-03-24 PROCEDURE — 93010 EKG 12-LEAD: ICD-10-PCS | Mod: ,,, | Performed by: INTERNAL MEDICINE

## 2022-03-24 PROCEDURE — 93010 ELECTROCARDIOGRAM REPORT: CPT | Mod: ,,, | Performed by: INTERNAL MEDICINE

## 2022-03-24 PROCEDURE — 99204 PR OFFICE/OUTPT VISIT, NEW, LEVL IV, 45-59 MIN: ICD-10-PCS | Mod: S$PBB,25,, | Performed by: INTERNAL MEDICINE

## 2022-03-24 PROCEDURE — 1159F PR MEDICATION LIST DOCUMENTED IN MEDICAL RECORD: ICD-10-PCS | Mod: CPTII,,, | Performed by: INTERNAL MEDICINE

## 2022-03-24 PROCEDURE — 99999 PR PBB SHADOW E&M-EST. PATIENT-LVL IV: ICD-10-PCS | Mod: PBBFAC,,, | Performed by: INTERNAL MEDICINE

## 2022-03-24 PROCEDURE — 4010F PR ACE/ARB THEARPY RXD/TAKEN: ICD-10-PCS | Mod: CPTII,,, | Performed by: INTERNAL MEDICINE

## 2022-03-24 PROCEDURE — 1159F MED LIST DOCD IN RCRD: CPT | Mod: CPTII,,, | Performed by: INTERNAL MEDICINE

## 2022-03-24 PROCEDURE — 3079F DIAST BP 80-89 MM HG: CPT | Mod: CPTII,,, | Performed by: INTERNAL MEDICINE

## 2022-03-24 PROCEDURE — 93005 ELECTROCARDIOGRAM TRACING: CPT

## 2022-03-24 PROCEDURE — 3077F SYST BP >= 140 MM HG: CPT | Mod: CPTII,,, | Performed by: INTERNAL MEDICINE

## 2022-03-24 RX ORDER — FUROSEMIDE 40 MG/1
40 TABLET ORAL 2 TIMES DAILY
Qty: 60 TABLET | Refills: 11 | Status: SHIPPED | OUTPATIENT
Start: 2022-03-24 | End: 2022-06-07

## 2022-03-24 NOTE — PROGRESS NOTES
Subjective:   Patient ID:  Joe Ceballos is a 52 y.o. female who presents for evaluation of No chief complaint on file.      HPI   52-year-old female, with history of sickle cell trait, ex-smoker, hypertension and recent CVA.  Patient was getting preop workup for a revision of her gastric sleeve when she started to have arm weakness, she states her blood pressure was 200 over above 100.   This happened at [facility].  She had an MRI that showed acute ischemia in the right corona radiata, she was treated with aspirin Plavix, her blood pressure medications have been changed.  Patient is being visited by home health and her blood pressure is under better control.  She states that her weakness has improved significantly.  She denies any history of chest pain exertional or nonexertional.  She had a normal echocardiogram during her admission, she had TCDs as well as a negative bubble study.  Carotids no significant plaque.  Denies any palpitations.  No bleeding while on aspirin Plavix.    IMPRESSION:   Impression:   1. Evolving acute ischemia in the right corona radiata.   2. Diffuse volume loss and findings suggestive of chronic microvascular ischemia.     Electronically Signed By: Radames Preston MD 3/7/2022 3:43 PM CST     Past Medical History:   Diagnosis Date    Anxiety     Edema     Hypertension     Initial insomnia     Sickle cell trait     Stroke     Tobacco use        Past Surgical History:   Procedure Laterality Date    BREAST SURGERY       SECTION      x 2    CYST REMOVAL      from left tube    gastric sleeve      Raheel-en-Y gastric bypass      TONSILLECTOMY         Social History     Tobacco Use    Smoking status: Former Smoker     Packs/day: 0.50     Types: Cigarettes    Smokeless tobacco: Never Used   Substance Use Topics    Alcohol use: Yes    Drug use: No       Family History   Adopted: Yes   Problem Relation Age of Onset    Hypertension Mother      Thyroid disease Paternal Aunt     Hypertension Paternal Grandmother     Breast cancer Neg Hx     Colon cancer Neg Hx     Ovarian cancer Neg Hx        Review of Systems   Constitutional: Negative for fever and malaise/fatigue.   HENT: Negative for sore throat.    Eyes: Negative for blurred vision.   Cardiovascular: Negative for chest pain, claudication, cyanosis, dyspnea on exertion, irregular heartbeat, leg swelling, near-syncope, orthopnea, palpitations, paroxysmal nocturnal dyspnea and syncope.   Respiratory: Negative for cough and hemoptysis.    Hematologic/Lymphatic: Negative for bleeding problem.   Skin: Negative for rash.   Musculoskeletal: Negative for falls.   Gastrointestinal: Negative for abdominal pain.   Genitourinary: Negative.    Neurological: Positive for focal weakness.   Psychiatric/Behavioral: Negative for altered mental status and substance abuse.       Current Outpatient Medications on File Prior to Visit   Medication Sig    amLODIPine (NORVASC) 10 MG tablet Take 10 mg by mouth once daily.    aspirin (ECOTRIN) 81 MG EC tablet Take 81 mg by mouth once daily.    atorvastatin (LIPITOR) 80 MG tablet Take 80 mg by mouth every evening.    cholecalciferol, vitamin D3, 50,000 unit capsule TAKE 1 CAPSULE ONCE A WEEK ORALLY 28 DAYS    clopidogreL (PLAVIX) 75 mg tablet Take 75 mg by mouth once daily.    cyclobenzaprine (FLEXERIL) 10 MG tablet Take 1 tablet (10 mg total) by mouth 3 (three) times daily as needed for Muscle spasms.    DULoxetine (CYMBALTA) 60 MG capsule Take 60 mg by mouth 2 (two) times a day.    hydroCHLOROthiazide (HYDRODIURIL) 12.5 MG Tab Take 1 tablet (12.5 mg total) by mouth once daily.    isosorbide mononitrate (IMDUR) 60 MG 24 hr tablet Take 60 mg by mouth once daily.    losartan (COZAAR) 100 MG tablet Take 100 mg by mouth once daily.    oxyCODONE-acetaminophen (PERCOCET) 7.5-325 mg per tablet oxycodone-acetaminophen 7.5 mg-325 mg tablet   TAKE 1 TABLET BY MOUTH EVERY 4  HOURS AS NEEDED FOR PAIN    pantoprazole (PROTONIX) 20 MG tablet Take 1 tablet (20 mg total) by mouth once daily.    pantoprazole (PROTONIX) 40 MG tablet Take 40 mg by mouth once daily.    sucralfate (CARAFATE) 1 gram tablet TAKE 1 TABLET BY MOUTH 4 TIMES A DAY BEFORE MEALS AND AT BEDTIME     No current facility-administered medications on file prior to visit.       Objective:   Objective:  Wt Readings from Last 3 Encounters:   03/24/22 95.7 kg (211 lb)   03/18/22 98 kg (216 lb)   11/17/21 91.8 kg (202 lb 6.4 oz)     Temp Readings from Last 3 Encounters:   03/18/22 97 °F (36.1 °C) (Temporal)   11/17/21 96.8 °F (36 °C) (Temporal)   09/29/21 98 °F (36.7 °C) (Temporal)     BP Readings from Last 3 Encounters:   03/24/22 (!) 146/82   03/18/22 (!) 158/96   11/17/21 (!) 188/92     Pulse Readings from Last 3 Encounters:   03/24/22 91   03/18/22 89   11/17/21 78       Physical Exam  Vitals reviewed.   Constitutional:       Appearance: She is well-developed.   HENT:      Head: Normocephalic and atraumatic.   Eyes:      General: No scleral icterus.     Conjunctiva/sclera: Conjunctivae normal.   Cardiovascular:      Rate and Rhythm: Normal rate and regular rhythm.      Pulses: Intact distal pulses.      Heart sounds: Normal heart sounds. No murmur heard.  Pulmonary:      Effort: No respiratory distress.      Breath sounds: No wheezing or rales.   Chest:      Chest wall: No tenderness.   Abdominal:      General: Bowel sounds are normal. There is no distension.      Palpations: Abdomen is soft.      Tenderness: There is no guarding.   Musculoskeletal:         General: Normal range of motion.      Cervical back: Normal range of motion and neck supple.   Skin:     General: Skin is warm.   Neurological:      Mental Status: She is alert and oriented to person, place, and time.         Lab Results   Component Value Date    CHOL 166 08/09/2021     Lab Results   Component Value Date    HDL 55 08/09/2021     Lab Results   Component  Value Date    LDLCALC 80.6 08/09/2021     Lab Results   Component Value Date    TRIG 152 (H) 08/09/2021     Lab Results   Component Value Date    CHOLHDL 33.1 08/09/2021       Chemistry        Component Value Date/Time     (L) 08/09/2021 1650    K 4.2 08/09/2021 1650     08/09/2021 1650    CO2 18 (L) 08/09/2021 1650    BUN 20 08/09/2021 1650    CREATININE 1.2 08/09/2021 1650    GLU 86 08/09/2021 1650        Component Value Date/Time    CALCIUM 9.6 08/09/2021 1650    ALKPHOS 152 (H) 08/09/2021 1650    AST 34 08/09/2021 1650    ALT 20 08/09/2021 1650    BILITOT 0.4 08/09/2021 1650    ESTGFRAFRICA >60.0 08/09/2021 1650    EGFRNONAA 52.8 (A) 08/09/2021 1650          Lab Results   Component Value Date    TSH 0.538 08/09/2021     No results found for: INR, PROTIME  Lab Results   Component Value Date    WBC 10.72 08/27/2021    HGB 9.4 (L) 08/27/2021    HCT 31.7 (L) 08/27/2021    MCV 75 (L) 08/27/2021     (H) 08/27/2021     BNP  @LABRCNTIP(BNP,BNPTRIAGEBLO)@  CrCl cannot be calculated (Patient's most recent lab result is older than the maximum 7 days allowed.).     Imaging:  ======  No results found for this or any previous visit.    No results found for this or any previous visit.    No results found for this or any previous visit.    Results for orders placed during the hospital encounter of 10/04/16    X-Ray Chest PA And Lateral    Narrative  2 views.    Findings:  Heart size is normal.  Lungs are clear.  Bony thorax is intact.  IMPRESSION:  Normal chest.      Electronically signed by: GEO WOMACK MD  Date:     10/04/16  Time:    12:59    No results found for this or any previous visit.    No valid procedures specified.    Diagnostic Results:  ECG: Reviewed    The 10-year ASCVD risk score (Frisco DC Jr., et al., 2013) is: 5.2%    Values used to calculate the score:      Age: 52 years      Sex: Female      Is Non- : Yes      Diabetic: No      Tobacco smoker: No      Systolic  Blood Pressure: 146 mmHg      Is BP treated: Yes      HDL Cholesterol: 55 mg/dL      Total Cholesterol: 166 mg/dL    Assessment and Plan:   Heart failure with preserved ejection fraction, unspecified HF chronicity  -     furosemide (LASIX) 40 MG tablet; Take 1 tablet (40 mg total) by mouth 2 (two) times daily.  Dispense: 60 tablet; Refill: 11    Recent cerebrovascular accident  -     Ambulatory referral/consult to Cardiology    Essential hypertension  -     Ambulatory referral/consult to Cardiology    Hospital discharge follow-up  -     Ambulatory referral/consult to Cardiology    S/P laparoscopic sleeve gastrectomy    Benign esophageal stricture    Esophageal dysphagia    Preop cardiovascular exam      Continue with amlodipine, HCTZ, losartan, Imdur.  She did not tolerate beta-blockers in the past   She has chest pains free.  Mets more than 4.   Normal echocardiogram.  Her stroke was on the 7th.  I have recommended to the patient that she would at least wait 30 days before any surgery or general anesthesia.  Would need to follow with neurology.  She is awaiting surgery for breast reduction as well revision of her gastric sleeve.  From a cardiac standpoint there is no chest pain, functional status is good.  Her EKG is normal.  She had normal echocardiogram.  Continue with blood pressure control and she is stable cardiac wise for general anesthesia.  Would wait at least 30 days after her recent stroke.  She needs to be evaluated by Neurology.  [facility]  Reviewed all tests and above medical conditions with patient in detail and formulated treatment plan.  Risk factor modification discussed.   Cardiac low salt diet discussed.  Maintaining healthy weight and weight loss goals were discussed in clinic.      Follow up in 6 months.

## 2022-03-31 ENCOUNTER — PATIENT MESSAGE (OUTPATIENT)
Dept: PEDIATRICS | Facility: CLINIC | Age: 52
End: 2022-03-31
Payer: MEDICAID

## 2022-03-31 ENCOUNTER — TELEPHONE (OUTPATIENT)
Dept: CARDIOLOGY | Facility: CLINIC | Age: 52
End: 2022-03-31
Payer: MEDICAID

## 2022-03-31 ENCOUNTER — TELEPHONE (OUTPATIENT)
Dept: PRIMARY CARE CLINIC | Facility: CLINIC | Age: 52
End: 2022-03-31
Payer: MEDICAID

## 2022-03-31 NOTE — TELEPHONE ENCOUNTER
Office note with cardiac clearance was sent to pt email [email or web address]    ----- Message from Nay Sharma sent at 3/31/2022  4:13 PM CDT -----  Pt is requesting a call back in regards to getting a note that states that she is clear for surgery and can go under anesthesia. Pt states that the notes must states that she can have anesthesia. Pt can be reached at [phone] (odka)

## 2022-03-31 NOTE — TELEPHONE ENCOUNTER
----- Message from Judy Chavarria sent at 3/31/2022  3:53 PM CDT -----  Contact: [phone] Patient  Pt states the cardiology doctor cleared her for the breast surgery scheduled on 04/08/22. Pt states the breast surgeon advised the pt has not been cleared for anesthesia. Pt states she had an EKG and chest xray done that was ordered by the cardiologist. Please call and advise.

## 2022-04-01 ENCOUNTER — TELEPHONE (OUTPATIENT)
Dept: PRIMARY CARE CLINIC | Facility: CLINIC | Age: 52
End: 2022-04-01
Payer: MEDICAID

## 2022-04-01 NOTE — TELEPHONE ENCOUNTER
Called patient in regards to message. Patient states that she came in for a pre op clearance on 3/18/22, and that she was cleared by cardiology for her surgery on 4/8/22. Informed patient that she was seen for hospital f/u in regards to her stroke. Patient apologized for the confusion, and agreed that she was seen in concern with her stroke during primary visit. Patient states that she was just informed by her surgeon on 3/31/22 that she needed primary clearance as well. Call was transferred to nurse, where you informed her that cardiologist sent inquiry for anesthesia clearance, and it was routed to Dr. Heart. Nurse states once provider responds, she will receive a call. She voiced understanding.          ----- Message from Vangie Aguillon sent at 4/1/2022  9:09 AM CDT -----  Contact: self/[phone]  Pt called in regards to checking on the status of her pre op paper work. Pt would like a call back.     Please advise

## 2022-04-04 ENCOUNTER — TELEPHONE (OUTPATIENT)
Dept: PRIMARY CARE CLINIC | Facility: CLINIC | Age: 52
End: 2022-04-04
Payer: MEDICAID

## 2022-04-04 NOTE — TELEPHONE ENCOUNTER
Placed call to patient to schedule Preop fpr surgical clearance patient was scheduled for 1:30 CST 4/5/2022

## 2022-04-04 NOTE — TELEPHONE ENCOUNTER
Returned call to patient regarding appointment. Informed patient  Appointment in [place] was canceled and she has an appointment with Dr Heart on tomorrow. Patient was very pleasant and voiced understanding. Stated she was previously upset because she was unaware of the appointment in [place]. Patient voiced understanding of appointment date time and location.

## 2022-04-05 ENCOUNTER — OFFICE VISIT (OUTPATIENT)
Dept: PRIMARY CARE CLINIC | Facility: CLINIC | Age: 52
End: 2022-04-05
Payer: MEDICAID

## 2022-04-05 VITALS
HEIGHT: 68 IN | SYSTOLIC BLOOD PRESSURE: 156 MMHG | WEIGHT: 222 LBS | BODY MASS INDEX: 33.65 KG/M2 | RESPIRATION RATE: 20 BRPM | HEART RATE: 80 BPM | OXYGEN SATURATION: 98 % | TEMPERATURE: 98 F | DIASTOLIC BLOOD PRESSURE: 84 MMHG

## 2022-04-05 DIAGNOSIS — L30.4 ERYTHEMA INTERTRIGO: ICD-10-CM

## 2022-04-05 DIAGNOSIS — F41.0 GENERALIZED ANXIETY DISORDER WITH PANIC ATTACKS: ICD-10-CM

## 2022-04-05 DIAGNOSIS — E87.6 DIURETIC-INDUCED HYPOKALEMIA: ICD-10-CM

## 2022-04-05 DIAGNOSIS — N64.4 MASTODYNIA: ICD-10-CM

## 2022-04-05 DIAGNOSIS — N62 HYPERTROPHY OF BREAST: Primary | ICD-10-CM

## 2022-04-05 DIAGNOSIS — I10 ESSENTIAL HYPERTENSION: ICD-10-CM

## 2022-04-05 DIAGNOSIS — F41.1 GENERALIZED ANXIETY DISORDER WITH PANIC ATTACKS: ICD-10-CM

## 2022-04-05 DIAGNOSIS — Z01.818 PREOP EXAMINATION: ICD-10-CM

## 2022-04-05 DIAGNOSIS — T50.2X5A DIURETIC-INDUCED HYPOKALEMIA: ICD-10-CM

## 2022-04-05 DIAGNOSIS — G89.4 PAIN SYNDROME, CHRONIC: ICD-10-CM

## 2022-04-05 PROCEDURE — 99214 OFFICE O/P EST MOD 30 MIN: CPT | Mod: PBBFAC,PN | Performed by: FAMILY MEDICINE

## 2022-04-05 PROCEDURE — 3077F SYST BP >= 140 MM HG: CPT | Mod: CPTII,,, | Performed by: FAMILY MEDICINE

## 2022-04-05 PROCEDURE — 4010F ACE/ARB THERAPY RXD/TAKEN: CPT | Mod: CPTII,,, | Performed by: FAMILY MEDICINE

## 2022-04-05 PROCEDURE — 3079F PR MOST RECENT DIASTOLIC BLOOD PRESSURE 80-89 MM HG: ICD-10-PCS | Mod: CPTII,,, | Performed by: FAMILY MEDICINE

## 2022-04-05 PROCEDURE — 3008F BODY MASS INDEX DOCD: CPT | Mod: CPTII,,, | Performed by: FAMILY MEDICINE

## 2022-04-05 PROCEDURE — 99214 PR OFFICE/OUTPT VISIT, EST, LEVL IV, 30-39 MIN: ICD-10-PCS | Mod: S$PBB,,, | Performed by: FAMILY MEDICINE

## 2022-04-05 PROCEDURE — 1159F PR MEDICATION LIST DOCUMENTED IN MEDICAL RECORD: ICD-10-PCS | Mod: CPTII,,, | Performed by: FAMILY MEDICINE

## 2022-04-05 PROCEDURE — 3077F PR MOST RECENT SYSTOLIC BLOOD PRESSURE >= 140 MM HG: ICD-10-PCS | Mod: CPTII,,, | Performed by: FAMILY MEDICINE

## 2022-04-05 PROCEDURE — 99214 OFFICE O/P EST MOD 30 MIN: CPT | Mod: S$PBB,,, | Performed by: FAMILY MEDICINE

## 2022-04-05 PROCEDURE — 1159F MED LIST DOCD IN RCRD: CPT | Mod: CPTII,,, | Performed by: FAMILY MEDICINE

## 2022-04-05 PROCEDURE — 4010F PR ACE/ARB THEARPY RXD/TAKEN: ICD-10-PCS | Mod: CPTII,,, | Performed by: FAMILY MEDICINE

## 2022-04-05 PROCEDURE — 99999 PR PBB SHADOW E&M-EST. PATIENT-LVL IV: CPT | Mod: PBBFAC,,, | Performed by: FAMILY MEDICINE

## 2022-04-05 PROCEDURE — 3008F PR BODY MASS INDEX (BMI) DOCUMENTED: ICD-10-PCS | Mod: CPTII,,, | Performed by: FAMILY MEDICINE

## 2022-04-05 PROCEDURE — 99999 PR PBB SHADOW E&M-EST. PATIENT-LVL IV: ICD-10-PCS | Mod: PBBFAC,,, | Performed by: FAMILY MEDICINE

## 2022-04-05 PROCEDURE — 3079F DIAST BP 80-89 MM HG: CPT | Mod: CPTII,,, | Performed by: FAMILY MEDICINE

## 2022-04-05 RX ORDER — POTASSIUM CHLORIDE 750 MG/1
10 CAPSULE, EXTENDED RELEASE ORAL 2 TIMES DAILY
Qty: 60 CAPSULE | Refills: 2 | Status: SHIPPED | OUTPATIENT
Start: 2022-04-05 | End: 2022-05-05

## 2022-04-05 RX ORDER — LORAZEPAM 0.5 MG/1
0.5 TABLET ORAL DAILY PRN
Qty: 20 TABLET | Refills: 0 | Status: SHIPPED | OUTPATIENT
Start: 2022-04-05 | End: 2022-07-12 | Stop reason: ALTCHOICE

## 2022-04-05 NOTE — PROGRESS NOTES
Subjective:       Patient ID: Joe Ceballos is a 52 y.o. female.    Chief Complaint: Mastodynia, hypertrophy of breast and Pre-op Exam and Back Pain          Chief Complaint: Mastodynia, Hypertrophy of breast  And Preoperative evaluation    History of Present Illness:      Joe Ceballos is a 52 y.o. female who presents to the office today for a preoperative consultation at the request of Dr Cantu who plans on performing Bilateral breast reduction on April 8th 2022.      Functional Status:      She had a right sided CVA 03/07/22 but is fully recovered. The patient is able to ambulate without difficulty. The patient's functional status is not affected by the surgical problem. The patient's functional status is not affected by shortness of breath, chest pain, dyspnea on exertion and fatigue.    MET score greater than 4 and understands the risks of surgery.    She stopped plavix 5 days ago.  BP is not quite controlled but her lasix was increased to 40 mg BID yesterday, not enough time to see an increase and she reports compliance with medication.    She is on Ativan as needed for anxiety and asking for a refill today.   Cardiographics:      ECG: normal sinus rhythm, no blocks or conduction defects, no ischemic changes    Imaging:      Chest x-ray: per surgery     Past Medical History:   Diagnosis Date    Anxiety     Edema     Hypertension     Initial insomnia     Sickle cell trait     Stroke     Tobacco use      Family History   Adopted: Yes   Problem Relation Age of Onset    Hypertension Mother     Thyroid disease Paternal Aunt     Hypertension Paternal Grandmother     Breast cancer Neg Hx     Colon cancer Neg Hx     Ovarian cancer Neg Hx      Social History     Socioeconomic History    Marital status: Single   Tobacco Use    Smoking status: Former Smoker     Packs/day: 0.50     Types: Cigarettes    Smokeless tobacco: Never Used   Substance and Sexual Activity    Alcohol use: Yes    Drug  use: No    Sexual activity: Not Currently     Partners: Male     Birth control/protection: None     Outpatient Encounter Medications as of 4/5/2022   Medication Sig Dispense Refill    amLODIPine (NORVASC) 10 MG tablet Take 10 mg by mouth once daily.      aspirin (ECOTRIN) 81 MG EC tablet Take 81 mg by mouth once daily.      atorvastatin (LIPITOR) 80 MG tablet Take 80 mg by mouth every evening.      cholecalciferol, vitamin D3, 50,000 unit capsule TAKE 1 CAPSULE ONCE A WEEK ORALLY 28 DAYS  3    clopidogreL (PLAVIX) 75 mg tablet Take 75 mg by mouth once daily.      cyclobenzaprine (FLEXERIL) 10 MG tablet Take 1 tablet (10 mg total) by mouth 3 (three) times daily as needed for Muscle spasms. 30 tablet 0    DULoxetine (CYMBALTA) 60 MG capsule Take 60 mg by mouth 2 (two) times a day.      furosemide (LASIX) 40 MG tablet Take 1 tablet (40 mg total) by mouth 2 (two) times daily. 60 tablet 11    hydroCHLOROthiazide (HYDRODIURIL) 12.5 MG Tab Take 1 tablet (12.5 mg total) by mouth once daily. 30 tablet 1    isosorbide mononitrate (IMDUR) 60 MG 24 hr tablet Take 60 mg by mouth once daily.      losartan (COZAAR) 100 MG tablet Take 100 mg by mouth once daily.      oxyCODONE-acetaminophen (PERCOCET) 7.5-325 mg per tablet oxycodone-acetaminophen 7.5 mg-325 mg tablet   TAKE 1 TABLET BY MOUTH EVERY 4 HOURS AS NEEDED FOR PAIN      pantoprazole (PROTONIX) 20 MG tablet Take 1 tablet (20 mg total) by mouth once daily. 30 tablet 3    pantoprazole (PROTONIX) 40 MG tablet Take 40 mg by mouth once daily.      sucralfate (CARAFATE) 1 gram tablet TAKE 1 TABLET BY MOUTH 4 TIMES A DAY BEFORE MEALS AND AT BEDTIME 120 tablet 3    LORazepam (ATIVAN) 0.5 MG tablet Take 1 tablet (0.5 mg total) by mouth daily as needed for Anxiety. 20 tablet 0    potassium chloride (MICRO-K) 10 MEQ CpSR Take 1 capsule (10 mEq total) by mouth 2 (two) times daily. 60 capsule 2     No facility-administered encounter medications on file as of 4/5/2022.  "      Review of Systems   Constitutional: Negative for chills and fever.   HENT: Negative for congestion and facial swelling.    Eyes: Negative for discharge and itching.   Respiratory: Negative for cough and wheezing.    Cardiovascular: Negative for chest pain and palpitations.   Gastrointestinal: Negative for abdominal pain, nausea and vomiting.   Endocrine: Negative for cold intolerance and heat intolerance.   Genitourinary: Negative for dysuria and flank pain.   Musculoskeletal: Negative for myalgias and neck stiffness.   Skin: Negative for pallor and wound.   Neurological: Negative for facial asymmetry and weakness.   Psychiatric/Behavioral: Negative for agitation and suicidal ideas.       Objective:      BP (!) 156/84   Pulse 80   Temp 97.5 °F (36.4 °C) (Temporal)   Resp 20   Ht 5' 8" (1.727 m)   Wt 100.7 kg (222 lb)   SpO2 98%   BMI 33.75 kg/m²   Physical Exam  Vitals and nursing note reviewed.   Constitutional:       General: She is not in acute distress.     Appearance: She is well-developed.   HENT:      Head: Normocephalic and atraumatic.      Right Ear: External ear normal.      Left Ear: External ear normal.   Eyes:      Conjunctiva/sclera: Conjunctivae normal.   Neck:      Thyroid: No thyromegaly.   Cardiovascular:      Rate and Rhythm: Normal rate and regular rhythm.   Pulmonary:      Effort: Pulmonary effort is normal. No respiratory distress.   Abdominal:      General: Bowel sounds are normal. There is no distension.      Palpations: Abdomen is soft.      Tenderness: There is no abdominal tenderness.   Genitourinary:     Comments: deferred  Musculoskeletal:         General: No deformity.      Cervical back: Neck supple.   Lymphadenopathy:      Head:      Right side of head: No submandibular adenopathy.      Left side of head: No submandibular adenopathy.      Cervical: No cervical adenopathy.   Skin:     General: Skin is warm and dry.   Neurological:      Mental Status: She is alert and " oriented to person, place, and time.   Psychiatric:         Behavior: Behavior normal.         Results for orders placed or performed during the hospital encounter of 08/27/21   CBC auto differential   Result Value Ref Range    WBC 10.72 3.90 - 12.70 K/uL    RBC 4.23 4.00 - 5.40 M/uL    Hemoglobin 9.4 (L) 12.0 - 16.0 g/dL    Hematocrit 31.7 (L) 37.0 - 48.5 %    MCV 75 (L) 82 - 98 fL    MCH 22.2 (L) 27.0 - 31.0 pg    MCHC 29.7 (L) 32.0 - 36.0 g/dL    RDW 19.5 (H) 11.5 - 14.5 %    Platelets 510 (H) 150 - 450 K/uL    MPV 9.5 9.2 - 12.9 fL    Immature Granulocytes 0.5 0.0 - 0.5 %    Gran # (ANC) 7.9 (H) 1.8 - 7.7 K/uL    Immature Grans (Abs) 0.05 (H) 0.00 - 0.04 K/uL    Lymph # 1.9 1.0 - 4.8 K/uL    Mono # 0.8 0.3 - 1.0 K/uL    Eos # 0.0 0.0 - 0.5 K/uL    Baso # 0.06 0.00 - 0.20 K/uL    nRBC 0 0 /100 WBC    Gran % 73.7 (H) 38.0 - 73.0 %    Lymph % 17.7 (L) 18.0 - 48.0 %    Mono % 7.3 4.0 - 15.0 %    Eosinophil % 0.2 0.0 - 8.0 %    Basophil % 0.6 0.0 - 1.9 %    Platelet Estimate Increased (A)     Aniso Slight     Poik Slight     Poly Occasional     Hypo Occasional     Ovalocytes Occasional     Target Cells Occasional     Tear Drop Cells Occasional     Stomatocytes Present     Differential Method Automated      Assessment:       1. Hypertrophy of breast    2. Erythema intertrigo    3. Mastodynia    4. Pain syndrome, chronic    5. Preop examination    6. Essential hypertension    7. Diuretic-induced hypokalemia    8. Generalized anxiety disorder with panic attacks        Plan:   Hypertrophy of breast    Erythema intertrigo    Mastodynia    Pain syndrome, chronic    Preop examination    Essential hypertension  -     Hypertension [employer] ([facility]) Enrollment Order  -     Hypertension [employer] ([facility]): Assign Onboarding Questionnaires    Diuretic-induced hypokalemia  -     potassium chloride (MICRO-K) 10 MEQ CpSR; Take 1 capsule (10 mEq total) by mouth 2 (two) times daily.  Dispense: 60 capsule; Refill:  2    Generalized anxiety disorder with panic attacks  -     LORazepam (ATIVAN) 0.5 MG tablet; Take 1 tablet (0.5 mg total) by mouth daily as needed for Anxiety.  Dispense: 20 tablet; Refill: 0      Intermediate risk surgery on an intermediate risk pt based on co-morbidities.  Meds have been reviewed.    Lab per surgery and she has been cleared by cards.  No other invasive testing needed.  She is CLEARED from primary standpoint.    Will initiate oral K due to increased diuretic.  Daphney Heart MD

## 2022-04-06 ENCOUNTER — TELEPHONE (OUTPATIENT)
Dept: PRIMARY CARE CLINIC | Facility: CLINIC | Age: 52
End: 2022-04-06
Payer: MEDICAID

## 2022-04-06 NOTE — TELEPHONE ENCOUNTER
Called patient and informed her paper work was faxed to both numbers she provided she voiced understanding forms faxed to [phone] and [phone].    ----- Message from Debbie Rivers sent at 4/6/2022 10:19 AM CDT -----  Contact: [phone] @ Dr Cantu  Good Morning  Please fax pre op clearance to [phone] .    Please call and advise surgery is Friday

## 2022-04-06 NOTE — TELEPHONE ENCOUNTER
Spoke with Queta diagnosis verified per Dr Sly MD    ----- Message from Charles Silverio sent at 4/6/2022  8:44 AM CDT -----  Contact: [phone]  Queta with [employer] would like to consult with a nurse in regards to diagnosis clarification. Please call back at [phone]. Thanks r/s

## 2022-04-06 NOTE — TELEPHONE ENCOUNTER
Returned call informed paper work faxed.      ----- Message from Iman Mera sent at 4/6/2022  9:48 AM CDT -----  Contact: [facility]/Marylou/[phone] option 1  Marylou said that she is calling in regards to needing to get pt's clearance for surgery faxed to her at;[phone], she stated that she needs the clearance by 1pm today or pt's surgery will be canceled. Please advise

## 2022-04-10 ENCOUNTER — HISTORICAL (OUTPATIENT)
Dept: ADMINISTRATIVE | Facility: HOSPITAL | Age: 52
End: 2022-04-10
Payer: MEDICAID

## 2022-04-11 ENCOUNTER — TELEPHONE (OUTPATIENT)
Dept: NEUROLOGY | Facility: CLINIC | Age: 52
End: 2022-04-11
Payer: MEDICAID

## 2022-04-11 NOTE — TELEPHONE ENCOUNTER
----- Message from Nay Sharma sent at 4/11/2022  8:53 AM CDT -----  Pt is requesting a call back in regards to getting an appt. Pt has an referral in the system. Pt can be reached at [phone] (nqng)

## 2022-04-11 NOTE — TELEPHONE ENCOUNTER
I spoke with patient in regards to appointment I advise her that we currently did not have any spots for medicaid patients at the moment, she verbalized understanding.

## 2022-04-12 ENCOUNTER — TELEPHONE (OUTPATIENT)
Dept: PRIMARY CARE CLINIC | Facility: CLINIC | Age: 52
End: 2022-04-12
Payer: MEDICAID

## 2022-04-12 NOTE — TELEPHONE ENCOUNTER
----- Message from Judy Deon sent at 4/12/2022  3:41 PM CDT -----  Contact: [phone] Patient  Patient would like to get a referral.  Referral to what specialty:  breast surgeon  Does the patient want the referral with a specific physician:  no  Is the [facility]:  [facility]  Reason (be specific):  breast reduction  Does the patient already have the specialty clinic appointment scheduled: no  If yes, what date is the appointment scheduled:     Is the insurance listed in Epic correct? (this is important for a referral):  yes, 5minutes  Advised patient that once provider approves this either a nurse or  will return their call?:   Would the patient like a call back, or a response through their [facility] portal?:   call back  Comments:  Pt states her surgery time at [facility] was not given and her surgery was missed. Pt states they will not be r/s her surgery at [facility]. Ms Lizzie( ) from [place] also stated she was not able to get the surgery time from [facility].

## 2022-04-13 ENCOUNTER — TELEPHONE (OUTPATIENT)
Dept: PRIMARY CARE CLINIC | Facility: CLINIC | Age: 52
End: 2022-04-13
Payer: MEDICAID

## 2022-04-13 ENCOUNTER — PATIENT MESSAGE (OUTPATIENT)
Dept: PEDIATRICS | Facility: CLINIC | Age: 52
End: 2022-04-13
Payer: MEDICAID

## 2022-04-13 NOTE — TELEPHONE ENCOUNTER
Called and spoke with patient, she stated she did not have surgery on Friday as there was a mix up on her surgery time. Patient is requesting a new referral be placed with new physician. Advised patient message will be sent to provider. She voiced understanding.    ----- Message from Iman Mera sent at 4/13/2022 10:02 AM CDT -----  Contact: Antengo/Lei/call pt @[phone]  Patient would like to get a referral.  Referral to what specialty:  Breast Surgeon   Does the patient want the referral with a specific physician:  Dr Jaimes phone number()  Is the [facility]:  [facility]  Reason (be specific):  Breast reduction  Surgery   Does the patient already have the specialty clinic appointment scheduled:  No   If yes, what date is the appointment scheduled:     Is the insurance listed in Epic correct? (this is important for a referral):  yes  Advised patient that once provider approves this either a nurse or  will return their call?: yes  Would the patient like a call back, or a response through their [facility] portal?:   call back   Comments:

## 2022-04-13 NOTE — TELEPHONE ENCOUNTER
Spoke with Jeimy verbalized understanding office visit of Dr. KASSANDRA Heart notes faxed to [phone] ;

## 2022-04-13 NOTE — TELEPHONE ENCOUNTER
----- Message from Daphney Heart MD sent at 4/13/2022 11:30 AM CDT -----  Contact: Gogo/Jeimy/[phone]  She is not cleared for surgery. She can go to the appointment to get information only. She needs time to recover from other procedures and stroke.  ----- Message -----  From: Luz Elena Terrazas MA  Sent: 4/13/2022  11:22 AM CDT  To: Daphney Heart MD      ----- Message -----  From: Iman Mera  Sent: 4/13/2022  10:46 AM CDT  To: Nolberto DONALDSON Staff    Jeimy said that she is calling in regards to needing to get clearance for hernia surgery she stated that pt has an appointment 4/21 and they need to have all of her documentation prior to her Ov, she is asking if the clearance can be faxed to her at:[phone]. Please advise

## 2022-04-13 NOTE — TELEPHONE ENCOUNTER
Placed call to patient with recommendations from Provider to come in for blood pressure assessment call was unsuccessful will send information via [employer]

## 2022-04-14 ENCOUNTER — TELEPHONE (OUTPATIENT)
Dept: PRIMARY CARE CLINIC | Facility: CLINIC | Age: 52
End: 2022-04-14
Payer: MEDICAID

## 2022-04-14 ENCOUNTER — TELEPHONE (OUTPATIENT)
Dept: PEDIATRICS | Facility: CLINIC | Age: 52
End: 2022-04-14
Payer: MEDICAID

## 2022-04-14 NOTE — TELEPHONE ENCOUNTER
Spoke with pt verbalized understanding that Home Health nurse will com in for a visit an check BP, chart in and Dr. KASSANDRA Heart will be notified of reading; pt verbalized understanding no need to come in for nurse visit at the office

## 2022-04-14 NOTE — TELEPHONE ENCOUNTER
Called patient to inform per provider that she has to be scheduled for a nurse visit to check her BP before she can be issued her requested referral. Patient states she doesn't need to come in for nurse visit because she has home health nurse that report her BP reading through [employer]. Patient states that she need her paperwork processed so she can have her surgery. She asked if the readings from her home health nurses will be considered instead of coming in for nurse visit. Informed patient that her BP has to be within normal range, and that her message will be sent to provider to be advised. She will be updated, she voiced understanding.        ----- Message from Betsy Saavedra sent at 4/14/2022 10:11 AM CDT -----  Contact: self [phone]  Patient is returning a phone call.  Who left a message for the patient: Arianna Malik LPN  Does patient know what this is regarding:  missed call 4/13/2022  Would you like a call back, or a response through your [facility] portal?:   phone  Comments:

## 2022-04-18 ENCOUNTER — TELEPHONE (OUTPATIENT)
Dept: SMOKING CESSATION | Facility: CLINIC | Age: 52
End: 2022-04-18
Payer: MEDICAID

## 2022-04-18 NOTE — TELEPHONE ENCOUNTER
Call to the patient regarding missed appointment on 4/13/2022 at 1:00 pm for Tobacco Cessation to reschedule her intake appointment. Patient asks if she can call us back to reschedule

## 2022-04-19 ENCOUNTER — TELEPHONE (OUTPATIENT)
Dept: PRIMARY CARE CLINIC | Facility: CLINIC | Age: 52
End: 2022-04-19
Payer: MEDICAID

## 2022-04-19 VITALS — DIASTOLIC BLOOD PRESSURE: 60 MMHG | SYSTOLIC BLOOD PRESSURE: 134 MMHG

## 2022-04-19 DIAGNOSIS — N64.4 MASTODYNIA: Primary | ICD-10-CM

## 2022-04-19 NOTE — TELEPHONE ENCOUNTER
Returned call to patient. Informed referral in system. Patient gave remote BP reading from home nurse visit this morning.    ----- Message from Daphney Heart MD sent at 4/19/2022 11:09 AM CDT -----  Contact: [phone]  -We need to control her blood pressure before she could have the surgery. Please book a blood pressure check  with Nurse to document this patient's blood pressure because the last one that was listed on the chart was high. When she comes, then we can send her the referral. Thank you.         ----- Message -----  From: Paigebonnie Gonzalez  Sent: 4/19/2022   9:54 AM CDT  To: Daphney Heart MD    Type: Referral to a Specialist    Where would you like a referral to? Breast specialist    Have you previously requested?    Is the physician [facility] the [facility]? [facility]    Name and phone number of specialist: Dr Jaimes     Reason for appointment: left breast is painful and swollen    Is an appointment scheduled with specialist? When?    Comments: she also needs to speak with someone regarding her blood pressure readings

## 2022-04-20 ENCOUNTER — TELEPHONE (OUTPATIENT)
Dept: PRIMARY CARE CLINIC | Facility: CLINIC | Age: 52
End: 2022-04-20
Payer: MEDICAID

## 2022-04-20 NOTE — TELEPHONE ENCOUNTER
"  Called patient informed referral faxed.  ----- Message from Annita Marcus sent at 4/20/2022 12:19 PM CDT -----  Contact: Pt [phone]  Patient is requesting that the "Referral" for Breast Surgery to the following fax # [phone].    Please call and advise.    Thank You        "

## 2022-04-25 VITALS
WEIGHT: 175.69 LBS | DIASTOLIC BLOOD PRESSURE: 80 MMHG | SYSTOLIC BLOOD PRESSURE: 144 MMHG | HEIGHT: 68 IN | BODY MASS INDEX: 26.63 KG/M2 | OXYGEN SATURATION: 97 %

## 2022-04-26 ENCOUNTER — TELEPHONE (OUTPATIENT)
Dept: PRIMARY CARE CLINIC | Facility: CLINIC | Age: 52
End: 2022-04-26
Payer: MEDICAID

## 2022-04-26 NOTE — TELEPHONE ENCOUNTER
Placed call to patient per patient her Left breast is swollen and draining pus. Patient requested for Mammo and U/S of L. Breast per patient she can go to [facility] and  images needed for appointment. Assessed blood pressure during Home Health Visit- unable to obtain during call will be faxed over. Patient reported that she presented to Urgent Care related to Oral Thrush. Surgery for Hernia Repair 05/16/2022. Advised patient to ED for any concerns patient verbalized understanding

## 2022-05-02 ENCOUNTER — TELEPHONE (OUTPATIENT)
Dept: NEUROLOGY | Facility: CLINIC | Age: 52
End: 2022-05-02
Payer: MEDICAID

## 2022-05-02 NOTE — TELEPHONE ENCOUNTER
Attempt to contact pt to schedule appt with our vascular neurologist on 5/3/22. vm full unable to leave a message

## 2022-05-02 NOTE — TELEPHONE ENCOUNTER
----- Message from Scarlett Yin sent at 5/2/2022 11:40 AM CDT -----  Regarding: Scheduling  Good morning,       an order was placed for the patient to be seen can you assist with scheduling please? Thanks!       Recent cerebrovascular accident [Z86.73]  Hospital discharge follow-up [Z09]

## 2022-05-05 ENCOUNTER — TELEPHONE (OUTPATIENT)
Dept: PRIMARY CARE CLINIC | Facility: CLINIC | Age: 52
End: 2022-05-05
Payer: MEDICAID

## 2022-05-05 NOTE — TELEPHONE ENCOUNTER
Placed call to Shari, with [facility] regarding patient complains of increased edema and swelling to facial area and bilateral LE's  Pt wt on today's assessment was 222 vs 205 lbs order was placed for replacement scale. Consulted with Dr. Arvizu patient was encouraged to report to ED for further assessment Home Health notified patient.

## 2022-05-05 NOTE — TELEPHONE ENCOUNTER
Placed call to patient regarding reporting to Ed for swelling and bilateral edema patient is not answering phone

## 2022-05-09 ENCOUNTER — TELEPHONE (OUTPATIENT)
Dept: SMOKING CESSATION | Facility: CLINIC | Age: 52
End: 2022-05-09
Payer: MEDICAID

## 2022-05-09 NOTE — TELEPHONE ENCOUNTER
Spoke with patient to reschedule intake appointment for Tobacco Cessation. She states she is about to have surgery in [facility] & will call us back

## 2022-05-23 ENCOUNTER — TELEPHONE (OUTPATIENT)
Dept: PRIMARY CARE CLINIC | Facility: CLINIC | Age: 52
End: 2022-05-23
Payer: MEDICAID

## 2022-05-23 NOTE — TELEPHONE ENCOUNTER
Returned call to patient advised to go to ER she voiced understanding.    ----- Message from Annita Marcus sent at 5/23/2022 12:31 PM CDT -----  Contact: Pt [phone]  1MEDICALADVICE     Patient is calling for Medical Advice regarding: patient fell last night (1 week post op/bariatrics) hit her stomach, chest face & arms (bruises, right nipple had blood.)     How long has patient had these symptoms: 1 days    Pharmacy name and phone#:   [id number] - [place] - [address]  [address] [facility]  [place] [address]  Phone: [phone] Fax: [phone]    Would like response via [employer]:  call back    Comments:breast bone is swollen/painfully, bruises, scared she may have busted something from the recent procedure.      Please call and advise.    Thank You

## 2022-05-25 ENCOUNTER — HOSPITAL ENCOUNTER (EMERGENCY)
Facility: HOSPITAL | Age: 52
Discharge: HOME | End: 2022-05-26
Attending: FAMILY MEDICINE
Payer: MEDICAID

## 2022-05-25 DIAGNOSIS — S20.213A: Primary | ICD-10-CM

## 2022-05-25 DIAGNOSIS — R07.9 CHEST PAIN: ICD-10-CM

## 2022-05-25 LAB
ALBUMIN SERPL BCP-MCNC: 3.3 G/DL (ref 3.5–5.2)
ALP SERPL-CCNC: 191 U/L (ref 55–135)
ALT SERPL W/O P-5'-P-CCNC: 22 U/L (ref 10–44)
ANION GAP SERPL CALC-SCNC: 12 MMOL/L (ref 8–16)
ANISOCYTOSIS BLD QL SMEAR: SLIGHT
AST SERPL-CCNC: 20 U/L (ref 10–40)
BASOPHILS # BLD AUTO: 0.06 K/UL (ref 0–0.2)
BASOPHILS NFR BLD: 0.5 % (ref 0–1.9)
BILIRUB SERPL-MCNC: 0.4 MG/DL (ref 0.1–1)
BILIRUB UR QL STRIP: NEGATIVE
BUN SERPL-MCNC: 8 MG/DL (ref 6–20)
CALCIUM SERPL-MCNC: 9.2 MG/DL (ref 8.7–10.5)
CHLORIDE SERPL-SCNC: 104 MMOL/L (ref 95–110)
CLARITY UR: CLEAR
CO2 SERPL-SCNC: 21 MMOL/L (ref 23–29)
COLOR UR: YELLOW
CREAT SERPL-MCNC: 0.9 MG/DL (ref 0.5–1.4)
DACRYOCYTES BLD QL SMEAR: ABNORMAL
DIFFERENTIAL METHOD: ABNORMAL
EOSINOPHIL # BLD AUTO: 0.1 K/UL (ref 0–0.5)
EOSINOPHIL NFR BLD: 0.6 % (ref 0–8)
ERYTHROCYTE [DISTWIDTH] IN BLOOD BY AUTOMATED COUNT: 23.5 % (ref 11.5–14.5)
EST. GFR  (AFRICAN AMERICAN): >60 ML/MIN/1.73 M^2
EST. GFR  (NON AFRICAN AMERICAN): >60 ML/MIN/1.73 M^2
GLUCOSE SERPL-MCNC: 90 MG/DL (ref 70–110)
GLUCOSE UR QL STRIP: NEGATIVE
HCT VFR BLD AUTO: 32 % (ref 37–48.5)
HGB BLD-MCNC: 9.3 G/DL (ref 12–16)
HGB UR QL STRIP: ABNORMAL
IMM GRANULOCYTES # BLD AUTO: 0.06 K/UL (ref 0–0.04)
IMM GRANULOCYTES NFR BLD AUTO: 0.5 % (ref 0–0.5)
KETONES UR QL STRIP: NEGATIVE
LEUKOCYTE ESTERASE UR QL STRIP: NEGATIVE
LIPASE SERPL-CCNC: 18 U/L (ref 4–60)
LYMPHOCYTES # BLD AUTO: 1.4 K/UL (ref 1–4.8)
LYMPHOCYTES NFR BLD: 11.4 % (ref 18–48)
MCH RBC QN AUTO: 20.8 PG (ref 27–31)
MCHC RBC AUTO-ENTMCNC: 29.1 G/DL (ref 32–36)
MCV RBC AUTO: 72 FL (ref 82–98)
MONOCYTES # BLD AUTO: 1 K/UL (ref 0.3–1)
MONOCYTES NFR BLD: 8 % (ref 4–15)
NEUTROPHILS # BLD AUTO: 9.8 K/UL (ref 1.8–7.7)
NEUTROPHILS NFR BLD: 79 % (ref 38–73)
NITRITE UR QL STRIP: NEGATIVE
NRBC BLD-RTO: 0 /100 WBC
OVALOCYTES BLD QL SMEAR: ABNORMAL
PH UR STRIP: 7 [PH] (ref 5–8)
PLATELET # BLD AUTO: 431 K/UL (ref 150–450)
PLATELET BLD QL SMEAR: ABNORMAL
PMV BLD AUTO: 8.5 FL (ref 9.2–12.9)
POIKILOCYTOSIS BLD QL SMEAR: SLIGHT
POTASSIUM SERPL-SCNC: 4.4 MMOL/L (ref 3.5–5.1)
PROT SERPL-MCNC: 8.6 G/DL (ref 6–8.4)
PROT UR QL STRIP: NEGATIVE
RBC # BLD AUTO: 4.47 M/UL (ref 4–5.4)
SODIUM SERPL-SCNC: 137 MMOL/L (ref 136–145)
SP GR UR STRIP: <=1.005 (ref 1–1.03)
TARGETS BLD QL SMEAR: ABNORMAL
TROPONIN I SERPL DL<=0.01 NG/ML-MCNC: 0.01 NG/ML (ref 0–0.03)
URN SPEC COLLECT METH UR: ABNORMAL
UROBILINOGEN UR STRIP-ACNC: NEGATIVE EU/DL
WBC # BLD AUTO: 12.41 K/UL (ref 3.9–12.7)

## 2022-05-25 PROCEDURE — 84484 ASSAY OF TROPONIN QUANT: CPT | Performed by: NURSE PRACTITIONER

## 2022-05-25 PROCEDURE — 81003 URINALYSIS AUTO W/O SCOPE: CPT | Performed by: NURSE PRACTITIONER

## 2022-05-25 PROCEDURE — 96375 TX/PRO/DX INJ NEW DRUG ADDON: CPT

## 2022-05-25 PROCEDURE — 85025 COMPLETE CBC W/AUTO DIFF WBC: CPT | Performed by: NURSE PRACTITIONER

## 2022-05-25 PROCEDURE — 96374 THER/PROPH/DIAG INJ IV PUSH: CPT

## 2022-05-25 PROCEDURE — 99285 EMERGENCY DEPT VISIT HI MDM: CPT | Mod: 25

## 2022-05-25 PROCEDURE — 80053 COMPREHEN METABOLIC PANEL: CPT | Performed by: NURSE PRACTITIONER

## 2022-05-25 PROCEDURE — 83690 ASSAY OF LIPASE: CPT | Performed by: NURSE PRACTITIONER

## 2022-05-25 PROCEDURE — 93010 EKG 12-LEAD: ICD-10-PCS | Mod: ,,, | Performed by: INTERNAL MEDICINE

## 2022-05-25 PROCEDURE — 93005 ELECTROCARDIOGRAM TRACING: CPT

## 2022-05-25 PROCEDURE — 63600175 PHARM REV CODE 636 W HCPCS: Performed by: FAMILY MEDICINE

## 2022-05-25 PROCEDURE — 93010 ELECTROCARDIOGRAM REPORT: CPT | Mod: ,,, | Performed by: INTERNAL MEDICINE

## 2022-05-25 RX ORDER — FUROSEMIDE 10 MG/ML
40 INJECTION INTRAMUSCULAR; INTRAVENOUS
Status: COMPLETED | OUTPATIENT
Start: 2022-05-25 | End: 2022-05-25

## 2022-05-25 RX ORDER — KETOROLAC TROMETHAMINE 30 MG/ML
30 INJECTION, SOLUTION INTRAMUSCULAR; INTRAVENOUS
Status: COMPLETED | OUTPATIENT
Start: 2022-05-25 | End: 2022-05-25

## 2022-05-25 RX ADMIN — FUROSEMIDE 40 MG: 10 INJECTION, SOLUTION INTRAMUSCULAR; INTRAVENOUS at 11:05

## 2022-05-25 RX ADMIN — KETOROLAC TROMETHAMINE 30 MG: 30 INJECTION, SOLUTION INTRAMUSCULAR at 11:05

## 2022-05-26 ENCOUNTER — PATIENT MESSAGE (OUTPATIENT)
Dept: PRIMARY CARE CLINIC | Facility: CLINIC | Age: 52
End: 2022-05-26
Payer: MEDICAID

## 2022-05-26 ENCOUNTER — TELEPHONE (OUTPATIENT)
Dept: PRIMARY CARE CLINIC | Facility: CLINIC | Age: 52
End: 2022-05-26
Payer: MEDICAID

## 2022-05-26 VITALS
SYSTOLIC BLOOD PRESSURE: 132 MMHG | OXYGEN SATURATION: 99 % | DIASTOLIC BLOOD PRESSURE: 74 MMHG | BODY MASS INDEX: 33.18 KG/M2 | TEMPERATURE: 98 F | HEART RATE: 71 BPM | HEIGHT: 68 IN | RESPIRATION RATE: 19 BRPM | WEIGHT: 218.94 LBS

## 2022-05-26 RX ORDER — TRAMADOL HYDROCHLORIDE 50 MG/1
50 TABLET ORAL EVERY 6 HOURS PRN
Qty: 12 TABLET | Refills: 0 | Status: SHIPPED | OUTPATIENT
Start: 2022-05-26 | End: 2022-07-12 | Stop reason: ALTCHOICE

## 2022-05-26 NOTE — FIRST PROVIDER EVALUATION
"Medical screening exam completed.  I have conducted a focused provider triage encounter, findings are as follows:    Brief history of present illness:  Guzmán injury post fall. Also reports chest pain    Vitals:    05/25/22 2013 05/25/22 2016   BP:  (!) 159/73   BP Location:  Right arm   Patient Position:  Sitting   Pulse:  75   Resp:  (!) 22   Temp:  97.9 °F (36.6 °C)   TempSrc:  Oral   SpO2:  97%   Weight: 99.3 kg (218 lb 14.7 oz)    Height: 5' 8" (1.727 m)        Pertinent physical exam:  busing across chest and abdomen    Brief workup plan:  Labs, ekg, imaging     Preliminary workup initiated; this workup will be continued and followed by the physician or advanced practice provider that is assigned to the patient when roomed.  "

## 2022-05-26 NOTE — ED PROVIDER NOTES
"SCRIBE #1 NOTE: I, Collin Fontaine, am scribing for, and in the presence of, Ciara Roblero MD. I have scribed the entire note.       History     Chief Complaint   Patient presents with    Fall     Pt states she had paraesophageal hernia surgery 22 and had a fall this am. Now is experiencing CP and SOB. Pt states she was taken off lasix and is swollen now. +blood thinners.      Review of patient's allergies indicates:   Allergen Reactions    Lisinopril Anaphylaxis     This is only possible agent at the time.  This is only possible agent at the time.  This is only possible agent at the time.    Sulfa (sulfonamide antibiotics) Anaphylaxis    Beta-blockers (beta-adrenergic blocking agts)      Other reaction(s): Other (See Comments)  Syncope & Collapse         History of Present Illness     HPI    2022, 10:58 PM  History obtained from the patient      History of Present Illness: Joe Ceballos is a 52 y.o. female patient with a PMHx of HTN, anxiety, and stroke who presents to the Emergency Department for evaluation of fall. Pt states she had paraesophageal hernia surgery . Pt fell in the AM and is now c/o of CP and SOB. Pt is on blood thinners. Pt notes that she was taken off lasix and now feels "swollen". Symptoms are constant and moderate in severity. No mitigating or exacerbating factors reported. Patient denies any n/v, syncope, HA, abd pain, myalgias, and all other sxs at this time. No prior tx reported. No further complaints or concerns at this time.       Arrival mode: Personal vehicle      PCP: Daphney Heart MD        Past Medical History:  Past Medical History:   Diagnosis Date    Anxiety     Edema     Hypertension     Initial insomnia     Sickle cell trait     Stroke     Tobacco use        Past Surgical History:  Past Surgical History:   Procedure Laterality Date    BREAST SURGERY       SECTION      x 2    CYST REMOVAL      from left tube    gastric sleeve      " Raheel-en-Y gastric bypass  02/022017    TONSILLECTOMY           Family History:  Family History   Adopted: Yes   Problem Relation Age of Onset    Hypertension Mother     Thyroid disease Paternal Aunt     Hypertension Paternal Grandmother     Breast cancer Neg Hx     Colon cancer Neg Hx     Ovarian cancer Neg Hx        Social History:  Social History     Tobacco Use    Smoking status: Former Smoker     Packs/day: 0.50     Types: Cigarettes    Smokeless tobacco: Never Used   Substance and Sexual Activity    Alcohol use: Yes    Drug use: No    Sexual activity: Not Currently     Partners: Male     Birth control/protection: None        Review of Systems     Review of Systems   Constitutional: Negative for chills and fever.   HENT: Negative for sore throat.    Eyes: Negative for photophobia and visual disturbance.   Respiratory: Positive for shortness of breath.    Cardiovascular: Positive for chest pain and leg swelling.   Gastrointestinal: Negative for abdominal pain, nausea and vomiting.   Genitourinary: Negative for dysuria.   Musculoskeletal: Negative for back pain.   Skin: Negative for rash.   Neurological: Negative for weakness and headaches.   Hematological: Does not bruise/bleed easily.   All other systems reviewed and are negative.     Physical Exam     Initial Vitals [05/25/22 2016]   BP Pulse Resp Temp SpO2   (!) 159/73 75 (!) 22 97.9 °F (36.6 °C) 97 %      MAP       --          Physical Exam  Nursing Notes and Vital Signs Reviewed.  Constitutional: Patient is in no acute distress. Well-developed and well-nourished.  Head: Atraumatic. Normocephalic.  Eyes: PERRL. EOM intact. Conjunctivae are not pale. No scleral icterus.  ENT: Mucous membranes are moist. Oropharynx is clear and symmetric.    Neck: Supple. Full ROM. No lymphadenopathy.  Cardiovascular: Regular rate. Regular rhythm. No murmurs, rubs, or gallops. Distal pulses are 2+ and symmetric.  Pulmonary/Chest: No respiratory distress. Clear to  "auscultation bilaterally. No wheezing or rales.  Abdominal: Soft and non-distended.  There is no tenderness.  No rebound, guarding, or rigidity. Good bowel sounds.  Genitourinary: No CVA tenderness  Musculoskeletal: Moves all extremities. No obvious deformities. No edema. No calf tenderness.  Skin: Warm and dry. Contusions left anterior chest wall and right breast. Contusions are tender to palpation. Abd laparoscopic scar is clean, dry, and intact  Neurological:  Alert, awake, and appropriate.  Normal speech.  No acute focal neurological deficits are appreciated.  Psychiatric: Normal affect. Good eye contact. Appropriate in content.     ED Course   Procedures  ED Vital Signs:  Vitals:    05/25/22 2013 05/25/22 2016 05/25/22 2223 05/25/22 2354   BP:  (!) 159/73 (!) 183/85 (!) 171/84   Pulse:  75 75 71   Resp:  (!) 22  19   Temp:  97.9 °F (36.6 °C)     TempSrc:  Oral     SpO2:  97% 99% 99%   Weight: 99.3 kg (218 lb 14.7 oz)      Height: 5' 8" (1.727 m)       05/26/22 0005   BP: 132/74   Pulse:    Resp:    Temp:    TempSrc:    SpO2:    Weight:    Height:        Abnormal Lab Results:  Labs Reviewed   CBC W/ AUTO DIFFERENTIAL - Abnormal; Notable for the following components:       Result Value    Hemoglobin 9.3 (*)     Hematocrit 32.0 (*)     MCV 72 (*)     MCH 20.8 (*)     MCHC 29.1 (*)     RDW 23.5 (*)     MPV 8.5 (*)     Gran # (ANC) 9.8 (*)     Immature Grans (Abs) 0.06 (*)     Gran % 79.0 (*)     Lymph % 11.4 (*)     All other components within normal limits   COMPREHENSIVE METABOLIC PANEL - Abnormal; Notable for the following components:    CO2 21 (*)     Total Protein 8.6 (*)     Albumin 3.3 (*)     Alkaline Phosphatase 191 (*)     All other components within normal limits   URINALYSIS, REFLEX TO URINE CULTURE - Abnormal; Notable for the following components:    Specific Gravity, UA <=1.005 (*)     Occult Blood UA Trace (*)     All other components within normal limits    Narrative:     Specimen Source->Urine "   LIPASE   TROPONIN I        All Lab Results:  Results for orders placed or performed during the hospital encounter of 05/25/22   CBC auto differential   Result Value Ref Range    WBC 12.41 3.90 - 12.70 K/uL    RBC 4.47 4.00 - 5.40 M/uL    Hemoglobin 9.3 (L) 12.0 - 16.0 g/dL    Hematocrit 32.0 (L) 37.0 - 48.5 %    MCV 72 (L) 82 - 98 fL    MCH 20.8 (L) 27.0 - 31.0 pg    MCHC 29.1 (L) 32.0 - 36.0 g/dL    RDW 23.5 (H) 11.5 - 14.5 %    Platelets 431 150 - 450 K/uL    MPV 8.5 (L) 9.2 - 12.9 fL    Immature Granulocytes 0.5 0.0 - 0.5 %    Gran # (ANC) 9.8 (H) 1.8 - 7.7 K/uL    Immature Grans (Abs) 0.06 (H) 0.00 - 0.04 K/uL    Lymph # 1.4 1.0 - 4.8 K/uL    Mono # 1.0 0.3 - 1.0 K/uL    Eos # 0.1 0.0 - 0.5 K/uL    Baso # 0.06 0.00 - 0.20 K/uL    nRBC 0 0 /100 WBC    Gran % 79.0 (H) 38.0 - 73.0 %    Lymph % 11.4 (L) 18.0 - 48.0 %    Mono % 8.0 4.0 - 15.0 %    Eosinophil % 0.6 0.0 - 8.0 %    Basophil % 0.5 0.0 - 1.9 %    Platelet Estimate Appears normal     Aniso Slight     Poik Slight     Ovalocytes Occasional     Target Cells Moderate     Tear Drop Cells Occasional     Differential Method Automated    Comprehensive metabolic panel   Result Value Ref Range    Sodium 137 136 - 145 mmol/L    Potassium 4.4 3.5 - 5.1 mmol/L    Chloride 104 95 - 110 mmol/L    CO2 21 (L) 23 - 29 mmol/L    Glucose 90 70 - 110 mg/dL    BUN 8 6 - 20 mg/dL    Creatinine 0.9 0.5 - 1.4 mg/dL    Calcium 9.2 8.7 - 10.5 mg/dL    Total Protein 8.6 (H) 6.0 - 8.4 g/dL    Albumin 3.3 (L) 3.5 - 5.2 g/dL    Total Bilirubin 0.4 0.1 - 1.0 mg/dL    Alkaline Phosphatase 191 (H) 55 - 135 U/L    AST 20 10 - 40 U/L    ALT 22 10 - 44 U/L    Anion Gap 12 8 - 16 mmol/L    eGFR if African American >60 >60 mL/min/1.73 m^2    eGFR if non African American >60 >60 mL/min/1.73 m^2   Lipase   Result Value Ref Range    Lipase 18 4 - 60 U/L   Urinalysis, Reflex to Urine Culture Urine, Clean Catch    Specimen: Urine   Result Value Ref Range    Specimen UA Urine, Clean Catch      Color, UA Yellow Yellow, Straw, Sherrie    Appearance, UA Clear Clear    pH, UA 7.0 5.0 - 8.0    Specific Gravity, UA <=1.005 (A) 1.005 - 1.030    Protein, UA Negative Negative    Glucose, UA Negative Negative    Ketones, UA Negative Negative    Bilirubin (UA) Negative Negative    Occult Blood UA Trace (A) Negative    Nitrite, UA Negative Negative    Urobilinogen, UA Negative <2.0 EU/dL    Leukocytes, UA Negative Negative   Troponin I   Result Value Ref Range    Troponin I 0.014 0.000 - 0.026 ng/mL         Imaging Results:  Imaging Results          CT Chest Abdomen Pelvis Without Contrast (XPD) (Final result)  Result time 05/25/22 21:36:25    Final result by Jeff Leon MD (05/25/22 21:36:25)                 Impression:      New left-sided pleural effusion.  Mild left basilar atelectasis/consolidation.  This could be posttraumatic in nature.    Enlarging cystic mass in the left davis mesentery abutting the left ureter and psoas of uncertain origin.  Cystic malignancy not excluded.  Urinoma not excluded.  Recommend clinical correlation and follow-up.    Mild cardiomegaly    Status post gastric bypass    Bilateral adrenal gland enlargement    All CT scans at this facility are performed  using dose modulation techniques as appropriate to performed exam including the following:  automated exposure control; adjustment of mA and/or kV according to the patients size (this includes techniques or standardized protocols for targeted exams where dose is matched to indication/reason for exam: i.e. extremities or head);  iterative reconstruction technique.      Electronically signed by: Edward Aguilar  Date:    05/25/2022  Time:    21:36             Narrative:    EXAMINATION:  CT CHEST ABDOMEN PELVIS WITHOUT CONTRAST(XPD)    CLINICAL HISTORY:  Polytrauma, blunt;    TECHNIQUE:  CT chest abdomen pelvis    COMPARISON:  Prior    FINDINGS:  Adrenal gland enlargement with calcification again noted.  Status post gastric bypass.  Mild  left-sided pleural effusion.  Coronary artery calcification.  Small left basilar atelectasis/consolidation.  Low-density lesion overlying the left psoas at the level of the sacroiliac joints appears enlarged since the prior exam measuring 5.1 x 5.0 x 5.4  cm.  Mild left hydroureter.  The ureter appears to abut this cystic mass.  Right hip arthroplasty.  Mild cardiomegaly.  No definite acute osseous injury.                             Radiology Procedure Done: Chest CT without Contrast.          The EKG was ordered, reviewed, and independently interpreted by the ED provider.  Interpretation time: 20:16  Rate: 66 BPM  Rhythm: normal sinus rhythm  Interpretation: No acute ST changes. No STEMI.         The Emergency Provider reviewed the vital signs and test results, which are outlined above.     ED Discussion     12:17 AM: Reassessed pt at this time. Discussed with pt all pertinent ED information and results. Discussed pt dx and plan of tx. Gave pt all f/u and return to the ED instructions. All questions and concerns were addressed at this time. Pt expresses understanding of information and instructions, and is comfortable with plan to discharge. Pt is stable for discharge.    I discussed with patient and/or family/caretaker that evaluation in the ED does not suggest any emergent or life threatening medical conditions requiring immediate intervention beyond what was provided in the ED, and I believe patient is safe for discharge.  Regardless, an unremarkable evaluation in the ED does not preclude the development or presence of a serious of life threatening condition. As such, patient was instructed to return immediately for any worsening or change in current symptoms.       Medical Decision Making:   Clinical Tests:   Lab Tests: Ordered and Reviewed  Radiological Study: Reviewed and Ordered  Medical Tests: Ordered and Reviewed           ED Medication(s):  Medications   ketorolac injection 30 mg (30 mg Intravenous Given  5/25/22 2303)   furosemide injection 40 mg (40 mg Intravenous Given 5/25/22 2303)       Discharge Medication List as of 5/26/2022 12:17 AM           Follow-up Information     Daphney Heart MD.    Specialty: Family Medicine  Contact information:  [address]  [address]  [place] [address]  [phone]                             Scribe Attestation:   Scribe #1: I performed the above scribed service and the documentation accurately describes the services I performed. I attest to the accuracy of the note.     Attending:   Physician Attestation Statement for Scribe #1: I, Ciara Roblero MD, personally performed the services described in this documentation, as scribed by Collin Fontaine, in my presence, and it is both accurate and complete.           Clinical Impression       ICD-10-CM ICD-9-CM   1. Contusion of both sides of front wall of thorax, initial encounter  S20.213A 922.1   2. Chest pain  R07.9 786.50       Disposition:   Disposition: Discharged  Condition: Stable         Ciara Roblero MD  05/26/22 1929

## 2022-05-27 ENCOUNTER — TELEPHONE (OUTPATIENT)
Dept: PRIMARY CARE CLINIC | Facility: CLINIC | Age: 52
End: 2022-05-27
Payer: MEDICAID

## 2022-05-27 DIAGNOSIS — N64.4 BREAST PAIN, RIGHT: Primary | ICD-10-CM

## 2022-05-27 DIAGNOSIS — S29.9XXA: ICD-10-CM

## 2022-05-27 DIAGNOSIS — S20.01XA CONTUSION OF RIGHT BREAST, INITIAL ENCOUNTER: ICD-10-CM

## 2022-05-27 NOTE — TELEPHONE ENCOUNTER
Placed call to [facility] regarding patient. Patient had previous referral that was rejected due to unmet criteria spoke to Nurse and was informed of what was required-current mammo, ultrasound, submit new referral then fax information to [phone] to expedite for appointment. Call was placed to patient there was no answer. order for Ultrasound and new referral has been submitted.

## 2022-05-31 ENCOUNTER — TELEPHONE (OUTPATIENT)
Dept: SMOKING CESSATION | Facility: CLINIC | Age: 52
End: 2022-05-31
Payer: MEDICAID

## 2022-05-31 ENCOUNTER — HOSPITAL ENCOUNTER (OUTPATIENT)
Dept: RADIOLOGY | Facility: HOSPITAL | Age: 52
Discharge: HOME | End: 2022-05-31
Attending: FAMILY MEDICINE
Payer: MEDICAID

## 2022-05-31 ENCOUNTER — HOSPITAL ENCOUNTER (OUTPATIENT)
Dept: RADIOLOGY | Facility: HOSPITAL | Age: 52
Discharge: HOME | End: 2022-05-31
Attending: NURSE PRACTITIONER
Payer: MEDICAID

## 2022-05-31 DIAGNOSIS — N64.9 BREAST DISORDER: Primary | ICD-10-CM

## 2022-05-31 DIAGNOSIS — N64.4 BREAST PAIN, RIGHT: ICD-10-CM

## 2022-05-31 DIAGNOSIS — S20.01XA CONTUSION OF RIGHT BREAST, INITIAL ENCOUNTER: ICD-10-CM

## 2022-05-31 DIAGNOSIS — S29.9XXD: ICD-10-CM

## 2022-05-31 DIAGNOSIS — N64.9 BREAST DISORDER: ICD-10-CM

## 2022-05-31 DIAGNOSIS — N64.4 BREAST PAIN: Primary | ICD-10-CM

## 2022-05-31 DIAGNOSIS — S20.01XD CONTUSION OF RIGHT BREAST, SUBSEQUENT ENCOUNTER: ICD-10-CM

## 2022-05-31 DIAGNOSIS — S29.9XXA: ICD-10-CM

## 2022-05-31 PROCEDURE — 76642 ULTRASOUND BREAST LIMITED: CPT | Mod: 26,50,, | Performed by: RADIOLOGY

## 2022-05-31 PROCEDURE — 76642 US BREAST BILATERAL LIMITED: ICD-10-PCS | Mod: 26,50,, | Performed by: RADIOLOGY

## 2022-05-31 PROCEDURE — 77062 MAMMO DIGITAL DIAGNOSTIC BILAT WITH TOMO: ICD-10-PCS | Mod: 26,,, | Performed by: RADIOLOGY

## 2022-05-31 PROCEDURE — 76642 ULTRASOUND BREAST LIMITED: CPT | Mod: TC,RT

## 2022-05-31 PROCEDURE — 77066 DX MAMMO INCL CAD BI: CPT | Mod: 26,,, | Performed by: RADIOLOGY

## 2022-05-31 PROCEDURE — 77066 MAMMO DIGITAL DIAGNOSTIC BILAT WITH TOMO: ICD-10-PCS | Mod: 26,,, | Performed by: RADIOLOGY

## 2022-05-31 PROCEDURE — 76642 ULTRASOUND BREAST LIMITED: CPT | Mod: TC,50

## 2022-05-31 PROCEDURE — 77062 BREAST TOMOSYNTHESIS BI: CPT | Mod: 26,,, | Performed by: RADIOLOGY

## 2022-05-31 PROCEDURE — 77062 BREAST TOMOSYNTHESIS BI: CPT | Mod: TC

## 2022-06-01 ENCOUNTER — TELEPHONE (OUTPATIENT)
Dept: PRIMARY CARE CLINIC | Facility: CLINIC | Age: 52
End: 2022-06-01
Payer: MEDICAID

## 2022-06-01 NOTE — PROGRESS NOTES
Your mammogram shows area of trauma and previous biopsy to the right breast.  Repeat mammo in 6 mons

## 2022-06-01 NOTE — TELEPHONE ENCOUNTER
----- Message from Daphney Heart MD sent at 6/1/2022  8:23 AM CDT -----  Your mammogram shows area of trauma and previous biopsy to the right breast.  Repeat mammo in 6 mons

## 2022-06-02 DIAGNOSIS — T14.8XXA CONTUSION: Primary | ICD-10-CM

## 2022-06-03 ENCOUNTER — OFFICE VISIT (OUTPATIENT)
Dept: PRIMARY CARE CLINIC | Facility: CLINIC | Age: 52
End: 2022-06-03
Payer: MEDICAID

## 2022-06-03 VITALS
WEIGHT: 213.38 LBS | DIASTOLIC BLOOD PRESSURE: 104 MMHG | HEIGHT: 67 IN | RESPIRATION RATE: 20 BRPM | OXYGEN SATURATION: 98 % | SYSTOLIC BLOOD PRESSURE: 170 MMHG | BODY MASS INDEX: 33.49 KG/M2 | HEART RATE: 74 BPM
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
## 2022-06-03 DIAGNOSIS — R60.0 BILATERAL EDEMA OF LOWER EXTREMITY: ICD-10-CM

## 2022-06-03 DIAGNOSIS — M62.838 MUSCLE SPASM: ICD-10-CM

## 2022-06-03 DIAGNOSIS — T07.XXXA MULTIPLE CONTUSIONS: ICD-10-CM

## 2022-06-03 PROCEDURE — 1160F PR REVIEW ALL MEDS BY PRESCRIBER/CLIN PHARMACIST DOCUMENTED: ICD-10-PCS | Mod: CPTII,,, | Performed by: NURSE PRACTITIONER

## 2022-06-03 PROCEDURE — 1160F RVW MEDS BY RX/DR IN RCRD: CPT | Mod: CPTII,,, | Performed by: NURSE PRACTITIONER

## 2022-06-03 PROCEDURE — 99999 PR PBB SHADOW E&M-EST. PATIENT-LVL V: ICD-10-PCS | Mod: PBBFAC,,, | Performed by: NURSE PRACTITIONER

## 2022-06-03 PROCEDURE — 3077F SYST BP >= 140 MM HG: CPT | Mod: CPTII,,, | Performed by: NURSE PRACTITIONER

## 2022-06-03 PROCEDURE — 3077F PR MOST RECENT SYSTOLIC BLOOD PRESSURE >= 140 MM HG: ICD-10-PCS | Mod: CPTII,,, | Performed by: NURSE PRACTITIONER

## 2022-06-03 PROCEDURE — 99215 OFFICE O/P EST HI 40 MIN: CPT | Mod: PBBFAC,PN | Performed by: NURSE PRACTITIONER

## 2022-06-03 PROCEDURE — 3008F PR BODY MASS INDEX (BMI) DOCUMENTED: ICD-10-PCS | Mod: CPTII,,, | Performed by: NURSE PRACTITIONER

## 2022-06-03 PROCEDURE — 3080F DIAST BP >= 90 MM HG: CPT | Mod: CPTII,,, | Performed by: NURSE PRACTITIONER

## 2022-06-03 PROCEDURE — 99214 OFFICE O/P EST MOD 30 MIN: CPT | Mod: S$PBB,,, | Performed by: NURSE PRACTITIONER

## 2022-06-03 PROCEDURE — 1159F MED LIST DOCD IN RCRD: CPT | Mod: CPTII,,, | Performed by: NURSE PRACTITIONER

## 2022-06-03 PROCEDURE — 1159F PR MEDICATION LIST DOCUMENTED IN MEDICAL RECORD: ICD-10-PCS | Mod: CPTII,,, | Performed by: NURSE PRACTITIONER

## 2022-06-03 PROCEDURE — 99999 PR PBB SHADOW E&M-EST. PATIENT-LVL V: CPT | Mod: PBBFAC,,, | Performed by: NURSE PRACTITIONER

## 2022-06-03 PROCEDURE — 3008F BODY MASS INDEX DOCD: CPT | Mod: CPTII,,, | Performed by: NURSE PRACTITIONER

## 2022-06-03 PROCEDURE — 3080F PR MOST RECENT DIASTOLIC BLOOD PRESSURE >= 90 MM HG: ICD-10-PCS | Mod: CPTII,,, | Performed by: NURSE PRACTITIONER

## 2022-06-03 PROCEDURE — 99214 PR OFFICE/OUTPT VISIT, EST, LEVL IV, 30-39 MIN: ICD-10-PCS | Mod: S$PBB,,, | Performed by: NURSE PRACTITIONER

## 2022-06-03 PROCEDURE — 4010F PR ACE/ARB THEARPY RXD/TAKEN: ICD-10-PCS | Mod: CPTII,,, | Performed by: NURSE PRACTITIONER

## 2022-06-03 PROCEDURE — 4010F ACE/ARB THERAPY RXD/TAKEN: CPT | Mod: CPTII,,, | Performed by: NURSE PRACTITIONER

## 2022-06-03 RX ORDER — CYCLOBENZAPRINE HCL 10 MG
10 TABLET ORAL 3 TIMES DAILY PRN
Qty: 30 TABLET | Refills: 0 | Status: SHIPPED | OUTPATIENT
Start: 2022-06-03 | End: 2022-07-12 | Stop reason: ALTCHOICE

## 2022-06-03 NOTE — PROGRESS NOTES
"Subjective:      Patient ID: Joe Ceballos is a 52 y.o. female.    Chief Complaint: Follow-up (From fallen x 1week )    BP (!) 170/104 (BP Location: Right arm, Patient Position: Sitting, BP Method: Large (Automatic))   Pulse 74   Resp 20   Ht 5' 7" (1.702 m)   Wt 96.8 kg (213 lb 6.4 oz)   SpO2 98%   BMI 33.42 kg/m²     Pt presents for follow up after fall and bruising of chest, breasts, right arm, abdomen and bilateral lower extremity. C/o continued pain and muscle tightness and soreness. Also c/o ankle swelling she believes is caused by her amlodipine. Pt currently takes 10 mg amlodipine as well as 100 mg losartan and 60 mg isosorbide mononitrate. Pt was on lasix prior to her surgery but was instructed to stop taking prior to surgery and says she was never told when to resume. Denies difficulty breathing. Pt states she is still waiting on the breast specialist to contact her regarding an appointment and    Follow-up  Pertinent negatives include no chest pain, chills, congestion, coughing, fever, headaches, nausea, sore throat or vomiting.       Review of patient's allergies indicates:   Allergen Reactions    Lisinopril Anaphylaxis     This is only possible agent at the time.  This is only possible agent at the time.  This is only possible agent at the time.    Sulfa (sulfonamide antibiotics) Anaphylaxis    Beta-blockers (beta-adrenergic blocking agts)      Other reaction(s): Other (See Comments)  Syncope & Collapse        Review of Systems   Constitutional: Negative for chills and fever.   HENT: Negative for congestion and sore throat.    Respiratory: Negative for cough and shortness of breath.    Cardiovascular: Negative for chest pain and palpitations.   Gastrointestinal: Negative for nausea and vomiting.   Genitourinary: Negative.    Skin: Negative.    Neurological: Negative for headaches.      Objective:      Physical Exam  Vitals reviewed.   Constitutional:       Appearance: She is " well-developed.   HENT:      Head: Normocephalic and atraumatic.      Right Ear: External ear normal.      Left Ear: External ear normal.      Nose: No mucosal edema or rhinorrhea.      Mouth/Throat:      Mouth: Mucous membranes are moist.      Pharynx: Uvula midline.   Eyes:      General: Lids are normal.      Conjunctiva/sclera: Conjunctivae normal.      Pupils: Pupils are equal, round, and reactive to light.   Cardiovascular:      Rate and Rhythm: Normal rate and regular rhythm.      Heart sounds: Normal heart sounds. No murmur heard.     Comments: Edema noted to bilateral lower extremeties for calf to feet. Skin taut and shiny  Pulmonary:      Effort: Pulmonary effort is normal.      Breath sounds: Normal breath sounds. No decreased breath sounds or wheezing.   Chest:      Chest wall: Tenderness present.       Abdominal:       Musculoskeletal:         General: Normal range of motion.      Cervical back: Normal range of motion and neck supple.      Right lower leg: Edema present.      Left lower leg: Edema present.   Lymphadenopathy:      Cervical: No cervical adenopathy.   Skin:     General: Skin is warm and dry.      Capillary Refill: Capillary refill takes less than 2 seconds.      Coloration: Skin is not cyanotic or pale.   Neurological:      Mental Status: She is alert and oriented to person, place, and time.      Cranial Nerves: No cranial nerve deficit.   Psychiatric:         Attention and Perception: Attention normal.         Mood and Affect: Mood normal.         Speech: Speech normal.         Behavior: Behavior normal.         Thought Content: Thought content normal.         Cognition and Memory: Cognition normal.         Assessment:       1. Fall, initial encounter    2. Multiple contusions    3. Bilateral edema of lower extremity    4. Muscle spasm        Plan:     Fall, initial encounter    Multiple contusions    Bilateral edema of lower extremity    Muscle spasm  -     cyclobenzaprine (FLEXERIL) 10  MG tablet; Take 1 tablet (10 mg total) by mouth 3 (three) times daily as needed for Muscle spasms.  Dispense: 30 tablet; Refill: 0    US Breast Bilateral Limited    Result Date: 5/31/2022  Result: US Breast Bilateral Limited  History: Patient is 52 y.o. and is seen for diagnostic imaging. She presents for evaluation of bilateral breast pain status post recent fall with bruising noted to the chest bilaterally. She reports history of prior benign bilateral breast excisional biopsies.     Films Compared: None available  Findings: This procedure was performed using tomosynthesis. Computer-aided detection was utilized in the interpretation of this examination.   The breasts have scattered areas of fibroglandular density.   Mammogram:   Multiple small bilateral benign-appearing oval circumscribed masses are scattered within both breasts the largest of these masses is seen within the right breast measuring 9 mm.   There is an area of architectural distortion within the subareolar right breast containing a few locules of fat density, coarse calcifications, and adjacent surgical clip favored to represent sequela of prior excisional biopsy.  Triangular cutaneous marker was placed on the inner right breast at middle depth to indicate patient's site of focal pain.  There are multiple superficial focal asymmetries measuring up to 8 mm at the site of this pain which correspond to areas of fat necrosis/hematoma on sonogram exam.   There is also an area of architectural distortion within the lower inner left breast at middle depth also favored to represent sequela of prior excisional biopsy.  Just posterior to this area of distortion there are multiple coarse calcifications, surgical clip, and numerous oil cysts also consistent with sequela of prior surgery.   Ultrasound:   Targeted sonogram of the right breast was performed at the 3 o'clock position at patient's site of focal pain.  There are multiple superficial areas of  heterogeneous fat necrosis/hematoma corresponding to the focal asymmetries within the inner right breast on the mammogram.  The largest of these areas measures 1.2 cm.   Targeted sonogram of the subareolar right breast was performed.  At this site there is a 1.9 x 1.2 cm irregular hypoechoic mass corresponding to the site of architectural distortion on the mammogram, favored to represent scarring from prior surgery.   Targeted sonogram of the left breast was performed at the 8 o'clock position.  At this site there is a vague heterogeneous area of distorted tissue corresponding to the area of architectural distortion seen on the mammogram, favored to represent scarring from prior surgery.   Targeted sonogram of the left breast was performed at the 10 o'clock position at the patient's area of pain.  At this site there are multiple small patchy hyperechoic areas of fat necrosis within the superficial subcutaneous tissue measuring up to 9 mm in size.        1.  Multiple small superficial areas of fat necrosis/hematoma are seen within both breasts corresponding to patient's sites of pain consistent with her history of recent trauma.   2.  Areas of architectural distortion within the subareolar right breast and 8:00 left breast are favored to represent sequela of patient's prior excisional biopsies.  However, note that patient's prior mammogram examinations are not available for comparison at time of this exam.  Recommend short-term follow-up diagnostic mammogram in 6 months to ensure stability of these findings given lack of prior comparison examination.  Attempt will be made to obtain the patient's prior mammograms and if received an addendum will be added to this report to document the comparison.     BI-RADS Category: Overall: 3 - Probably Benign  Recommendation: Short interval follow-up is recommended in 6 Months.     Your estimated lifetime risk of breast cancer (to age 85) based on Tyrer-Cuzick risk assessment  model is Danya: 8.81 %. According to the American [employer] Society, patients with a lifetime breast cancer risk of 20% or higher might benefit from supplemental screening tests.     CT Chest Abdomen Pelvis Without Contrast (XPD)    Result Date: 5/25/2022  EXAMINATION: CT CHEST ABDOMEN PELVIS WITHOUT CONTRAST(XPD) CLINICAL HISTORY: Polytrauma, blunt; TECHNIQUE: CT chest abdomen pelvis COMPARISON: Prior FINDINGS: Adrenal gland enlargement with calcification again noted.  Status post gastric bypass.  Mild left-sided pleural effusion.  Coronary artery calcification.  Small left basilar atelectasis/consolidation.  Low-density lesion overlying the left psoas at the level of the sacroiliac joints appears enlarged since the prior exam measuring 5.1 x 5.0 x 5.4  cm.  Mild left hydroureter.  The ureter appears to abut this cystic mass.  Right hip arthroplasty.  Mild cardiomegaly.  No definite acute osseous injury.     New left-sided pleural effusion.  Mild left basilar atelectasis/consolidation.  This could be posttraumatic in nature. Enlarging cystic mass in the left davis mesentery abutting the left ureter and psoas of uncertain origin.  Cystic malignancy not excluded.  Urinoma not excluded.  Recommend clinical correlation and follow-up. Mild cardiomegaly Status post gastric bypass Bilateral adrenal gland enlargement All CT scans at this facility are performed  using dose modulation techniques as appropriate to performed exam including the following:  automated exposure control; adjustment of mA and/or kV according to the patients size (this includes techniques or standardized protocols for targeted exams where dose is matched to indication/reason for exam: i.e. extremities or head);  iterative reconstruction technique. Electronically signed by: Edward Aguilar Date:    05/25/2022 Time:    21:36    Mammo Digital Diagnostic Bilat with Asaf    Addendum Date: 6/1/2022    Comparison is made with prior outside institution  mammograms from 10/2021. The areas of architectural distortion within both breasts are noted to be stable consistent with sequela of prior bilateral breast excisional biopsies (BIRADS 2).    Result Date: 5/31/2022  Result: Mammo Digital Diagnostic Bilat with Asaf  History: Patient is 52 y.o. and is seen for diagnostic imaging. She presents for evaluation of bilateral breast pain status post recent fall with bruising noted to the chest bilaterally. She reports history of prior benign bilateral breast excisional biopsies. Films Compared: None available  Findings: This procedure was performed using tomosynthesis. Computer-aided detection was utilized in the interpretation of this examination. The breasts have scattered areas of fibroglandular density. Mammogram: Multiple small bilateral benign-appearing oval circumscribed masses are scattered within both breasts the largest of these masses is seen within the right breast measuring 9 mm. There is an area of architectural distortion within the subareolar right breast containing a few locules of fat density, coarse calcifications, and adjacent surgical clip favored to represent sequela of prior excisional biopsy.  Triangular cutaneous marker was placed on the inner right breast at middle depth to indicate patient's site of focal pain.  There are multiple superficial focal asymmetries measuring up to 8 mm at the site of this pain which correspond to areas of fat necrosis/hematoma on sonogram exam. There is also an area of architectural distortion within the lower inner left breast at middle depth also favored to represent sequela of prior excisional biopsy.  Just posterior to this area of distortion there are multiple coarse calcifications, surgical clip, and numerous oil cysts also consistent with sequela of prior surgery. Ultrasound: Targeted sonogram of the right breast was performed at the 3 o'clock position at patient's site of focal pain.  There are multiple  superficial areas of heterogeneous fat necrosis/hematoma corresponding to the focal asymmetries within the inner right breast on the mammogram.  The largest of these areas measures 1.2 cm. Targeted sonogram of the subareolar right breast was performed.  At this site there is a 1.9 x 1.2 cm irregular hypoechoic mass corresponding to the site of architectural distortion on the mammogram, favored to represent scarring from prior surgery. Targeted sonogram of the left breast was performed at the 8 o'clock position.  At this site there is a vague heterogeneous area of distorted tissue corresponding to the area of architectural distortion seen on the mammogram, favored to represent scarring from prior surgery. Targeted sonogram of the left breast was performed at the 10 o'clock position at the patient's area of pain.  At this site there are multiple small patchy hyperechoic areas of fat necrosis within the superficial subcutaneous tissue measuring up to 9 mm in size.     1.  Multiple small superficial areas of fat necrosis/hematoma are seen within both breasts corresponding to patient's sites of pain consistent with her history of recent trauma. 2.  Areas of architectural distortion within the subareolar right breast and 8:00 left breast are favored to represent sequela of patient's prior excisional biopsies.  However, note that patient's prior mammogram examinations are not available for comparison at time of this exam.  Recommend short-term follow-up diagnostic mammogram in 6 months to ensure stability of these findings given lack of prior comparison examination.  Attempt will be made to obtain the patient's prior mammograms and if received an addendum will be added to this report to document the comparison. BI-RADS Category: Overall: 3 - Probably Benign  Recommendation: Short interval follow-up is recommended in 6 Months. Your estimated lifetime risk of breast cancer (to age 85) based on Tyrer-Cuzick risk assessment  model is Danya: 8.81 %. According to the [employer] Society, patients with a lifetime breast cancer risk of 20% or higher might benefit from supplemental screening tests.      Mammogram and breast US results reviewed with patient.  Follow up mammogram in 6 months.  Follow up with breast specialist.  Resume your lasix as prescribed.  Call your surgeon regarding a post operative follow up appointment.

## 2022-06-03 NOTE — PATIENT INSTRUCTIONS
Follow up mammogram in 6 months.  Follow up with breast specialist.  Resume your lasix as prescribed.  Elevate your lower extremities as much as possible.  Continue to apply ice to your bruised/sore areas for 15 minutes at a time.  Call your surgeon regarding a post operative follow up appointment.

## 2022-06-07 ENCOUNTER — OFFICE VISIT (OUTPATIENT)
Dept: CARDIOLOGY | Facility: CLINIC | Age: 52
End: 2022-06-07
Payer: MEDICAID

## 2022-06-07 ENCOUNTER — LAB VISIT (OUTPATIENT)
Dept: LAB | Facility: HOSPITAL | Age: 52
End: 2022-06-07
Attending: INTERNAL MEDICINE
Payer: MEDICAID

## 2022-06-07 VITALS
HEART RATE: 96 BPM | WEIGHT: 202.81 LBS | BODY MASS INDEX: 31.83 KG/M2 | HEIGHT: 67 IN | SYSTOLIC BLOOD PRESSURE: 106 MMHG | OXYGEN SATURATION: 97 % | DIASTOLIC BLOOD PRESSURE: 70 MMHG

## 2022-06-07 DIAGNOSIS — I50.30 HEART FAILURE WITH PRESERVED EJECTION FRACTION, UNSPECIFIED HF CHRONICITY: ICD-10-CM

## 2022-06-07 DIAGNOSIS — M79.89 LEG SWELLING: Primary | ICD-10-CM

## 2022-06-07 DIAGNOSIS — R07.89 OTHER CHEST PAIN: ICD-10-CM

## 2022-06-07 DIAGNOSIS — Z86.73 RECENT CEREBROVASCULAR ACCIDENT: ICD-10-CM

## 2022-06-07 DIAGNOSIS — E66.3 OVERWEIGHT (BMI 25.0-29.9): ICD-10-CM

## 2022-06-07 DIAGNOSIS — E78.5 HYPERLIPIDEMIA, UNSPECIFIED HYPERLIPIDEMIA TYPE: ICD-10-CM

## 2022-06-07 DIAGNOSIS — M87.051 AVASCULAR NECROSIS OF BONE OF HIP, RIGHT: ICD-10-CM

## 2022-06-07 DIAGNOSIS — I10 ESSENTIAL HYPERTENSION: ICD-10-CM

## 2022-06-07 DIAGNOSIS — Z98.84 S/P LAPAROSCOPIC SLEEVE GASTRECTOMY: ICD-10-CM

## 2022-06-07 PROCEDURE — 3078F PR MOST RECENT DIASTOLIC BLOOD PRESSURE < 80 MM HG: ICD-10-PCS | Mod: CPTII,,, | Performed by: INTERNAL MEDICINE

## 2022-06-07 PROCEDURE — 83880 ASSAY OF NATRIURETIC PEPTIDE: CPT | Performed by: INTERNAL MEDICINE

## 2022-06-07 PROCEDURE — 4010F ACE/ARB THERAPY RXD/TAKEN: CPT | Mod: CPTII,,, | Performed by: INTERNAL MEDICINE

## 2022-06-07 PROCEDURE — 36415 COLL VENOUS BLD VENIPUNCTURE: CPT | Performed by: INTERNAL MEDICINE

## 2022-06-07 PROCEDURE — 3074F SYST BP LT 130 MM HG: CPT | Mod: CPTII,,, | Performed by: INTERNAL MEDICINE

## 2022-06-07 PROCEDURE — 99999 PR PBB SHADOW E&M-EST. PATIENT-LVL III: CPT | Mod: PBBFAC,,, | Performed by: INTERNAL MEDICINE

## 2022-06-07 PROCEDURE — 99999 PR PBB SHADOW E&M-EST. PATIENT-LVL III: ICD-10-PCS | Mod: PBBFAC,,, | Performed by: INTERNAL MEDICINE

## 2022-06-07 PROCEDURE — 3074F PR MOST RECENT SYSTOLIC BLOOD PRESSURE < 130 MM HG: ICD-10-PCS | Mod: CPTII,,, | Performed by: INTERNAL MEDICINE

## 2022-06-07 PROCEDURE — 4010F PR ACE/ARB THEARPY RXD/TAKEN: ICD-10-PCS | Mod: CPTII,,, | Performed by: INTERNAL MEDICINE

## 2022-06-07 PROCEDURE — 3078F DIAST BP <80 MM HG: CPT | Mod: CPTII,,, | Performed by: INTERNAL MEDICINE

## 2022-06-07 PROCEDURE — 99213 OFFICE O/P EST LOW 20 MIN: CPT | Mod: PBBFAC | Performed by: INTERNAL MEDICINE

## 2022-06-07 PROCEDURE — 99214 PR OFFICE/OUTPT VISIT, EST, LEVL IV, 30-39 MIN: ICD-10-PCS | Mod: S$PBB,,, | Performed by: INTERNAL MEDICINE

## 2022-06-07 PROCEDURE — 3008F BODY MASS INDEX DOCD: CPT | Mod: CPTII,,, | Performed by: INTERNAL MEDICINE

## 2022-06-07 PROCEDURE — 99214 OFFICE O/P EST MOD 30 MIN: CPT | Mod: S$PBB,,, | Performed by: INTERNAL MEDICINE

## 2022-06-07 PROCEDURE — 3008F PR BODY MASS INDEX (BMI) DOCUMENTED: ICD-10-PCS | Mod: CPTII,,, | Performed by: INTERNAL MEDICINE

## 2022-06-07 RX ORDER — FUROSEMIDE 40 MG/1
TABLET ORAL
Qty: 90 TABLET | Refills: 11 | Status: SHIPPED | OUTPATIENT
Start: 2022-06-07 | End: 2022-07-12 | Stop reason: SDUPTHER

## 2022-06-07 NOTE — PROGRESS NOTES
Subjective:   Patient ID:  Joe Ceballos is a 52 y.o. female who presents for evaluation of No chief complaint on file.      HPI   6.7.2022  Had her bariatric surgery done   Lost about 21 kilograms since last visit   She presented to the ed las week with leg swelling   Trop were normal , no bnp done   Given lasix iv with improvement , also takes amlodipine 10 mg   She also has MORRELL, leg swelling, NYHA 1-2   She also has angina CCS 1-2   She also reports recent fall with chest pain allover , however states has also exertional retrosternal moderate chest pain       3.24.2022  52-year-old female, with history of sickle cell trait, ex-smoker, hypertension and recent CVA.  Patient was getting preop workup for a revision of her gastric sleeve when she started to have arm weakness, she states her blood pressure was 200 over above 100.   This happened at [facility].  She had an MRI that showed acute ischemia in the right corona radiata, she was treated with aspirin Plavix, her blood pressure medications have been changed.  Patient is being visited by home health and her blood pressure is under better control.  She states that her weakness has improved significantly.  She denies any history of chest pain exertional or nonexertional.  She had a normal echocardiogram during her admission, she had TCDs as well as a negative bubble study.  Carotids no significant plaque.  Denies any palpitations.  No bleeding while on aspirin Plavix.    IMPRESSION:   Impression:   1. Evolving acute ischemia in the right corona radiata.   2. Diffuse volume loss and findings suggestive of chronic microvascular ischemia.     Electronically Signed By: Radames Preston MD 3/7/2022 3:43 PM CST     Past Medical History:   Diagnosis Date    Anxiety     Edema     Hypertension     Initial insomnia     Sickle cell trait     Stroke     Tobacco use        Past Surgical History:   Procedure Laterality Date    BREAST SURGERY        SECTION      x 2    CYST REMOVAL      from left tube    gastric sleeve      Raheel-en-Y gastric bypass      TONSILLECTOMY         Social History     Tobacco Use    Smoking status: Former Smoker     Packs/day: 0.50     Types: Cigarettes    Smokeless tobacco: Never Used   Substance Use Topics    Alcohol use: Yes    Drug use: No       Family History   Adopted: Yes   Problem Relation Age of Onset    Hypertension Mother     Thyroid disease Paternal Aunt     Hypertension Paternal Grandmother     Breast cancer Neg Hx     Colon cancer Neg Hx     Ovarian cancer Neg Hx        Review of Systems   Constitutional: Negative for fever and malaise/fatigue.   HENT: Negative for sore throat.    Eyes: Negative for blurred vision.   Cardiovascular: Negative for chest pain, claudication, cyanosis, dyspnea on exertion, irregular heartbeat, leg swelling, near-syncope, orthopnea, palpitations, paroxysmal nocturnal dyspnea and syncope.   Respiratory: Negative for cough and hemoptysis.    Hematologic/Lymphatic: Negative for bleeding problem.   Skin: Negative for rash.   Musculoskeletal: Negative for falls.   Gastrointestinal: Negative for abdominal pain.   Genitourinary: Negative.    Neurological: Positive for focal weakness.   Psychiatric/Behavioral: Negative for altered mental status and substance abuse.       Current Outpatient Medications on File Prior to Visit   Medication Sig    aspirin (ECOTRIN) 81 MG EC tablet Take 81 mg by mouth once daily.    atorvastatin (LIPITOR) 80 MG tablet Take 80 mg by mouth every evening.    cholecalciferol, vitamin D3, 50,000 unit capsule TAKE 1 CAPSULE ONCE A WEEK ORALLY 28 DAYS    clopidogreL (PLAVIX) 75 mg tablet Take 75 mg by mouth once daily.    cyclobenzaprine (FLEXERIL) 10 MG tablet Take 1 tablet (10 mg total) by mouth 3 (three) times daily as needed for Muscle spasms.    cyclobenzaprine (FLEXERIL) 10 MG tablet Take 1 tablet (10 mg total) by mouth 3 (three) times  daily as needed for Muscle spasms.    DULoxetine (CYMBALTA) 60 MG capsule Take 60 mg by mouth 2 (two) times a day.    hydroCHLOROthiazide (HYDRODIURIL) 12.5 MG Tab Take 1 tablet (12.5 mg total) by mouth once daily.    isosorbide mononitrate (IMDUR) 60 MG 24 hr tablet Take 60 mg by mouth once daily.    losartan (COZAAR) 100 MG tablet Take 100 mg by mouth once daily.    pantoprazole (PROTONIX) 20 MG tablet Take 1 tablet (20 mg total) by mouth once daily.    pantoprazole (PROTONIX) 40 MG tablet Take 40 mg by mouth once daily.    sucralfate (CARAFATE) 1 gram tablet TAKE 1 TABLET BY MOUTH 4 TIMES A DAY BEFORE MEALS AND AT BEDTIME    traMADoL (ULTRAM) 50 mg tablet Take 1 tablet (50 mg total) by mouth every 6 (six) hours as needed.    [DISCONTINUED] amLODIPine (NORVASC) 10 MG tablet Take 10 mg by mouth once daily.    [DISCONTINUED] furosemide (LASIX) 40 MG tablet Take 1 tablet (40 mg total) by mouth 2 (two) times daily.    LORazepam (ATIVAN) 0.5 MG tablet Take 1 tablet (0.5 mg total) by mouth daily as needed for Anxiety. (Patient not taking: Reported on 6/7/2022)    oxyCODONE-acetaminophen (PERCOCET) 7.5-325 mg per tablet oxycodone-acetaminophen 7.5 mg-325 mg tablet   TAKE 1 TABLET BY MOUTH EVERY 4 HOURS AS NEEDED FOR PAIN     No current facility-administered medications on file prior to visit.       Objective:   Objective:  Wt Readings from Last 3 Encounters:   06/07/22 92 kg (202 lb 13.2 oz)   06/03/22 96.8 kg (213 lb 6.4 oz)   05/25/22 99.3 kg (218 lb 14.7 oz)     Temp Readings from Last 3 Encounters:   05/25/22 97.9 °F (36.6 °C) (Oral)   04/05/22 97.5 °F (36.4 °C) (Temporal)   03/18/22 97 °F (36.1 °C) (Temporal)     BP Readings from Last 3 Encounters:   06/07/22 106/70   06/03/22 (!) 170/104   05/26/22 132/74     Pulse Readings from Last 3 Encounters:   06/07/22 96   06/03/22 74   05/25/22 71       Physical Exam  Vitals reviewed.   Constitutional:       Appearance: She is well-developed.   HENT:      Head:  Normocephalic and atraumatic.   Eyes:      General: No scleral icterus.     Conjunctiva/sclera: Conjunctivae normal.   Cardiovascular:      Rate and Rhythm: Normal rate and regular rhythm.      Pulses: Intact distal pulses.      Heart sounds: Normal heart sounds. No murmur heard.  Pulmonary:      Effort: No respiratory distress.      Breath sounds: No wheezing or rales.   Chest:      Chest wall: No tenderness.   Abdominal:      General: Bowel sounds are normal. There is no distension.      Palpations: Abdomen is soft.      Tenderness: There is no guarding.   Musculoskeletal:         General: Normal range of motion.      Cervical back: Normal range of motion and neck supple.   Skin:     General: Skin is warm.   Neurological:      Mental Status: She is alert and oriented to person, place, and time.         Lab Results   Component Value Date    CHOL 166 08/09/2021     Lab Results   Component Value Date    HDL 55 08/09/2021     Lab Results   Component Value Date    LDLCALC 80.6 08/09/2021     Lab Results   Component Value Date    TRIG 152 (H) 08/09/2021     Lab Results   Component Value Date    CHOLHDL 33.1 08/09/2021       Chemistry        Component Value Date/Time     05/25/2022 2210    K 4.4 05/25/2022 2210     05/25/2022 2210    CO2 21 (L) 05/25/2022 2210    BUN 8 05/25/2022 2210    CREATININE 0.9 05/25/2022 2210    GLU 90 05/25/2022 2210        Component Value Date/Time    CALCIUM 9.2 05/25/2022 2210    ALKPHOS 191 (H) 05/25/2022 2210    AST 20 05/25/2022 2210    ALT 22 05/25/2022 2210    BILITOT 0.4 05/25/2022 2210    ESTGFRAFRICA >60 05/25/2022 2210    EGFRNONAA >60 05/25/2022 2210          Lab Results   Component Value Date    TSH 0.538 08/09/2021     No results found for: INR, PROTIME  Lab Results   Component Value Date    WBC 12.41 05/25/2022    HGB 9.3 (L) 05/25/2022    HCT 32.0 (L) 05/25/2022    MCV 72 (L) 05/25/2022     05/25/2022     BNP  @LABRCNTIP(BNP,BNPTRIAGEBLO)@  CrCl cannot be  calculated (Patient's most recent lab result is older than the maximum 7 days allowed.).     Imaging:  ======  No results found for this or any previous visit.    No results found for this or any previous visit.    No results found for this or any previous visit.    Results for orders placed during the hospital encounter of 10/04/16    X-Ray Chest PA And Lateral    Narrative  2 views.    Findings:  Heart size is normal.  Lungs are clear.  Bony thorax is intact.  IMPRESSION:  Normal chest.      Electronically signed by: GEO WOMACK MD  Date:     10/04/16  Time:    12:59    No results found for this or any previous visit.    No valid procedures specified.    Diagnostic Results:  ECG: Reviewed    The 10-year ASCVD risk score (Tomasdany MCDOWELL Jr., et al., 2013) is: 1.5%    Values used to calculate the score:      Age: 52 years      Sex: Female      Is Non- : Yes      Diabetic: No      Tobacco smoker: No      Systolic Blood Pressure: 106 mmHg      Is BP treated: Yes      HDL Cholesterol: 55 mg/dL      Total Cholesterol: 166 mg/dL    Assessment and Plan:   Leg swelling  -     Echo; Future    Heart failure with preserved ejection fraction, unspecified HF chronicity  -     NT-Pro Natriuretic Peptide; Future; Expected date: 06/07/2022  -     furosemide (LASIX) 40 MG tablet; Take 2 tablets (80 mg total) by mouth once daily AND 1 tablet (40 mg total) every evening.  Dispense: 90 tablet; Refill: 11    Other chest pain  -     Nuclear Stress - Cardiology Interpreted; Future    Hyperlipidemia, unspecified hyperlipidemia type    Recent cerebrovascular accident    Essential hypertension    S/P laparoscopic sleeve gastrectomy    Overweight (BMI 25.0-29.9)    Avascular necrosis of bone of hip, right      Continue with HCTZ, losartan, Imdur.  She did not tolerate beta-blockers in the past   Repeat echo, check bnp   Increase dose of lasix   cw plavix  Off norvasc to evaluate if leg swelling improves she has also lost  21 kg after her bariatric surgery with BP on low side of normal   Reviewed all tests and above medical conditions with patient in detail and formulated treatment plan.  Risk factor modification discussed.   Cardiac low salt diet discussed.  Maintaining healthy weight and weight loss goals were discussed in clinic.      Follow up in 6 months.

## 2022-06-08 ENCOUNTER — TELEPHONE (OUTPATIENT)
Dept: PRIMARY CARE CLINIC | Facility: CLINIC | Age: 52
End: 2022-06-08
Payer: MEDICAID

## 2022-06-08 NOTE — TELEPHONE ENCOUNTER
Spoke with pt verbalized understanding of Mammogram results concerning result review by Dr. KASSANDRA Heart

## 2022-06-08 NOTE — PROGRESS NOTES
I have reviewed the result of your mammogram  Multiple small superficial areas of fat necrosis/hematoma are seen within both breasts corresponding to patient's sites of pain consistent with her history of recent trauma.    Repeat test in 6 mons ie 11/22

## 2022-06-08 NOTE — TELEPHONE ENCOUNTER
----- Message from Daphney Heart MD sent at 6/7/2022  8:09 PM CDT -----  I have reviewed the result of your mammogram  Multiple small superficial areas of fat necrosis/hematoma are seen within both breasts corresponding to patient's sites of pain consistent with her history of recent trauma.    Repeat test in 6 [facility] ie 11/22

## 2022-06-10 LAB — NT-PROBNP SERPL-MCNC: 403 PG/ML

## 2022-06-14 ENCOUNTER — TELEPHONE (OUTPATIENT)
Dept: SMOKING CESSATION | Facility: CLINIC | Age: 52
End: 2022-06-14
Payer: MEDICAID

## 2022-06-21 ENCOUNTER — PATIENT MESSAGE (OUTPATIENT)
Dept: SMOKING CESSATION | Facility: CLINIC | Age: 52
End: 2022-06-21
Payer: MEDICAID

## 2022-06-21 ENCOUNTER — TELEPHONE (OUTPATIENT)
Dept: SMOKING CESSATION | Facility: CLINIC | Age: 52
End: 2022-06-21
Payer: MEDICAID

## 2022-06-23 ENCOUNTER — TELEPHONE (OUTPATIENT)
Dept: SMOKING CESSATION | Facility: CLINIC | Age: 52
End: 2022-06-23
Payer: MEDICAID

## 2022-06-23 ENCOUNTER — OFFICE VISIT (OUTPATIENT)
Dept: SURGERY | Facility: CLINIC | Age: 52
End: 2022-06-23
Payer: MEDICAID

## 2022-06-23 VITALS
RESPIRATION RATE: 18 BRPM | OXYGEN SATURATION: 98 % | BODY MASS INDEX: 30.79 KG/M2 | WEIGHT: 196.19 LBS | HEART RATE: 89 BPM | DIASTOLIC BLOOD PRESSURE: 90 MMHG | SYSTOLIC BLOOD PRESSURE: 158 MMHG | TEMPERATURE: 99 F | HEIGHT: 67 IN

## 2022-06-23 DIAGNOSIS — S20.01XS: Primary | ICD-10-CM

## 2022-06-23 DIAGNOSIS — N62 MACROMASTIA: ICD-10-CM

## 2022-06-23 DIAGNOSIS — T14.8XXA CONTUSION: ICD-10-CM

## 2022-06-23 PROCEDURE — 99215 OFFICE O/P EST HI 40 MIN: CPT | Mod: PBBFAC

## 2022-06-23 NOTE — PROGRESS NOTES
[facility] General Surgery Clinic Note    CC:   Joe Ceballos is a 52 y.o. female presenting with breast contusion    HPI:   52-year-old female with history of stroke in March of this year as well as several bariatric surgeries with the most recent 1 being a revision approximately 6 weeks ago presents to clinic for contusion of right breast.  About 4 weeks ago, she tripped in the dark and hit several pieces of furniture while falling.  Had numerous contusions to bilateral breasts.  Workup at that time including mammography was consistent with localized trauma.  Does have a history of which she says were fibroadenomas removed from both breasts as well as a hematoma evacuation from her left breast.  Says that her pain has resolved and that her contusions are mostly resolved.    Focuses on wanting breast reduction.  States that she was originally scheduled with Dr. Jaimes for breast reduction before she had her stroke in March.    No family history of breast cancer or ovarian cancer    PMH:   Past Medical History:   Diagnosis Date    Anxiety     Edema     Hypertension     Initial insomnia     Sickle cell trait     Stroke     Tobacco use        PSH:   Past Surgical History:   Procedure Laterality Date    BREAST SURGERY       SECTION      x 2    CYST REMOVAL      from left tube    gastric sleeve      Raheel-en-Y gastric bypass      TONSILLECTOMY         FamHx:   Family History   Adopted: Yes   Problem Relation Age of Onset    Hypertension Mother     Thyroid disease Paternal Aunt     Hypertension Paternal Grandmother     Breast cancer Neg Hx     Colon cancer Neg Hx     Ovarian cancer Neg Hx        SocHx:  Social History     Socioeconomic History    Marital status: Single   Tobacco Use    Smoking status: Former Smoker     Packs/day: 0.50     Types: Cigarettes    Smokeless tobacco: Never Used   Substance and Sexual Activity    Alcohol use: Yes    Drug use: No    Sexual activity: Not  Currently     Partners: Male     Birth control/protection: None       Allergies:   Review of patient's allergies indicates:   Allergen Reactions    Lisinopril Anaphylaxis     This is only possible agent at the time.  This is only possible agent at the time.  This is only possible agent at the time.    Sulfa (sulfonamide antibiotics) Anaphylaxis    Beta-blockers (beta-adrenergic blocking agts)      Other reaction(s): Other (See Comments)  Syncope & Collapse       Medications:  Current Outpatient Medications on File Prior to Visit   Medication Sig Dispense Refill    aspirin (ECOTRIN) 81 MG EC tablet Take 81 mg by mouth once daily.      atorvastatin (LIPITOR) 80 MG tablet Take 80 mg by mouth every evening.      cholecalciferol, vitamin D3, 50,000 unit capsule TAKE 1 CAPSULE ONCE A WEEK ORALLY 28 DAYS  3    clopidogreL (PLAVIX) 75 mg tablet Take 75 mg by mouth once daily.      cyclobenzaprine (FLEXERIL) 10 MG tablet Take 1 tablet (10 mg total) by mouth 3 (three) times daily as needed for Muscle spasms. 30 tablet 0    cyclobenzaprine (FLEXERIL) 10 MG tablet Take 1 tablet (10 mg total) by mouth 3 (three) times daily as needed for Muscle spasms. 30 tablet 0    DULoxetine (CYMBALTA) 60 MG capsule Take 60 mg by mouth 2 (two) times a day.      furosemide (LASIX) 40 MG tablet Take 2 tablets (80 mg total) by mouth once daily AND 1 tablet (40 mg total) every evening. 90 tablet 11    hydroCHLOROthiazide (HYDRODIURIL) 12.5 MG Tab Take 1 tablet (12.5 mg total) by mouth once daily. 30 tablet 1    isosorbide mononitrate (IMDUR) 60 MG 24 hr tablet Take 60 mg by mouth once daily.      LORazepam (ATIVAN) 0.5 MG tablet Take 1 tablet (0.5 mg total) by mouth daily as needed for Anxiety. (Patient not taking: Reported on 6/7/2022) 20 tablet 0    losartan (COZAAR) 100 MG tablet Take 100 mg by mouth once daily.      oxyCODONE-acetaminophen (PERCOCET) 7.5-325 mg per tablet oxycodone-acetaminophen 7.5 mg-325 mg tablet   TAKE 1  TABLET BY MOUTH EVERY 4 HOURS AS NEEDED FOR PAIN      pantoprazole (PROTONIX) 20 MG tablet Take 1 tablet (20 mg total) by mouth once daily. 30 tablet 3    pantoprazole (PROTONIX) 40 MG tablet Take 40 mg by mouth once daily.      sucralfate (CARAFATE) 1 gram tablet TAKE 1 TABLET BY MOUTH 4 TIMES A DAY BEFORE MEALS AND AT BEDTIME 120 tablet 3    traMADoL (ULTRAM) 50 mg tablet Take 1 tablet (50 mg total) by mouth every 6 (six) hours as needed. 12 tablet 0     No current facility-administered medications on file prior to visit.         ROS:   Gen: Denies any weight change, fatigue, fever, or chills  Skin: No new rashes or other skin changes  Head: No new headaches  Eyes: Denies any recent changes in vision or blurry vision  Ears: Denies any changes in hearing, tinnitus, or vertigo  Throat: Denies any dysphagia or hoarseness  Cardiac: Denies any orthopnea or palpitations  Resp: Denies any cough, wheezing, or shortness of breath  GI:  Denies any diarrhea, nausea, or vomiting  Urinary: Denies any pain or difficulty urinating  Neuro: No pain or tingling in extremities.  No new onset weakness.  Psychiatric: No changes in mood or memory    Objective:  Physical Exam:  GENERAL: NAD, resting comfortably in chair  NEURO: awake, alert, oriented x3  HEENT: Atraumatic, no scleral icterus, MMM  NECK: trachea midline, no masses  CARDIO: regular rate, regular rhythm, no murmurs or rubs  CHEST: Non-labored breathing, good resp effort, CTAB  ABD: S/NT/ND, no rebound tenderness or guarding. Well-healed lap scars  BREAST:  Very minor resolving contusion to right breast medial portion.  Otherwise no abnormalities in bilateral breast or axilla.  Well-healed scars on both breasts.    Imaging:  Mammogram, 5/31/22  Impression:  1.  Multiple small superficial areas of fat necrosis/hematoma are seen within both breasts corresponding to patient's sites of pain consistent with her history of recent trauma.      2.  Areas of architectural  distortion within the subareolar right breast and 8:00 left breast are favored to represent sequela of patient's prior excisional biopsies.  However, note that patient's prior mammogram examinations are not available for comparison at time of this exam.  Recommend short-term follow-up diagnostic mammogram in 6 months to ensure stability of these findings given lack of prior comparison examination.  Attempt will be made to obtain the patient's prior mammograms and if received an addendum will be added to this report to document the comparison.         BI-RADS Category:   Overall: 3 - Probably Benign    A/P:   52 y.o. female with trauma to right breast.  -no need for any breast intervention  -can follow up with her PCP in 6 months for repeat mammogram  -referral placed to Plastic surgery for evaluation for breast reduction at patient request   -return to clinic as needed    Johan Cheek MD  [facility]  06/23/2022

## 2022-06-23 NOTE — PROGRESS NOTES
I have reviewed the notes, assessments, and/or procedures performed by the resident, I concur with her/his documentation of Joe Ceballos.     Rachel Calles MD

## 2022-07-06 ENCOUNTER — TELEPHONE (OUTPATIENT)
Dept: SMOKING CESSATION | Facility: CLINIC | Age: 52
End: 2022-07-06
Payer: MEDICAID

## 2022-07-06 ENCOUNTER — PATIENT MESSAGE (OUTPATIENT)
Dept: SMOKING CESSATION | Facility: CLINIC | Age: 52
End: 2022-07-06
Payer: MEDICAID

## 2022-07-06 NOTE — TELEPHONE ENCOUNTER
Call to the patient's mobile phone regarding scheduling missed intake appointment for the  Tobacco Cessation Program. Voicemail left & mychart sent

## 2022-07-08 ENCOUNTER — TELEPHONE (OUTPATIENT)
Dept: PRIMARY CARE CLINIC | Facility: CLINIC | Age: 52
End: 2022-07-08
Payer: MEDICAID

## 2022-07-08 NOTE — TELEPHONE ENCOUNTER
Returned call to patient who then advised she was in the hospital on 06/23/2022 and is requesting a 7 day hospital follow up. Advised patient I will speak with provider and give her a call. She voiced understanding.      ----- Message from Juliet Chong sent at 7/8/2022  9:06 AM CDT -----  Contact: Pt [phone]  Patient is returning a phone call.  Who left a message for the patient: Not sure   Does patient know what this is regarding:  Yes   Would you like a call back, or a response through your [facility] portal?: call  Comments:

## 2022-07-11 ENCOUNTER — TELEPHONE (OUTPATIENT)
Dept: CARDIOLOGY | Facility: CLINIC | Age: 52
End: 2022-07-11
Payer: MEDICAID

## 2022-07-11 ENCOUNTER — TELEPHONE (OUTPATIENT)
Dept: PEDIATRICS | Facility: CLINIC | Age: 52
End: 2022-07-11
Payer: MEDICAID

## 2022-07-11 NOTE — TELEPHONE ENCOUNTER
transpiration call for approval for tomorrow appt with Dr. KASSANDRA Heart ; pt inform of approval for transporation

## 2022-07-11 NOTE — TELEPHONE ENCOUNTER
Spoke with pt agreed and verbalized understanding of schedule appt with Dr. KASSANDRA Heart due to falls and dizziness; appt booked for 07/12/22 @ 10:30 am

## 2022-07-11 NOTE — TELEPHONE ENCOUNTER
----- Message from Judy Chavarria sent at 7/11/2022  9:01 AM CDT -----  Contact: [phone] Patient  Pt is requesting a call back about scheduling a hospital follow up appt. Pt is also asking if the office got her medical records from her hospital visit at [facility].

## 2022-07-11 NOTE — TELEPHONE ENCOUNTER
Lvm for pt to return call to clinic in regards to hosp f/u      ----- Message from Nay Sharma sent at 7/11/2022  1:01 PM CDT -----  Pt is calling in regards to getting an appt to see the doctor for a hospital f/u. Pt states that she has fluid around her heart and was told to f/u with the doctor asap. Pt can be reached at [phone] (quss)

## 2022-07-12 ENCOUNTER — OFFICE VISIT (OUTPATIENT)
Dept: PRIMARY CARE CLINIC | Facility: CLINIC | Age: 52
End: 2022-07-12
Payer: MEDICAID

## 2022-07-12 ENCOUNTER — HOSPITAL ENCOUNTER (OUTPATIENT)
Dept: RADIOLOGY | Facility: HOSPITAL | Age: 52
Discharge: HOME | End: 2022-07-12
Attending: FAMILY MEDICINE
Payer: MEDICAID

## 2022-07-12 ENCOUNTER — TELEPHONE (OUTPATIENT)
Dept: SMOKING CESSATION | Facility: CLINIC | Age: 52
End: 2022-07-12
Payer: MEDICAID

## 2022-07-12 ENCOUNTER — PATIENT MESSAGE (OUTPATIENT)
Dept: SMOKING CESSATION | Facility: CLINIC | Age: 52
End: 2022-07-12
Payer: MEDICAID

## 2022-07-12 VITALS
TEMPERATURE: 97 F | HEIGHT: 67 IN | HEART RATE: 88 BPM | OXYGEN SATURATION: 98 % | WEIGHT: 199 LBS | SYSTOLIC BLOOD PRESSURE: 134 MMHG | DIASTOLIC BLOOD PRESSURE: 96 MMHG | BODY MASS INDEX: 31.23 KG/M2

## 2022-07-12 DIAGNOSIS — J90 PLEURAL EFFUSION ON LEFT: ICD-10-CM

## 2022-07-12 DIAGNOSIS — E78.2 MIXED HYPERLIPIDEMIA: ICD-10-CM

## 2022-07-12 DIAGNOSIS — N76.0 ACUTE VAGINITIS: ICD-10-CM

## 2022-07-12 DIAGNOSIS — M62.838 MUSCLE SPASM: ICD-10-CM

## 2022-07-12 DIAGNOSIS — F41.1 GENERALIZED ANXIETY DISORDER WITH PANIC ATTACKS: ICD-10-CM

## 2022-07-12 DIAGNOSIS — E55.9 VITAMIN D DEFICIENCY: ICD-10-CM

## 2022-07-12 DIAGNOSIS — F41.0 GENERALIZED ANXIETY DISORDER WITH PANIC ATTACKS: ICD-10-CM

## 2022-07-12 DIAGNOSIS — R53.1 LEFT-SIDED WEAKNESS: ICD-10-CM

## 2022-07-12 DIAGNOSIS — I10 ESSENTIAL HYPERTENSION: Primary | ICD-10-CM

## 2022-07-12 DIAGNOSIS — Z86.73 HISTORY OF STROKE: ICD-10-CM
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
## 2022-07-12 DIAGNOSIS — Z86.73 HISTORY OF CEREBROVASCULAR ACCIDENT (CVA) IN ADULTHOOD: ICD-10-CM

## 2022-07-12 DIAGNOSIS — K21.9 GASTROESOPHAGEAL REFLUX DISEASE WITHOUT ESOPHAGITIS: ICD-10-CM

## 2022-07-12 DIAGNOSIS — I50.30 HEART FAILURE WITH PRESERVED EJECTION FRACTION, UNSPECIFIED HF CHRONICITY: ICD-10-CM

## 2022-07-12 PROCEDURE — 1159F MED LIST DOCD IN RCRD: CPT | Mod: CPTII,,, | Performed by: FAMILY MEDICINE

## 2022-07-12 PROCEDURE — 3075F SYST BP GE 130 - 139MM HG: CPT | Mod: CPTII,,, | Performed by: FAMILY MEDICINE

## 2022-07-12 PROCEDURE — 99215 PR OFFICE/OUTPT VISIT, EST, LEVL V, 40-54 MIN: ICD-10-PCS | Mod: S$PBB,,, | Performed by: FAMILY MEDICINE

## 2022-07-12 PROCEDURE — 4010F ACE/ARB THERAPY RXD/TAKEN: CPT | Mod: CPTII,,, | Performed by: FAMILY MEDICINE

## 2022-07-12 PROCEDURE — 3080F PR MOST RECENT DIASTOLIC BLOOD PRESSURE >= 90 MM HG: ICD-10-PCS | Mod: CPTII,,, | Performed by: FAMILY MEDICINE

## 2022-07-12 PROCEDURE — 99999 PR PBB SHADOW E&M-EST. PATIENT-LVL IV: ICD-10-PCS | Mod: PBBFAC,,, | Performed by: FAMILY MEDICINE

## 2022-07-12 PROCEDURE — 71046 XR CHEST PA AND LATERAL: ICD-10-PCS | Mod: 26,,, | Performed by: RADIOLOGY

## 2022-07-12 PROCEDURE — 71046 X-RAY EXAM CHEST 2 VIEWS: CPT | Mod: 26,,, | Performed by: RADIOLOGY

## 2022-07-12 PROCEDURE — 3075F PR MOST RECENT SYSTOLIC BLOOD PRESS GE 130-139MM HG: ICD-10-PCS | Mod: CPTII,,, | Performed by: FAMILY MEDICINE

## 2022-07-12 PROCEDURE — 71046 X-RAY EXAM CHEST 2 VIEWS: CPT | Mod: TC,PN

## 2022-07-12 PROCEDURE — 1159F PR MEDICATION LIST DOCUMENTED IN MEDICAL RECORD: ICD-10-PCS | Mod: CPTII,,, | Performed by: FAMILY MEDICINE

## 2022-07-12 PROCEDURE — 3008F PR BODY MASS INDEX (BMI) DOCUMENTED: ICD-10-PCS | Mod: CPTII,,, | Performed by: FAMILY MEDICINE

## 2022-07-12 PROCEDURE — 3008F BODY MASS INDEX DOCD: CPT | Mod: CPTII,,, | Performed by: FAMILY MEDICINE

## 2022-07-12 PROCEDURE — 3080F DIAST BP >= 90 MM HG: CPT | Mod: CPTII,,, | Performed by: FAMILY MEDICINE

## 2022-07-12 PROCEDURE — 4010F PR ACE/ARB THEARPY RXD/TAKEN: ICD-10-PCS | Mod: CPTII,,, | Performed by: FAMILY MEDICINE

## 2022-07-12 PROCEDURE — 99215 OFFICE O/P EST HI 40 MIN: CPT | Mod: S$PBB,,, | Performed by: FAMILY MEDICINE

## 2022-07-12 PROCEDURE — 99999 PR PBB SHADOW E&M-EST. PATIENT-LVL IV: CPT | Mod: PBBFAC,,, | Performed by: FAMILY MEDICINE

## 2022-07-12 PROCEDURE — 99214 OFFICE O/P EST MOD 30 MIN: CPT | Mod: PBBFAC,25,PN | Performed by: FAMILY MEDICINE

## 2022-07-12 RX ORDER — DULOXETIN HYDROCHLORIDE 60 MG/1
60 CAPSULE, DELAYED RELEASE ORAL 2 TIMES DAILY
Qty: 60 CAPSULE | Refills: 2 | Status: SHIPPED | OUTPATIENT
Start: 2022-07-12 | End: 2023-04-28 | Stop reason: SDUPTHER

## 2022-07-12 RX ORDER — ISOSORBIDE MONONITRATE 60 MG/1
60 TABLET, EXTENDED RELEASE ORAL DAILY
Qty: 30 TABLET | Refills: 2 | Status: SHIPPED | OUTPATIENT
Start: 2022-07-12 | End: 2023-04-14 | Stop reason: SDUPTHER

## 2022-07-12 RX ORDER — ASPIRIN 325 MG
50000 TABLET, DELAYED RELEASE (ENTERIC COATED) ORAL
Qty: 12 CAPSULE | Refills: 1 | Status: SHIPPED | OUTPATIENT
Start: 2022-07-12 | End: 2022-10-10

## 2022-07-12 RX ORDER — TIZANIDINE 4 MG/1
4 TABLET ORAL NIGHTLY PRN
Qty: 30 TABLET | Refills: 1 | Status: SHIPPED | OUTPATIENT
Start: 2022-07-12 | End: 2022-09-11

## 2022-07-12 RX ORDER — FUROSEMIDE 40 MG/1
TABLET ORAL
Qty: 90 TABLET | Refills: 2 | Status: SHIPPED | OUTPATIENT
Start: 2022-07-12 | End: 2023-04-14 | Stop reason: SDUPTHER

## 2022-07-12 RX ORDER — CLOPIDOGREL BISULFATE 75 MG/1
75 TABLET ORAL DAILY
Qty: 30 TABLET | Refills: 2 | Status: ON HOLD | OUTPATIENT
Start: 2022-07-12 | End: 2023-01-24 | Stop reason: HOSPADM

## 2022-07-12 RX ORDER — PANTOPRAZOLE SODIUM 40 MG/1
40 TABLET, DELAYED RELEASE ORAL DAILY
Qty: 30 TABLET | Refills: 2 | Status: ON HOLD | OUTPATIENT
Start: 2022-07-12 | End: 2023-01-24

## 2022-07-12 RX ORDER — ASPIRIN 81 MG/1
81 TABLET ORAL DAILY
Qty: 30 TABLET | Refills: 3 | Status: ON HOLD | OUTPATIENT
Start: 2022-07-12 | End: 2023-03-12 | Stop reason: HOSPADM

## 2022-07-12 RX ORDER — LOSARTAN POTASSIUM 100 MG/1
100 TABLET ORAL DAILY
Qty: 30 TABLET | Refills: 2 | Status: SHIPPED | OUTPATIENT
Start: 2022-07-12 | End: 2022-11-07

## 2022-07-12 RX ORDER — FLUCONAZOLE 150 MG/1
150 TABLET ORAL
Qty: 3 TABLET | Refills: 0 | Status: SHIPPED | OUTPATIENT
Start: 2022-07-12 | End: 2022-07-19

## 2022-07-12 RX ORDER — ATORVASTATIN CALCIUM 80 MG/1
80 TABLET, FILM COATED ORAL NIGHTLY
Qty: 90 TABLET | Refills: 2 | Status: SHIPPED | OUTPATIENT
Start: 2022-07-12 | End: 2023-07-16 | Stop reason: SDUPTHER

## 2022-07-12 RX ORDER — LORAZEPAM 0.5 MG/1
0.5 TABLET ORAL EVERY 6 HOURS PRN
COMMUNITY
End: 2022-07-12 | Stop reason: ALTCHOICE

## 2022-07-12 NOTE — PROGRESS NOTES
Subjective:       Patient ID: Joe Ceballos is a 52 y.o. female.    Chief Complaint:     HPI   History of Present Illness:   Joe Ceballos 52 y.o. female presents today with  Patient is here to follow up after being in long term for 2 weeks.  She wanted to let us know what happened. She was in a restaurant when her left side went numb so she got out to the parking lot, but Police was called and she was taken to long term but unable to book her because BP was very high in the 200s. Consequently taken to the ER to be cleared.    She went to  long term on 06/24,-->ER--->back to long term on the 06/25.-->out of long term 07/05/ and here for a follow up.     Today BP is not quite controlled and she is asking to resume ativan for acute anxiety due to several psychosocial stressors including a friend using her card while she was locked up. On Cymbalta, reports that she cannot feel the effect. Per pt, she has been talking too much and in long term, they were giving her med to keep her from talking and she would like to continue med.    Fall: she fell in front of the long term on the day of her release.     Reports spontaneous bruises on her body while in the long term.    Since her last visit, she has had ventral hernia repair and doing great.    Still need to loose weight so she could get the breast reduction.    Vaginal discharge: thinks she has yeast infection.    HTN: they did not give her med int he long term.    She has flexeril for body cramps and asking for a change for something that will work better.    Past Medical History:   Diagnosis Date    Anxiety     Edema     Hypertension     Initial insomnia     Sickle cell trait     Stroke     Tobacco use      Family History   Adopted: Yes   Problem Relation Age of Onset    Hypertension Mother     Thyroid disease Paternal Aunt     Hypertension Paternal Grandmother     Breast cancer Neg Hx     Colon cancer Neg Hx     Ovarian cancer Neg Hx      Social History     Socioeconomic History     Marital status: Single   Tobacco Use    Smoking status: Former Smoker     Packs/day: 0.50     Types: Cigarettes    Smokeless tobacco: Never Used   Substance and Sexual Activity    Alcohol use: Yes    Drug use: No    Sexual activity: Not Currently     Partners: Male     Birth control/protection: None     Outpatient Encounter Medications as of 7/12/2022   Medication Sig Dispense Refill    sucralfate (CARAFATE) 1 gram tablet TAKE 1 TABLET BY MOUTH 4 TIMES A DAY BEFORE MEALS AND AT BEDTIME 120 tablet 3    [DISCONTINUED] aspirin (ECOTRIN) 81 MG EC tablet Take 81 mg by mouth once daily.      [DISCONTINUED] cholecalciferol, vitamin D3, 50,000 unit capsule TAKE 1 CAPSULE ONCE A WEEK ORALLY 28 DAYS  3    [DISCONTINUED] clopidogreL (PLAVIX) 75 mg tablet Take 75 mg by mouth once daily.      [DISCONTINUED] cyclobenzaprine (FLEXERIL) 10 MG tablet Take 1 tablet (10 mg total) by mouth 3 (three) times daily as needed for Muscle spasms. 30 tablet 0    [DISCONTINUED] DULoxetine (CYMBALTA) 60 MG capsule Take 60 mg by mouth 2 (two) times a day.      [DISCONTINUED] furosemide (LASIX) 40 MG tablet Take 2 tablets (80 mg total) by mouth once daily AND 1 tablet (40 mg total) every evening. 90 tablet 11    [DISCONTINUED] isosorbide mononitrate (IMDUR) 60 MG 24 hr tablet Take 60 mg by mouth once daily.      [DISCONTINUED] LORazepam (ATIVAN) 0.5 MG tablet Take 0.5 mg by mouth every 6 (six) hours as needed for Anxiety.      [DISCONTINUED] losartan (COZAAR) 100 MG tablet Take 100 mg by mouth once daily.      [DISCONTINUED] pantoprazole (PROTONIX) 40 MG tablet Take 40 mg by mouth once daily.      [DISCONTINUED] traMADoL (ULTRAM) 50 mg tablet Take 1 tablet (50 mg total) by mouth every 6 (six) hours as needed. 12 tablet 0    aspirin (ECOTRIN) 81 MG EC tablet Take 1 tablet (81 mg total) by mouth once daily. 30 tablet 3    atorvastatin (LIPITOR) 80 MG tablet Take 1 tablet (80 mg total) by mouth every evening. 90 tablet 2     cholecalciferol, vitamin D3, 1,250 mcg (50,000 unit) capsule Take 1 capsule (50,000 Units total) by mouth every 7 days. 12 capsule 1    clopidogreL (PLAVIX) 75 mg tablet Take 1 tablet (75 mg total) by mouth once daily. 30 tablet 2    DULoxetine (CYMBALTA) 60 MG capsule Take 1 capsule (60 mg total) by mouth 2 (two) times a day. 60 capsule 2    fluconazole (DIFLUCAN) 150 MG Tab Take 1 tablet (150 mg total) by mouth every 72 hours. for 3 doses 3 tablet 0    furosemide (LASIX) 40 MG tablet Take 2 tablets (80 mg total) by mouth once daily AND 1 tablet (40 mg total) every evening. 90 tablet 2    isosorbide mononitrate (IMDUR) 60 MG 24 hr tablet Take 1 tablet (60 mg total) by mouth once daily. 30 tablet 2    losartan (COZAAR) 100 MG tablet Take 1 tablet (100 mg total) by mouth once daily. 30 tablet 2    pantoprazole (PROTONIX) 40 MG tablet Take 1 tablet (40 mg total) by mouth once daily. 30 tablet 2    tiZANidine (ZANAFLEX) 4 MG tablet Take 1 tablet (4 mg total) by mouth nightly as needed (spasm). 30 tablet 1    [DISCONTINUED] atorvastatin (LIPITOR) 80 MG tablet Take 80 mg by mouth every evening.      [DISCONTINUED] cyclobenzaprine (FLEXERIL) 10 MG tablet Take 1 tablet (10 mg total) by mouth 3 (three) times daily as needed for Muscle spasms. 30 tablet 0    [DISCONTINUED] hydroCHLOROthiazide (HYDRODIURIL) 12.5 MG Tab Take 1 tablet (12.5 mg total) by mouth once daily. 30 tablet 1    [DISCONTINUED] LORazepam (ATIVAN) 0.5 MG tablet Take 1 tablet (0.5 mg total) by mouth daily as needed for Anxiety. (Patient not taking: No sig reported) 20 tablet 0    [DISCONTINUED] oxyCODONE-acetaminophen (PERCOCET) 7.5-325 mg per tablet oxycodone-acetaminophen 7.5 mg-325 mg tablet   TAKE 1 TABLET BY MOUTH EVERY 4 HOURS AS NEEDED FOR PAIN      [DISCONTINUED] pantoprazole (PROTONIX) 20 MG tablet Take 1 tablet (20 mg total) by mouth once daily. 30 tablet 3     No facility-administered encounter medications on file as of 7/12/2022.  "      Review of Systems    Review of Systems      A complete 10 point ROS was completed and are positive as per above HPI.    Otherwise negative for fever, diplopia, chest pain, shortness of breath, vomiting, blood in urine, joint pain, skin rash, seizures and unusual bleeding.     Objective:      BP (!) 134/96 (BP Location: Left arm, Patient Position: Sitting, BP Method: Medium (Manual))   Pulse 88   Temp 97.3 °F (36.3 °C) (Temporal)   Ht 5' 7" (1.702 m)   Wt 90.3 kg (199 lb)   SpO2 98%   BMI 31.17 kg/m²   Physical Exam  Pulmonary:      Effort: Pulmonary effort is normal.      Breath sounds: Normal breath sounds.   Musculoskeletal:      Right lower leg: Edema present.      Left lower leg: Edema present.   Psychiatric:         Mood and Affect: Mood is anxious.         Thought Content: Thought content is not delusional. Thought content does not include homicidal ideation. Thought content does not include homicidal plan.           Results for orders placed or performed in visit on 06/07/22   NT-Pro Natriuretic Peptide   Result Value Ref Range    NT-proBNP 403 (H) <=150 pg/mL     Assessment:       1. Essential hypertension    2. Heart failure with preserved ejection fraction, unspecified HF chronicity    3. Generalized anxiety disorder with panic attacks    4. Vitamin D deficiency    5. Mixed hyperlipidemia    6. History of stroke    7. Gastroesophageal reflux disease without esophagitis    8. Left-sided weakness    9. History of cerebrovascular accident (CVA) in adulthood    10. Pleural effusion on left    11. Muscle spasm    12. Acute vaginitis        Plan:   Essential hypertension  -     Hypertension Digital Medicine (HDMP) Enrollment Order  -     furosemide (LASIX) 40 MG tablet; Take 2 tablets (80 mg total) by mouth once daily AND 1 tablet (40 mg total) every evening.  Dispense: 90 tablet; Refill: 2  -     losartan (COZAAR) 100 MG tablet; Take 1 tablet (100 mg total) by mouth once daily.  Dispense: 30 tablet; " Refill: 2  -     isosorbide mononitrate (IMDUR) 60 MG 24 hr tablet; Take 1 tablet (60 mg total) by mouth once daily.  Dispense: 30 tablet; Refill: 2    Heart failure with preserved ejection fraction, unspecified HF chronicity  -     furosemide (LASIX) 40 MG tablet; Take 2 tablets (80 mg total) by mouth once daily AND 1 tablet (40 mg total) every evening.  Dispense: 90 tablet; Refill: 2    Generalized anxiety disorder with panic attacks  -     DULoxetine (CYMBALTA) 60 MG capsule; Take 1 capsule (60 mg total) by mouth 2 (two) times a day.  Dispense: 60 capsule; Refill: 2    Vitamin D deficiency    Mixed hyperlipidemia  -     cholecalciferol, vitamin D3, 1,250 mcg (50,000 unit) capsule; Take 1 capsule (50,000 Units total) by mouth every 7 days.  Dispense: 12 capsule; Refill: 1    History of stroke  -     clopidogreL (PLAVIX) 75 mg tablet; Take 1 tablet (75 mg total) by mouth once daily.  Dispense: 30 tablet; Refill: 2  -     atorvastatin (LIPITOR) 80 MG tablet; Take 1 tablet (80 mg total) by mouth every evening.  Dispense: 90 tablet; Refill: 2  -     aspirin (ECOTRIN) 81 MG EC tablet; Take 1 tablet (81 mg total) by mouth once daily.  Dispense: 30 tablet; Refill: 3    Gastroesophageal reflux disease without esophagitis  -     pantoprazole (PROTONIX) 40 MG tablet; Take 1 tablet (40 mg total) by mouth once daily.  Dispense: 30 tablet; Refill: 2    Left-sided weakness  -     Ambulatory referral/consult to Physical/Occupational Therapy; Future; Expected date: 07/19/2022    History of cerebrovascular accident (CVA) in adulthood  -     Ambulatory referral/consult to Physical/Occupational Therapy; Future; Expected date: 07/19/2022    Pleural effusion on left  -     X-Ray Chest PA And Lateral; Future; Expected date: 07/12/2022    Muscle spasm  Comments:  left upper and lower extremity  Orders:  -     tiZANidine (ZANAFLEX) 4 MG tablet; Take 1 tablet (4 mg total) by mouth nightly as needed (spasm).  Dispense: 30 tablet; Refill:  1    Acute vaginitis  -     fluconazole (DIFLUCAN) 150 MG Tab; Take 1 tablet (150 mg total) by mouth every 72 hours. for 3 doses  Dispense: 3 tablet; Refill: 0      Treatment options and alternatives were discussed with the patient. Patient was given ample time to ask questions. All questions were answered. Voices understanding and acceptance of this advice. Will call back if any further questions or concerns.  Daphney Heart MD

## 2022-07-12 NOTE — TELEPHONE ENCOUNTER
Received call from patient to set up an appointment for tobacco cessation program. Patient was with her dr. RALPH sent the patient a [employer] message containing the information to set up her intake & that she wants the [facility] location

## 2022-07-13 ENCOUNTER — TELEPHONE (OUTPATIENT)
Dept: PRIMARY CARE CLINIC | Facility: CLINIC | Age: 52
End: 2022-07-13
Payer: MEDICAID

## 2022-07-13 NOTE — TELEPHONE ENCOUNTER
----- Message from Daphney Heart MD sent at 7/13/2022  3:47 PM CDT -----  Inform pt that result is normal. Fluid in the lung has cleared up

## 2022-07-20 ENCOUNTER — TELEPHONE (OUTPATIENT)
Dept: SMOKING CESSATION | Facility: CLINIC | Age: 52
End: 2022-07-20
Payer: MEDICAID

## 2022-07-20 ENCOUNTER — PATIENT MESSAGE (OUTPATIENT)
Dept: SMOKING CESSATION | Facility: CLINIC | Age: 52
End: 2022-07-20
Payer: MEDICAID

## 2022-07-26 ENCOUNTER — TELEPHONE (OUTPATIENT)
Dept: PRIMARY CARE CLINIC | Facility: CLINIC | Age: 52
End: 2022-07-26
Payer: MEDICAID

## 2022-07-26 ENCOUNTER — PATIENT MESSAGE (OUTPATIENT)
Dept: PRIMARY CARE CLINIC | Facility: CLINIC | Age: 52
End: 2022-07-26
Payer: MEDICAID

## 2022-07-26 NOTE — TELEPHONE ENCOUNTER
Rescheduled nurse bp check for 8/4/22 with transportation at 10:20am. She voiced understanding.          ----- Message from Sugar Cardenas sent at 7/26/2022  8:04 AM CDT -----  Contact: pt [phone]  Patient exposed to COVID and needs to reschedule nurse visit. Transportation was cancelled.    Please call and schedule.    Thank You

## 2022-08-09 ENCOUNTER — TELEPHONE (OUTPATIENT)
Dept: PEDIATRICS | Facility: CLINIC | Age: 52
End: 2022-08-09
Payer: MEDICAID

## 2022-08-09 ENCOUNTER — NURSE TRIAGE (OUTPATIENT)
Dept: ADMINISTRATIVE | Facility: CLINIC | Age: 52
End: 2022-08-09
Payer: MEDICAID

## 2022-08-09 NOTE — TELEPHONE ENCOUNTER
Patient called per provider request to follow up on symptoms. Patient was advised to go to ER by provider. Patient stated blood pressure was not elevated anymore but she will report to ER.

## 2022-08-09 NOTE — TELEPHONE ENCOUNTER
OOC RN  Patient states she cannot walk right,   Think I am catching another stroke.  On Sunday 8/7/22,   Already had a stroke 8/7//22   Went to ,  Did not get any better.   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   I advised if she had someone who could come watch and get her son,   Patient started screaming at me and disconnect the call.       Reason for Disposition   Difficult to awaken or acting confused (e.g., disoriented, slurred speech)    Protocols used: NEUROLOGIC DEFICIT-A-[place]

## 2022-08-15 ENCOUNTER — OFFICE VISIT (OUTPATIENT)
Dept: PLASTIC SURGERY | Facility: CLINIC | Age: 52
End: 2022-08-15
Payer: MEDICAID

## 2022-08-15 VITALS
BODY MASS INDEX: 29.45 KG/M2 | DIASTOLIC BLOOD PRESSURE: 85 MMHG | WEIGHT: 187.63 LBS | OXYGEN SATURATION: 98 % | RESPIRATION RATE: 20 BRPM | TEMPERATURE: 97 F | SYSTOLIC BLOOD PRESSURE: 160 MMHG | HEART RATE: 92 BPM | HEIGHT: 67 IN

## 2022-08-15 DIAGNOSIS — N62 MACROMASTIA: ICD-10-CM

## 2022-08-15 PROCEDURE — 3079F PR MOST RECENT DIASTOLIC BLOOD PRESSURE 80-89 MM HG: ICD-10-PCS | Mod: CPTII,,, | Performed by: SURGERY

## 2022-08-15 PROCEDURE — 4010F ACE/ARB THERAPY RXD/TAKEN: CPT | Mod: CPTII,,, | Performed by: SURGERY

## 2022-08-15 PROCEDURE — 99213 PR OFFICE/OUTPT VISIT, EST, LEVL III, 20-29 MIN: ICD-10-PCS | Mod: S$PBB,,, | Performed by: SURGERY

## 2022-08-15 PROCEDURE — 3008F PR BODY MASS INDEX (BMI) DOCUMENTED: ICD-10-PCS | Mod: CPTII,,, | Performed by: SURGERY

## 2022-08-15 PROCEDURE — 99215 OFFICE O/P EST HI 40 MIN: CPT | Mod: PBBFAC | Performed by: SURGERY

## 2022-08-15 PROCEDURE — 3079F DIAST BP 80-89 MM HG: CPT | Mod: CPTII,,, | Performed by: SURGERY

## 2022-08-15 PROCEDURE — 4010F PR ACE/ARB THEARPY RXD/TAKEN: ICD-10-PCS | Mod: CPTII,,, | Performed by: SURGERY

## 2022-08-15 PROCEDURE — 3077F PR MOST RECENT SYSTOLIC BLOOD PRESSURE >= 140 MM HG: ICD-10-PCS | Mod: CPTII,,, | Performed by: SURGERY

## 2022-08-15 PROCEDURE — 1159F PR MEDICATION LIST DOCUMENTED IN MEDICAL RECORD: ICD-10-PCS | Mod: CPTII,,, | Performed by: SURGERY

## 2022-08-15 PROCEDURE — 1159F MED LIST DOCD IN RCRD: CPT | Mod: CPTII,,, | Performed by: SURGERY

## 2022-08-15 PROCEDURE — 3008F BODY MASS INDEX DOCD: CPT | Mod: CPTII,,, | Performed by: SURGERY

## 2022-08-15 PROCEDURE — 3077F SYST BP >= 140 MM HG: CPT | Mod: CPTII,,, | Performed by: SURGERY

## 2022-08-15 PROCEDURE — 99213 OFFICE O/P EST LOW 20 MIN: CPT | Mod: S$PBB,,, | Performed by: SURGERY

## 2022-08-15 NOTE — PROGRESS NOTES
Surgery Clinic Note     CC: consultation for bilateral breast reduction    HPI:  48 year old with 30 lb weight loss presents with persistent neck and back pain. She is concerned this pain is related to her breast and was referred to clinic for consultation regarding bilateral breast reduction. She has additional concerns regarding side breast tissue and asked about liposuction. Pt was seen in clinic in 2018 and denied for a breast lift due to insurance coverage. Denies rashes and f/c/n/v/CP/SOB.    PMH:   Past Medical History:   Diagnosis Date    Anxiety     Edema     Hypertension     Initial insomnia     Sickle cell trait     Stroke     Tobacco use      PSH:   Past Surgical History:   Procedure Laterality Date    BREAST SURGERY       SECTION      x 2    CYST REMOVAL      from left tube    gastric sleeve      Raheel-en-Y gastric bypass      TONSILLECTOMY       Fam Hx:   Family History   Adopted: Yes   Problem Relation Age of Onset    Hypertension Mother     Thyroid disease Paternal Aunt     Hypertension Paternal Grandmother     Breast cancer Neg Hx     Colon cancer Neg Hx     Ovarian cancer Neg Hx      Social Hx:   Social History     Socioeconomic History    Marital status: Single   Tobacco Use    Smoking status: Former Smoker     Packs/day: 0.50     Types: Cigarettes    Smokeless tobacco: Never Used   Substance and Sexual Activity    Alcohol use: Yes    Drug use: No    Sexual activity: Not Currently     Partners: Male     Birth control/protection: None     Allergies: *  Review of patient's allergies indicates:   Allergen Reactions    Lisinopril Anaphylaxis     This is only possible agent at the time.  This is only possible agent at the time.  This is only possible agent at the time.    Sulfa (sulfonamide antibiotics) Anaphylaxis    Beta-blockers (beta-adrenergic blocking agts)      Other reaction(s): Other (See Comments)  Syncope & Collapse         ROS: Negative except  "above     Current Outpatient Medications on File Prior to Visit   Medication Sig Dispense Refill    atorvastatin (LIPITOR) 80 MG tablet Take 1 tablet (80 mg total) by mouth every evening. 90 tablet 2    cholecalciferol, vitamin D3, 1,250 mcg (50,000 unit) capsule Take 1 capsule (50,000 Units total) by mouth every 7 days. 12 capsule 1    sucralfate (CARAFATE) 1 gram tablet TAKE 1 TABLET BY MOUTH 4 TIMES A DAY BEFORE MEALS AND AT BEDTIME 120 tablet 3    aspirin (ECOTRIN) 81 MG EC tablet Take 1 tablet (81 mg total) by mouth once daily. 30 tablet 3    clopidogreL (PLAVIX) 75 mg tablet Take 1 tablet (75 mg total) by mouth once daily. 30 tablet 2    DULoxetine (CYMBALTA) 60 MG capsule Take 1 capsule (60 mg total) by mouth 2 (two) times a day. 60 capsule 2    furosemide (LASIX) 40 MG tablet Take 2 tablets (80 mg total) by mouth once daily AND 1 tablet (40 mg total) every evening. 90 tablet 2    isosorbide mononitrate (IMDUR) 60 MG 24 hr tablet Take 1 tablet (60 mg total) by mouth once daily. 30 tablet 2    losartan (COZAAR) 100 MG tablet Take 1 tablet (100 mg total) by mouth once daily. 30 tablet 2    pantoprazole (PROTONIX) 40 MG tablet Take 1 tablet (40 mg total) by mouth once daily. 30 tablet 2     No current facility-administered medications on file prior to visit.       Physical Exam  BP (!) 160/85 (BP Location: Right arm, Patient Position: Sitting, BP Method: Large (Automatic))   Pulse 92   Temp 97 °F (36.1 °C) (Oral)   Resp 20   Ht 5' 6.93" (1.7 m)   Wt 85.1 kg (187 lb 9.8 oz)   SpO2 98%   BMI 29.45 kg/m²   General: NAD, AAO X 3  CV: Regular rate and rhythm without murmurs  Resp: Clear to ascultation bilaterally  Abdomen: soft, non-tender, non-distended, bowel sounds present  Breast: Pendulous small breast with some ptosis and extramammary axillary tissue      ASSESSMENT/PLAN  48 year old with 30 lb weight loss presents with persistent neck and back pain and pendulous breasts. Pts pain is not likely " related to her breasts, as their size does not warrant a breast reduction. Pt would benefit from a mastopexy to lift the nipples and remove the extra skin. This is a cosmetic operation, not covered by insurance.    -F/U PRN  -given information to contact Dr. Jaimes's private clinic for breast lift consultation    Betty Hernandez MS4   And  Raad Zapien MD  PGY 5 [employer] [facility]

## 2022-08-17 ENCOUNTER — TELEPHONE (OUTPATIENT)
Dept: CARDIOLOGY | Facility: CLINIC | Age: 52
End: 2022-08-17
Payer: MEDICAID

## 2022-08-17 NOTE — TELEPHONE ENCOUNTER
Pt was contacted and Advised Per Dr. Gerard to seek medical attention ASAP at the ED in regards to c/o of Leg numbness and discoloration. Pt verbalized understanding with no questions or concerns.

## 2022-08-17 NOTE — TELEPHONE ENCOUNTER
Pt states that her foot on the  Left Foot is cold and black from the toes to the ankle been that way for 5 days. Please contact the pt asap

## 2022-08-31 ENCOUNTER — TELEPHONE (OUTPATIENT)
Dept: SMOKING CESSATION | Facility: CLINIC | Age: 52
End: 2022-08-31
Payer: MEDICAID

## 2022-10-11 ENCOUNTER — TELEPHONE (OUTPATIENT)
Dept: SMOKING CESSATION | Facility: CLINIC | Age: 52
End: 2022-10-11
Payer: MEDICAID

## 2022-10-21 ENCOUNTER — PATIENT MESSAGE (OUTPATIENT)
Dept: ADMINISTRATIVE | Facility: HOSPITAL | Age: 52
End: 2022-10-21
Payer: MEDICAID

## 2022-11-03 ENCOUNTER — PATIENT OUTREACH (OUTPATIENT)
Dept: ADMINISTRATIVE | Facility: HOSPITAL | Age: 52
End: 2022-11-03
Payer: MEDICAID

## 2022-11-30 ENCOUNTER — PATIENT OUTREACH (OUTPATIENT)
Dept: ADMINISTRATIVE | Facility: HOSPITAL | Age: 52
End: 2022-11-30
Payer: MEDICAID

## 2022-11-30 NOTE — PROGRESS NOTES
HTN Report:  Attempted to contact pt in regards to obtaining remote bp, and schedule follow up appt with Dr Heart, the number on file for this chart is invalid, pt answered told me wrong number ([phone]).

## 2022-12-29 ENCOUNTER — TELEPHONE (OUTPATIENT)
Dept: PRIMARY CARE CLINIC | Facility: CLINIC | Age: 52
End: 2022-12-29
Payer: MEDICAID

## 2022-12-29 NOTE — TELEPHONE ENCOUNTER
----- Message from Betsy Saavedra sent at 12/29/2022  9:36 AM CST -----  Contact: [phone]  [email or web address]     Patient is calling for Medical Advice regarding: lower abdominal pain, cannot hold any food down    How long has patient had these symptoms: last weeks     Pharmacy name and phone#:      [id number] - [place] - [address]  [address]  [place] [address]  Phone: [phone] Fax: [phone]          Would like response via [employer]:  requesting a referral to GI    Comments:

## 2022-12-29 NOTE — TELEPHONE ENCOUNTER
I called the patient, she states that she is having issues with her abdomen not being able to use the bathroom and or not being able to control her bowels after surgery and possible dehydration. Patient was advised to report to ER to be evaluated.

## 2023-01-04 ENCOUNTER — OFFICE VISIT (OUTPATIENT)
Dept: URGENT CARE | Facility: CLINIC | Age: 53
End: 2023-01-04
Payer: MEDICAID

## 2023-01-04 VITALS
WEIGHT: 187 LBS | OXYGEN SATURATION: 96 % | HEART RATE: 89 BPM | SYSTOLIC BLOOD PRESSURE: 174 MMHG | TEMPERATURE: 100 F | BODY MASS INDEX: 29.35 KG/M2 | HEIGHT: 67 IN | DIASTOLIC BLOOD PRESSURE: 96 MMHG | RESPIRATION RATE: 20 BRPM

## 2023-01-04 DIAGNOSIS — R11.0 NAUSEA: ICD-10-CM

## 2023-01-04 DIAGNOSIS — B37.0 THRUSH: ICD-10-CM

## 2023-01-04 DIAGNOSIS — R10.9 ABDOMINAL PAIN, UNSPECIFIED ABDOMINAL LOCATION: Primary | ICD-10-CM

## 2023-01-04 LAB
CTP QC/QA: YES
CTP QC/QA: YES
POC MOLECULAR INFLUENZA A AGN: NEGATIVE
POC MOLECULAR INFLUENZA B AGN: NEGATIVE
SARS-COV-2 AG RESP QL IA.RAPID: NEGATIVE

## 2023-01-04 PROCEDURE — 3077F PR MOST RECENT SYSTOLIC BLOOD PRESSURE >= 140 MM HG: ICD-10-PCS | Mod: CPTII,S$GLB,, | Performed by: PHYSICIAN ASSISTANT

## 2023-01-04 PROCEDURE — 1160F RVW MEDS BY RX/DR IN RCRD: CPT | Mod: CPTII,S$GLB,, | Performed by: PHYSICIAN ASSISTANT

## 2023-01-04 PROCEDURE — 87811 SARS CORONAVIRUS 2 ANTIGEN POCT, MANUAL READ: ICD-10-PCS | Mod: QW,S$GLB,, | Performed by: PHYSICIAN ASSISTANT

## 2023-01-04 PROCEDURE — 99214 PR OFFICE/OUTPT VISIT, EST, LEVL IV, 30-39 MIN: ICD-10-PCS | Mod: S$GLB,,, | Performed by: PHYSICIAN ASSISTANT

## 2023-01-04 PROCEDURE — 99214 OFFICE O/P EST MOD 30 MIN: CPT | Mod: S$GLB,,, | Performed by: PHYSICIAN ASSISTANT

## 2023-01-04 PROCEDURE — 1159F MED LIST DOCD IN RCRD: CPT | Mod: CPTII,S$GLB,, | Performed by: PHYSICIAN ASSISTANT

## 2023-01-04 PROCEDURE — 87811 SARS-COV-2 COVID19 W/OPTIC: CPT | Mod: QW,S$GLB,, | Performed by: PHYSICIAN ASSISTANT

## 2023-01-04 PROCEDURE — 3008F BODY MASS INDEX DOCD: CPT | Mod: CPTII,S$GLB,, | Performed by: PHYSICIAN ASSISTANT

## 2023-01-04 PROCEDURE — 3008F PR BODY MASS INDEX (BMI) DOCUMENTED: ICD-10-PCS | Mod: CPTII,S$GLB,, | Performed by: PHYSICIAN ASSISTANT

## 2023-01-04 PROCEDURE — 87502 INFLUENZA DNA AMP PROBE: CPT | Mod: QW,S$GLB,, | Performed by: PHYSICIAN ASSISTANT

## 2023-01-04 PROCEDURE — 1159F PR MEDICATION LIST DOCUMENTED IN MEDICAL RECORD: ICD-10-PCS | Mod: CPTII,S$GLB,, | Performed by: PHYSICIAN ASSISTANT

## 2023-01-04 PROCEDURE — 1160F PR REVIEW ALL MEDS BY PRESCRIBER/CLIN PHARMACIST DOCUMENTED: ICD-10-PCS | Mod: CPTII,S$GLB,, | Performed by: PHYSICIAN ASSISTANT

## 2023-01-04 PROCEDURE — 3077F SYST BP >= 140 MM HG: CPT | Mod: CPTII,S$GLB,, | Performed by: PHYSICIAN ASSISTANT

## 2023-01-04 PROCEDURE — 3080F PR MOST RECENT DIASTOLIC BLOOD PRESSURE >= 90 MM HG: ICD-10-PCS | Mod: CPTII,S$GLB,, | Performed by: PHYSICIAN ASSISTANT

## 2023-01-04 PROCEDURE — 3080F DIAST BP >= 90 MM HG: CPT | Mod: CPTII,S$GLB,, | Performed by: PHYSICIAN ASSISTANT

## 2023-01-04 PROCEDURE — 87502 POCT INFLUENZA A/B MOLECULAR: ICD-10-PCS | Mod: QW,S$GLB,, | Performed by: PHYSICIAN ASSISTANT

## 2023-01-04 RX ORDER — PROMETHAZINE HYDROCHLORIDE 25 MG/1
25 TABLET ORAL EVERY 4 HOURS
Qty: 12 TABLET | Refills: 0 | Status: SHIPPED | OUTPATIENT
Start: 2023-01-04 | End: 2023-10-02 | Stop reason: ALTCHOICE

## 2023-01-04 RX ORDER — LIDOCAINE HYDROCHLORIDE 20 MG/ML
10 SOLUTION OROPHARYNGEAL ONCE
Status: COMPLETED | OUTPATIENT
Start: 2023-01-04 | End: 2023-01-04

## 2023-01-04 RX ORDER — MAG HYDROX/ALUMINUM HYD/SIMETH 200-200-20
30 SUSPENSION, ORAL (FINAL DOSE FORM) ORAL
Status: COMPLETED | OUTPATIENT
Start: 2023-01-04 | End: 2023-01-04

## 2023-01-04 RX ORDER — SUCRALFATE 1 G/1
TABLET ORAL
Qty: 120 TABLET | Refills: 3 | Status: ON HOLD | OUTPATIENT
Start: 2023-01-04 | End: 2023-01-24 | Stop reason: SDUPTHER

## 2023-01-04 RX ORDER — FLUCONAZOLE 100 MG/1
100 TABLET ORAL DAILY
Qty: 7 TABLET | Refills: 0 | Status: SHIPPED | OUTPATIENT
Start: 2023-01-04 | End: 2023-01-11

## 2023-01-04 RX ORDER — DICYCLOMINE HYDROCHLORIDE 10 MG/5ML
20 SOLUTION ORAL
Status: COMPLETED | OUTPATIENT
Start: 2023-01-04 | End: 2023-01-04

## 2023-01-04 RX ADMIN — DICYCLOMINE HYDROCHLORIDE 20 MG: 10 SOLUTION ORAL at 07:01

## 2023-01-04 RX ADMIN — LIDOCAINE HYDROCHLORIDE 10 ML: 20 SOLUTION OROPHARYNGEAL at 07:01

## 2023-01-04 RX ADMIN — Medication 30 ML: at 07:01

## 2023-01-05 NOTE — PROGRESS NOTES
"Subjective:       Patient ID: Joe Ceballos is a 52 y.o. female.    Vitals:  height is 5' 6.93" (1.7 m) and weight is 84.8 kg (187 lb). Her temperature is 99.8 °F (37.7 °C). Her blood pressure is 174/96 (abnormal) and her pulse is 89. Her respiration is 20 and oxygen saturation is 96%.     Chief Complaint: No chief complaint on file.    Joe Ceballos is a 52 year old female who presents today with complaints of mouth discomfort and white plaque formations and concern for thrush.  She also states she is having abdominal pain, nausea and diarrhea. She is status post gastric bypass and states anything she eats goes right through her.  She has also had mild sore throat.  States she is tolerating foods and liquids.  States urinating adequately.    Abdominal Pain  This is a new problem. The current episode started in the past 7 days. The onset quality is sudden. The problem occurs constantly. The problem has been unchanged. The pain is located in the generalized abdominal region. The pain is at a severity of 10/10. The pain is severe. The quality of the pain is burning and cramping. The abdominal pain does not radiate. Associated symptoms include diarrhea, a fever, flatus, headaches, myalgias and nausea. Pertinent negatives include no anorexia, arthralgias, belching, constipation, dysuria, frequency, hematochezia, hematuria, melena or weight loss. The pain is relieved by Nothing.     Constitution: Positive for fever.   Gastrointestinal:  Positive for abdominal pain, nausea and diarrhea. Negative for constipation and bright red blood in stool.   Genitourinary:  Negative for dysuria, frequency and hematuria.   Musculoskeletal:  Positive for muscle ache. Negative for joint pain.   Neurological:  Positive for headaches.     Objective:      Physical Exam   Constitutional: She is oriented to person, place, and time. She appears well-developed.   HENT:   Head: Normocephalic and atraumatic.   Ears:   Right Ear: Hearing, " tympanic membrane, external ear and ear canal normal.   Left Ear: Hearing, tympanic membrane, external ear and ear canal normal.   Nose: Nose normal.   Mouth/Throat: Uvula is midline, oropharynx is clear and moist and mucous membranes are normal. No tonsillar exudate.   Mild white patches to mouth.  Slightly dry.        Comments: Mild white patches to mouth.  Slightly dry.    Eyes: Conjunctivae and lids are normal.   Neck: Trachea normal. Neck supple.   Cardiovascular: Normal rate, regular rhythm and normal heart sounds.   Pulmonary/Chest: Effort normal and breath sounds normal. No respiratory distress.   Abdominal: Normal appearance and bowel sounds are normal. She exhibits no distension and no mass. Soft. flat abdomen There is no abdominal tenderness.      Comments: No acute abdomen on exam.   Musculoskeletal: Normal range of motion.         General: Normal range of motion.   Neurological: She is alert and oriented to person, place, and time. She has normal strength.   Skin: Skin is warm, dry, intact, not diaphoretic and not pale.   Psychiatric: Her speech is normal and behavior is normal. Judgment and thought content normal.   Nursing note and vitals reviewed.      Assessment:       1. Abdominal pain, unspecified abdominal location    2. Nausea    3. Thrush          Plan:         Abdominal pain, unspecified abdominal location  -     POCT Influenza A/B MOLECULAR  -     SARS Coronavirus 2 Antigen, POCT Manual Read  -     promethazine (PHENERGAN) 25 MG tablet; Take 1 tablet (25 mg total) by mouth every 4 (four) hours.  Dispense: 12 tablet; Refill: 0  -     sucralfate (CARAFATE) 1 gram tablet; TAKE 1 TABLET BY MOUTH 4 TIMES A DAY BEFORE MEALS AND AT BEDTIME  Dispense: 120 tablet; Refill: 3  -     LIDOcaine HCl 2% oral solution 10 mL  -     aluminum-magnesium hydroxide-simethicone 200-200-20 mg/5 mL suspension 30 mL  -     dicyclomine 10 mg/5 mL syrup 20 mg    Nausea  -     promethazine (PHENERGAN) 25 MG tablet; Take  1 tablet (25 mg total) by mouth every 4 (four) hours.  Dispense: 12 tablet; Refill: 0  -     sucralfate (CARAFATE) 1 gram tablet; TAKE 1 TABLET BY MOUTH 4 TIMES A DAY BEFORE MEALS AND AT BEDTIME  Dispense: 120 tablet; Refill: 3    Thrush  -     fluconazole (DIFLUCAN) 100 MG tablet; Take 1 tablet (100 mg total) by mouth once daily. for 7 days  Dispense: 7 tablet; Refill: 0         Results for orders placed or performed in visit on 01/04/23   POCT Influenza A/B MOLECULAR   Result Value Ref Range    POC Molecular Influenza A Ag Negative Negative, Not Reported    POC Molecular Influenza B Ag Negative Negative, Not Reported     Acceptable Yes    SARS Coronavirus 2 Antigen, POCT Manual Read   Result Value Ref Range    SARS Coronavirus 2 Antigen Negative Negative     Acceptable Yes        GI cocktail given in clinic.  Refilled some of her standards meds.  Diflucan sent to pharmacy.  Patient states the Nystatin swish and spit has not worked in the past.  Patient advised to increase fluids PO.  Recommended to follow up with GI and/or her Bariatric surgeon.  I am concerned she is having a dumping syndrome.  RTC or go to the ER with new or worsening symptoms.

## 2023-01-11 ENCOUNTER — PATIENT MESSAGE (OUTPATIENT)
Dept: SMOKING CESSATION | Facility: CLINIC | Age: 53
End: 2023-01-11
Payer: MEDICAID

## 2023-01-11 ENCOUNTER — TELEPHONE (OUTPATIENT)
Dept: PRIMARY CARE CLINIC | Facility: CLINIC | Age: 53
End: 2023-01-11
Payer: MEDICAID

## 2023-01-11 ENCOUNTER — TELEPHONE (OUTPATIENT)
Dept: SMOKING CESSATION | Facility: CLINIC | Age: 53
End: 2023-01-11
Payer: MEDICAID

## 2023-01-11 DIAGNOSIS — Z01.419 WELL WOMAN EXAM: Primary | ICD-10-CM

## 2023-01-11 DIAGNOSIS — Z12.11 COLON CANCER SCREENING: ICD-10-CM

## 2023-01-11 DIAGNOSIS — I10 ESSENTIAL HYPERTENSION: ICD-10-CM

## 2023-01-11 DIAGNOSIS — Z11.4 ENCOUNTER FOR SCREENING FOR HIV: ICD-10-CM

## 2023-01-11 NOTE — TELEPHONE ENCOUNTER
Patient called per provider to schedule nurse visit for bp check and schedule additional labs that needed to be done. Patient voiced she had some concerns since her recent hospital follow up. Patient is scheduled a virtual with provider to discuss those concerns. Patient was informed of all appointment time and dates, patient voiced understanding.

## 2023-01-17 ENCOUNTER — HOSPITAL ENCOUNTER (OUTPATIENT)
Dept: PREADMISSION TESTING | Facility: HOSPITAL | Age: 53
Discharge: HOME | End: 2023-01-17
Attending: FAMILY MEDICINE
Payer: MEDICAID

## 2023-01-17 DIAGNOSIS — Z12.11 COLON CANCER SCREENING: ICD-10-CM

## 2023-01-21 ENCOUNTER — HOSPITAL ENCOUNTER (INPATIENT)
Facility: HOSPITAL | Age: 53
LOS: 3 days | Discharge: HOME | DRG: 812 | End: 2023-01-24
Attending: EMERGENCY MEDICINE | Admitting: HOSPITALIST
Payer: MEDICAID

## 2023-01-21 DIAGNOSIS — D64.9 SYMPTOMATIC ANEMIA: Primary | ICD-10-CM

## 2023-01-21 DIAGNOSIS — R10.9 ABDOMINAL PAIN, UNSPECIFIED ABDOMINAL LOCATION: ICD-10-CM

## 2023-01-21 DIAGNOSIS — R07.9 CHEST PAIN: ICD-10-CM

## 2023-01-21 DIAGNOSIS — R06.02 SOB (SHORTNESS OF BREATH): ICD-10-CM

## 2023-01-21 DIAGNOSIS — R55 SYNCOPE, UNSPECIFIED SYNCOPE TYPE: ICD-10-CM

## 2023-01-21 DIAGNOSIS — K21.9 GASTROESOPHAGEAL REFLUX DISEASE WITHOUT ESOPHAGITIS: ICD-10-CM

## 2023-01-21 DIAGNOSIS — M62.838 MUSCLE SPASM: ICD-10-CM

## 2023-01-21 DIAGNOSIS — R53.83 FATIGUE: ICD-10-CM

## 2023-01-21 DIAGNOSIS — R55 SYNCOPE: ICD-10-CM

## 2023-01-21 DIAGNOSIS — D64.9 SEVERE ANEMIA: ICD-10-CM

## 2023-01-21 DIAGNOSIS — R11.0 NAUSEA: ICD-10-CM

## 2023-01-21 LAB
ABO + RH BLD: NORMAL
ALBUMIN SERPL BCP-MCNC: 2.7 G/DL (ref 3.5–5.2)
ALP SERPL-CCNC: 135 U/L (ref 55–135)
ALT SERPL W/O P-5'-P-CCNC: 11 U/L (ref 10–44)
AMPHET+METHAMPHET UR QL: NEGATIVE
ANION GAP SERPL CALC-SCNC: 10 MMOL/L (ref 8–16)
AST SERPL-CCNC: 21 U/L (ref 10–40)
BARBITURATES UR QL SCN>200 NG/ML: NEGATIVE
BASOPHILS # BLD AUTO: 0.02 K/UL (ref 0–0.2)
BASOPHILS NFR BLD: 0.3 % (ref 0–1.9)
BENZODIAZ UR QL SCN>200 NG/ML: NEGATIVE
BILIRUB SERPL-MCNC: 0.5 MG/DL (ref 0.1–1)
BILIRUB UR QL STRIP: NEGATIVE
BLD GP AB SCN CELLS X3 SERPL QL: NORMAL
BLD PROD TYP BPU: NORMAL
BLOOD UNIT EXPIRATION DATE: NORMAL
BLOOD UNIT TYPE CODE: 6200
BLOOD UNIT TYPE: NORMAL
BNP SERPL-MCNC: 169 PG/ML (ref 0–99)
BUN SERPL-MCNC: 14 MG/DL (ref 6–20)
BZE UR QL SCN: NEGATIVE
CALCIUM SERPL-MCNC: 8.6 MG/DL (ref 8.7–10.5)
CANNABINOIDS UR QL SCN: NEGATIVE
CHLORIDE SERPL-SCNC: 108 MMOL/L (ref 95–110)
CLARITY UR: CLEAR
CO2 SERPL-SCNC: 23 MMOL/L (ref 23–29)
CODING SYSTEM: NORMAL
COLOR UR: COLORLESS
CREAT SERPL-MCNC: 1 MG/DL (ref 0.5–1.4)
CREAT UR-MCNC: 21.1 MG/DL (ref 15–325)
DIFFERENTIAL METHOD: ABNORMAL
DISPENSE STATUS: NORMAL
EOSINOPHIL # BLD AUTO: 0 K/UL (ref 0–0.5)
EOSINOPHIL NFR BLD: 0.6 % (ref 0–8)
ERYTHROCYTE [DISTWIDTH] IN BLOOD BY AUTOMATED COUNT: 21.5 % (ref 11.5–14.5)
EST. GFR  (NO RACE VARIABLE): >60 ML/MIN/1.73 M^2
GLUCOSE SERPL-MCNC: 94 MG/DL (ref 70–110)
GLUCOSE UR QL STRIP: NEGATIVE
HCT VFR BLD AUTO: 20.7 % (ref 37–48.5)
HGB BLD-MCNC: 6 G/DL (ref 12–16)
HGB UR QL STRIP: NEGATIVE
IMM GRANULOCYTES # BLD AUTO: 0.02 K/UL (ref 0–0.04)
IMM GRANULOCYTES NFR BLD AUTO: 0.3 % (ref 0–0.5)
KETONES UR QL STRIP: NEGATIVE
LEUKOCYTE ESTERASE UR QL STRIP: NEGATIVE
LYMPHOCYTES # BLD AUTO: 1.2 K/UL (ref 1–4.8)
LYMPHOCYTES NFR BLD: 16.8 % (ref 18–48)
MCH RBC QN AUTO: 19.8 PG (ref 27–31)
MCHC RBC AUTO-ENTMCNC: 29 G/DL (ref 32–36)
MCV RBC AUTO: 68 FL (ref 82–98)
METHADONE UR QL SCN>300 NG/ML: NEGATIVE
MONOCYTES # BLD AUTO: 0.7 K/UL (ref 0.3–1)
MONOCYTES NFR BLD: 9.5 % (ref 4–15)
NEUTROPHILS # BLD AUTO: 5.2 K/UL (ref 1.8–7.7)
NEUTROPHILS NFR BLD: 72.5 % (ref 38–73)
NITRITE UR QL STRIP: NEGATIVE
NRBC BLD-RTO: 0 /100 WBC
NUM UNITS TRANS PACKED RBC: NORMAL
OB PNL STL: NEGATIVE
OPIATES UR QL SCN: NEGATIVE
OVALOCYTES BLD QL SMEAR: ABNORMAL
PCP UR QL SCN>25 NG/ML: NEGATIVE
PH UR STRIP: 7 [PH] (ref 5–8)
PLATELET # BLD AUTO: 387 K/UL (ref 150–450)
PMV BLD AUTO: 9.3 FL (ref 9.2–12.9)
POTASSIUM SERPL-SCNC: 3.9 MMOL/L (ref 3.5–5.1)
PROT SERPL-MCNC: 7 G/DL (ref 6–8.4)
PROT UR QL STRIP: NEGATIVE
RBC # BLD AUTO: 3.03 M/UL (ref 4–5.4)
SODIUM SERPL-SCNC: 141 MMOL/L (ref 136–145)
SP GR UR STRIP: 1.01 (ref 1–1.03)
TARGETS BLD QL SMEAR: ABNORMAL
TOXICOLOGY INFORMATION: NORMAL
TROPONIN I SERPL DL<=0.01 NG/ML-MCNC: 0.02 NG/ML (ref 0–0.03)
URN SPEC COLLECT METH UR: ABNORMAL
UROBILINOGEN UR STRIP-ACNC: NEGATIVE EU/DL
WBC # BLD AUTO: 7.15 K/UL (ref 3.9–12.7)

## 2023-01-21 PROCEDURE — 93005 ELECTROCARDIOGRAM TRACING: CPT

## 2023-01-21 PROCEDURE — 84484 ASSAY OF TROPONIN QUANT: CPT | Performed by: EMERGENCY MEDICINE

## 2023-01-21 PROCEDURE — 85025 COMPLETE CBC W/AUTO DIFF WBC: CPT | Performed by: EMERGENCY MEDICINE

## 2023-01-21 PROCEDURE — 86920 COMPATIBILITY TEST SPIN: CPT | Performed by: FAMILY MEDICINE

## 2023-01-21 PROCEDURE — 93010 EKG 12-LEAD: ICD-10-PCS | Mod: ,,, | Performed by: INTERNAL MEDICINE

## 2023-01-21 PROCEDURE — 86900 BLOOD TYPING SEROLOGIC ABO: CPT | Performed by: EMERGENCY MEDICINE

## 2023-01-21 PROCEDURE — 83880 ASSAY OF NATRIURETIC PEPTIDE: CPT | Performed by: EMERGENCY MEDICINE

## 2023-01-21 PROCEDURE — 80307 DRUG TEST PRSMV CHEM ANLYZR: CPT | Performed by: EMERGENCY MEDICINE

## 2023-01-21 PROCEDURE — 63600175 PHARM REV CODE 636 W HCPCS: Performed by: HOSPITALIST

## 2023-01-21 PROCEDURE — P9016 RBC LEUKOCYTES REDUCED: HCPCS | Performed by: EMERGENCY MEDICINE

## 2023-01-21 PROCEDURE — 82272 OCCULT BLD FECES 1-3 TESTS: CPT | Performed by: EMERGENCY MEDICINE

## 2023-01-21 PROCEDURE — 86920 COMPATIBILITY TEST SPIN: CPT | Performed by: EMERGENCY MEDICINE

## 2023-01-21 PROCEDURE — 93010 ELECTROCARDIOGRAM REPORT: CPT | Mod: ,,, | Performed by: INTERNAL MEDICINE

## 2023-01-21 PROCEDURE — 36415 COLL VENOUS BLD VENIPUNCTURE: CPT | Performed by: HOSPITALIST

## 2023-01-21 PROCEDURE — 63600175 PHARM REV CODE 636 W HCPCS: Performed by: EMERGENCY MEDICINE

## 2023-01-21 PROCEDURE — 81003 URINALYSIS AUTO W/O SCOPE: CPT | Performed by: EMERGENCY MEDICINE

## 2023-01-21 PROCEDURE — 11000001 HC ACUTE MED/SURG PRIVATE ROOM

## 2023-01-21 PROCEDURE — 25000003 PHARM REV CODE 250: Performed by: HOSPITALIST

## 2023-01-21 PROCEDURE — 99285 EMERGENCY DEPT VISIT HI MDM: CPT | Mod: 25

## 2023-01-21 PROCEDURE — 80053 COMPREHEN METABOLIC PANEL: CPT | Performed by: EMERGENCY MEDICINE

## 2023-01-21 RX ORDER — MAG HYDROX/ALUMINUM HYD/SIMETH 200-200-20
30 SUSPENSION, ORAL (FINAL DOSE FORM) ORAL 4 TIMES DAILY PRN
Status: DISCONTINUED | OUTPATIENT
Start: 2023-01-21 | End: 2023-01-24 | Stop reason: HOSPADM

## 2023-01-21 RX ORDER — ONDANSETRON 2 MG/ML
4 INJECTION INTRAMUSCULAR; INTRAVENOUS EVERY 8 HOURS PRN
Status: DISCONTINUED | OUTPATIENT
Start: 2023-01-21 | End: 2023-01-24 | Stop reason: HOSPADM

## 2023-01-21 RX ORDER — ONDANSETRON 2 MG/ML
8 INJECTION INTRAMUSCULAR; INTRAVENOUS
Status: COMPLETED | OUTPATIENT
Start: 2023-01-21 | End: 2023-01-21

## 2023-01-21 RX ORDER — ACETAMINOPHEN 325 MG/1
650 TABLET ORAL EVERY 8 HOURS PRN
Status: DISCONTINUED | OUTPATIENT
Start: 2023-01-21 | End: 2023-01-24 | Stop reason: HOSPADM

## 2023-01-21 RX ORDER — AMOXICILLIN 250 MG
1 CAPSULE ORAL 2 TIMES DAILY PRN
Status: DISCONTINUED | OUTPATIENT
Start: 2023-01-21 | End: 2023-01-24 | Stop reason: HOSPADM

## 2023-01-21 RX ORDER — SODIUM CHLORIDE 0.9 % (FLUSH) 0.9 %
3 SYRINGE (ML) INJECTION EVERY 12 HOURS PRN
Status: DISCONTINUED | OUTPATIENT
Start: 2023-01-21 | End: 2023-01-24 | Stop reason: HOSPADM

## 2023-01-21 RX ORDER — IBUPROFEN 200 MG
16 TABLET ORAL
Status: DISCONTINUED | OUTPATIENT
Start: 2023-01-21 | End: 2023-01-24 | Stop reason: HOSPADM

## 2023-01-21 RX ORDER — IPRATROPIUM BROMIDE AND ALBUTEROL SULFATE 2.5; .5 MG/3ML; MG/3ML
3 SOLUTION RESPIRATORY (INHALATION) EVERY 6 HOURS PRN
Status: DISCONTINUED | OUTPATIENT
Start: 2023-01-21 | End: 2023-01-24 | Stop reason: HOSPADM

## 2023-01-21 RX ORDER — HYDROCODONE BITARTRATE AND ACETAMINOPHEN 5; 325 MG/1; MG/1
1 TABLET ORAL EVERY 6 HOURS PRN
Status: DISCONTINUED | OUTPATIENT
Start: 2023-01-21 | End: 2023-01-22

## 2023-01-21 RX ORDER — DIPHENHYDRAMINE HYDROCHLORIDE 50 MG/ML
25 INJECTION INTRAMUSCULAR; INTRAVENOUS EVERY 6 HOURS PRN
Status: DISCONTINUED | OUTPATIENT
Start: 2023-01-21 | End: 2023-01-24 | Stop reason: HOSPADM

## 2023-01-21 RX ORDER — TALC
6 POWDER (GRAM) TOPICAL NIGHTLY PRN
Status: DISCONTINUED | OUTPATIENT
Start: 2023-01-21 | End: 2023-01-24 | Stop reason: HOSPADM

## 2023-01-21 RX ORDER — IBUPROFEN 200 MG
24 TABLET ORAL
Status: DISCONTINUED | OUTPATIENT
Start: 2023-01-21 | End: 2023-01-24 | Stop reason: HOSPADM

## 2023-01-21 RX ORDER — PROMETHAZINE HYDROCHLORIDE 25 MG/1
25 TABLET ORAL EVERY 6 HOURS PRN
Status: DISCONTINUED | OUTPATIENT
Start: 2023-01-21 | End: 2023-01-24 | Stop reason: HOSPADM

## 2023-01-21 RX ORDER — GLUCAGON 1 MG
1 KIT INJECTION
Status: DISCONTINUED | OUTPATIENT
Start: 2023-01-21 | End: 2023-01-24 | Stop reason: HOSPADM

## 2023-01-21 RX ORDER — NALOXONE HCL 0.4 MG/ML
0.02 VIAL (ML) INJECTION
Status: DISCONTINUED | OUTPATIENT
Start: 2023-01-21 | End: 2023-01-24 | Stop reason: HOSPADM

## 2023-01-21 RX ORDER — MORPHINE SULFATE 2 MG/ML
2 INJECTION, SOLUTION INTRAMUSCULAR; INTRAVENOUS EVERY 4 HOURS PRN
Status: DISCONTINUED | OUTPATIENT
Start: 2023-01-21 | End: 2023-01-22

## 2023-01-21 RX ORDER — HYDROCODONE BITARTRATE AND ACETAMINOPHEN 500; 5 MG/1; MG/1
TABLET ORAL
Status: DISCONTINUED | OUTPATIENT
Start: 2023-01-21 | End: 2023-01-23

## 2023-01-21 RX ORDER — ACETAMINOPHEN 650 MG/1
650 SUPPOSITORY RECTAL EVERY 6 HOURS PRN
Status: DISCONTINUED | OUTPATIENT
Start: 2023-01-21 | End: 2023-01-24 | Stop reason: HOSPADM

## 2023-01-21 RX ADMIN — ONDANSETRON 4 MG: 2 INJECTION INTRAMUSCULAR; INTRAVENOUS at 10:01

## 2023-01-21 RX ADMIN — DIPHENHYDRAMINE HYDROCHLORIDE 25 MG: 50 INJECTION, SOLUTION INTRAMUSCULAR; INTRAVENOUS at 11:01

## 2023-01-21 RX ADMIN — HYDROCODONE BITARTRATE AND ACETAMINOPHEN 1 TABLET: 5; 325 TABLET ORAL at 09:01

## 2023-01-21 RX ADMIN — ONDANSETRON 8 MG: 2 INJECTION INTRAMUSCULAR; INTRAVENOUS at 02:01

## 2023-01-21 RX ADMIN — SODIUM CHLORIDE, POTASSIUM CHLORIDE, SODIUM LACTATE AND CALCIUM CHLORIDE 1000 ML: 600; 310; 30; 20 INJECTION, SOLUTION INTRAVENOUS at 02:01

## 2023-01-21 NOTE — Clinical Note
Diagnosis: Fatigue [577628]   Admitting Provider:: LINDSEY HERCULES [[id number]]   Future Attending Provider: LINDSEY HERCULES [[id number]]   Reason for IP Medical Treatment  (Clinical interventions that can only be accomplished in the IP setting? ) :: symptomatic anemia, syncope   Estimated Length of Stay:: 2 midnights   I certify that Inpatient services for greater than or equal to 2 midnights are medically necessary:: Yes   Plans for Post-Acute care--if anticipated (pick the single best option):: A. No post acute care anticipated at this time   Special Needs:: No Special Needs [1]

## 2023-01-21 NOTE — ED PROVIDER NOTES
"SCRIBE #1 NOTE: I, Nuria Lucy, am scribing for, and in the presence of, Eris Cordero MD. I have scribed the entire note.       History     Chief Complaint   Patient presents with    Fatigue     Weakness, low blood pressure en route     Review of patient's allergies indicates:   Allergen Reactions    Lisinopril Anaphylaxis     This is only possible agent at the time.  This is only possible agent at the time.  This is only possible agent at the time.    Sulfa (sulfonamide antibiotics) Anaphylaxis    Beta-blockers (beta-adrenergic blocking agts)      Other reaction(s): Other (See Comments)  Syncope & Collapse         History of Present Illness     HPI    2023, 2:12 PM  History obtained from the patient      History of Present Illness: Joe Ceballos is a 53 y.o. female patient with a PMHx of HTN and stroke who presents to the Emergency Department for evaluation of fatigue and dizziness which worsens with exertion. Pt reports a syncopal episode while packing to leave her place as their water is out. Pt denies any head trauma. No mitigating or exacerbating factors reported. She reports she had blood in her stool one time "a couple weeks ago" but none currently. No further complaints or concerns at this time.       Arrival mode: Ambulance service    PCP: Daphney Heart MD        Past Medical History:  Past Medical History:   Diagnosis Date    Anxiety     CHF (congestive heart failure)     Edema     Hypertension     Initial insomnia     Sickle cell trait     Stroke     Tobacco use        Past Surgical History:  Past Surgical History:   Procedure Laterality Date    BREAST SURGERY       SECTION      x 2    CYST REMOVAL      from left tube    gastric sleeve      Raheel-en-Y gastric bypass      TONSILLECTOMY           Family History:  Family History   Adopted: Yes   Problem Relation Age of Onset    Hypertension Mother     Thyroid disease Paternal Aunt     Hypertension Paternal Grandmother     " Breast cancer Neg Hx     Colon cancer Neg Hx     Ovarian cancer Neg Hx        Social History:  Social History     Tobacco Use    Smoking status: Every Day     Packs/day: 0.50     Types: Cigarettes    Smokeless tobacco: Never   Substance and Sexual Activity    Alcohol use: Yes    Drug use: No    Sexual activity: Not Currently     Partners: Male     Birth control/protection: None        Review of Systems     Review of Systems   Constitutional:  Positive for fatigue. Negative for fever.   HENT:  Negative for sore throat.    Respiratory:  Positive for shortness of breath.    Cardiovascular:  Negative for chest pain.   Gastrointestinal:  Positive for nausea.   Genitourinary:  Negative for dysuria.   Musculoskeletal:  Negative for back pain.   Skin:  Negative for rash.   Neurological:  Positive for dizziness and syncope. Negative for weakness.   Hematological:  Does not bruise/bleed easily.   All other systems reviewed and are negative.   Physical Exam     Initial Vitals [01/21/23 1353]   BP Pulse Resp Temp SpO2   108/63 70 18 98.7 °F (37.1 °C) 96 %      MAP       --          Physical Exam  Nursing Notes and Vital Signs reviewed.  Constitutional: Patient is in no acute distress. Appears fatigued.  Head: Normocephalic. Atraumatic.   Eyes:  No scleral icterus.   ENT: Mucous membranes moist.   Neck: Supple.   Cardiovascular: Regular rate. Regular rhythm. Faint aortic systolic murmur.   Pulmonary: No respiratory distress.   Abdominal: Non-distended.   Rectal: Female chaperone present for the duration of the rectal exam.There is no tenderness. No masses or hemorrhoids. Normal sphincter tone. The stool color is light brown.  Musculoskeletal: Moves all extremities. No obvious deformities.   Skin: Warm and dry. Cap refill 2-3 sec.   Neurological:  Alert, awake, and appropriate. Normal speech. No acute lateralizing neurologic deficits appreciated.   Psychiatric: Normal affect.        ED Course   Critical Care    Date/Time:  1/21/2023 3:00 PM  Performed by: Eris Cordero MD  Authorized by: Eris Cordero MD   Direct patient critical care time: 12 minutes  Additional history critical care time: 6 minutes  Ordering / reviewing critical care time: 6 minutes  Documentation critical care time: 6 minutes  Consulting other physicians critical care time: 3 minutes  Total critical care time (exclusive of procedural time) : 33 minutes  Critical care time was exclusive of separately billable procedures and treating other patients and teaching time.  Critical care was necessary to treat or prevent imminent or life-threatening deterioration of the following conditions: severe anemia requiring ED blood transfusion.  Critical care was time spent personally by me on the following activities: blood draw for specimens, development of treatment plan with patient or surrogate, discussions with consultants, gastric intubation, interpretation of cardiac output measurements, evaluation of patient's response to treatment, examination of patient, obtaining history from patient or surrogate, ordering and performing treatments and interventions, ordering and review of laboratory studies, pulse oximetry, re-evaluation of patient's condition and review of old charts.      ED Vital Signs:  Vitals:    01/22/23 0505 01/22/23 0508 01/22/23 0745 01/22/23 0809   BP:  (!) 158/82 (!) 182/91    Pulse: 75 74 71 72   Resp:  18 (!) 22 20   Temp:  98.7 °F (37.1 °C) 98.4 °F (36.9 °C)    TempSrc:  Oral Oral    SpO2:  95% 99% 99%   Weight:       Height:        01/22/23 0933 01/22/23 1121 01/22/23 1226 01/22/23 1324   BP:   (!) 155/78    Pulse: 72 69 69 74   Resp:   18    Temp:   98.8 °F (37.1 °C)    TempSrc:   Oral    SpO2:   98%    Weight:       Height:        01/22/23 1604 01/22/23 1639 01/22/23 1655 01/22/23 1742   BP: (!) 186/79 (!) 164/91 (!) 152/94    Pulse: 70 71 73 71   Resp: 16 18 18    Temp: 97.4 °F (36.3 °C) 99.2 °F (37.3 °C) 99 °F (37.2 °C)    TempSrc: Oral   Oral    SpO2: 99% 97% 97%    Weight:       Height:        01/22/23 1901 01/22/23 1943 01/22/23 2104   BP: 134/65 (!) 156/75    Pulse: 85 86 74   Resp: 18 16    Temp: 99.1 °F (37.3 °C) 99 °F (37.2 °C)    TempSrc: Oral Oral    SpO2: 98% 97%    Weight:      Height:          Abnormal Lab Results:  Labs Reviewed   CBC W/ AUTO DIFFERENTIAL - Abnormal; Notable for the following components:       Result Value    RBC 3.03 (*)     Hemoglobin 6.0 (*)     Hematocrit 20.7 (*)     MCV 68 (*)     MCH 19.8 (*)     MCHC 29.0 (*)     RDW 21.5 (*)     Lymph % 16.8 (*)     All other components within normal limits   COMPREHENSIVE METABOLIC PANEL - Abnormal; Notable for the following components:    Calcium 8.6 (*)     Albumin 2.7 (*)     All other components within normal limits   B-TYPE NATRIURETIC PEPTIDE - Abnormal; Notable for the following components:     (*)     All other components within normal limits   URINALYSIS, REFLEX TO URINE CULTURE - Abnormal; Notable for the following components:    Color, UA Colorless (*)     All other components within normal limits    Narrative:     Specimen Source->Urine   TROPONIN I   DRUG SCREEN PANEL, URINE EMERGENCY    Narrative:     Specimen Source->Urine   OCCULT BLOOD X 1, STOOL        All Lab Results:  Results for orders placed or performed during the hospital encounter of 01/21/23   CBC auto differential   Result Value Ref Range    WBC 7.15 3.90 - 12.70 K/uL    RBC 3.03 (L) 4.00 - 5.40 M/uL    Hemoglobin 6.0 (L) 12.0 - 16.0 g/dL    Hematocrit 20.7 (L) 37.0 - 48.5 %    MCV 68 (L) 82 - 98 fL    MCH 19.8 (L) 27.0 - 31.0 pg    MCHC 29.0 (L) 32.0 - 36.0 g/dL    RDW 21.5 (H) 11.5 - 14.5 %    Platelets 387 150 - 450 K/uL    MPV 9.3 9.2 - 12.9 fL    Immature Granulocytes 0.3 0.0 - 0.5 %    Gran # (ANC) 5.2 1.8 - 7.7 K/uL    Immature Grans (Abs) 0.02 0.00 - 0.04 K/uL    Lymph # 1.2 1.0 - 4.8 K/uL    Mono # 0.7 0.3 - 1.0 K/uL    Eos # 0.0 0.0 - 0.5 K/uL    Baso # 0.02 0.00 - 0.20 K/uL    nRBC 0  0 /100 WBC    Gran % 72.5 38.0 - 73.0 %    Lymph % 16.8 (L) 18.0 - 48.0 %    Mono % 9.5 4.0 - 15.0 %    Eosinophil % 0.6 0.0 - 8.0 %    Basophil % 0.3 0.0 - 1.9 %    Ovalocytes Occasional     Target Cells Occasional     Differential Method Automated    Comprehensive metabolic panel   Result Value Ref Range    Sodium 141 136 - 145 mmol/L    Potassium 3.9 3.5 - 5.1 mmol/L    Chloride 108 95 - 110 mmol/L    CO2 23 23 - 29 mmol/L    Glucose 94 70 - 110 mg/dL    BUN 14 6 - 20 mg/dL    Creatinine 1.0 0.5 - 1.4 mg/dL    Calcium 8.6 (L) 8.7 - 10.5 mg/dL    Total Protein 7.0 6.0 - 8.4 g/dL    Albumin 2.7 (L) 3.5 - 5.2 g/dL    Total Bilirubin 0.5 0.1 - 1.0 mg/dL    Alkaline Phosphatase 135 55 - 135 U/L    AST 21 10 - 40 U/L    ALT 11 10 - 44 U/L    Anion Gap 10 8 - 16 mmol/L    eGFR >60 >60 mL/min/1.73 m^2   Brain natriuretic peptide   Result Value Ref Range     (H) 0 - 99 pg/mL   Troponin I   Result Value Ref Range    Troponin I 0.019 0.000 - 0.026 ng/mL   Urinalysis, Reflex to Urine Culture Urine, Clean Catch    Specimen: Urine   Result Value Ref Range    Specimen UA Urine, Clean Catch     Color, UA Colorless (A) Yellow, Straw, Sherrie    Appearance, UA Clear Clear    pH, UA 7.0 5.0 - 8.0    Specific Gravity, UA 1.010 1.005 - 1.030    Protein, UA Negative Negative    Glucose, UA Negative Negative    Ketones, UA Negative Negative    Bilirubin (UA) Negative Negative    Occult Blood UA Negative Negative    Nitrite, UA Negative Negative    Urobilinogen, UA Negative <2.0 EU/dL    Leukocytes, UA Negative Negative   Drug screen panel, emergency   Result Value Ref Range    Benzodiazepines Negative Negative    Methadone metabolites Negative Negative    Cocaine (Metab.) Negative Negative    Opiate Scrn, Ur Negative Negative    Barbiturate Screen, Ur Negative Negative    Amphetamine Screen, Ur Negative Negative    THC Negative Negative    Phencyclidine Negative Negative    Creatinine, Urine 21.1 15.0 - 325.0 mg/dL    Toxicology  Information SEE COMMENT    Occult blood x 1, stool    Specimen: Stool   Result Value Ref Range    Occult Blood Negative Negative   Comprehensive Metabolic Panel (CMP)   Result Value Ref Range    Sodium 143 136 - 145 mmol/L    Potassium 4.0 3.5 - 5.1 mmol/L    Chloride 109 95 - 110 mmol/L    CO2 24 23 - 29 mmol/L    Glucose 84 70 - 110 mg/dL    BUN 18 6 - 20 mg/dL    Creatinine 1.1 0.5 - 1.4 mg/dL    Calcium 8.7 8.7 - 10.5 mg/dL    Total Protein 6.8 6.0 - 8.4 g/dL    Albumin 2.6 (L) 3.5 - 5.2 g/dL    Total Bilirubin 0.9 0.1 - 1.0 mg/dL    Alkaline Phosphatase 132 55 - 135 U/L    AST 17 10 - 40 U/L    ALT 11 10 - 44 U/L    Anion Gap 10 8 - 16 mmol/L    eGFR >60 >60 mL/min/1.73 m^2   CBC with Automated Differential   Result Value Ref Range    WBC 7.35 3.90 - 12.70 K/uL    RBC 3.23 (L) 4.00 - 5.40 M/uL    Hemoglobin 6.7 (L) 12.0 - 16.0 g/dL    Hematocrit 23.3 (L) 37.0 - 48.5 %    MCV 72 (L) 82 - 98 fL    MCH 20.7 (L) 27.0 - 31.0 pg    MCHC 28.8 (L) 32.0 - 36.0 g/dL    RDW 21.6 (H) 11.5 - 14.5 %    Platelets 391 150 - 450 K/uL    MPV 9.6 9.2 - 12.9 fL    Immature Granulocytes 0.3 0.0 - 0.5 %    Gran # (ANC) 5.0 1.8 - 7.7 K/uL    Immature Grans (Abs) 0.02 0.00 - 0.04 K/uL    Lymph # 1.5 1.0 - 4.8 K/uL    Mono # 0.7 0.3 - 1.0 K/uL    Eos # 0.0 0.0 - 0.5 K/uL    Baso # 0.05 0.00 - 0.20 K/uL    nRBC 1 (A) 0 /100 WBC    Gran % 68.0 38.0 - 73.0 %    Lymph % 20.8 18.0 - 48.0 %    Mono % 9.7 4.0 - 15.0 %    Eosinophil % 0.5 0.0 - 8.0 %    Basophil % 0.7 0.0 - 1.9 %    Platelet Estimate Appears normal     Aniso Slight     Poik Slight     Hypo Occasional     Target Cells Occasional     Tear Drop Cells Occasional     Differential Method Automated    Echo Saline Bubble? Yes   Result Value Ref Range    BSA 1.93 m2    TDI SEPTAL 0.10 m/s    LV LATERAL E/E' RATIO 8.82 m/s    LV SEPTAL E/E' RATIO 9.70 m/s    IVC diameter 1.06 cm    Left Ventricular Outflow Tract Mean Velocity 0.96 cm/s    Left Ventricular Outflow Tract Mean Gradient  4.32 mmHg    Pulmonary Valve Mean Velocity 0.99 m/s    TDI LATERAL 0.11 m/s    PV PEAK VELOCITY 1.36 cm/s    LVIDd 5.38 3.5 - 6.0 cm    IVS 1.07 0.6 - 1.1 cm    Posterior Wall 1.15 (A) 0.6 - 1.1 cm    LVIDs 3.38 2.1 - 4.0 cm    FS 37 28 - 44 %    STJ 1.99 cm    LV mass 236.27 g    LA size 3.46 cm    Left Ventricle Relative Wall Thickness 0.43 cm    AV mean gradient 8 mmHg    AV valve area 2.50 cm2    AV Velocity Ratio 0.75     AV index (prosthetic) 0.81     E/A ratio 1.28     Mean e' 0.11 m/s    E wave deceleration time 251.20 msec    IVRT 83.73 msec    LVOT diameter 1.98 cm    LVOT area 3.1 cm2    LVOT peak jordy 1.47 m/s    LVOT peak VTI 30.70 cm    Ao peak jordy 1.95 m/s    Ao VTI 37.8 cm    LVOT stroke volume 94.48 cm3    AV peak gradient 15 mmHg    E/E' ratio 9.24 m/s    MV Peak E Jordy 0.97 m/s    TR Max Jordy 2.89 m/s    MV Peak A Jordy 0.76 m/s    LV Systolic Volume 46.74 mL    LV Systolic Volume Index 24.3 mL/m2    LV Diastolic Volume 140.33 mL    LV Diastolic Volume Index 73.09 mL/m2    LV Mass Index 123 g/m2    RA Major Axis 5.53 cm    Left Atrium Minor Axis 4.46 cm    Left Atrium Major Axis 5.85 cm    Triscuspid Valve Regurgitation Peak Gradient 33 mmHg    Right Atrial Pressure (from IVC) 3 mmHg    EF 60 %    TV rest pulmonary artery pressure 36 mmHg   Type & Screen   Result Value Ref Range    Group & Rh A POS     Indirect Michael NEG    Prepare RBC 1 Unit   Result Value Ref Range    UNIT NUMBER [id number]     Product Code S6522H94     DISPENSE STATUS TRANSFUSED     CODING SYSTEM EZOG507     Unit Blood Type Code 6200     Unit Blood Type A POS     Unit Expiration [id number]    Prepare RBC 1 Unit   Result Value Ref Range    UNIT NUMBER [id number]     Product Code [id number]     DISPENSE STATUS ISSUED     CODING SYSTEM HAZH600     Unit Blood Type Code 6200     Unit Blood Type A POS     Unit Expiration [id number]          Imaging Results:  Imaging Results              X-Ray Chest AP Portable (Final result)   Result time 01/21/23 15:19:07      Final result by Kam Siegel MD (01/21/23 15:19:07)                   Impression:      No acute findings.      Electronically signed by: Kam Siegel  Date:    01/21/2023  Time:    15:19               Narrative:    EXAMINATION:  XR CHEST AP PORTABLE    CLINICAL HISTORY:  Shortness of breath    COMPARISON:  07/12/2022    FINDINGS:  Lung fields are clear.  The heart is normal in size.    No pleural fluid or pneumothorax.    No adenopathy is identified.    No significant osseous abnormality.                                       The EKG was ordered, reviewed, and independently interpreted by the ED provider.  Interpretation time: 14:30  Rate: 66 BPM  Rhythm: normal sinus rhythm  Interpretation: Nonspecific T wave abnormality. No STEMI.           The Emergency Provider reviewed the vital signs and test results, which are outlined above.     ED Discussion     7:50 PM: Discussed case with Rojelio Barrientos NP ([facility]). Dr. Barrientos agrees with current care and management of pt and accepts admission.   Admitting Service: Hospital medicine   Admitting Physician: Dr. Barrientos  Admit to: Med/tele    7:50 PM: Re-evaluated pt. I have discussed test results, shared treatment plan, and the need for admission with patient and family at bedside. Pt and family express understanding at this time and agree with all information. All questions answered. Pt and family have no further questions or concerns at this time. Pt is ready for admit.         Medical Decision Making:   Clinical Tests:   Lab Tests: Ordered and Reviewed  Radiological Study: Ordered and Reviewed  Medical Tests: Ordered and Reviewed         ED Medication(s):  Medications   0.9%  NaCl infusion (for blood administration) (has no administration in time range)   sodium chloride 0.9% flush 3 mL (has no administration in time range)   albuterol-ipratropium 2.5 mg-0.5 mg/3 mL nebulizer solution 3 mL (has no administration in time  range)   melatonin tablet 6 mg (has no administration in time range)   ondansetron injection 4 mg (4 mg Intravenous Given 1/22/23 1939)   promethazine tablet 25 mg (25 mg Oral Given 1/22/23 2245)   senna-docusate 8.6-50 mg per tablet 1 tablet (has no administration in time range)   acetaminophen tablet 650 mg (650 mg Oral Given 1/22/23 2104)   aluminum-magnesium hydroxide-simethicone 200-200-20 mg/5 mL suspension 30 mL (has no administration in time range)   acetaminophen suppository 650 mg (has no administration in time range)   naloxone 0.4 mg/mL injection 0.02 mg (has no administration in time range)   glucose chewable tablet 16 g (has no administration in time range)   glucose chewable tablet 24 g (has no administration in time range)   glucagon (human recombinant) injection 1 mg (has no administration in time range)   dextrose 10% bolus 125 mL 125 mL (has no administration in time range)   dextrose 10% bolus 250 mL 250 mL (has no administration in time range)   diphenhydrAMINE injection 25 mg (25 mg Intravenous Given 1/21/23 2305)   aspirin EC tablet 81 mg (81 mg Oral Given 1/22/23 0932)   atorvastatin tablet 80 mg (80 mg Oral Given 1/22/23 2102)   clopidogreL tablet 75 mg (75 mg Oral Not Given 1/22/23 0933)   DULoxetine DR capsule 60 mg (60 mg Oral Given 1/22/23 2102)   furosemide tablet 80 mg (80 mg Oral Given 1/22/23 0932)   isosorbide mononitrate 24 hr tablet 60 mg (60 mg Oral Given 1/22/23 0932)   losartan tablet 100 mg (100 mg Oral Given 1/22/23 0817)   hydrALAZINE injection 10 mg (10 mg Intravenous Given 1/22/23 0330)   HYDROcodone-acetaminophen 5-325 mg per tablet 1 tablet (has no administration in time range)   pantoprazole EC tablet 40 mg (40 mg Oral Given 1/22/23 2102)   sucralfate tablet 1 g (1 g Oral Given 1/22/23 2102)   0.9%  NaCl infusion (for blood administration) (has no administration in time range)   furosemide injection 40 mg (has no administration in time range)   lactated ringers bolus  1,000 mL (0 mLs Intravenous Stopped 1/21/23 1550)   ondansetron injection 8 mg (8 mg Intravenous Given 1/21/23 1431)   diphenhydrAMINE capsule 25 mg (25 mg Oral Given 1/22/23 0137)       Current Discharge Medication List                  Scribe Attestation:   Scribe #1: I performed the above scribed service and the documentation accurately describes the services I performed. I attest to the accuracy of the note.     Attending:   Physician Attestation Statement for Scribe #1: I, Eris Cordero MD, personally performed the services described in this documentation, as scribed by Nuria Ayala, in my presence, and it is both accurate and complete.           Clinical Impression       ICD-10-CM ICD-9-CM   1. Symptomatic anemia  D64.9 285.9   2. Fatigue  R53.83 780.79   3. SOB (shortness of breath)  R06.02 786.05   4. Chest pain  R07.9 786.50   5. Syncope  R55 780.2   6. Syncope, unspecified syncope type  R55 780.2   7. Severe anemia  D64.9 285.9       Disposition:   Disposition: Admitted  Condition: Serious          Eris Cordero MD  01/22/23 0224

## 2023-01-22 PROBLEM — D64.9 SYMPTOMATIC ANEMIA: Status: ACTIVE | Noted: 2023-01-22

## 2023-01-22 PROBLEM — D57.3 SICKLE CELL TRAIT: Status: ACTIVE | Noted: 2023-01-22

## 2023-01-22 PROBLEM — K22.10: Status: ACTIVE | Noted: 2017-07-31

## 2023-01-22 PROBLEM — F41.9 ANXIETY: Status: ACTIVE | Noted: 2023-01-22

## 2023-01-22 PROBLEM — I50.30 (HFPEF) HEART FAILURE WITH PRESERVED EJECTION FRACTION: Status: ACTIVE | Noted: 2023-01-22

## 2023-01-22 PROBLEM — E78.5 HLD (HYPERLIPIDEMIA): Status: ACTIVE | Noted: 2023-01-22

## 2023-01-22 PROBLEM — Z72.0 TOBACCO ABUSE: Status: ACTIVE | Noted: 2023-01-22

## 2023-01-22 PROBLEM — Z86.73 HISTORY OF CVA (CEREBROVASCULAR ACCIDENT): Status: ACTIVE | Noted: 2023-01-22

## 2023-01-22 PROBLEM — R55 SYNCOPE: Status: ACTIVE | Noted: 2023-01-22

## 2023-01-22 LAB
ALBUMIN SERPL BCP-MCNC: 2.6 G/DL (ref 3.5–5.2)
ALP SERPL-CCNC: 132 U/L (ref 55–135)
ALT SERPL W/O P-5'-P-CCNC: 11 U/L (ref 10–44)
ANION GAP SERPL CALC-SCNC: 10 MMOL/L (ref 8–16)
ANISOCYTOSIS BLD QL SMEAR: SLIGHT
AST SERPL-CCNC: 17 U/L (ref 10–40)
AV INDEX (PROSTH): 0.81
AV MEAN GRADIENT: 8 MMHG
AV PEAK GRADIENT: 15 MMHG
AV VALVE AREA: 2.5 CM2
AV VELOCITY RATIO: 0.75
BASOPHILS # BLD AUTO: 0.05 K/UL (ref 0–0.2)
BASOPHILS NFR BLD: 0.7 % (ref 0–1.9)
BILIRUB SERPL-MCNC: 0.9 MG/DL (ref 0.1–1)
BLD PROD TYP BPU: NORMAL
BLOOD UNIT EXPIRATION DATE: NORMAL
BLOOD UNIT TYPE CODE: 6200
BLOOD UNIT TYPE: NORMAL
BSA FOR ECHO PROCEDURE: 1.93 M2
BUN SERPL-MCNC: 18 MG/DL (ref 6–20)
CALCIUM SERPL-MCNC: 8.7 MG/DL (ref 8.7–10.5)
CHLORIDE SERPL-SCNC: 109 MMOL/L (ref 95–110)
CO2 SERPL-SCNC: 24 MMOL/L (ref 23–29)
CODING SYSTEM: NORMAL
CREAT SERPL-MCNC: 1.1 MG/DL (ref 0.5–1.4)
CV ECHO LV RWT: 0.43 CM
DACRYOCYTES BLD QL SMEAR: ABNORMAL
DIFFERENTIAL METHOD: ABNORMAL
DISPENSE STATUS: NORMAL
DOP CALC AO PEAK VEL: 1.95 M/S
DOP CALC AO VTI: 37.8 CM
DOP CALC LVOT AREA: 3.1 CM2
DOP CALC LVOT DIAMETER: 1.98 CM
DOP CALC LVOT PEAK VEL: 1.47 M/S
DOP CALC LVOT STROKE VOLUME: 94.48 CM3
DOP CALCLVOT PEAK VEL VTI: 30.7 CM
E WAVE DECELERATION TIME: 251.2 MSEC
E/A RATIO: 1.28
E/E' RATIO: 9.24 M/S
ECHO LV POSTERIOR WALL: 1.15 CM (ref 0.6–1.1)
EJECTION FRACTION: 60 %
EOSINOPHIL # BLD AUTO: 0 K/UL (ref 0–0.5)
EOSINOPHIL NFR BLD: 0.5 % (ref 0–8)
ERYTHROCYTE [DISTWIDTH] IN BLOOD BY AUTOMATED COUNT: 21.6 % (ref 11.5–14.5)
EST. GFR  (NO RACE VARIABLE): >60 ML/MIN/1.73 M^2
FRACTIONAL SHORTENING: 37 % (ref 28–44)
GLUCOSE SERPL-MCNC: 84 MG/DL (ref 70–110)
HCT VFR BLD AUTO: 23.3 % (ref 37–48.5)
HGB BLD-MCNC: 6.7 G/DL (ref 12–16)
HYPOCHROMIA BLD QL SMEAR: ABNORMAL
IMM GRANULOCYTES # BLD AUTO: 0.02 K/UL (ref 0–0.04)
IMM GRANULOCYTES NFR BLD AUTO: 0.3 % (ref 0–0.5)
INTERVENTRICULAR SEPTUM: 1.07 CM (ref 0.6–1.1)
IVC DIAMETER: 1.06 CM
IVRT: 83.73 MSEC
LA MAJOR: 5.85 CM
LA MINOR: 4.46 CM
LEFT ATRIUM SIZE: 3.46 CM
LEFT INTERNAL DIMENSION IN SYSTOLE: 3.38 CM (ref 2.1–4)
LEFT VENTRICLE DIASTOLIC VOLUME INDEX: 73.09 ML/M2
LEFT VENTRICLE DIASTOLIC VOLUME: 140.33 ML
LEFT VENTRICLE MASS INDEX: 123 G/M2
LEFT VENTRICLE SYSTOLIC VOLUME INDEX: 24.3 ML/M2
LEFT VENTRICLE SYSTOLIC VOLUME: 46.74 ML
LEFT VENTRICULAR INTERNAL DIMENSION IN DIASTOLE: 5.38 CM (ref 3.5–6)
LEFT VENTRICULAR MASS: 236.27 G
LV LATERAL E/E' RATIO: 8.82 M/S
LV SEPTAL E/E' RATIO: 9.7 M/S
LVOT MG: 4.32 MMHG
LVOT MV: 0.96 CM/S
LYMPHOCYTES # BLD AUTO: 1.5 K/UL (ref 1–4.8)
LYMPHOCYTES NFR BLD: 20.8 % (ref 18–48)
MCH RBC QN AUTO: 20.7 PG (ref 27–31)
MCHC RBC AUTO-ENTMCNC: 28.8 G/DL (ref 32–36)
MCV RBC AUTO: 72 FL (ref 82–98)
MONOCYTES # BLD AUTO: 0.7 K/UL (ref 0.3–1)
MONOCYTES NFR BLD: 9.7 % (ref 4–15)
MV PEAK A VEL: 0.76 M/S
MV PEAK E VEL: 0.97 M/S
NEUTROPHILS # BLD AUTO: 5 K/UL (ref 1.8–7.7)
NEUTROPHILS NFR BLD: 68 % (ref 38–73)
NRBC BLD-RTO: 1 /100 WBC
NUM UNITS TRANS PACKED RBC: NORMAL
PISA TR MAX VEL: 2.89 M/S
PLATELET # BLD AUTO: 391 K/UL (ref 150–450)
PLATELET BLD QL SMEAR: ABNORMAL
PMV BLD AUTO: 9.6 FL (ref 9.2–12.9)
POIKILOCYTOSIS BLD QL SMEAR: SLIGHT
POTASSIUM SERPL-SCNC: 4 MMOL/L (ref 3.5–5.1)
PROT SERPL-MCNC: 6.8 G/DL (ref 6–8.4)
PV MV: 0.99 M/S
PV PEAK VELOCITY: 1.36 CM/S
RA MAJOR: 5.53 CM
RA PRESSURE: 3 MMHG
RBC # BLD AUTO: 3.23 M/UL (ref 4–5.4)
SODIUM SERPL-SCNC: 143 MMOL/L (ref 136–145)
STJ: 1.99 CM
TARGETS BLD QL SMEAR: ABNORMAL
TDI LATERAL: 0.11 M/S
TDI SEPTAL: 0.1 M/S
TDI: 0.11 M/S
TR MAX PG: 33 MMHG
TV REST PULMONARY ARTERY PRESSURE: 36 MMHG
WBC # BLD AUTO: 7.35 K/UL (ref 3.9–12.7)

## 2023-01-22 PROCEDURE — 36415 COLL VENOUS BLD VENIPUNCTURE: CPT | Performed by: HOSPITALIST

## 2023-01-22 PROCEDURE — 63600175 PHARM REV CODE 636 W HCPCS: Performed by: HOSPITALIST

## 2023-01-22 PROCEDURE — 85025 COMPLETE CBC W/AUTO DIFF WBC: CPT | Performed by: HOSPITALIST

## 2023-01-22 PROCEDURE — 25000003 PHARM REV CODE 250: Performed by: HOSPITALIST

## 2023-01-22 PROCEDURE — 80053 COMPREHEN METABOLIC PANEL: CPT | Performed by: HOSPITALIST

## 2023-01-22 PROCEDURE — 94761 N-INVAS EAR/PLS OXIMETRY MLT: CPT

## 2023-01-22 PROCEDURE — 99900035 HC TECH TIME PER 15 MIN (STAT)

## 2023-01-22 PROCEDURE — 63600175 PHARM REV CODE 636 W HCPCS

## 2023-01-22 PROCEDURE — 11000001 HC ACUTE MED/SURG PRIVATE ROOM

## 2023-01-22 PROCEDURE — P9016 RBC LEUKOCYTES REDUCED: HCPCS | Performed by: FAMILY MEDICINE

## 2023-01-22 PROCEDURE — 25000003 PHARM REV CODE 250: Performed by: FAMILY MEDICINE

## 2023-01-22 PROCEDURE — 36430 TRANSFUSION BLD/BLD COMPNT: CPT

## 2023-01-22 RX ORDER — LOSARTAN POTASSIUM 50 MG/1
100 TABLET ORAL DAILY
Status: DISCONTINUED | OUTPATIENT
Start: 2023-01-22 | End: 2023-01-24 | Stop reason: HOSPADM

## 2023-01-22 RX ORDER — DULOXETIN HYDROCHLORIDE 30 MG/1
60 CAPSULE, DELAYED RELEASE ORAL 2 TIMES DAILY
Status: DISCONTINUED | OUTPATIENT
Start: 2023-01-22 | End: 2023-01-24 | Stop reason: HOSPADM

## 2023-01-22 RX ORDER — SUCRALFATE 1 G/1
1 TABLET ORAL
Status: DISCONTINUED | OUTPATIENT
Start: 2023-01-22 | End: 2023-01-24 | Stop reason: HOSPADM

## 2023-01-22 RX ORDER — HYDROCODONE BITARTRATE AND ACETAMINOPHEN 5; 325 MG/1; MG/1
1 TABLET ORAL EVERY 6 HOURS PRN
Status: DISCONTINUED | OUTPATIENT
Start: 2023-01-22 | End: 2023-01-24 | Stop reason: HOSPADM

## 2023-01-22 RX ORDER — DIPHENHYDRAMINE HCL 25 MG
25 CAPSULE ORAL ONCE
Status: COMPLETED | OUTPATIENT
Start: 2023-01-22 | End: 2023-01-22

## 2023-01-22 RX ORDER — ASPIRIN 81 MG/1
81 TABLET ORAL DAILY
Status: DISCONTINUED | OUTPATIENT
Start: 2023-01-22 | End: 2023-01-24 | Stop reason: HOSPADM

## 2023-01-22 RX ORDER — ISOSORBIDE MONONITRATE 60 MG/1
60 TABLET, EXTENDED RELEASE ORAL DAILY
Status: DISCONTINUED | OUTPATIENT
Start: 2023-01-22 | End: 2023-01-24 | Stop reason: HOSPADM

## 2023-01-22 RX ORDER — CLOPIDOGREL BISULFATE 75 MG/1
75 TABLET ORAL DAILY
Status: DISCONTINUED | OUTPATIENT
Start: 2023-01-22 | End: 2023-01-24 | Stop reason: HOSPADM

## 2023-01-22 RX ORDER — FUROSEMIDE 10 MG/ML
40 INJECTION INTRAMUSCULAR; INTRAVENOUS ONCE AS NEEDED
Status: DISCONTINUED | OUTPATIENT
Start: 2023-01-22 | End: 2023-01-24 | Stop reason: HOSPADM

## 2023-01-22 RX ORDER — ATORVASTATIN CALCIUM 40 MG/1
80 TABLET, FILM COATED ORAL NIGHTLY
Status: DISCONTINUED | OUTPATIENT
Start: 2023-01-22 | End: 2023-01-24 | Stop reason: HOSPADM

## 2023-01-22 RX ORDER — HYDRALAZINE HYDROCHLORIDE 20 MG/ML
10 INJECTION INTRAMUSCULAR; INTRAVENOUS EVERY 6 HOURS PRN
Status: DISCONTINUED | OUTPATIENT
Start: 2023-01-22 | End: 2023-01-24 | Stop reason: HOSPADM

## 2023-01-22 RX ORDER — HYDRALAZINE HYDROCHLORIDE 20 MG/ML
INJECTION INTRAMUSCULAR; INTRAVENOUS
Status: COMPLETED
Start: 2023-01-22 | End: 2023-01-22

## 2023-01-22 RX ORDER — PANTOPRAZOLE SODIUM 40 MG/1
40 TABLET, DELAYED RELEASE ORAL DAILY
Status: DISCONTINUED | OUTPATIENT
Start: 2023-01-22 | End: 2023-01-22

## 2023-01-22 RX ORDER — HYDROCODONE BITARTRATE AND ACETAMINOPHEN 500; 5 MG/1; MG/1
TABLET ORAL
Status: DISCONTINUED | OUTPATIENT
Start: 2023-01-22 | End: 2023-01-23

## 2023-01-22 RX ORDER — FUROSEMIDE 40 MG/1
80 TABLET ORAL DAILY
Status: DISCONTINUED | OUTPATIENT
Start: 2023-01-22 | End: 2023-01-24 | Stop reason: HOSPADM

## 2023-01-22 RX ORDER — PANTOPRAZOLE SODIUM 40 MG/1
40 TABLET, DELAYED RELEASE ORAL 2 TIMES DAILY
Status: DISCONTINUED | OUTPATIENT
Start: 2023-01-22 | End: 2023-01-24 | Stop reason: HOSPADM

## 2023-01-22 RX ADMIN — HYDRALAZINE HYDROCHLORIDE 10 MG: 20 INJECTION, SOLUTION INTRAMUSCULAR; INTRAVENOUS at 03:01

## 2023-01-22 RX ADMIN — PANTOPRAZOLE SODIUM 40 MG: 40 TABLET, DELAYED RELEASE ORAL at 09:01

## 2023-01-22 RX ADMIN — ASPIRIN 81 MG: 81 TABLET, COATED ORAL at 09:01

## 2023-01-22 RX ADMIN — DULOXETINE 60 MG: 30 CAPSULE, DELAYED RELEASE ORAL at 09:01

## 2023-01-22 RX ADMIN — FUROSEMIDE 80 MG: 40 TABLET ORAL at 09:01

## 2023-01-22 RX ADMIN — ISOSORBIDE MONONITRATE 60 MG: 60 TABLET, EXTENDED RELEASE ORAL at 09:01

## 2023-01-22 RX ADMIN — DIPHENHYDRAMINE HYDROCHLORIDE 25 MG: 25 CAPSULE ORAL at 01:01

## 2023-01-22 RX ADMIN — SUCRALFATE 1 G: 1 TABLET ORAL at 03:01

## 2023-01-22 RX ADMIN — ATORVASTATIN CALCIUM 80 MG: 40 TABLET, FILM COATED ORAL at 01:01

## 2023-01-22 RX ADMIN — PROMETHAZINE HYDROCHLORIDE 25 MG: 25 TABLET ORAL at 10:01

## 2023-01-22 RX ADMIN — ATORVASTATIN CALCIUM 80 MG: 40 TABLET, FILM COATED ORAL at 09:01

## 2023-01-22 RX ADMIN — SUCRALFATE 1 G: 1 TABLET ORAL at 09:01

## 2023-01-22 RX ADMIN — ONDANSETRON 4 MG: 2 INJECTION INTRAMUSCULAR; INTRAVENOUS at 07:01

## 2023-01-22 RX ADMIN — LOSARTAN POTASSIUM 100 MG: 50 TABLET, FILM COATED ORAL at 08:01

## 2023-01-22 RX ADMIN — DULOXETINE 60 MG: 30 CAPSULE, DELAYED RELEASE ORAL at 01:01

## 2023-01-22 RX ADMIN — ACETAMINOPHEN 650 MG: 325 TABLET ORAL at 09:01

## 2023-01-22 NOTE — ASSESSMENT & PLAN NOTE
Patient is chronically on statin.will continue for now. Last Lipid Panel:   Lab Results   Component Value Date    CHOL 166 08/09/2021    HDL 55 08/09/2021    LDLCALC 80.6 08/09/2021    TRIG 152 (H) 08/09/2021    CHOLHDL 33.1 08/09/2021   Plan:  -Continue home medication  -low fat/low calorie diet

## 2023-01-22 NOTE — ASSESSMENT & PLAN NOTE
Patient currently without signs/symptoms of acute exacerbation with last reported EF measuring >55% on 3/8/22. BNP currently measuring 169. CXR revealing chest X-ray showed no acute findings. Patient currently follows Dr. Gerard from cardiology.   Plan:  -Continue home medications, titrate as needed   -Monitor Is/Os  -Low salt/cardiac diet  -f/u cardiology outpatient    Home lasix resumed  Intravenous lasix prn for transfusion

## 2023-01-22 NOTE — ASSESSMENT & PLAN NOTE
psh lap sleeve gastrectomy  Previous egd revealed esophagitis and ulcerated esophagus  Endorses taking aspirin on empty stomach  Was without prescription for carafate for prolonged period of time but since resumed  Resume proton pump inhibitor and carafate  Transfuse and monitor  May follow up outpatient gastroenterology if stable

## 2023-01-22 NOTE — H&P
[facility]– [place][note 2] Medicine  History & Physical    Patient Name: Joe Ceballos  MRN: [id number]  Patient Class: IP- Inpatient  Admission Date: 1/21/2023  Attending Physician: Joe Barrientos MD   Primary Care Provider: Daphney Heart MD         Patient information was obtained from patient, past medical records and ER records.     Subjective:     Principal Problem:<principal problem not specified>    Chief Complaint:   Chief Complaint   Patient presents with    Fatigue     Weakness, low blood pressure en route        HPI: Joe Ceballos is a 53 y.o. female with a PMH  has a past medical history of Anxiety, CHF (congestive heart failure), Edema, Hypertension, Initial insomnia, Sickle cell trait, Stroke, and Tobacco use. who presented to the ED for further evaluation of worsening fatigue and syncope x2 since Friday afternoon.  Patient reported sitting in the yard Friday with family and experienced acute onset dizziness, lightheadedness, and ground level fall following feeling weak shortly after standing.  She denied complete loss of consciousness or endorsing any trauma during that time and did not seek medical attention.  Patient reported endorsing 2nd syncopal event earlier today when she was packing when she suddenly became lightheaded and dizzy with vision turning blue resulting in patient falling to the ground and hitting her head.  Patient unsure if she lost complete consciousness but was complaining of a headache which has now resolved.  She denied endorsing any diaphoresis, nausea, vomiting, palpitations, chest pain, or shortness a breath prior to the event but did report feeling weak and fatigue since Friday.  Patient also reported episode of bloody stool approximately 2 weeks prior to admission which has since resolved reports compliance with home medications, dietary restrictions, negative ill contacts, and having good oral intake.  Prior to onset of symptoms, patient reported being in  her usual state of health with no other concerns or complaints.  All other review of systems negative except as noted above.  Patient being admitted to Hospital Medicine for further workup and treatment of syncope and medical management of symptomatic anemia after patient was found to have H/H measuring 6.0/20.7 upon initial workup.     PCP: Daphney Heart        Past Medical History:   Diagnosis Date    Anxiety     CHF (congestive heart failure)     Edema     Hypertension     Initial insomnia     Sickle cell trait     Stroke     Tobacco use        Past Surgical History:   Procedure Laterality Date    BREAST SURGERY       SECTION      x 2    CYST REMOVAL      from left tube    gastric sleeve      Raheel-en-Y gastric bypass      TONSILLECTOMY         Review of patient's allergies indicates:   Allergen Reactions    Lisinopril Anaphylaxis     This is only possible agent at the time.  This is only possible agent at the time.  This is only possible agent at the time.    Sulfa (sulfonamide antibiotics) Anaphylaxis    Beta-blockers (beta-adrenergic blocking agts)      Other reaction(s): Other (See Comments)  Syncope & Collapse       No current facility-administered medications on file prior to encounter.     Current Outpatient Medications on File Prior to Encounter   Medication Sig    aspirin (ECOTRIN) 81 MG EC tablet Take 1 tablet (81 mg total) by mouth once daily.    atorvastatin (LIPITOR) 80 MG tablet Take 1 tablet (80 mg total) by mouth every evening.    clopidogreL (PLAVIX) 75 mg tablet Take 1 tablet (75 mg total) by mouth once daily.    DULoxetine (CYMBALTA) 60 MG capsule Take 1 capsule (60 mg total) by mouth 2 (two) times a day.    furosemide (LASIX) 40 MG tablet Take 2 tablets (80 mg total) by mouth once daily AND 1 tablet (40 mg total) every evening.    isosorbide mononitrate (IMDUR) 60 MG 24 hr tablet Take 1 tablet (60 mg total) by mouth once daily.    losartan (COZAAR) 100 MG tablet TAKE 1 TABLET  BY MOUTH EVERY DAY    pantoprazole (PROTONIX) 40 MG tablet Take 1 tablet (40 mg total) by mouth once daily.    promethazine (PHENERGAN) 25 MG tablet Take 1 tablet (25 mg total) by mouth every 4 (four) hours.    sucralfate (CARAFATE) 1 gram tablet TAKE 1 TABLET BY MOUTH 4 TIMES A DAY BEFORE MEALS AND AT BEDTIME    tiZANidine (ZANAFLEX) 4 MG tablet TAKE 1 TABLET BY MOUTH AT BEDTIME FOR MUSCLE SPASMS     Family History       Problem Relation (Age of Onset)    Hypertension Mother, Paternal Grandmother    Thyroid disease Paternal Aunt          Tobacco Use    Smoking status: Every Day     Packs/day: 0.50     Types: Cigarettes    Smokeless tobacco: Never   Substance and Sexual Activity    Alcohol use: Yes    Drug use: No    Sexual activity: Not Currently     Partners: Male     Birth control/protection: None     Review of Systems   All other systems reviewed and are negative.  Objective:     Vital Signs (Most Recent):  Temp: 98.4 °F (36.9 °C) (01/22/23 0131)  Pulse: 65 (01/22/23 0131)  Resp: 20 (01/22/23 0131)  BP: (!) 175/100 (01/22/23 0131)  SpO2: 97 % (01/22/23 0131)   Vital Signs (24h Range):  Temp:  [98 °F (36.7 °C)-99.4 °F (37.4 °C)] 98.4 °F (36.9 °C)  Pulse:  [64-94] 65  Resp:  [16-20] 20  SpO2:  [94 %-100 %] 97 %  BP: (108-175)/() 175/100     Weight: 78.6 kg (173 lb 4.5 oz)  Body mass index is 26.35 kg/m².    Physical Exam  Vitals reviewed.   Constitutional:       General: She is not in acute distress.     Appearance: She is normal weight. She is not ill-appearing, toxic-appearing or diaphoretic.      Comments: Slightly lethargic/fatigue   HENT:      Head: Normocephalic and atraumatic.      Right Ear: External ear normal.      Left Ear: External ear normal.      Nose: Nose normal. No congestion or rhinorrhea.      Mouth/Throat:      Mouth: Mucous membranes are moist.      Pharynx: Oropharynx is clear. No oropharyngeal exudate or posterior oropharyngeal erythema.   Eyes:      General: No scleral icterus.      Extraocular Movements: Extraocular movements intact.      Conjunctiva/sclera: Conjunctivae normal.      Pupils: Pupils are equal, round, and reactive to light.      Comments: Pallor noted    Neck:      Vascular: No carotid bruit.   Cardiovascular:      Rate and Rhythm: Normal rate and regular rhythm.      Pulses: Normal pulses.      Heart sounds: Normal heart sounds. No murmur heard.    No friction rub. No gallop.   Pulmonary:      Effort: Pulmonary effort is normal. No respiratory distress.      Breath sounds: Normal breath sounds. No stridor. No wheezing, rhonchi or rales.   Chest:      Chest wall: No tenderness.   Abdominal:      General: Abdomen is flat. Bowel sounds are normal. There is no distension.      Palpations: Abdomen is soft.      Tenderness: There is no abdominal tenderness. There is no right CVA tenderness, left CVA tenderness, guarding or rebound.      Hernia: No hernia is present.   Musculoskeletal:         General: No swelling, tenderness, deformity or signs of injury. Normal range of motion.      Cervical back: Normal range of motion and neck supple. No rigidity or tenderness.      Right lower leg: No edema.      Left lower leg: No edema.   Lymphadenopathy:      Cervical: No cervical adenopathy.   Skin:     General: Skin is warm and dry.      Capillary Refill: Capillary refill takes less than 2 seconds.      Coloration: Skin is not jaundiced or pale.      Findings: No bruising, erythema, lesion or rash.   Neurological:      General: No focal deficit present.      Mental Status: She is alert and oriented to person, place, and time. Mental status is at baseline.      Cranial Nerves: No cranial nerve deficit.      Sensory: No sensory deficit.      Motor: No weakness.      Coordination: Coordination normal.   Psychiatric:         Mood and Affect: Mood normal.         Behavior: Behavior normal.         Thought Content: Thought content normal.         Judgment: Judgment normal.         CRANIAL NERVES      CN III, IV, VI   Pupils are equal, round, and reactive to light.     Significant Labs: All pertinent labs within the past 24 hours have been reviewed.    Significant Imaging: I have reviewed all pertinent imaging results/findings within the past 24 hours.    LABS:  Recent Results (from the past 24 hour(s))   CBC auto differential    Collection Time: 01/21/23  2:30 PM   Result Value Ref Range    WBC 7.15 3.90 - 12.70 K/uL    RBC 3.03 (L) 4.00 - 5.40 M/uL    Hemoglobin 6.0 (L) 12.0 - 16.0 g/dL    Hematocrit 20.7 (L) 37.0 - 48.5 %    MCV 68 (L) 82 - 98 fL    MCH 19.8 (L) 27.0 - 31.0 pg    MCHC 29.0 (L) 32.0 - 36.0 g/dL    RDW 21.5 (H) 11.5 - 14.5 %    Platelets 387 150 - 450 K/uL    MPV 9.3 9.2 - 12.9 fL    Immature Granulocytes 0.3 0.0 - 0.5 %    Gran # (ANC) 5.2 1.8 - 7.7 K/uL    Immature Grans (Abs) 0.02 0.00 - 0.04 K/uL    Lymph # 1.2 1.0 - 4.8 K/uL    Mono # 0.7 0.3 - 1.0 K/uL    Eos # 0.0 0.0 - 0.5 K/uL    Baso # 0.02 0.00 - 0.20 K/uL    nRBC 0 0 /100 WBC    Gran % 72.5 38.0 - 73.0 %    Lymph % 16.8 (L) 18.0 - 48.0 %    Mono % 9.5 4.0 - 15.0 %    Eosinophil % 0.6 0.0 - 8.0 %    Basophil % 0.3 0.0 - 1.9 %    Ovalocytes Occasional     Target Cells Occasional     Differential Method Automated    Comprehensive metabolic panel    Collection Time: 01/21/23  2:30 PM   Result Value Ref Range    Sodium 141 136 - 145 mmol/L    Potassium 3.9 3.5 - 5.1 mmol/L    Chloride 108 95 - 110 mmol/L    CO2 23 23 - 29 mmol/L    Glucose 94 70 - 110 mg/dL    BUN 14 6 - 20 mg/dL    Creatinine 1.0 0.5 - 1.4 mg/dL    Calcium 8.6 (L) 8.7 - 10.5 mg/dL    Total Protein 7.0 6.0 - 8.4 g/dL    Albumin 2.7 (L) 3.5 - 5.2 g/dL    Total Bilirubin 0.5 0.1 - 1.0 mg/dL    Alkaline Phosphatase 135 55 - 135 U/L    AST 21 10 - 40 U/L    ALT 11 10 - 44 U/L    Anion Gap 10 8 - 16 mmol/L    eGFR >60 >60 mL/min/1.73 m^2   Brain natriuretic peptide    Collection Time: 01/21/23  2:30 PM   Result Value Ref Range     (H) 0 - 99 pg/mL   Troponin I     Collection Time: 01/21/23  2:30 PM   Result Value Ref Range    Troponin I 0.019 0.000 - 0.026 ng/mL   Drug screen panel, emergency    Collection Time: 01/21/23  2:43 PM   Result Value Ref Range    Benzodiazepines Negative Negative    Methadone metabolites Negative Negative    Cocaine (Metab.) Negative Negative    Opiate Scrn, Ur Negative Negative    Barbiturate Screen, Ur Negative Negative    Amphetamine Screen, Ur Negative Negative    THC Negative Negative    Phencyclidine Negative Negative    Creatinine, Urine 21.1 15.0 - 325.0 mg/dL    Toxicology Information SEE COMMENT    Urinalysis, Reflex to Urine Culture Urine, Clean Catch    Collection Time: 01/21/23  2:44 PM    Specimen: Urine   Result Value Ref Range    Specimen UA Urine, Clean Catch     Color, UA Colorless (A) Yellow, Straw, Sherrie    Appearance, UA Clear Clear    pH, UA 7.0 5.0 - 8.0    Specific Gravity, UA 1.010 1.005 - 1.030    Protein, UA Negative Negative    Glucose, UA Negative Negative    Ketones, UA Negative Negative    Bilirubin (UA) Negative Negative    Occult Blood UA Negative Negative    Nitrite, UA Negative Negative    Urobilinogen, UA Negative <2.0 EU/dL    Leukocytes, UA Negative Negative   Occult blood x 1, stool    Collection Time: 01/21/23  7:41 PM    Specimen: Stool   Result Value Ref Range    Occult Blood Negative Negative   Prepare RBC 1 Unit    Collection Time: 01/21/23  8:02 PM   Result Value Ref Range    UNIT NUMBER [id number]     Product Code [id number]     DISPENSE STATUS TRANSFUSED     CODING SYSTEM [id number]     Unit Blood Type Code 6200     Unit Blood Type A POS     Unit Expiration [id number]    Type & Screen    Collection Time: 01/21/23  8:02 PM   Result Value Ref Range    Group & Rh A POS     Indirect Michael NEG        RADIOLOGY  X-Ray Chest AP Portable    Result Date: 1/21/2023  EXAMINATION: XR CHEST AP PORTABLE CLINICAL HISTORY: Shortness of breath COMPARISON: 07/12/2022 FINDINGS: Lung fields are clear.  The heart is  normal in size. No pleural fluid or pneumothorax. No adenopathy is identified. No significant osseous abnormality.     No acute findings. Electronically signed by: Kam Kenyons Date:    01/21/2023 Time:    15:19      EKG    MICROBIOLOGY    MDM      Assessment/Plan:     Syncope    Symptomatic anemia  Patient presented with syncope event x2 days duration and was found to have worsening anemia with H/H currently measuring 6.0/20.7 with last reported baseline measuring 9.3/32.0 back in 05/2022.  Patient reported blood in stools 2 weeks prior to admission however, occult blood negative.  Patient has history of sickle cell trait. Patient normotensive prior to admission however orthostatics not obtained. BG 94.  Last reported iron panel revealed iron level 13 back on 05/17/2022.  Urine toxicology and UA negative.  Syncope likely secondary to symptomatic anemia as patient without signs/symptoms of CHF exacerbation, dehydration, known cardiac arrhythmia, or presence of infectious processes.   Plan:  -telemetry   -continue blood transfusion  -monitor H/H and BP  -carotid dopplers  -f/u echo  -repeat iron panel following transfusion  -f/u GI outpatient for endoscopy/colonoscopy       Primary hypertension  Currently normotensive. BP currently ranging from 108-175 systolic.  Plan:  -Optimize pain control   -Continue home medications, titrate as needed   -Monitor BP  -Low salt/cardiac diet when not NPO  -IV hydralazine prn for SBP>160 or DBP>90         (HFpEF) heart failure with preserved ejection fraction  Patient currently without signs/symptoms of acute exacerbation with last reported EF measuring >55% on 3/8/22. BNP currently measuring 169. CXR revealing chest X-ray showed no acute findings. Patient currently follows Dr. Gerard from cardiology.   Plan:  -Continue home medications, titrate as needed   -Monitor Is/Os  -Low salt/cardiac diet  -f/u cardiology outpatient          History of CVA (cerebrovascular accident)  Patient  s/p CVA in 3/2022 with MRI revealing acute ischemia in the right corona radiata.  Patient currently on aspirin, Plavix, and statin outpatient and is currently at neurological baseline.  Plan:  -continue home medications       HLD (hyperlipidemia)  Patient is chronically on statin.will continue for now. Last Lipid Panel:   Lab Results   Component Value Date    CHOL 166 08/09/2021    HDL 55 08/09/2021    LDLCALC 80.6 08/09/2021    TRIG 152 (H) 08/09/2021    CHOLHDL 33.1 08/09/2021   Plan:  -Continue home medication  -low fat/low calorie diet      Anxiety  Chronic. Stable. Not in acute exacerbation and currently denies endorsing any suicidal/homicidal ideations.   Plan:  -Continue home medications       Sickle cell trait  Chronic. Stable.   Plan:  -continue to monitor      Tobacco abuse  Patient reports smoking approximately 1/4 pack of cigarettes daily with no thoughts of cutting back currently. Patient educated on morbidity and mortality in regards to continuation of smoking and stressed importance of cessation. Patient currently declined nicotine patch at time of admission but will be available at patient's request.  Plan:  -Nicotine patch available at patient's request        VTE Risk Mitigation (From admission, onward)           Ordered     Reason for No Pharmacological VTE Prophylaxis  Once        Question:  Reasons:  Answer:  Risk of Bleeding  Comment:  getting blood products    01/21/23 2144     IP VTE LOW RISK PATIENT  Once         01/21/23 2144     Place sequential compression device  Until discontinued         01/21/23 2144                  //Core Measures   -DVT proph: SCDs, withholding anticoagulation in setting of blood transfusion   -Code status: Full    -Surrogate: none provided       Components of this note were documented using a voice recognition system and are subject to errors not corrected at the time the document was proof read. Please contact the author for any clarifications.        Joe J  MD Iliana  Department of Hospital Medicine   '[facility] - [facility] S[facility]

## 2023-01-22 NOTE — HPI
Joe Ceballos is a 53 y.o. female with a PMH  has a past medical history of Anxiety, CHF (congestive heart failure), Edema, Hypertension, Initial insomnia, Sickle cell trait, Stroke, and Tobacco use. who presented to the ED for further evaluation of worsening fatigue and syncope x2 since Friday afternoon.  Patient reported sitting in the yard Friday with family and experienced acute onset dizziness, lightheadedness, and ground level fall following feeling weak shortly after standing.  She denied complete loss of consciousness or endorsing any trauma during that time and did not seek medical attention.  Patient reported endorsing 2nd syncopal event earlier today when she was packing when she suddenly became lightheaded and dizzy with vision turning blue resulting in patient falling to the ground and hitting her head.  Patient unsure if she lost complete consciousness but was complaining of a headache which has now resolved.  She denied endorsing any diaphoresis, nausea, vomiting, palpitations, chest pain, or shortness a breath prior to the event but did report feeling weak and fatigue since Friday.  Patient also reported episode of bloody stool approximately 2 weeks prior to admission which has since resolved reports compliance with home medications, dietary restrictions, negative ill contacts, and having good oral intake.  Prior to onset of symptoms, patient reported being in her usual state of health with no other concerns or complaints.  All other review of systems negative except as noted above.  Patient being admitted to Hospital Medicine for further workup and treatment of syncope and medical management of symptomatic anemia after patient was found to have H/H measuring 6.0/20.7 upon initial workup.     PCP: Daphney Heart

## 2023-01-22 NOTE — ASSESSMENT & PLAN NOTE
Currently normotensive. BP currently ranging from 108-175 systolic.  Plan:  -Optimize pain control   -Continue home medications, titrate as needed   -Monitor BP  -Low salt/cardiac diet when not NPO  -IV hydralazine prn for SBP>160 or DBP>90

## 2023-01-22 NOTE — ASSESSMENT & PLAN NOTE
Patient currently without signs/symptoms of acute exacerbation with last reported EF measuring >55% on 3/8/22. BNP currently measuring 169. CXR revealing chest X-ray showed no acute findings. Patient currently follows Dr. Gerard from cardiology.   Plan:  -Continue home medications, titrate as needed   -Monitor Is/Os  -Low salt/cardiac diet  -f/u cardiology outpatient

## 2023-01-22 NOTE — ASSESSMENT & PLAN NOTE
Patient s/p CVA in 3/2022 with MRI revealing acute ischemia in the right corona radiata.  Patient currently on aspirin, Plavix, and statin outpatient and is currently at neurological baseline.  Plan:  -continue home medications

## 2023-01-22 NOTE — NURSING
At 10 minute eulalio after 15 minute assessment of blood infusion, pt called complaining of itching. Blood infusing stopped, hospital medicine on call contacted. Instructed to give IV benadryl and resume infusion. Also checked vital signs again at this time. Pt has had no further complaints.

## 2023-01-22 NOTE — ASSESSMENT & PLAN NOTE
Patient is chronically on statin.will continue for now. Last Lipid Panel:   Lab Results   Component Value Date    CHOL 166 08/09/2021    HDL 55 08/09/2021    LDLCALC 80.6 08/09/2021    TRIG 152 (H) 08/09/2021    CHOLHDL 33.1 08/09/2021   patient on high dose statin  Resume

## 2023-01-22 NOTE — PROGRESS NOTES
[facility] Medicine  Progress Note    Patient Name: Joe Ceballos  MRN: [id number]  Patient Class: IP- Inpatient   Admission Date: 1/21/2023  Length of Stay: 1 days  Attending Physician: Laura Robertson MD  Primary Care Provider: Daphney Heart MD        Subjective:     Principal Problem:Symptomatic anemia        HPI:  Joe Ceballos is a 53 y.o. female with a PMH  has a past medical history of Anxiety, CHF (congestive heart failure), Edema, Hypertension, Initial insomnia, Sickle cell trait, Stroke, and Tobacco use. who presented to the ED for further evaluation of worsening fatigue and syncope x2 since Friday afternoon.  Patient reported sitting in the yard Friday with family and experienced acute onset dizziness, lightheadedness, and ground level fall following feeling weak shortly after standing.  She denied complete loss of consciousness or endorsing any trauma during that time and did not seek medical attention.  Patient reported endorsing 2nd syncopal event earlier today when she was packing when she suddenly became lightheaded and dizzy with vision turning blue resulting in patient falling to the ground and hitting her head.  Patient unsure if she lost complete consciousness but was complaining of a headache which has now resolved.  She denied endorsing any diaphoresis, nausea, vomiting, palpitations, chest pain, or shortness a breath prior to the event but did report feeling weak and fatigue since Friday.  Patient also reported episode of bloody stool approximately 2 weeks prior to admission which has since resolved reports compliance with home medications, dietary restrictions, negative ill contacts, and having good oral intake.  Prior to onset of symptoms, patient reported being in her usual state of health with no other concerns or complaints.  All other review of systems negative except as noted above.  Patient being admitted to Hospital Medicine for further workup and treatment of  syncope and medical management of symptomatic anemia after patient was found to have H/H measuring 6.0/20.7 upon initial workup.     PCP: Daphney Heart        Overview/Hospital Course:  1/22 admitted for symptomatic anemia. Reports dark stool 2 weeks ago. Suppose to follow up with gastroenterology for cscope. Reports taking daily aspirin, sometimes on empty stomach. Associated with burning sensation in lower abdomen. Also reports bowel incontinence. Psh hernia repair and gastric surgery per [facility] in [place]. Also reports pmh esophageal ulcers and hernia and suppose to take proton pump inhibitor and carafate. Stool occult negative. Follow up outpatient gastroenterology. Transfuse additional unit. Anticipate discharge 1-2 days if remains stable.      Interval History: See hospital course for today      Review of Systems   Constitutional:  Positive for activity change. Negative for appetite change and fever.   Respiratory:  Negative for shortness of breath.    Cardiovascular:  Negative for chest pain and leg swelling.   Gastrointestinal:  Positive for abdominal pain (burning sensation) and blood in stool. Negative for nausea and vomiting.   Neurological:  Positive for syncope and weakness.   Psychiatric/Behavioral:  Positive for agitation. Negative for decreased concentration. The patient is nervous/anxious.    Objective:     Vital Signs (Most Recent):  Temp: 98.8 °F (37.1 °C) (01/22/23 1226)  Pulse: 69 (01/22/23 1226)  Resp: 18 (01/22/23 1226)  BP: (!) 155/78 (01/22/23 1226)  SpO2: 98 % (01/22/23 1226)   Vital Signs (24h Range):  Temp:  [98 °F (36.7 °C)-99.4 °F (37.4 °C)] 98.8 °F (37.1 °C)  Pulse:  [64-94] 69  Resp:  [16-22] 18  SpO2:  [94 %-100 %] 98 %  BP: (108-182)/() 155/78     Weight: 78.6 kg (173 lb 4.5 oz)  Body mass index is 26.35 kg/m².    Intake/Output Summary (Last 24 hours) at 1/22/2023 1247  Last data filed at 1/22/2023 0131  Gross per 24 hour   Intake 1391.32 ml   Output --   Net 1391.32 ml       Physical Exam  Vitals and nursing note reviewed.   Constitutional:       General: She is not in acute distress.     Appearance: She is ill-appearing. She is not toxic-appearing.   HENT:      Head: Normocephalic and atraumatic.   Cardiovascular:      Rate and Rhythm: Normal rate.   Pulmonary:      Effort: Pulmonary effort is normal. No respiratory distress.   Abdominal:      General: There is distension.      Palpations: Abdomen is soft.      Tenderness: There is abdominal tenderness.      Comments: Abdominal surgical incisions well healed  Tympanic to percussion in all quadrants except lower left quadrant  Dullness to percussion in LLQ   Musculoskeletal:      Right lower leg: No edema.      Left lower leg: No edema.   Skin:     General: Skin is warm.   Neurological:      Mental Status: She is alert and oriented to person, place, and time.      Motor: Weakness present.   Psychiatric:         Speech: Speech normal.         Behavior: Behavior is agitated.       Significant Labs: All pertinent labs within the past 24 hours have been reviewed.  CBC:   Recent Labs   Lab 01/21/23  1430 01/22/23  0536   WBC 7.15 7.35   HGB 6.0* 6.7*   HCT 20.7* 23.3*    391     CMP:   Recent Labs   Lab 01/21/23  1430 01/22/23  0536    143   K 3.9 4.0    109   CO2 23 24   GLU 94 84   BUN 14 18   CREATININE 1.0 1.1   CALCIUM 8.6* 8.7   PROT 7.0 6.8   ALBUMIN 2.7* 2.6*   BILITOT 0.5 0.9   ALKPHOS 135 132   AST 21 17   ALT 11 11   ANIONGAP 10 10     Cardiac Markers:   Recent Labs   Lab 01/21/23  1430   *     Troponin:   Recent Labs   Lab 01/21/23  1430   TROPONINI 0.019         Significant Imaging: I have reviewed all pertinent imaging results/findings within the past 24 hours.  CXR: I have reviewed all pertinent results/findings within the past 24 hours and my personal findings are:  no acute findings  Echo: I have reviewed all pertinent results/findings within the past 24 hours and my personal findings are:  no  intracardiac shunting      Assessment/Plan:      * Symptomatic anemia  Patient presented with syncope event x2 days duration and was found to have worsening anemia with H/H currently measuring 6.0/20.7 with last reported baseline measuring 9.3/32.0 back in 05/2022.  Patient reported blood in stools 2 weeks prior to admission however, occult blood negative.  Patient has history of sickle cell trait. Patient normotensive prior to admission however orthostatics not obtained. BG 94.  Last reported iron panel revealed iron level 13 back on 05/17/2022.  Urine toxicology and UA negative.  Syncope likely secondary to symptomatic anemia as patient without signs/symptoms of CHF exacerbation, dehydration, known cardiac arrhythmia, or presence of infectious processes. Denies menses   blood transfuse additional unit as hb<7  Syncope likely related to anemia  Will forgo carotid dopplers  Echocardiogram with no intracardiac shunting  pmh gastric ulcers  Resume proton pump inhibitor and carafate  Follow up gastroenterology outpatient       Tobacco abuse  Patient reports smoking approximately 1/4 pack of cigarettes daily with no thoughts of cutting back currently. Patient educated on morbidity and mortality in regards to continuation of smoking and stressed importance of cessation. Patient currently declined nicotine patch at time of admission but will be available at patient's request.  Plan:  -Nicotine patch available at patient's request        (HFpEF) heart failure with preserved ejection fraction  Patient currently without signs/symptoms of acute exacerbation with last reported EF measuring >55% on 3/8/22. BNP currently measuring 169. CXR revealing chest X-ray showed no acute findings. Patient currently follows Dr. Gerard from cardiology.   Plan:  -Continue home medications, titrate as needed   -Monitor Is/Os  -Low salt/cardiac diet  -f/u cardiology outpatient    Home lasix resumed  Intravenous lasix prn for  transfusion        Sickle cell trait  Chronic  Transfusing blood       Anxiety  Chronic. Stable. Not in acute exacerbation and currently denies endorsing any suicidal/homicidal ideations.   Plan:  -Continue home medications       History of CVA (cerebrovascular accident)  Patient s/p CVA in 3/2022 with MRI revealing acute ischemia in the right corona radiata.  Patient currently on aspirin, Plavix, and statin outpatient and is currently at neurological baseline.    Continue home medications   Echocardiogram without intracardiac shunting      HLD (hyperlipidemia)  Patient is chronically on statin.will continue for now. Last Lipid Panel:   Lab Results   Component Value Date    CHOL 166 08/09/2021    HDL 55 08/09/2021    LDLCALC 80.6 08/09/2021    TRIG 152 (H) 08/09/2021    CHOLHDL 33.1 08/09/2021   patient on high dose statin  Resume       Syncope  Likely 2/2 symptomatic anemia  Forgo us carotid as patient has been on high dose stain since last cva  Transfuse to hb>7  Obtain carotid us after fluid resuscitated with blood if continues to have syncope    Ulceration, esophagus  psh lap sleeve gastrectomy  Previous egd revealed esophagitis and ulcerated esophagus  Endorses taking aspirin on empty stomach  Was without prescription for carafate for prolonged period of time but since resumed  Resume proton pump inhibitor and carafate  Transfuse and monitor  May follow up outpatient gastroenterology if stable      Primary hypertension  Currently normotensive. BP currently ranging from 108-175 systolic.  Continue home medications, titrate as needed   Low salt/cardiac diet   IV hydralazine prn for SBP>160 or DBP>90         VTE Risk Mitigation (From admission, onward)         Ordered     Reason for No Pharmacological VTE Prophylaxis  Once        Question:  Reasons:  Answer:  Risk of Bleeding  Comment:  getting blood products    01/21/23 2144     IP VTE LOW RISK PATIENT  Once         01/21/23 2144     Place sequential compression  device  Until discontinued         01/21/23 2144                Discharge Planning   ALEXA: 1/22/2023     Code Status: Full Code   Is the patient medically ready for discharge?: Yes    Reason for patient still in hospital (select all that apply): Patient trending condition, Laboratory test, Treatment, Consult recommendations and Pending disposition  Discharge Plan A: Home                  Laura Robertson MD  Department of Hospital Medicine   [facility] [facility]

## 2023-01-22 NOTE — ASSESSMENT & PLAN NOTE
Patient s/p CVA in 3/2022 with MRI revealing acute ischemia in the right corona radiata.  Patient currently on aspirin, Plavix, and statin outpatient and is currently at neurological baseline.    Continue home medications   Echocardiogram without intracardiac shunting

## 2023-01-22 NOTE — SUBJECTIVE & OBJECTIVE
Past Medical History:   Diagnosis Date    Anxiety     CHF (congestive heart failure)     Edema     Hypertension     Initial insomnia     Sickle cell trait     Stroke     Tobacco use        Past Surgical History:   Procedure Laterality Date    BREAST SURGERY       SECTION      x 2    CYST REMOVAL      from left tube    gastric sleeve      Raheel-en-Y gastric bypass      TONSILLECTOMY         Review of patient's allergies indicates:   Allergen Reactions    Lisinopril Anaphylaxis     This is only possible agent at the time.  This is only possible agent at the time.  This is only possible agent at the time.    Sulfa (sulfonamide antibiotics) Anaphylaxis    Beta-blockers (beta-adrenergic blocking agts)      Other reaction(s): Other (See Comments)  Syncope & Collapse       No current facility-administered medications on file prior to encounter.     Current Outpatient Medications on File Prior to Encounter   Medication Sig    aspirin (ECOTRIN) 81 MG EC tablet Take 1 tablet (81 mg total) by mouth once daily.    atorvastatin (LIPITOR) 80 MG tablet Take 1 tablet (80 mg total) by mouth every evening.    clopidogreL (PLAVIX) 75 mg tablet Take 1 tablet (75 mg total) by mouth once daily.    DULoxetine (CYMBALTA) 60 MG capsule Take 1 capsule (60 mg total) by mouth 2 (two) times a day.    furosemide (LASIX) 40 MG tablet Take 2 tablets (80 mg total) by mouth once daily AND 1 tablet (40 mg total) every evening.    isosorbide mononitrate (IMDUR) 60 MG 24 hr tablet Take 1 tablet (60 mg total) by mouth once daily.    losartan (COZAAR) 100 MG tablet TAKE 1 TABLET BY MOUTH EVERY DAY    pantoprazole (PROTONIX) 40 MG tablet Take 1 tablet (40 mg total) by mouth once daily.    promethazine (PHENERGAN) 25 MG tablet Take 1 tablet (25 mg total) by mouth every 4 (four) hours.    sucralfate (CARAFATE) 1 gram tablet TAKE 1 TABLET BY MOUTH 4 TIMES A DAY BEFORE MEALS AND AT BEDTIME    tiZANidine (ZANAFLEX) 4 MG tablet TAKE 1 TABLET BY  MOUTH AT BEDTIME FOR MUSCLE SPASMS     Family History       Problem Relation (Age of Onset)    Hypertension Mother, Paternal Grandmother    Thyroid disease Paternal Aunt          Tobacco Use    Smoking status: Every Day     Packs/day: 0.50     Types: Cigarettes    Smokeless tobacco: Never   Substance and Sexual Activity    Alcohol use: Yes    Drug use: No    Sexual activity: Not Currently     Partners: Male     Birth control/protection: None     Review of Systems   All other systems reviewed and are negative.  Objective:     Vital Signs (Most Recent):  Temp: 98.4 °F (36.9 °C) (01/22/23 0131)  Pulse: 65 (01/22/23 0131)  Resp: 20 (01/22/23 0131)  BP: (!) 175/100 (01/22/23 0131)  SpO2: 97 % (01/22/23 0131)   Vital Signs (24h Range):  Temp:  [98 °F (36.7 °C)-99.4 °F (37.4 °C)] 98.4 °F (36.9 °C)  Pulse:  [64-94] 65  Resp:  [16-20] 20  SpO2:  [94 %-100 %] 97 %  BP: (108-175)/() 175/100     Weight: 78.6 kg (173 lb 4.5 oz)  Body mass index is 26.35 kg/m².    Physical Exam  Vitals reviewed.   Constitutional:       General: She is not in acute distress.     Appearance: She is normal weight. She is not ill-appearing, toxic-appearing or diaphoretic.      Comments: Slightly lethargic/fatigue   HENT:      Head: Normocephalic and atraumatic.      Right Ear: External ear normal.      Left Ear: External ear normal.      Nose: Nose normal. No congestion or rhinorrhea.      Mouth/Throat:      Mouth: Mucous membranes are moist.      Pharynx: Oropharynx is clear. No oropharyngeal exudate or posterior oropharyngeal erythema.   Eyes:      General: No scleral icterus.     Extraocular Movements: Extraocular movements intact.      Conjunctiva/sclera: Conjunctivae normal.      Pupils: Pupils are equal, round, and reactive to light.      Comments: Pallor noted    Neck:      Vascular: No carotid bruit.   Cardiovascular:      Rate and Rhythm: Normal rate and regular rhythm.      Pulses: Normal pulses.      Heart sounds: Normal heart  sounds. No murmur heard.    No friction rub. No gallop.   Pulmonary:      Effort: Pulmonary effort is normal. No respiratory distress.      Breath sounds: Normal breath sounds. No stridor. No wheezing, rhonchi or rales.   Chest:      Chest wall: No tenderness.   Abdominal:      General: Abdomen is flat. Bowel sounds are normal. There is no distension.      Palpations: Abdomen is soft.      Tenderness: There is no abdominal tenderness. There is no right CVA tenderness, left CVA tenderness, guarding or rebound.      Hernia: No hernia is present.   Musculoskeletal:         General: No swelling, tenderness, deformity or signs of injury. Normal range of motion.      Cervical back: Normal range of motion and neck supple. No rigidity or tenderness.      Right lower leg: No edema.      Left lower leg: No edema.   Lymphadenopathy:      Cervical: No cervical adenopathy.   Skin:     General: Skin is warm and dry.      Capillary Refill: Capillary refill takes less than 2 seconds.      Coloration: Skin is not jaundiced or pale.      Findings: No bruising, erythema, lesion or rash.   Neurological:      General: No focal deficit present.      Mental Status: She is alert and oriented to person, place, and time. Mental status is at baseline.      Cranial Nerves: No cranial nerve deficit.      Sensory: No sensory deficit.      Motor: No weakness.      Coordination: Coordination normal.   Psychiatric:         Mood and Affect: Mood normal.         Behavior: Behavior normal.         Thought Content: Thought content normal.         Judgment: Judgment normal.         CRANIAL NERVES     CN III, IV, VI   Pupils are equal, round, and reactive to light.     Significant Labs: All pertinent labs within the past 24 hours have been reviewed.    Significant Imaging: I have reviewed all pertinent imaging results/findings within the past 24 hours.    LABS:  Recent Results (from the past 24 hour(s))   CBC auto differential    Collection Time:  01/21/23  2:30 PM   Result Value Ref Range    WBC 7.15 3.90 - 12.70 K/uL    RBC 3.03 (L) 4.00 - 5.40 M/uL    Hemoglobin 6.0 (L) 12.0 - 16.0 g/dL    Hematocrit 20.7 (L) 37.0 - 48.5 %    MCV 68 (L) 82 - 98 fL    MCH 19.8 (L) 27.0 - 31.0 pg    MCHC 29.0 (L) 32.0 - 36.0 g/dL    RDW 21.5 (H) 11.5 - 14.5 %    Platelets 387 150 - 450 K/uL    MPV 9.3 9.2 - 12.9 fL    Immature Granulocytes 0.3 0.0 - 0.5 %    Gran # (ANC) 5.2 1.8 - 7.7 K/uL    Immature Grans (Abs) 0.02 0.00 - 0.04 K/uL    Lymph # 1.2 1.0 - 4.8 K/uL    Mono # 0.7 0.3 - 1.0 K/uL    Eos # 0.0 0.0 - 0.5 K/uL    Baso # 0.02 0.00 - 0.20 K/uL    nRBC 0 0 /100 WBC    Gran % 72.5 38.0 - 73.0 %    Lymph % 16.8 (L) 18.0 - 48.0 %    Mono % 9.5 4.0 - 15.0 %    Eosinophil % 0.6 0.0 - 8.0 %    Basophil % 0.3 0.0 - 1.9 %    Ovalocytes Occasional     Target Cells Occasional     Differential Method Automated    Comprehensive metabolic panel    Collection Time: 01/21/23  2:30 PM   Result Value Ref Range    Sodium 141 136 - 145 mmol/L    Potassium 3.9 3.5 - 5.1 mmol/L    Chloride 108 95 - 110 mmol/L    CO2 23 23 - 29 mmol/L    Glucose 94 70 - 110 mg/dL    BUN 14 6 - 20 mg/dL    Creatinine 1.0 0.5 - 1.4 mg/dL    Calcium 8.6 (L) 8.7 - 10.5 mg/dL    Total Protein 7.0 6.0 - 8.4 g/dL    Albumin 2.7 (L) 3.5 - 5.2 g/dL    Total Bilirubin 0.5 0.1 - 1.0 mg/dL    Alkaline Phosphatase 135 55 - 135 U/L    AST 21 10 - 40 U/L    ALT 11 10 - 44 U/L    Anion Gap 10 8 - 16 mmol/L    eGFR >60 >60 mL/min/1.73 m^2   Brain natriuretic peptide    Collection Time: 01/21/23  2:30 PM   Result Value Ref Range     (H) 0 - 99 pg/mL   Troponin I    Collection Time: 01/21/23  2:30 PM   Result Value Ref Range    Troponin I 0.019 0.000 - 0.026 ng/mL   Drug screen panel, emergency    Collection Time: 01/21/23  2:43 PM   Result Value Ref Range    Benzodiazepines Negative Negative    Methadone metabolites Negative Negative    Cocaine (Metab.) Negative Negative    Opiate Scrn, Ur Negative Negative     Barbiturate Screen, Ur Negative Negative    Amphetamine Screen, Ur Negative Negative    THC Negative Negative    Phencyclidine Negative Negative    Creatinine, Urine 21.1 15.0 - 325.0 mg/dL    Toxicology Information SEE COMMENT    Urinalysis, Reflex to Urine Culture Urine, Clean Catch    Collection Time: 01/21/23  2:44 PM    Specimen: Urine   Result Value Ref Range    Specimen UA Urine, Clean Catch     Color, UA Colorless (A) Yellow, Straw, Sherrie    Appearance, UA Clear Clear    pH, UA 7.0 5.0 - 8.0    Specific Gravity, UA 1.010 1.005 - 1.030    Protein, UA Negative Negative    Glucose, UA Negative Negative    Ketones, UA Negative Negative    Bilirubin (UA) Negative Negative    Occult Blood UA Negative Negative    Nitrite, UA Negative Negative    Urobilinogen, UA Negative <2.0 EU/dL    Leukocytes, UA Negative Negative   Occult blood x 1, stool    Collection Time: 01/21/23  7:41 PM    Specimen: Stool   Result Value Ref Range    Occult Blood Negative Negative   Prepare RBC 1 Unit    Collection Time: 01/21/23  8:02 PM   Result Value Ref Range    UNIT NUMBER [id number]     Product Code [id number]     DISPENSE STATUS TRANSFUSED     CODING SYSTEM VYPR238     Unit Blood Type Code 6200     Unit Blood Type A POS     Unit Expiration [id number]    Type & Screen    Collection Time: 01/21/23  8:02 PM   Result Value Ref Range    Group & Rh A POS     Indirect Michael NEG        RADIOLOGY  X-Ray Chest AP Portable    Result Date: 1/21/2023  EXAMINATION: XR CHEST AP PORTABLE CLINICAL HISTORY: Shortness of breath COMPARISON: 07/12/2022 FINDINGS: Lung fields are clear.  The heart is normal in size. No pleural fluid or pneumothorax. No adenopathy is identified. No significant osseous abnormality.     No acute findings. Electronically signed by: Kam Siegel Date:    01/21/2023 Time:    15:19      EKG    MICROBIOLOGY    MDM

## 2023-01-22 NOTE — SUBJECTIVE & OBJECTIVE
Interval History: See hospital course for today      Review of Systems   Constitutional:  Positive for activity change. Negative for appetite change and fever.   Respiratory:  Negative for shortness of breath.    Cardiovascular:  Negative for chest pain and leg swelling.   Gastrointestinal:  Positive for abdominal pain (burning sensation) and blood in stool. Negative for nausea and vomiting.   Neurological:  Positive for syncope and weakness.   Psychiatric/Behavioral:  Positive for agitation. Negative for decreased concentration. The patient is nervous/anxious.    Objective:     Vital Signs (Most Recent):  Temp: 98.8 °F (37.1 °C) (01/22/23 1226)  Pulse: 69 (01/22/23 1226)  Resp: 18 (01/22/23 1226)  BP: (!) 155/78 (01/22/23 1226)  SpO2: 98 % (01/22/23 1226)   Vital Signs (24h Range):  Temp:  [98 °F (36.7 °C)-99.4 °F (37.4 °C)] 98.8 °F (37.1 °C)  Pulse:  [64-94] 69  Resp:  [16-22] 18  SpO2:  [94 %-100 %] 98 %  BP: (108-182)/() 155/78     Weight: 78.6 kg (173 lb 4.5 oz)  Body mass index is 26.35 kg/m².    Intake/Output Summary (Last 24 hours) at 1/22/2023 1247  Last data filed at 1/22/2023 0131  Gross per 24 hour   Intake 1391.32 ml   Output --   Net 1391.32 ml      Physical Exam  Vitals and nursing note reviewed.   Constitutional:       General: She is not in acute distress.     Appearance: She is ill-appearing. She is not toxic-appearing.   HENT:      Head: Normocephalic and atraumatic.   Cardiovascular:      Rate and Rhythm: Normal rate.   Pulmonary:      Effort: Pulmonary effort is normal. No respiratory distress.   Abdominal:      General: There is distension.      Palpations: Abdomen is soft.      Tenderness: There is abdominal tenderness.      Comments: Abdominal surgical incisions well healed  Tympanic to percussion in all quadrants except lower left quadrant  Dullness to percussion in LLQ   Musculoskeletal:      Right lower leg: No edema.      Left lower leg: No edema.   Skin:     General: Skin is warm.    Neurological:      Mental Status: She is alert and oriented to person, place, and time.      Motor: Weakness present.   Psychiatric:         Speech: Speech normal.         Behavior: Behavior is agitated.       Significant Labs: All pertinent labs within the past 24 hours have been reviewed.  CBC:   Recent Labs   Lab 01/21/23  1430 01/22/23  0536   WBC 7.15 7.35   HGB 6.0* 6.7*   HCT 20.7* 23.3*    391     CMP:   Recent Labs   Lab 01/21/23  1430 01/22/23  0536    143   K 3.9 4.0    109   CO2 23 24   GLU 94 84   BUN 14 18   CREATININE 1.0 1.1   CALCIUM 8.6* 8.7   PROT 7.0 6.8   ALBUMIN 2.7* 2.6*   BILITOT 0.5 0.9   ALKPHOS 135 132   AST 21 17   ALT 11 11   ANIONGAP 10 10     Cardiac Markers:   Recent Labs   Lab 01/21/23  1430   *     Troponin:   Recent Labs   Lab 01/21/23  1430   TROPONINI 0.019         Significant Imaging: I have reviewed all pertinent imaging results/findings within the past 24 hours.  CXR: I have reviewed all pertinent results/findings within the past 24 hours and my personal findings are:  no acute findings  Echo: I have reviewed all pertinent results/findings within the past 24 hours and my personal findings are:  no intracardiac shunting

## 2023-01-22 NOTE — PLAN OF CARE
[facility]  Initial Discharge Assessment       Primary Care Provider: Daphney Heart MD    Admission Diagnosis: Fatigue [R53.83]  SOB (shortness of breath) [R06.02]    Admission Date: 1/21/2023  Expected Discharge Date: 1/22/2023    Discharge Barriers Identified: None    Payor: MEDICAID / Plan: [place] [employer] [place] / Product Type: Managed Medicaid /     Extended Emergency Contact Information  Primary Emergency Contact: [place] [address] [facility]  Home Phone: [phone]  Relation: Relative    Discharge Plan A: Home         [facility] - [place] - [address]  [address]  [facility] [place] [address]  Phone: [phone] Fax: [phone]    [facility] [address] [facility] [employer] - [place] - [address]  [address] [facility]  [place] [address]  Phone: [phone] Fax: [phone]    [id number]  [place] - [address] [facility].  [address] [place] [address]  [place] [address]  Phone: [phone] Fax: [phone]    [id number] - [place] - [address] [facility]  [address] [facility]  [place] [address]  Phone: [phone] Fax: [phone]      Initial Assessment (most recent)       Adult Discharge Assessment - 01/22/23 1013          Discharge Assessment    Assessment Type Discharge Planning Assessment     Confirmed/corrected address, phone number and insurance Yes     Confirmed Demographics Correct on Facesheet     Source of Information patient     When was your last doctors appointment? --   unknown    Does patient/caregiver understand observation status Yes     Communicated ALEXA with patient/caregiver No     People in Home child(zhen), dependent     Do you expect to return to your current living situation? Yes     Do you have help at home or someone to help you manage your care at home? No     Prior to hospitilization cognitive status: Alert/Oriented      Current cognitive status: Alert/Oriented     Walking or Climbing Stairs ambulation difficulty, requires equipment     Do you have any problems with: Errands/Grocery     Home Accessibility not wheelchair accessible     Equipment Currently Used at Home walker, standard     Readmission within 30 days? No     Patient currently being followed by outpatient case management? No     Do you currently have service(s) that help you manage your care at home? No     Do you take prescription medications? Yes     Do you have prescription coverage? Yes     Do you have any problems affording any of your prescribed medications? No     Is the patient taking medications as prescribed? yes     Who is going to help you get home at discharge? will need assistnace     How do you get to doctors appointments? car, drives self     Are you on dialysis? No     Do you take coumadin? No     Discharge Plan A Home     DME Needed Upon Discharge  none     Discharge Plan discussed with: Patient     Discharge Barriers Identified None                     SW spoke with pt over the phone. SW explained role. Pt stated prior to admission she did use a rolling walker but not often. Pt is independent with her ADLs and drives her self to her medical appointments. Pt will needs transportation at discharge.

## 2023-01-22 NOTE — ASSESSMENT & PLAN NOTE
Patient presented with syncope event x2 days duration and was found to have worsening anemia with H/H currently measuring 6.0/20.7 with last reported baseline measuring 9.3/32.0 back in 05/2022.  Patient reported blood in stools 2 weeks prior to admission however, occult blood negative.  Patient has history of sickle cell trait. Patient normotensive prior to admission however orthostatics not obtained. BG 94.  Last reported iron panel revealed iron level 13 back on 05/17/2022.  Urine toxicology and UA negative.  Syncope likely secondary to symptomatic anemia as patient without signs/symptoms of CHF exacerbation, dehydration, known cardiac arrhythmia, or presence of infectious processes. Denies menses   blood transfuse additional unit as hb<7  Syncope likely related to anemia  Will forgo carotid dopplers  Echocardiogram with no intracardiac shunting  pmh gastric ulcers  Resume proton pump inhibitor and carafate  Follow up gastroenterology outpatient

## 2023-01-22 NOTE — ASSESSMENT & PLAN NOTE
Patient reports smoking approximately 1/4 pack of cigarettes daily with no thoughts of cutting back currently. Patient educated on morbidity and mortality in regards to continuation of smoking and stressed importance of cessation. Patient currently declined nicotine patch at time of admission but will be available at patient's request.  Plan:  -Nicotine patch available at patient's request

## 2023-01-22 NOTE — PLAN OF CARE
Problem: Adult Inpatient Plan of Care  Goal: Plan of Care Review  Outcome: Ongoing, Progressing  Goal: Patient-Specific Goal (Individualized)  Outcome: Ongoing, Progressing  Goal: Absence of Hospital-Acquired Illness or Injury  Outcome: Ongoing, Progressing  Goal: Optimal Comfort and Wellbeing  Outcome: Ongoing, Progressing  Goal: Readiness for Transition of Care  Outcome: Ongoing, Progressing     Problem: Fall Injury Risk  Goal: Absence of Fall and Fall-Related Injury  Outcome: Ongoing, Progressing     Problem: Pain Acute  Goal: Acceptable Pain Control and Functional Ability  Outcome: Ongoing, Progressing     Problem: Fatigue  Goal: Improved Activity Tolerance  Outcome: Ongoing, Progressing     Problem: Anemia  Goal: Anemia Symptom Improvement  Outcome: Ongoing, Progressing     Problem: Nausea and Vomiting  Goal: Fluid and Electrolyte Balance  Outcome: Ongoing, Progressing      Admitted to floor at 2100. Patient remains free from injury. Safety precautions maintained. No s/s of acute distress. Pain controlled per MD order. One unit of blood infusing. Chart and orders review completed. Pt education about care completed.

## 2023-01-22 NOTE — ASSESSMENT & PLAN NOTE
Currently normotensive. BP currently ranging from 108-175 systolic.  Continue home medications, titrate as needed   Low salt/cardiac diet   IV hydralazine prn for SBP>160 or DBP>90

## 2023-01-22 NOTE — HOSPITAL COURSE
1/22 admitted for symptomatic anemia. Reports dark stool 2 weeks ago. Suppose to follow up with gastroenterology for cscope. Reports taking daily aspirin, sometimes on empty stomach. Associated with burning sensation in lower abdomen. Also reports bowel incontinence. Psh hernia repair and gastric surgery per [facility] in [place]. Also reports pmh esophageal ulcers and hernia and suppose to take proton pump inhibitor and carafate. Stool occult negative. Follow up outpatient gastroenterology. Transfuse additional unit. Anticipate discharge 1-2 days if remains stable.  1/23 She is complaining of SOB . Pt requesting GI evaluation . NAEON . The H/H trending up .   1/24 Pt was seen and examined at bedside at bedside. She was determined to be suitable for  d/c .  The H/H is trending up . She denies any chest pain , sob . Black stool or bloody stool . GI consulted and rec outpt EGD and possible c-scope . She will be d/c on Protonix  po bid and Carafate po qid . Pt BP has been labile  and Norvasc 5 mf po qd added to her BP regimen . She was advised to f/u with GI and her PCP in 1 to 2 weeks .

## 2023-01-22 NOTE — ASSESSMENT & PLAN NOTE
Patient presented with syncope event x2 days duration and was found to have worsening anemia with H/H currently measuring 6.0/20.7 with last reported baseline measuring 9.3/32.0 back in 05/2022.  Patient reported blood in stools 2 weeks prior to admission however, occult blood negative.  Patient has history of sickle cell trait. Patient normotensive prior to admission however orthostatics not obtained. BG 94.  Last reported iron panel revealed iron level 13 back on 05/17/2022.  Urine toxicology and UA negative.  Syncope likely secondary to symptomatic anemia as patient without signs/symptoms of CHF exacerbation, dehydration, known cardiac arrhythmia, or presence of infectious processes.   Plan:  -telemetry   -continue blood transfusion  -monitor H/H and BP  -carotid dopplers  -f/u echo  -repeat iron panel following transfusion

## 2023-01-22 NOTE — ASSESSMENT & PLAN NOTE
Likely 2/2 symptomatic anemia  Forgo us carotid as patient has been on high dose stain since last cva  Transfuse to hb>7  Obtain carotid us after fluid resuscitated with blood if continues to have syncope

## 2023-01-23 LAB
ALBUMIN SERPL BCP-MCNC: 2.6 G/DL (ref 3.5–5.2)
ALP SERPL-CCNC: 128 U/L (ref 55–135)
ALT SERPL W/O P-5'-P-CCNC: 9 U/L (ref 10–44)
ANION GAP SERPL CALC-SCNC: 9 MMOL/L (ref 8–16)
ANISOCYTOSIS BLD QL SMEAR: ABNORMAL
AST SERPL-CCNC: 15 U/L (ref 10–40)
BASOPHILS # BLD AUTO: 0.06 K/UL (ref 0–0.2)
BASOPHILS NFR BLD: 0.7 % (ref 0–1.9)
BILIRUB SERPL-MCNC: 0.6 MG/DL (ref 0.1–1)
BUN SERPL-MCNC: 18 MG/DL (ref 6–20)
CALCIUM SERPL-MCNC: 8.9 MG/DL (ref 8.7–10.5)
CHLORIDE SERPL-SCNC: 109 MMOL/L (ref 95–110)
CO2 SERPL-SCNC: 22 MMOL/L (ref 23–29)
CREAT SERPL-MCNC: 1 MG/DL (ref 0.5–1.4)
DIFFERENTIAL METHOD: ABNORMAL
EOSINOPHIL # BLD AUTO: 0.1 K/UL (ref 0–0.5)
EOSINOPHIL NFR BLD: 0.7 % (ref 0–8)
ERYTHROCYTE [DISTWIDTH] IN BLOOD BY AUTOMATED COUNT: 22.4 % (ref 11.5–14.5)
EST. GFR  (NO RACE VARIABLE): >60 ML/MIN/1.73 M^2
GLUCOSE SERPL-MCNC: 86 MG/DL (ref 70–110)
HCT VFR BLD AUTO: 26.7 % (ref 37–48.5)
HGB BLD-MCNC: 7.8 G/DL (ref 12–16)
HYPOCHROMIA BLD QL SMEAR: ABNORMAL
IMM GRANULOCYTES # BLD AUTO: 0.04 K/UL (ref 0–0.04)
IMM GRANULOCYTES NFR BLD AUTO: 0.5 % (ref 0–0.5)
LYMPHOCYTES # BLD AUTO: 1.7 K/UL (ref 1–4.8)
LYMPHOCYTES NFR BLD: 18.9 % (ref 18–48)
MCH RBC QN AUTO: 21.4 PG (ref 27–31)
MCHC RBC AUTO-ENTMCNC: 29.2 G/DL (ref 32–36)
MCV RBC AUTO: 73 FL (ref 82–98)
MONOCYTES # BLD AUTO: 0.6 K/UL (ref 0.3–1)
MONOCYTES NFR BLD: 6.9 % (ref 4–15)
NEUTROPHILS # BLD AUTO: 6.4 K/UL (ref 1.8–7.7)
NEUTROPHILS NFR BLD: 72.3 % (ref 38–73)
NRBC BLD-RTO: 1 /100 WBC
PLATELET # BLD AUTO: 405 K/UL (ref 150–450)
PLATELET BLD QL SMEAR: ABNORMAL
PMV BLD AUTO: 9.5 FL (ref 9.2–12.9)
POTASSIUM SERPL-SCNC: 4.1 MMOL/L (ref 3.5–5.1)
PROT SERPL-MCNC: 7 G/DL (ref 6–8.4)
RBC # BLD AUTO: 3.65 M/UL (ref 4–5.4)
SODIUM SERPL-SCNC: 140 MMOL/L (ref 136–145)
STOMATOCYTES BLD QL SMEAR: PRESENT
TARGETS BLD QL SMEAR: ABNORMAL
WBC # BLD AUTO: 8.8 K/UL (ref 3.9–12.7)

## 2023-01-23 PROCEDURE — 25000003 PHARM REV CODE 250: Performed by: HOSPITALIST

## 2023-01-23 PROCEDURE — 25000003 PHARM REV CODE 250: Performed by: FAMILY MEDICINE

## 2023-01-23 PROCEDURE — 36415 COLL VENOUS BLD VENIPUNCTURE: CPT | Performed by: HOSPITALIST

## 2023-01-23 PROCEDURE — 85025 COMPLETE CBC W/AUTO DIFF WBC: CPT | Performed by: HOSPITALIST

## 2023-01-23 PROCEDURE — 80053 COMPREHEN METABOLIC PANEL: CPT | Performed by: HOSPITALIST

## 2023-01-23 PROCEDURE — 21400001 HC TELEMETRY ROOM

## 2023-01-23 PROCEDURE — 25000003 PHARM REV CODE 250: Performed by: INTERNAL MEDICINE

## 2023-01-23 PROCEDURE — 99222 1ST HOSP IP/OBS MODERATE 55: CPT | Mod: ,,, | Performed by: INTERNAL MEDICINE

## 2023-01-23 PROCEDURE — 11000001 HC ACUTE MED/SURG PRIVATE ROOM

## 2023-01-23 PROCEDURE — 99222 PR INITIAL HOSPITAL CARE,LEVL II: ICD-10-PCS | Mod: ,,, | Performed by: INTERNAL MEDICINE

## 2023-01-23 PROCEDURE — 63600175 PHARM REV CODE 636 W HCPCS: Performed by: HOSPITALIST

## 2023-01-23 PROCEDURE — 99900035 HC TECH TIME PER 15 MIN (STAT)

## 2023-01-23 RX ORDER — CYCLOBENZAPRINE HCL 10 MG
10 TABLET ORAL 3 TIMES DAILY PRN
Status: DISCONTINUED | OUTPATIENT
Start: 2023-01-23 | End: 2023-01-24 | Stop reason: HOSPADM

## 2023-01-23 RX ADMIN — PANTOPRAZOLE SODIUM 40 MG: 40 TABLET, DELAYED RELEASE ORAL at 08:01

## 2023-01-23 RX ADMIN — FUROSEMIDE 80 MG: 40 TABLET ORAL at 08:01

## 2023-01-23 RX ADMIN — SUCRALFATE 1 G: 1 TABLET ORAL at 05:01

## 2023-01-23 RX ADMIN — CYCLOBENZAPRINE HYDROCHLORIDE 10 MG: 10 TABLET, FILM COATED ORAL at 09:01

## 2023-01-23 RX ADMIN — LOSARTAN POTASSIUM 100 MG: 50 TABLET, FILM COATED ORAL at 08:01

## 2023-01-23 RX ADMIN — DULOXETINE 60 MG: 30 CAPSULE, DELAYED RELEASE ORAL at 09:01

## 2023-01-23 RX ADMIN — SUCRALFATE 1 G: 1 TABLET ORAL at 09:01

## 2023-01-23 RX ADMIN — PANTOPRAZOLE SODIUM 40 MG: 40 TABLET, DELAYED RELEASE ORAL at 09:01

## 2023-01-23 RX ADMIN — ASPIRIN 81 MG: 81 TABLET, COATED ORAL at 08:01

## 2023-01-23 RX ADMIN — ATORVASTATIN CALCIUM 80 MG: 40 TABLET, FILM COATED ORAL at 09:01

## 2023-01-23 RX ADMIN — DULOXETINE 60 MG: 30 CAPSULE, DELAYED RELEASE ORAL at 08:01

## 2023-01-23 RX ADMIN — ISOSORBIDE MONONITRATE 60 MG: 60 TABLET, EXTENDED RELEASE ORAL at 08:01

## 2023-01-23 RX ADMIN — SUCRALFATE 1 G: 1 TABLET ORAL at 11:01

## 2023-01-23 RX ADMIN — HYDRALAZINE HYDROCHLORIDE 10 MG: 20 INJECTION, SOLUTION INTRAMUSCULAR; INTRAVENOUS at 04:01

## 2023-01-23 NOTE — PLAN OF CARE
Bed locked, in lowest position. Call bell in reach. Purposeful rounding done every two hours. Cardiac monitoring in use. Pain and nausea relieved by PO meds. 1 U of RBCs administered per MD order, tolerated well. Chart check complete. Will continue with plan of care.

## 2023-01-23 NOTE — PLAN OF CARE
Problem: Adult Inpatient Plan of Care  Goal: Plan of Care Review  Outcome: Ongoing, Progressing     Problem: Fall Injury Risk  Goal: Absence of Fall and Fall-Related Injury  Outcome: Ongoing, Progressing     Problem: Pain Acute  Goal: Acceptable Pain Control and Functional Ability  Outcome: Ongoing, Progressing     Problem: Fatigue  Goal: Improved Activity Tolerance  Outcome: Ongoing, Progressing     Problem: Anemia  Goal: Anemia Symptom Improvement  Outcome: Ongoing, Progressing     Problem: Nausea and Vomiting  Goal: Fluid and Electrolyte Balance  Outcome: Ongoing, Progressing    Patient remains free from injury. Safety precautions maintained. No s/s of acute distress. Pain and nausea controlled per MD order. Cardiac monitoring in place. Chart and orders review completed. Pt education about care completed.

## 2023-01-23 NOTE — ASSESSMENT & PLAN NOTE
blood transfuse additional unit as hb<7  Syncope likely related to anemia  Will forgo carotid dopplers  Echocardiogram with no intracardiac shunting  pmh gastric ulcers  Resume proton pump inhibitor and carafate  GI consulted

## 2023-01-23 NOTE — PROGRESS NOTES
[facility] Medicine  Progress Note    Patient Name: Joe Ceballos  MRN: [id number]  Patient Class: IP- Inpatient   Admission Date: 1/21/2023  Length of Stay: 2 days  Attending Physician: Minesh Jean, *  Primary Care Provider: Daphney Heart MD        Subjective:     Principal Problem:Symptomatic anemia        HPI:  Joe Ceballos is a 53 y.o. female with a PMH  has a past medical history of Anxiety, CHF (congestive heart failure), Edema, Hypertension, Initial insomnia, Sickle cell trait, Stroke, and Tobacco use. who presented to the ED for further evaluation of worsening fatigue and syncope x2 since Friday afternoon.  Patient reported sitting in the yard Friday with family and experienced acute onset dizziness, lightheadedness, and ground level fall following feeling weak shortly after standing.  She denied complete loss of consciousness or endorsing any trauma during that time and did not seek medical attention.  Patient reported endorsing 2nd syncopal event earlier today when she was packing when she suddenly became lightheaded and dizzy with vision turning blue resulting in patient falling to the ground and hitting her head.  Patient unsure if she lost complete consciousness but was complaining of a headache which has now resolved.  She denied endorsing any diaphoresis, nausea, vomiting, palpitations, chest pain, or shortness a breath prior to the event but did report feeling weak and fatigue since Friday.  Patient also reported episode of bloody stool approximately 2 weeks prior to admission which has since resolved reports compliance with home medications, dietary restrictions, negative ill contacts, and having good oral intake.  Prior to onset of symptoms, patient reported being in her usual state of health with no other concerns or complaints.  All other review of systems negative except as noted above.  Patient being admitted to Hospital Medicine for further workup and  treatment of syncope and medical management of symptomatic anemia after patient was found to have H/H measuring 6.0/20.7 upon initial workup.     PCP: Daphney Heart        Overview/Hospital Course:  1/22 admitted for symptomatic anemia. Reports dark stool 2 weeks ago. Suppose to follow up with gastroenterology for cscope. Reports taking daily aspirin, sometimes on empty stomach. Associated with burning sensation in lower abdomen. Also reports bowel incontinence. Psh hernia repair and gastric surgery per [facility] in [facility]. Also reports pmh esophageal ulcers and hernia and suppose to take proton pump inhibitor and carafate. Stool occult negative. Follow up outpatient gastroenterology. Transfuse additional unit. Anticipate discharge 1-2 days if remains stable.  1/23 She is complaining of SOB . Pt requesting GI evaluation . NAEON . The H/H trending up .       Interval History:     Review of Systems   Constitutional: Negative.    HENT: Negative.     Eyes: Negative.    Respiratory:  Positive for shortness of breath.    Cardiovascular: Negative.    Gastrointestinal: Negative.    Endocrine: Negative.    Genitourinary: Negative.    Musculoskeletal: Negative.    Skin: Negative.    Allergic/Immunologic: Negative.    Neurological: Negative.    Hematological: Negative.    Psychiatric/Behavioral:  Positive for agitation. The patient is nervous/anxious.    Objective:     Vital Signs (Most Recent):  Temp: 98.8 °F (37.1 °C) (01/23/23 1228)  Pulse: 84 (01/23/23 1228)  Resp: 18 (01/23/23 1228)  BP: 123/72 (01/23/23 1228)  SpO2: 95 % (01/23/23 1228) Vital Signs (24h Range):  Temp:  [98.6 °F (37 °C)-99.2 °F (37.3 °C)] 98.8 °F (37.1 °C)  Pulse:  [63-92] 84  Resp:  [16-18] 18  SpO2:  [95 %-98 %] 95 %  BP: (123-193)/(65-94) 123/72     Weight: 78.6 kg (173 lb 4.5 oz)  Body mass index is 26.35 kg/m².    Intake/Output Summary (Last 24 hours) at 1/23/2023 1627  Last data filed at 1/22/2023 1901  Gross per 24 hour   Intake 314.58 ml   Output --    Net 314.58 ml      Physical Exam  Vitals and nursing note reviewed.   Constitutional:       General: She is not in acute distress.     Appearance: Normal appearance.   HENT:      Nose: Nose normal.   Eyes:      Extraocular Movements: Extraocular movements intact.      Conjunctiva/sclera: Conjunctivae normal.      Pupils: Pupils are equal, round, and reactive to light.   Cardiovascular:      Rate and Rhythm: Normal rate and regular rhythm.      Pulses: Normal pulses.      Heart sounds: No murmur heard.  Pulmonary:      Effort: Pulmonary effort is normal. No respiratory distress.      Breath sounds: No stridor. No wheezing or rhonchi.   Abdominal:      General: Abdomen is flat. Bowel sounds are normal. There is no distension.      Palpations: There is no mass.      Tenderness: There is no abdominal tenderness. There is no guarding.      Hernia: No hernia is present.   Musculoskeletal:         General: No swelling, tenderness, deformity or signs of injury. Normal range of motion.      Cervical back: Normal range of motion. No rigidity or tenderness.   Skin:     General: Skin is warm.      Coloration: Skin is not jaundiced or pale.      Findings: No bruising or erythema.   Neurological:      General: No focal deficit present.      Mental Status: She is alert and oriented to person, place, and time. Mental status is at baseline.      Cranial Nerves: No cranial nerve deficit.      Sensory: No sensory deficit.      Motor: No weakness.      Coordination: Coordination normal.   Psychiatric:         Mood and Affect: Mood normal.       Significant Labs: All pertinent labs within the past 24 hours have been reviewed.      Significant Imaging: I have reviewed all pertinent imaging results/findings within the past 24 hours.        Assessment/Plan:      * Symptomatic anemia    blood transfuse additional unit as hb<7  Syncope likely related to anemia  Will forgo carotid dopplers  Echocardiogram with no intracardiac shunting  [facility]  gastric ulcers  Resume proton pump inhibitor and carafate  GI consulted       Tobacco abuse  Patient reports smoking approximately 1/4 pack of cigarettes daily with no thoughts of cutting back currently. Patient educated on morbidity and mortality in regards to continuation of smoking and stressed importance of cessation. Patient currently declined nicotine patch at time of admission but will be available at patient's request.  Plan:  -Nicotine patch available at patient's request        (HFpEF) heart failure with preserved ejection fraction  Patient currently without signs/symptoms of acute exacerbation with last reported EF measuring >55% on 3/8/22. BNP currently measuring 169. CXR revealing chest X-ray showed no acute findings. Patient currently follows Dr. Gerard from cardiology.   Plan:  -Continue home medications, titrate as needed   -Monitor Is/Os  -Low salt/cardiac diet  -f/u cardiology outpatient    Home lasix resumed  Intravenous lasix prn for transfusion        Sickle cell trait  Chronic  Transfusing blood       Anxiety  Chronic. Stable. Not in acute exacerbation and currently denies endorsing any suicidal/homicidal ideations.   Plan:  -Continue home medications       History of CVA (cerebrovascular accident)  Patient s/p CVA in 3/2022 with MRI revealing acute ischemia in the right corona radiata.  Patient currently on aspirin, Plavix, and statin outpatient and is currently at neurological baseline.    Continue home medications   Echocardiogram without intracardiac shunting      HLD (hyperlipidemia)  Patient is chronically on statin.will continue for now. Last Lipid Panel:   Lab Results   Component Value Date    CHOL 166 08/09/2021    HDL 55 08/09/2021    LDLCALC 80.6 08/09/2021    TRIG 152 (H) 08/09/2021    CHOLHDL 33.1 08/09/2021   patient on high dose statin  Resume       Syncope  Likely 2/2 symptomatic anemia  Transfuse to hb>7      Ulceration, esophagus  psh lap sleeve gastrectomy  Previous egd  revealed esophagitis and ulcerated esophagus  Endorses taking aspirin on empty stomach  Cont PPI BID  GI Consulted       Primary hypertension  Currently normotensive. BP currently ranging from 108-175 systolic.  Continue home medications, titrate as needed   Low salt/cardiac diet   IV hydralazine prn for SBP>160 or DBP>90           VTE Risk Mitigation (From admission, onward)         Ordered     Reason for No Pharmacological VTE Prophylaxis  Once        Question:  Reasons:  Answer:  Risk of Bleeding  Comment:  getting blood products    01/21/23 2144     IP VTE LOW RISK PATIENT  Once         01/21/23 2144     Place sequential compression device  Until discontinued         01/21/23 2144                Discharge Planning   ALEXA: 1/22/2023     Code Status: Full Code   Is the patient medically ready for discharge?: Yes    Reason for patient still in hospital (select all that apply): Treatment  Discharge Plan A: Home                  Minesh Jean MD  Department of Hospital Medicine   [facility] - Med S[facility]

## 2023-01-23 NOTE — SUBJECTIVE & OBJECTIVE
Interval History:     Review of Systems   Constitutional: Negative.    HENT: Negative.     Eyes: Negative.    Respiratory:  Positive for shortness of breath.    Cardiovascular: Negative.    Gastrointestinal: Negative.    Endocrine: Negative.    Genitourinary: Negative.    Musculoskeletal: Negative.    Skin: Negative.    Allergic/Immunologic: Negative.    Neurological: Negative.    Hematological: Negative.    Psychiatric/Behavioral:  Positive for agitation. The patient is nervous/anxious.    Objective:     Vital Signs (Most Recent):  Temp: 98.8 °F (37.1 °C) (01/23/23 1228)  Pulse: 84 (01/23/23 1228)  Resp: 18 (01/23/23 1228)  BP: 123/72 (01/23/23 1228)  SpO2: 95 % (01/23/23 1228) Vital Signs (24h Range):  Temp:  [98.6 °F (37 °C)-99.2 °F (37.3 °C)] 98.8 °F (37.1 °C)  Pulse:  [63-92] 84  Resp:  [16-18] 18  SpO2:  [95 %-98 %] 95 %  BP: (123-193)/(65-94) 123/72     Weight: 78.6 kg (173 lb 4.5 oz)  Body mass index is 26.35 kg/m².    Intake/Output Summary (Last 24 hours) at 1/23/2023 1627  Last data filed at 1/22/2023 1901  Gross per 24 hour   Intake 314.58 ml   Output --   Net 314.58 ml      Physical Exam  Vitals and nursing note reviewed.   Constitutional:       General: She is not in acute distress.     Appearance: Normal appearance.   HENT:      Nose: Nose normal.   Eyes:      Extraocular Movements: Extraocular movements intact.      Conjunctiva/sclera: Conjunctivae normal.      Pupils: Pupils are equal, round, and reactive to light.   Cardiovascular:      Rate and Rhythm: Normal rate and regular rhythm.      Pulses: Normal pulses.      Heart sounds: No murmur heard.  Pulmonary:      Effort: Pulmonary effort is normal. No respiratory distress.      Breath sounds: No stridor. No wheezing or rhonchi.   Abdominal:      General: Abdomen is flat. Bowel sounds are normal. There is no distension.      Palpations: There is no mass.      Tenderness: There is no abdominal tenderness. There is no guarding.      Hernia: No hernia  Radiology Tech Note            Time Out    Time Out Performed: Yes, @ 1000 by sc      Procedure Start Time: 1000     Procedure End Time: 1006      Radiation Safety Adhered: Yes     Radiation Dose: 0.0 min 1.39 mGy    Correct Patient: Yes    Correct Procedure: Yes    Site marking visible or correct site verified: Yes    Consent accurate and signed: Yes    Required images, implants, and equipment available: Yes    Patient allergies: Yes, verified    Fire risk assessment and special precautions: Assessed    Code status addressed (for non-full code patients): Addressed    Antibiotic dose and time given, if appropriate: N/A    Beta blocker given, if ordered: N/A    Team invited to voice any concerns: Yes    _________________________________    Sign Out    Final procedure performed: Yes    Specimens removed: N/A     Specimen labeled with correct patient identifiers: N/A    Correct specimen testing ordered: N/A       is present.   Musculoskeletal:         General: No swelling, tenderness, deformity or signs of injury. Normal range of motion.      Cervical back: Normal range of motion. No rigidity or tenderness.   Skin:     General: Skin is warm.      Coloration: Skin is not jaundiced or pale.      Findings: No bruising or erythema.   Neurological:      General: No focal deficit present.      Mental Status: She is alert and oriented to person, place, and time. Mental status is at baseline.      Cranial Nerves: No cranial nerve deficit.      Sensory: No sensory deficit.      Motor: No weakness.      Coordination: Coordination normal.   Psychiatric:         Mood and Affect: Mood normal.       Significant Labs: All pertinent labs within the past 24 hours have been reviewed.      Significant Imaging: I have reviewed all pertinent imaging results/findings within the past 24 hours.

## 2023-01-23 NOTE — ASSESSMENT & PLAN NOTE
psh lap sleeve gastrectomy  Previous egd revealed esophagitis and ulcerated esophagus  Endorses taking aspirin on empty stomach  Cont PPI BID  GI Consulted

## 2023-01-24 ENCOUNTER — TELEPHONE (OUTPATIENT)
Dept: GASTROENTEROLOGY | Facility: CLINIC | Age: 53
End: 2023-01-24
Payer: MEDICAID

## 2023-01-24 ENCOUNTER — TELEPHONE (OUTPATIENT)
Dept: PRIMARY CARE CLINIC | Facility: CLINIC | Age: 53
End: 2023-01-24
Payer: MEDICAID

## 2023-01-24 VITALS
OXYGEN SATURATION: 96 % | TEMPERATURE: 98 F | DIASTOLIC BLOOD PRESSURE: 60 MMHG | RESPIRATION RATE: 17 BRPM | WEIGHT: 176.81 LBS | HEART RATE: 75 BPM | BODY MASS INDEX: 26.8 KG/M2 | HEIGHT: 68 IN | SYSTOLIC BLOOD PRESSURE: 130 MMHG

## 2023-01-24 LAB
ALBUMIN SERPL BCP-MCNC: 2.6 G/DL (ref 3.5–5.2)
ALP SERPL-CCNC: 117 U/L (ref 55–135)
ALT SERPL W/O P-5'-P-CCNC: 8 U/L (ref 10–44)
ANION GAP SERPL CALC-SCNC: 11 MMOL/L (ref 8–16)
AST SERPL-CCNC: 15 U/L (ref 10–40)
BASOPHILS # BLD AUTO: 0.05 K/UL (ref 0–0.2)
BASOPHILS NFR BLD: 0.5 % (ref 0–1.9)
BILIRUB SERPL-MCNC: 0.5 MG/DL (ref 0.1–1)
BUN SERPL-MCNC: 19 MG/DL (ref 6–20)
CALCIUM SERPL-MCNC: 9 MG/DL (ref 8.7–10.5)
CHLORIDE SERPL-SCNC: 106 MMOL/L (ref 95–110)
CO2 SERPL-SCNC: 21 MMOL/L (ref 23–29)
CREAT SERPL-MCNC: 1.1 MG/DL (ref 0.5–1.4)
DIFFERENTIAL METHOD: ABNORMAL
EOSINOPHIL # BLD AUTO: 0.1 K/UL (ref 0–0.5)
EOSINOPHIL NFR BLD: 0.6 % (ref 0–8)
ERYTHROCYTE [DISTWIDTH] IN BLOOD BY AUTOMATED COUNT: 24.3 % (ref 11.5–14.5)
EST. GFR  (NO RACE VARIABLE): >60 ML/MIN/1.73 M^2
GLUCOSE SERPL-MCNC: 91 MG/DL (ref 70–110)
HCT VFR BLD AUTO: 27.9 % (ref 37–48.5)
HGB BLD-MCNC: 7.9 G/DL (ref 12–16)
IMM GRANULOCYTES # BLD AUTO: 0.04 K/UL (ref 0–0.04)
IMM GRANULOCYTES NFR BLD AUTO: 0.4 % (ref 0–0.5)
LYMPHOCYTES # BLD AUTO: 1.8 K/UL (ref 1–4.8)
LYMPHOCYTES NFR BLD: 18.6 % (ref 18–48)
MCH RBC QN AUTO: 21.5 PG (ref 27–31)
MCHC RBC AUTO-ENTMCNC: 28.3 G/DL (ref 32–36)
MCV RBC AUTO: 76 FL (ref 82–98)
MONOCYTES # BLD AUTO: 0.9 K/UL (ref 0.3–1)
MONOCYTES NFR BLD: 9 % (ref 4–15)
NEUTROPHILS # BLD AUTO: 6.9 K/UL (ref 1.8–7.7)
NEUTROPHILS NFR BLD: 70.9 % (ref 38–73)
NRBC BLD-RTO: 0 /100 WBC
PLATELET # BLD AUTO: 401 K/UL (ref 150–450)
PMV BLD AUTO: 9.7 FL (ref 9.2–12.9)
POTASSIUM SERPL-SCNC: 4.1 MMOL/L (ref 3.5–5.1)
PROT SERPL-MCNC: 6.9 G/DL (ref 6–8.4)
RBC # BLD AUTO: 3.67 M/UL (ref 4–5.4)
SODIUM SERPL-SCNC: 138 MMOL/L (ref 136–145)
WBC # BLD AUTO: 9.69 K/UL (ref 3.9–12.7)

## 2023-01-24 PROCEDURE — 36415 COLL VENOUS BLD VENIPUNCTURE: CPT | Performed by: HOSPITALIST

## 2023-01-24 PROCEDURE — 80053 COMPREHEN METABOLIC PANEL: CPT | Performed by: HOSPITALIST

## 2023-01-24 PROCEDURE — 85025 COMPLETE CBC W/AUTO DIFF WBC: CPT | Performed by: HOSPITALIST

## 2023-01-24 PROCEDURE — 99900035 HC TECH TIME PER 15 MIN (STAT)

## 2023-01-24 PROCEDURE — 25000003 PHARM REV CODE 250: Performed by: HOSPITALIST

## 2023-01-24 PROCEDURE — 25000003 PHARM REV CODE 250: Performed by: FAMILY MEDICINE

## 2023-01-24 PROCEDURE — 63600175 PHARM REV CODE 636 W HCPCS: Performed by: HOSPITALIST

## 2023-01-24 RX ORDER — AMLODIPINE BESYLATE 5 MG/1
5 TABLET ORAL DAILY
Status: DISCONTINUED | OUTPATIENT
Start: 2023-01-24 | End: 2023-01-24

## 2023-01-24 RX ORDER — SUCRALFATE 1 G/1
1 TABLET ORAL
Qty: 120 TABLET | Refills: 1 | Status: SHIPPED | OUTPATIENT
Start: 2023-01-24 | End: 2023-02-23

## 2023-01-24 RX ORDER — TIZANIDINE 4 MG/1
4 TABLET ORAL 2 TIMES DAILY PRN
Qty: 20 TABLET | Refills: 0 | Status: SHIPPED | OUTPATIENT
Start: 2023-01-24 | End: 2023-01-31

## 2023-01-24 RX ORDER — PANTOPRAZOLE SODIUM 40 MG/1
40 TABLET, DELAYED RELEASE ORAL 2 TIMES DAILY
Qty: 60 TABLET | Refills: 2 | Status: SHIPPED | OUTPATIENT
Start: 2023-01-24 | End: 2023-03-06

## 2023-01-24 RX ORDER — AMLODIPINE BESYLATE 5 MG/1
5 TABLET ORAL NIGHTLY
Qty: 30 TABLET | Refills: 3 | Status: SHIPPED | OUTPATIENT
Start: 2023-01-24 | End: 2023-04-14 | Stop reason: SDUPTHER

## 2023-01-24 RX ADMIN — LOSARTAN POTASSIUM 100 MG: 50 TABLET, FILM COATED ORAL at 08:01

## 2023-01-24 RX ADMIN — HYDRALAZINE HYDROCHLORIDE 10 MG: 20 INJECTION, SOLUTION INTRAMUSCULAR; INTRAVENOUS at 03:01

## 2023-01-24 RX ADMIN — ASPIRIN 81 MG: 81 TABLET, COATED ORAL at 08:01

## 2023-01-24 RX ADMIN — DULOXETINE 60 MG: 30 CAPSULE, DELAYED RELEASE ORAL at 08:01

## 2023-01-24 RX ADMIN — SUCRALFATE 1 G: 1 TABLET ORAL at 05:01

## 2023-01-24 RX ADMIN — CLOPIDOGREL BISULFATE 75 MG: 75 TABLET ORAL at 08:01

## 2023-01-24 RX ADMIN — PANTOPRAZOLE SODIUM 40 MG: 40 TABLET, DELAYED RELEASE ORAL at 08:01

## 2023-01-24 RX ADMIN — ISOSORBIDE MONONITRATE 60 MG: 60 TABLET, EXTENDED RELEASE ORAL at 08:01

## 2023-01-24 RX ADMIN — FUROSEMIDE 80 MG: 40 TABLET ORAL at 08:01

## 2023-01-24 NOTE — PLAN OF CARE
[facility]  Discharge Final Note    Primary Care Provider: Daphney Heart MD    Expected Discharge Date: 1/24/2023    Final Discharge Note (most recent)       Final Note - 01/24/23 0937          Final Note    Assessment Type Final Discharge Note     Anticipated Discharge Disposition Home or Self Care     Hospital Resources/Appts/Education Provided Appointments scheduled by Navigator/Coordinator;Appointments scheduled and added to AVS        Post-Acute Status    Discharge Delays None known at this time                     Important Message from Medicare             Contact Info       Emani Gerard MD   Specialty: Interventional Cardiology, Cardiology    [address] [facility]  [place] [address]   Phone: [phone]       Next Steps: Follow up in 1 week(s)    Leon Bruner PA-C   Specialty: Gastroenterology    [address] [facility]  [facility] [address]   Phone: [phone]       Next Steps: Follow up in 2 week(s)          Patient has hospital follow up appointment scheduled February 10, 2023, 3:10 pm.

## 2023-01-24 NOTE — ASSESSMENT & PLAN NOTE
Bloody stools 2 weeks ago  Drop in H&H from baseline   Transfused 2U pRBCs  No overt bleeding since admission  Protonix and carafate

## 2023-01-24 NOTE — ASSESSMENT & PLAN NOTE
CVA occurred pre-op prior to paraesophageal hernia repair  Treated with Plavix and ASA  MRI noted ischemia in the right corona radiata

## 2023-01-24 NOTE — PLAN OF CARE
Problem: Adult Inpatient Plan of Care  Goal: Plan of Care Review  Outcome: Ongoing, Progressing     Problem: Fall Injury Risk  Goal: Absence of Fall and Fall-Related Injury  Outcome: Ongoing, Progressing     Problem: Pain Acute  Goal: Acceptable Pain Control and Functional Ability  Outcome: Ongoing, Progressing     Problem: Fatigue  Goal: Improved Activity Tolerance  Outcome: Ongoing, Progressing     Problem: Anemia  Goal: Anemia Symptom Improvement  Outcome: Ongoing, Progressing     Problem: Nausea and Vomiting  Goal: Fluid and Electrolyte Balance  Outcome: Ongoing, Progressing    Patient remains free from injury. Safety precautions maintained. No s/s of acute distress. Cardiac monitoring in place. Chart and orders review completed. Pt education about care completed.

## 2023-01-24 NOTE — DISCHARGE SUMMARY
[facility] Medicine  Discharge Summary      Patient Name: Joe Ceballos  MRN: [id number]  JAYCEE: [id number]  Patient Class: IP- Inpatient  Admission Date: 1/21/2023  Hospital Length of Stay: 3 days  Discharge Date and Time: No discharge date for patient encounter.  Attending Physician: Minesh Jean, *   Discharging Provider: Minesh Jean MD  Primary Care Provider: Daphney Heart MD    Primary Care Team: Networked reference to record PCT     HPI:   Joe Ceballos is a 53 y.o. female with a PMH  has a past medical history of Anxiety, CHF (congestive heart failure), Edema, Hypertension, Initial insomnia, Sickle cell trait, Stroke, and Tobacco use. who presented to the ED for further evaluation of worsening fatigue and syncope x2 since Friday afternoon.  Patient reported sitting in the yard Friday with family and experienced acute onset dizziness, lightheadedness, and ground level fall following feeling weak shortly after standing.  She denied complete loss of consciousness or endorsing any trauma during that time and did not seek medical attention.  Patient reported endorsing 2nd syncopal event earlier today when she was packing when she suddenly became lightheaded and dizzy with vision turning blue resulting in patient falling to the ground and hitting her head.  Patient unsure if she lost complete consciousness but was complaining of a headache which has now resolved.  She denied endorsing any diaphoresis, nausea, vomiting, palpitations, chest pain, or shortness a breath prior to the event but did report feeling weak and fatigue since Friday.  Patient also reported episode of bloody stool approximately 2 weeks prior to admission which has since resolved reports compliance with home medications, dietary restrictions, negative ill contacts, and having good oral intake.  Prior to onset of symptoms, patient reported being in her usual state of health with no other concerns or  complaints.  All other review of systems negative except as noted above.  Patient being admitted to Hospital Medicine for further workup and treatment of syncope and medical management of symptomatic anemia after patient was found to have H/H measuring 6.0/20.7 upon initial workup.     PCP: Daphney Heart        * No surgery found *      Hospital Course:   1/22 admitted for symptomatic anemia. Reports dark stool 2 weeks ago. Suppose to follow up with gastroenterology for cscope. Reports taking daily aspirin, sometimes on empty stomach. Associated with burning sensation in lower abdomen. Also reports bowel incontinence. Psh hernia repair and gastric surgery per [facility]hav[facility] in [place]. Also reports pmh esophageal ulcers and hernia and suppose to take proton pump inhibitor and carafate. Stool occult negative. Follow up outpatient gastroenterology. Transfuse additional unit. Anticipate discharge 1-2 days if remains stable.  1/23 She is complaining of SOB . Pt requesting GI evaluation . NAEON . The H/H trending up .   1/24 Pt was seen and examined at bedside at bedside. She was determined to be suitable for  d/c .  The H/H is trending up . She denies any chest pain , sob . Black stool or bloody stool . GI consulted and rec outpt EGD and possible c-scope . She will be d/c on Protonix  po bid and Carafate po qid . Pt BP has been labile  and Norvasc 5 mf po qd added to her BP regimen . She was advised to f/u with GI and her PCP in 1 to 2 weeks .   Addendum : Pt voice is not taking Plavix anymore .  We will hold Plavix  for now until f/u with his cardiology .       Goals of Care Treatment Preferences:  Code Status: Full Code      Consults:   Consults (From admission, onward)          Status Ordering Provider     Inpatient consult to Gastroenterology  Once        Provider:  (Not yet assigned)    Completed AZAM VELAZQUEZ     IP consult to case management  Once        Provider:  (Not yet assigned)    Completed LINDSEY HERCULES  KURTIS            No new Assessment & Plan notes have been filed under this hospital service since the last note was generated.  Service: Hospital Medicine    Final Active Diagnoses:    Diagnosis Date Noted POA    PRINCIPAL PROBLEM:  Symptomatic anemia [D64.9] 01/22/2023 Yes    Syncope [R55] 01/22/2023 Yes    HLD (hyperlipidemia) [E78.5] 01/22/2023 Yes    History of CVA (cerebrovascular accident) [Z86.73] 01/22/2023 Not Applicable    Anxiety [F41.9] 01/22/2023 Yes    Sickle cell trait [D57.3] 01/22/2023 Yes    (HFpEF) heart failure with preserved ejection fraction [I50.30] 01/22/2023 Yes    Tobacco abuse [Z72.0] 01/22/2023 Yes    History of gastric bypass [Z98.84] 01/11/2017 Not Applicable    Primary hypertension [I10] 03/21/2014 Yes      Problems Resolved During this Admission:       Discharged Condition: stable    Disposition: Home or Self Care    Follow Up:   Follow-up Information       Emani Gerard MD Follow up in 1 week(s).    Specialties: Interventional Cardiology, Cardiology  Contact information:  [id number]  [place] [id number] [phone]               Leon Bruner PA-C Follow up in 2 week(s).    Specialty: Gastroenterology  Contact information:  [address]  [place] [id number] [phone]                           Patient Instructions:      Diet Cardiac     Activity as tolerated       Significant Diagnostic Studies: Labs:   BMP:   Recent Labs   Lab 01/23/23  0538 01/24/23  0514   GLU 86 91    138   K 4.1 4.1    106   CO2 22* 21*   BUN 18 19   CREATININE 1.0 1.1   CALCIUM 8.9 9.0   , CMP   Recent Labs   Lab 01/23/23  0538 01/24/23  0514    138   K 4.1 4.1    106   CO2 22* 21*   GLU 86 91   BUN 18 19   CREATININE 1.0 1.1   CALCIUM 8.9 9.0   PROT 7.0 6.9   ALBUMIN 2.6* 2.6*   BILITOT 0.6 0.5   ALKPHOS 128 117   AST 15 15   ALT 9* 8*   ANIONGAP 9 11    and CBC   Recent Labs   Lab 01/23/23  0538 01/24/23  0514   WBC 8.80 9.69   HGB 7.8* 7.9*   HCT 26.7*  27.9*    401     Microbiology: Blood Culture No results found for: LABBLOO    Pending Diagnostic Studies:       None           Medications:  Reconciled Home Medications:      Medication List        START taking these medications      amLODIPine 5 MG tablet  Commonly known as: NORVASC  Take 1 tablet (5 mg total) by mouth every evening.            CHANGE how you take these medications      pantoprazole 40 MG tablet  Commonly known as: PROTONIX  Take 1 tablet (40 mg total) by mouth 2 (two) times daily.  What changed: when to take this     sucralfate 1 gram tablet  Commonly known as: CARAFATE  . TAKE 1 TABLET BY MOUTH 4 TIMES A DAY BEFORE MEALS AND AT BEDTIME  What changed:   how much to take  how to take this  when to take this  additional instructions     tiZANidine 4 MG tablet  Commonly known as: ZANAFLEX  Take 1 tablet (4 mg total) by mouth 2 (two) times daily as needed (muscle spasm). FOR MUSCLE SPASMS  What changed:   when to take this  reasons to take this            CONTINUE taking these medications      aspirin 81 MG EC tablet  Commonly known as: ECOTRIN  Take 1 tablet (81 mg total) by mouth once daily.     atorvastatin 80 MG tablet  Commonly known as: LIPITOR  Take 1 tablet (80 mg total) by mouth every evening.     DULoxetine 60 MG capsule  Commonly known as: CYMBALTA  Take 1 capsule (60 mg total) by mouth 2 (two) times a day.     furosemide 40 MG tablet  Commonly known as: LASIX  Take 2 tablets (80 mg total) by mouth once daily AND 1 tablet (40 mg total) every evening.     isosorbide mononitrate 60 MG 24 hr tablet  Commonly known as: IMDUR  Take 1 tablet (60 mg total) by mouth once daily.     losartan 100 MG tablet  Commonly known as: COZAAR  TAKE 1 TABLET BY MOUTH EVERY DAY     promethazine 25 MG tablet  Commonly known as: PHENERGAN  Take 1 tablet (25 mg total) by mouth every 4 (four) hours.            STOP taking these medications      clopidogreL 75 mg tablet  Commonly known as: PLAVIX               Indwelling Lines/Drains at time of discharge:   Lines/Drains/Airways       None                   Time spent on the discharge of patient: 30 minutes         Minesh Jean MD  Department of Hospital Medicine  '[facility] S[facility]

## 2023-01-24 NOTE — CONSULTS
"[facility]  Gastroenterology  Consult Note    Patient Name: Joe Ceballos  MRN: [id number]  Admission Date: 1/21/2023  Hospital Length of Stay: 2 days  Code Status: Full Code   Attending Provider: Minesh Jean, *   Consulting Provider: Lisa Christian MD  Primary Care Physician: Daphney Heart MD  Principal Problem:Symptomatic anemia    Inpatient consult to Gastroenterology  Consult performed by: Lisa Christian MD  Consult ordered by: Minesh Jean MD  Reason for consult: drop in blood counts, blood in stool 2 weeks ago        Subjective:     HPI:  53 y.o female with complicated medical history and surgical history including poorly controlled hypertension, CHF with preserved EF, s/p laparoscopic gastric sleeve (2016) and laparoscopic revision to gastric bypass (2018) and paraesophageal hernia repair (05/2022) complicated by CVA. She has chronic anemia and sickle cell trait. Her baseline H&H 9.4/32. She presented to the ED with complaints of presyncopal symptoms. Patient indicated she was feeling dizzy, fell last Friday and again the day of admission. She also reported experiencing bloody stools that were "black" approximately 2 weeks ago but did not seek medical attention at that time. Labs on presentation showed a H&H 6/20.7. She was admitted, transfused 2U pRBCs. Repeat H&H is 7.8/26.7. She is requesting GI consult though there is no overt GI bleeding and she is tolerating PO intake.     Review of medical record shows she sought care at urgent care on 1/4/23 for thrush, abdominal pain and diarrhea. The diarrhea is not new. These same symptoms were reported at the time of her bariatric follow up in 08/2022. She was prescribed Carafate after her urgent care visit, it is unclear if she took this medication consistently. Patient denies active Plavix use though this medication is listed as a active medication and was scheduled to be given to her today. Furthermore, her last visit " with her cardiologist, Dr. Gerard, notes her CVA was being managed with both Plavix and ASA. Admits to active tobacco use.     At present, she tolerating a regular diet. Despite abdominal pain she is tolerating french fries with ketchup. Denies having any bowel movements and denies hematochezia or melena. She is able to ambulate around the room without difficulty. She is on Protonix and Carafate. Last EGD performed by Dr. Delcid at [facility] prior to her hernia repair. We do not have access to those records. Last colonoscopy 5 years ago while residing in [place], [place]. Results are unknown. No FHx of CRC.       Past Medical History:   Diagnosis Date    Anxiety     CHF (congestive heart failure)     Edema     Hypertension     Initial insomnia     Sickle cell trait     Stroke     Tobacco use        Past Surgical History:   Procedure Laterality Date    BREAST SURGERY       SECTION      x 2    CYST REMOVAL      from left tube    gastric sleeve      Raheel-en-Y gastric bypass      TONSILLECTOMY         Review of patient's allergies indicates:   Allergen Reactions    Lisinopril Anaphylaxis     This is only possible agent at the time.  This is only possible agent at the time.  This is only possible agent at the time.    Sulfa (sulfonamide antibiotics) Anaphylaxis    Beta-blockers (beta-adrenergic blocking agts)      Other reaction(s): Other (See Comments)  Syncope & Collapse     Family History       Problem Relation (Age of Onset)    Hypertension Mother, Paternal Grandmother    Thyroid disease Paternal Aunt          Tobacco Use    Smoking status: Every Day     Packs/day: 0.50     Types: Cigarettes    Smokeless tobacco: Never   Substance and Sexual Activity    Alcohol use: Yes    Drug use: No    Sexual activity: Not Currently     Partners: Male     Birth control/protection: None     Review of Systems   Gastrointestinal:  Positive for abdominal pain, blood in stool and diarrhea.   Neurological:   Positive for dizziness and light-headedness.   Psychiatric/Behavioral:  The patient is nervous/anxious.    All other systems reviewed and are negative.  Objective:     Vital Signs (Most Recent):  Temp: 99.2 °F (37.3 °C) (01/23/23 1651)  Pulse: 86 (01/23/23 1651)  Resp: 18 (01/23/23 1651)  BP: (!) 103/57 (01/23/23 1651)  SpO2: 95 % (01/23/23 1651)   Vital Signs (24h Range):  Temp:  [98.6 °F (37 °C)-99.2 °F (37.3 °C)] 99.2 °F (37.3 °C)  Pulse:  [63-92] 86  Resp:  [16-18] 18  SpO2:  [95 %-97 %] 95 %  BP: (103-193)/(57-94) 103/57     Weight: 78.6 kg (173 lb 4.5 oz) (01/21/23 2336)  Body mass index is 26.35 kg/m².    No intake or output data in the 24 hours ending 01/23/23 1949    Lines/Drains/Airways       Peripheral Intravenous Line  Duration                  Peripheral IV - Single Lumen 01/21/23 Posterior;Right Forearm 2 days                    Physical Exam  Vitals and nursing note reviewed.   Constitutional:       Appearance: She is overweight.   Abdominal:      General: Abdomen is protuberant.      Palpations: Abdomen is soft.      Tenderness: There is no abdominal tenderness. There is no guarding or rebound.          Comments: Multiple abdominal scars consistent with lap hernia repair and lap gastric bypass.   Rectal: no masses, good tone, no stool in rectal vault, no melena, no chava blood  Neurological:      Mental Status: She is alert.   Psychiatric:         Attention and Perception: Attention normal.         Mood and Affect: Mood is anxious.         Speech: Speech normal.         Behavior: Behavior is cooperative.         Thought Content: Thought content normal.         Cognition and Memory: Cognition and memory normal.         Judgment: Judgment normal.       Significant Labs:  CBC:   Recent Labs   Lab 01/22/23  0536 01/23/23  0538   WBC 7.35 8.80   HGB 6.7* 7.8*   HCT 23.3* 26.7*    405     CMP:   Recent Labs   Lab 01/23/23  0538   GLU 86   CALCIUM 8.9   ALBUMIN 2.6*   PROT 7.0      K 4.1   CO2  22*      BUN 18   CREATININE 1.0   ALKPHOS 128   ALT 9*   AST 15   BILITOT 0.6     Coagulation: No results for input(s): PT, INR, APTT in the last 48 hours.    Significant Imaging:  Imaging results within the past 24 hours have been reviewed.    Assessment/Plan:     * Symptomatic anemia  Bloody stools 2 weeks ago  Drop in H&H from baseline   Transfused 2U pRBCs  No overt bleeding since admission  Protonix and carafate        History of gastric bypass  Hx gastric sleeve 2016  Gastric bypass revision 2018  Bariatric surgeon Dr. Delcid at [facility]      (HFpEF) heart failure with preserved ejection fraction  Echo 1/23/23: EF 60%  Followed by Dr. Gerard - last seen 06/2022    History of CVA (cerebrovascular accident)  CVA occurred pre-op prior to paraesophageal hernia repair  Treated with Plavix and ASA  MRI noted ischemia in the right corona radiata    Primary hypertension  Poorly controlled  On multiple medications      Recommendations:  1. Tobacco cessation  2. Continue Protonix 40mg daily  3. Outpatient CV follow up with better control of BP and cardiac clearance for endoscopic procedures  4. Outpatient GI eval and EGD/Colonoscopy once cleared by CV  5. F/U with PCP to determine if patient should remain on Plavix for CVA hx      Thank you for your consult. Discussed with Dr. Alexander. Will sign off, please call if questions.      Lisa Christian MD  Gastroenterology  [facility]

## 2023-01-24 NOTE — TELEPHONE ENCOUNTER
----- Message from Lisa Christian MD sent at 1/23/2023  8:26 PM CST -----  Regarding: hospital follow up  Dr. Gerard and Dr. Heart    Please see my GI inpatient consult on this patient. She has a very complicated medical and surgical hx gastric sleeve with gastric bypass revision in 2018. She had a paraesophageal hernia repair at [facility] - where her bariatric surgeon is in 05/2022 and had a CVA prior to surgery due to high BP.     She is smoking again and came in symptomatic from her anemia. She had a bleed 2 weeks prior to presenting to ED. She needs a few things worked out before we can scope her. I will arrange a outpatient GI eval but she has not seen Dr. Gerard since 06/2022 and Dr. Heart in a very long time. Her BP needs better controlled and we need to determine if she still needs to be on Plavix for her CVA. Presently she is not taking it. Once all this is worked out we will scope her.    Thanks  AN    Angie, please schedule patient with GI MD given her complexity. We will need bariatric records from [facility]. States she had a EGD in 2022, but unable to view these.

## 2023-01-24 NOTE — PLAN OF CARE
Bed locked, in lowest position. Call bell in reach. Purposeful rounding done every two hours. Cardiac monitoring in use. Pain and nausea relieved by PO meds. Chart check complete. Will continue with plan of care.

## 2023-01-24 NOTE — HPI
"53 y.o female with complicated medical history and surgical history including poorly controlled hypertension, CHF with preserved EF, s/p laparoscopic gastric sleeve (2016) and laparoscopic revision to gastric bypass (2018) and paraesophageal hernia repair (05/2022) complicated by CVA. She has chronic anemia and sickle cell trait. Her with baseline H&H 9.4/32. She presented to the ED with complaints of presyncopal symptoms. Patient indicated she was feeling dizzy, fell last Friday and again the day of admission. She also reported experiencing bloody stools that were "black" approximately 2 weeks ago but did not seek medical attention at that time. Labs on presentation showed a H&H 6/20.7. She was admitted, transfused 2U pRBCs. Repeat H&H is 7.8/26.7. She is requesting GI consult though there is no overt GI bleeding and she is tolerating PO intake.     Review of medical record shows she sought care at urgent care on 1/4/23 for thrush, abdominal pain and diarrhea. The diarrhea is not new. These same symptoms were reported at the time of her bariatric follow up in 08/2022. She was prescribed Carafate after her urgent care visit, it is unclear if she took this medication consistently. Patient denies active Plavix use though this medication is listed as a active medication and was scheduled to be given to her today. Furthermore, her last visit with her cardiologist, Dr. Gerard, notes her CVA was being managed with both Plavix and ASA. Admits to active tobacco use.     At present, she tolerating a regular diet. Despite abdominal pain she is tolerating french fries with ketchup. Denies having any bowel movements and denies hematochezia or melena. She is able to ambulate around the room without difficulty. She is on Protonix and Carafate. Last EGD performed by Dr. Delcid at [facility] prior to her hernia repair. We do not have access to those records. Last colonoscopy 5 years ago while residing in [place], [place]. Results are " unknown.

## 2023-01-24 NOTE — TELEPHONE ENCOUNTER
Requested medical records from [facility] for Bariactic bypass procedure est 2018, CVA records, Hernia repair and last EGD with pathology report est 2022 per Dr Christian.     Phone  [phone]    Fax   [phone]

## 2023-01-24 NOTE — SUBJECTIVE & OBJECTIVE
Past Medical History:   Diagnosis Date    Anxiety     CHF (congestive heart failure)     Edema     Hypertension     Initial insomnia     Sickle cell trait     Stroke     Tobacco use        Past Surgical History:   Procedure Laterality Date    BREAST SURGERY       SECTION      x 2    CYST REMOVAL      from left tube    gastric sleeve      Raheel-en-Y gastric bypass      TONSILLECTOMY         Review of patient's allergies indicates:   Allergen Reactions    Lisinopril Anaphylaxis     This is only possible agent at the time.  This is only possible agent at the time.  This is only possible agent at the time.    Sulfa (sulfonamide antibiotics) Anaphylaxis    Beta-blockers (beta-adrenergic blocking agts)      Other reaction(s): Other (See Comments)  Syncope & Collapse     Family History       Problem Relation (Age of Onset)    Hypertension Mother, Paternal Grandmother    Thyroid disease Paternal Aunt          Tobacco Use    Smoking status: Every Day     Packs/day: 0.50     Types: Cigarettes    Smokeless tobacco: Never   Substance and Sexual Activity    Alcohol use: Yes    Drug use: No    Sexual activity: Not Currently     Partners: Male     Birth control/protection: None     Review of Systems   Gastrointestinal:  Positive for abdominal pain, blood in stool and diarrhea.   Neurological:  Positive for dizziness and light-headedness.   Psychiatric/Behavioral:  The patient is nervous/anxious.    All other systems reviewed and are negative.  Objective:     Vital Signs (Most Recent):  Temp: 99.2 °F (37.3 °C) (23)  Pulse: 86 (23)  Resp: 18 (23)  BP: (!) 103/57 (23)  SpO2: 95 % (23)   Vital Signs (24h Range):  Temp:  [98.6 °F (37 °C)-99.2 °F (37.3 °C)] 99.2 °F (37.3 °C)  Pulse:  [63-92] 86  Resp:  [16-18] 18  SpO2:  [95 %-97 %] 95 %  BP: (103-193)/(57-94) 103/57     Weight: 78.6 kg (173 lb 4.5 oz) (236)  Body mass index is 26.35 kg/m².    No intake or  output data in the 24 hours ending 01/23/23 1949    Lines/Drains/Airways       Peripheral Intravenous Line  Duration                  Peripheral IV - Single Lumen 01/21/23 Posterior;Right Forearm 2 days                    Physical Exam  Vitals and nursing note reviewed.   Constitutional:       Appearance: She is overweight.   Abdominal:      General: Abdomen is protuberant.      Palpations: Abdomen is soft.      Tenderness: There is no abdominal tenderness. There is no guarding or rebound.          Comments: Multiple abdominal scars consistent with lap hernia repair and lap gastric bypass.   Neurological:      Mental Status: She is alert.   Psychiatric:         Attention and Perception: Attention normal.         Mood and Affect: Mood is anxious.         Speech: Speech normal.         Behavior: Behavior is cooperative.         Thought Content: Thought content normal.         Cognition and Memory: Cognition and memory normal.         Judgment: Judgment normal.       Significant Labs:  CBC:   Recent Labs   Lab 01/22/23  0536 01/23/23  0538   WBC 7.35 8.80   HGB 6.7* 7.8*   HCT 23.3* 26.7*    405     CMP:   Recent Labs   Lab 01/23/23  0538   GLU 86   CALCIUM 8.9   ALBUMIN 2.6*   PROT 7.0      K 4.1   CO2 22*      BUN 18   CREATININE 1.0   ALKPHOS 128   ALT 9*   AST 15   BILITOT 0.6     Coagulation: No results for input(s): PT, INR, APTT in the last 48 hours.    Significant Imaging:  Imaging results within the past 24 hours have been reviewed.

## 2023-01-24 NOTE — ASSESSMENT & PLAN NOTE
Poorly controlled  On multiple medications     Ndc# For Kenalog Only: 6281-2304-92 Ndc# For Kenalog Only: 4534-9358-10

## 2023-01-24 NOTE — ASSESSMENT & PLAN NOTE
Hx gastric sleeve 2016  Gastric bypass revision 2018  Bariatric surgeon Dr. Delcid at [facility]

## 2023-01-26 ENCOUNTER — PATIENT OUTREACH (OUTPATIENT)
Dept: ADMINISTRATIVE | Facility: CLINIC | Age: 53
End: 2023-01-26
Payer: MEDICAID

## 2023-01-26 ENCOUNTER — PATIENT MESSAGE (OUTPATIENT)
Dept: ADMINISTRATIVE | Facility: CLINIC | Age: 53
End: 2023-01-26
Payer: MEDICAID

## 2023-01-26 NOTE — PROGRESS NOTES
C3 nurse attempted to contact Joe Ceballos  for a TCC post hospital discharge follow up call. No answer. Left voicemail with callback information. The patient has a scheduled HOSFU appointment with YURY Arvizu on 01/31/2023 @ 1100.

## 2023-01-26 NOTE — PROGRESS NOTES
C3 nurse spoke with Joe Ceballos  for a TCC post hospital discharge follow up call. The patient has a scheduled HOSFU appointment with Nolberto on 01/31/2023 @ 1100.

## 2023-01-27 NOTE — PROGRESS NOTES
C3 nurse spoke with Joe Ceballos  for a TCC post hospital discharge follow up call. The patient has a scheduled HOSFU appointment with YURY Arvizu on 01/31/2023 @ 1100.

## 2023-01-31 ENCOUNTER — OFFICE VISIT (OUTPATIENT)
Dept: PRIMARY CARE CLINIC | Facility: CLINIC | Age: 53
End: 2023-01-31
Payer: MEDICAID

## 2023-01-31 ENCOUNTER — HOSPITAL ENCOUNTER (OUTPATIENT)
Dept: RADIOLOGY | Facility: HOSPITAL | Age: 53
Discharge: HOME | End: 2023-01-31
Attending: NURSE PRACTITIONER
Payer: MEDICAID

## 2023-01-31 VITALS
TEMPERATURE: 97 F | HEART RATE: 83 BPM | OXYGEN SATURATION: 97 % | DIASTOLIC BLOOD PRESSURE: 82 MMHG | WEIGHT: 170.81 LBS | HEIGHT: 68 IN | BODY MASS INDEX: 25.89 KG/M2 | SYSTOLIC BLOOD PRESSURE: 138 MMHG

## 2023-01-31 DIAGNOSIS — M62.838 NIGHT MUSCLE SPASMS: ICD-10-CM

## 2023-01-31 DIAGNOSIS — R07.9 CHEST PAIN, UNSPECIFIED TYPE: Primary | ICD-10-CM

## 2023-01-31 DIAGNOSIS — D64.9 ANEMIA, UNSPECIFIED TYPE: ICD-10-CM

## 2023-01-31 DIAGNOSIS — N64.4 BREAST PAIN, LEFT: ICD-10-CM

## 2023-01-31 DIAGNOSIS — R07.9 CHEST PAIN, UNSPECIFIED TYPE: ICD-10-CM

## 2023-01-31 PROCEDURE — 3008F BODY MASS INDEX DOCD: CPT | Mod: CPTII,,, | Performed by: NURSE PRACTITIONER

## 2023-01-31 PROCEDURE — 99215 OFFICE O/P EST HI 40 MIN: CPT | Mod: PBBFAC,25,PN | Performed by: NURSE PRACTITIONER

## 2023-01-31 PROCEDURE — 3075F SYST BP GE 130 - 139MM HG: CPT | Mod: CPTII,,, | Performed by: NURSE PRACTITIONER

## 2023-01-31 PROCEDURE — 71046 X-RAY EXAM CHEST 2 VIEWS: CPT | Mod: TC,PN

## 2023-01-31 PROCEDURE — 99999 PR PBB SHADOW E&M-EST. PATIENT-LVL V: CPT | Mod: PBBFAC,,, | Performed by: NURSE PRACTITIONER

## 2023-01-31 PROCEDURE — 3079F DIAST BP 80-89 MM HG: CPT | Mod: CPTII,,, | Performed by: NURSE PRACTITIONER

## 2023-01-31 PROCEDURE — 1159F MED LIST DOCD IN RCRD: CPT | Mod: CPTII,,, | Performed by: NURSE PRACTITIONER

## 2023-01-31 PROCEDURE — 71046 X-RAY EXAM CHEST 2 VIEWS: CPT | Mod: 26,,, | Performed by: RADIOLOGY

## 2023-01-31 PROCEDURE — 3079F PR MOST RECENT DIASTOLIC BLOOD PRESSURE 80-89 MM HG: ICD-10-PCS | Mod: CPTII,,, | Performed by: NURSE PRACTITIONER

## 2023-01-31 PROCEDURE — 1111F PR DISCHARGE MEDS RECONCILED W/ CURRENT OUTPATIENT MED LIST: ICD-10-PCS | Mod: CPTII,,, | Performed by: NURSE PRACTITIONER

## 2023-01-31 PROCEDURE — 99214 PR OFFICE/OUTPT VISIT, EST, LEVL IV, 30-39 MIN: ICD-10-PCS | Mod: S$PBB,,, | Performed by: NURSE PRACTITIONER

## 2023-01-31 PROCEDURE — 3008F PR BODY MASS INDEX (BMI) DOCUMENTED: ICD-10-PCS | Mod: CPTII,,, | Performed by: NURSE PRACTITIONER

## 2023-01-31 PROCEDURE — 71046 XR CHEST PA AND LATERAL: ICD-10-PCS | Mod: 26,,, | Performed by: RADIOLOGY

## 2023-01-31 PROCEDURE — 1159F PR MEDICATION LIST DOCUMENTED IN MEDICAL RECORD: ICD-10-PCS | Mod: CPTII,,, | Performed by: NURSE PRACTITIONER

## 2023-01-31 PROCEDURE — 99214 OFFICE O/P EST MOD 30 MIN: CPT | Mod: S$PBB,,, | Performed by: NURSE PRACTITIONER

## 2023-01-31 PROCEDURE — 99999 PR PBB SHADOW E&M-EST. PATIENT-LVL V: ICD-10-PCS | Mod: PBBFAC,,, | Performed by: NURSE PRACTITIONER

## 2023-01-31 PROCEDURE — 3075F PR MOST RECENT SYSTOLIC BLOOD PRESS GE 130-139MM HG: ICD-10-PCS | Mod: CPTII,,, | Performed by: NURSE PRACTITIONER

## 2023-01-31 PROCEDURE — 1111F DSCHRG MED/CURRENT MED MERGE: CPT | Mod: CPTII,,, | Performed by: NURSE PRACTITIONER

## 2023-01-31 RX ORDER — CYCLOBENZAPRINE HCL 10 MG
10 TABLET ORAL NIGHTLY
Qty: 30 TABLET | Refills: 2 | Status: SHIPPED | OUTPATIENT
Start: 2023-01-31 | End: 2023-02-10

## 2023-01-31 RX ORDER — CYCLOBENZAPRINE HCL 10 MG
10 TABLET ORAL NIGHTLY
Qty: 30 TABLET | Refills: 2 | Status: SHIPPED | OUTPATIENT
Start: 2023-01-31 | End: 2023-01-31

## 2023-02-01 NOTE — PROGRESS NOTES
Subjective:       Patient ID: Joe Ceballos is a 53 y.o. female.    Chief Complaint: Follow-up (Hospital f/u appt ) and Hypertension      History of Present Illness:   Joe Ceballos 53 y.o. female presents for hospital follow up from 1- which she was treated for symptomatic anemia ( H & H resulted: 6.0 and 20.7) and BNP resulted 169. According to patient and hospital records she received 1 unit of PRBC's. Most recent H & H upon discharge resulted 7.8 and 26.7. Will obtain a CBC and CMP today. Patient has appointment with Dr. Miranda for clearance for endoscrope. Denies any other problems or concerns at this time. Treatment options and alternatives were discussed with the patient. Patient provided opportunity to ask additional questions.  All questions were answered. Voices understanding and acceptance of this advice. Instructed to call back if any further questions or concerns. I had the opportunity to review patient's medical records including care everywhere, labs, and medication reconciliation to determine medical decision making.       Past Medical History:   Diagnosis Date    Anxiety     CHF (congestive heart failure)     Edema     Hypertension     Initial insomnia     Sickle cell trait     Stroke     Tobacco use      Family History   Adopted: Yes   Problem Relation Age of Onset    Hypertension Mother     Thyroid disease Paternal Aunt     Hypertension Paternal Grandmother     Breast cancer Neg Hx     Colon cancer Neg Hx     Ovarian cancer Neg Hx      Social History     Socioeconomic History    Marital status: Single   Tobacco Use    Smoking status: Every Day     Packs/day: 0.50     Types: Cigarettes    Smokeless tobacco: Never   Substance and Sexual Activity    Alcohol use: Yes    Drug use: No    Sexual activity: Not Currently     Partners: Male     Birth control/protection: None     Outpatient Encounter Medications as of 1/31/2023   Medication Sig Dispense Refill    amLODIPine (NORVASC) 5 MG  tablet Take 1 tablet (5 mg total) by mouth every evening. 30 tablet 3    aspirin (ECOTRIN) 81 MG EC tablet Take 1 tablet (81 mg total) by mouth once daily. 30 tablet 3    atorvastatin (LIPITOR) 80 MG tablet Take 1 tablet (80 mg total) by mouth every evening. 90 tablet 2    DULoxetine (CYMBALTA) 60 MG capsule Take 1 capsule (60 mg total) by mouth 2 (two) times a day. 60 capsule 2    furosemide (LASIX) 40 MG tablet Take 2 tablets (80 mg total) by mouth once daily AND 1 tablet (40 mg total) every evening. 90 tablet 2    isosorbide mononitrate (IMDUR) 60 MG 24 hr tablet Take 1 tablet (60 mg total) by mouth once daily. 30 tablet 2    losartan (COZAAR) 100 MG tablet TAKE 1 TABLET BY MOUTH EVERY DAY 30 tablet 2    pantoprazole (PROTONIX) 40 MG tablet Take 1 tablet (40 mg total) by mouth 2 (two) times daily. 60 tablet 2    promethazine (PHENERGAN) 25 MG tablet Take 1 tablet (25 mg total) by mouth every 4 (four) hours. 12 tablet 0    sucralfate (CARAFATE) 1 gram tablet . TAKE 1 TABLET BY MOUTH 4 TIMES A DAY BEFORE MEALS AND AT BEDTIME 120 tablet 1    cyclobenzaprine (FLEXERIL) 10 MG tablet Take 1 tablet (10 mg total) by mouth nightly. for 10 days 30 tablet 2    [DISCONTINUED] cyclobenzaprine (FLEXERIL) 10 MG tablet Take 1 tablet (10 mg total) by mouth nightly. for 10 days 30 tablet 2    [DISCONTINUED] tiZANidine (ZANAFLEX) 4 MG tablet Take 1 tablet (4 mg total) by mouth 2 (two) times daily as needed (muscle spasm). FOR MUSCLE SPASMS (Patient not taking: Reported on 1/31/2023) 20 tablet 0     No facility-administered encounter medications on file as of 1/31/2023.       Review of Systems   Constitutional:  Negative for appetite change, chills and fever.   HENT:  Negative for ear pain, sinus pressure, sore throat and trouble swallowing.    Eyes:  Negative for visual disturbance.   Respiratory:  Negative for shortness of breath.    Cardiovascular:  Negative for chest pain.   Gastrointestinal:  Negative for abdominal pain,  "diarrhea, nausea and vomiting.   Endocrine: Negative for cold intolerance, polyphagia and polyuria.   Genitourinary:  Negative for decreased urine volume and dysuria.   Musculoskeletal:  Negative for back pain.   Skin:  Negative for rash.   Allergic/Immunologic: Negative for environmental allergies and food allergies.   Neurological:  Negative for dizziness, tremors, weakness and numbness.   Hematological:  Does not bruise/bleed easily.   Psychiatric/Behavioral:  Negative for confusion and hallucinations. The patient is not nervous/anxious and is not hyperactive.    All other systems reviewed and are negative.    Objective:      /82 (BP Location: Left arm, Patient Position: Sitting, BP Method: Medium (Manual))   Pulse 83   Temp 96.6 °F (35.9 °C) (Temporal)   Ht 5' 8" (1.727 m)   Wt 77.5 kg (170 lb 12.8 oz)   SpO2 97%   BMI 25.97 kg/m²   Physical Exam  Vitals and nursing note reviewed.   Constitutional:       Appearance: She is well-developed.   HENT:      Head: Normocephalic and atraumatic.      Right Ear: External ear normal.      Left Ear: External ear normal.      Nose: Nose normal.   Eyes:      Conjunctiva/sclera: Conjunctivae normal.      Pupils: Pupils are equal, round, and reactive to light.   Cardiovascular:      Rate and Rhythm: Normal rate and regular rhythm.      Heart sounds: Normal heart sounds. No murmur heard.  Pulmonary:      Effort: Pulmonary effort is normal.      Breath sounds: Normal breath sounds. No wheezing.   Abdominal:      General: Bowel sounds are normal.      Palpations: Abdomen is soft.      Tenderness: There is no abdominal tenderness.   Musculoskeletal:         General: Normal range of motion.      Cervical back: Normal range of motion and neck supple.   Skin:     General: Skin is warm and dry.      Findings: No rash.   Neurological:      Mental Status: She is alert and oriented to person, place, and time.      Deep Tendon Reflexes: Reflexes are normal and symmetric. "   Psychiatric:         Behavior: Behavior normal.         Thought Content: Thought content normal.         Judgment: Judgment normal.         Lab Results   Component Value Date    WBC 8.67 01/31/2023    RBC 4.28 01/31/2023    HGB 9.5 (L) 01/31/2023    HCT 33.7 (L) 01/31/2023    MCV 79 (L) 01/31/2023    MCH 22.2 (L) 01/31/2023    MCHC 28.2 (L) 01/31/2023    RDW 25.8 (H) 01/31/2023     (H) 01/31/2023    MPV 9.9 01/31/2023    GRAN 6.8 01/31/2023    GRAN 78.7 (H) 01/31/2023    LYMPH 1.2 01/31/2023    LYMPH 14.2 (L) 01/31/2023    MONO 0.5 01/31/2023    MONO 5.3 01/31/2023    EOS 0.1 01/31/2023    BASO 0.07 01/31/2023    EOSINOPHIL 0.7 01/31/2023    BASOPHIL 0.8 01/31/2023    CMP  Sodium   Date Value Ref Range Status   01/31/2023 138 136 - 145 mmol/L Final     Potassium   Date Value Ref Range Status   01/31/2023 3.8 3.5 - 5.1 mmol/L Final     Chloride   Date Value Ref Range Status   01/31/2023 105 95 - 110 mmol/L Final     CO2   Date Value Ref Range Status   01/31/2023 21 (L) 23 - 29 mmol/L Final     Glucose   Date Value Ref Range Status   01/31/2023 89 70 - 110 mg/dL Final     BUN   Date Value Ref Range Status   01/31/2023 17 6 - 20 mg/dL Final     Creatinine   Date Value Ref Range Status   01/31/2023 1.1 0.5 - 1.4 mg/dL Final     Calcium   Date Value Ref Range Status   01/31/2023 9.8 8.7 - 10.5 mg/dL Final     Total Protein   Date Value Ref Range Status   01/31/2023 8.0 6.0 - 8.4 g/dL Final     Albumin   Date Value Ref Range Status   01/31/2023 3.4 (L) 3.5 - 5.2 g/dL Final     Total Bilirubin   Date Value Ref Range Status   01/31/2023 0.4 0.1 - 1.0 mg/dL Final     Comment:     For infants and newborns, interpretation of results should be based  on gestational age, weight and in agreement with clinical  observations.    Premature Infant recommended reference ranges:  Up to 24 hours.............<8.0 mg/dL  Up to 48 hours............<12.0 mg/dL  3-5 days..................<15.0 mg/dL  6-29 days.................<15.0  mg/dL       Alkaline Phosphatase   Date Value Ref Range Status   01/31/2023 144 (H) 55 - 135 U/L Final     AST   Date Value Ref Range Status   01/31/2023 16 10 - 40 U/L Final     ALT   Date Value Ref Range Status   01/31/2023 14 10 - 44 U/L Final     Anion Gap   Date Value Ref Range Status   01/31/2023 12 8 - 16 mmol/L Final     eGFR   Date Value Ref Range Status   01/31/2023 >60.0 >60 mL/min/1.73 m^2 Final      Assessment:       1. Chest pain, unspecified type    2. Breast pain, left    3. Anemia, unspecified type    4. Night muscle spasms          Plan:   Joe was seen today for follow-up and hypertension.    Diagnoses and all orders for this visit:    Chest pain, unspecified type  -     X-Ray Chest PA And Lateral; Future  -     Ambulatory referral/consult to Cardiology; Future    Breast pain, left  -     Ambulatory referral/consult to Hematology / Oncology; Future    Anemia, unspecified type  -     CBC Auto Differential; Future  -     Comprehensive Metabolic Panel; Future    Night muscle spasms  -     Discontinue: cyclobenzaprine (FLEXERIL) 10 MG tablet; Take 1 tablet (10 mg total) by mouth nightly. for 10 days  -     cyclobenzaprine (FLEXERIL) 10 MG tablet; Take 1 tablet (10 mg total) by mouth nightly. for 10 days               [facility] [address]  [place] [address]  Office [phone]  Fax [phone]

## 2023-02-02 ENCOUNTER — TELEPHONE (OUTPATIENT)
Dept: GASTROENTEROLOGY | Facility: CLINIC | Age: 53
End: 2023-02-02
Payer: MEDICAID

## 2023-02-02 NOTE — TELEPHONE ENCOUNTER
Requested medical records x 2 from [facility] for Bariactic bypass procedure est 2018, CVA records, Hernia repair and last EGD with pathology report est 2022 per Dr Christian.      Phone  [phone]     Fax   [phone]

## 2023-02-03 ENCOUNTER — TELEPHONE (OUTPATIENT)
Dept: PRIMARY CARE CLINIC | Facility: CLINIC | Age: 53
End: 2023-02-03
Payer: MEDICAID

## 2023-02-03 NOTE — TELEPHONE ENCOUNTER
Patient requestinf Phenergan to be sent to Pharmacy, patient was informed that request would be sent to Provider Pharmacy was verified and updated

## 2023-03-11 ENCOUNTER — HOSPITAL ENCOUNTER (OUTPATIENT)
Facility: HOSPITAL | Age: 53
Discharge: LEFT BEFORE DISCHARGE | End: 2023-03-12
Attending: FAMILY MEDICINE | Admitting: INTERNAL MEDICINE
Payer: MEDICAID

## 2023-03-11 DIAGNOSIS — Z72.0 TOBACCO ABUSE: ICD-10-CM

## 2023-03-11 DIAGNOSIS — R06.02 SHORTNESS OF BREATH: ICD-10-CM

## 2023-03-11 DIAGNOSIS — K92.2 UPPER GI BLEED: Primary | ICD-10-CM

## 2023-03-11 DIAGNOSIS — I50.9 CONGESTIVE HEART FAILURE, UNSPECIFIED HF CHRONICITY, UNSPECIFIED HEART FAILURE TYPE: ICD-10-CM

## 2023-03-11 PROBLEM — D62 ACUTE BLOOD LOSS ANEMIA: Status: ACTIVE | Noted: 2023-03-11

## 2023-03-11 LAB
ABO + RH BLD: NORMAL
ALBUMIN SERPL BCP-MCNC: 2.9 G/DL (ref 3.5–5.2)
ALP SERPL-CCNC: 126 U/L (ref 55–135)
ALT SERPL W/O P-5'-P-CCNC: 15 U/L (ref 10–44)
ANION GAP SERPL CALC-SCNC: 13 MMOL/L (ref 8–16)
AST SERPL-CCNC: 18 U/L (ref 10–40)
BASOPHILS # BLD AUTO: 0.05 K/UL (ref 0–0.2)
BASOPHILS NFR BLD: 0.4 % (ref 0–1.9)
BILIRUB SERPL-MCNC: 0.5 MG/DL (ref 0.1–1)
BILIRUB UR QL STRIP: NEGATIVE
BLD GP AB SCN CELLS X3 SERPL QL: NORMAL
BLD PROD TYP BPU: NORMAL
BLOOD UNIT EXPIRATION DATE: NORMAL
BLOOD UNIT TYPE CODE: 5100
BLOOD UNIT TYPE: NORMAL
BNP SERPL-MCNC: 178 PG/ML (ref 0–99)
BUN SERPL-MCNC: 32 MG/DL (ref 6–20)
CALCIUM SERPL-MCNC: 9.1 MG/DL (ref 8.7–10.5)
CHLORIDE SERPL-SCNC: 106 MMOL/L (ref 95–110)
CLARITY UR: CLEAR
CO2 SERPL-SCNC: 22 MMOL/L (ref 23–29)
CODING SYSTEM: NORMAL
COLOR UR: COLORLESS
CREAT SERPL-MCNC: 1.1 MG/DL (ref 0.5–1.4)
CROSSMATCH INTERPRETATION: NORMAL
DIFFERENTIAL METHOD: ABNORMAL
DISPENSE STATUS: NORMAL
EOSINOPHIL # BLD AUTO: 0 K/UL (ref 0–0.5)
EOSINOPHIL NFR BLD: 0.3 % (ref 0–8)
ERYTHROCYTE [DISTWIDTH] IN BLOOD BY AUTOMATED COUNT: 20.9 % (ref 11.5–14.5)
EST. GFR  (NO RACE VARIABLE): >60 ML/MIN/1.73 M^2
FERRITIN SERPL-MCNC: 16 NG/ML (ref 20–300)
GLUCOSE SERPL-MCNC: 114 MG/DL (ref 70–110)
GLUCOSE UR QL STRIP: NEGATIVE
HCT VFR BLD AUTO: 25.7 % (ref 37–48.5)
HGB BLD-MCNC: 7.8 G/DL (ref 12–16)
HGB UR QL STRIP: NEGATIVE
IMM GRANULOCYTES # BLD AUTO: 0.05 K/UL (ref 0–0.04)
IMM GRANULOCYTES NFR BLD AUTO: 0.4 % (ref 0–0.5)
KETONES UR QL STRIP: NEGATIVE
LEUKOCYTE ESTERASE UR QL STRIP: NEGATIVE
LYMPHOCYTES # BLD AUTO: 1.4 K/UL (ref 1–4.8)
LYMPHOCYTES NFR BLD: 12.2 % (ref 18–48)
MCH RBC QN AUTO: 22.9 PG (ref 27–31)
MCHC RBC AUTO-ENTMCNC: 30.4 G/DL (ref 32–36)
MCV RBC AUTO: 76 FL (ref 82–98)
MONOCYTES # BLD AUTO: 0.6 K/UL (ref 0.3–1)
MONOCYTES NFR BLD: 5.3 % (ref 4–15)
NEUTROPHILS # BLD AUTO: 9.3 K/UL (ref 1.8–7.7)
NEUTROPHILS NFR BLD: 81.4 % (ref 38–73)
NITRITE UR QL STRIP: NEGATIVE
NRBC BLD-RTO: 0 /100 WBC
NUM UNITS TRANS PACKED RBC: NORMAL
OB PNL STL: POSITIVE
PH UR STRIP: 6 [PH] (ref 5–8)
PLATELET # BLD AUTO: 432 K/UL (ref 150–450)
PMV BLD AUTO: 8.7 FL (ref 9.2–12.9)
POTASSIUM SERPL-SCNC: 4.3 MMOL/L (ref 3.5–5.1)
PROT SERPL-MCNC: 7.9 G/DL (ref 6–8.4)
PROT UR QL STRIP: NEGATIVE
RBC # BLD AUTO: 3.4 M/UL (ref 4–5.4)
SODIUM SERPL-SCNC: 141 MMOL/L (ref 136–145)
SP GR UR STRIP: 1.01 (ref 1–1.03)
TROPONIN I SERPL DL<=0.01 NG/ML-MCNC: 0.01 NG/ML (ref 0–0.03)
URN SPEC COLLECT METH UR: ABNORMAL
UROBILINOGEN UR STRIP-ACNC: NEGATIVE EU/DL
WBC # BLD AUTO: 11.48 K/UL (ref 3.9–12.7)

## 2023-03-11 PROCEDURE — 80053 COMPREHEN METABOLIC PANEL: CPT | Performed by: REGISTERED NURSE

## 2023-03-11 PROCEDURE — 82272 OCCULT BLD FECES 1-3 TESTS: CPT | Performed by: REGISTERED NURSE

## 2023-03-11 PROCEDURE — 99285 EMERGENCY DEPT VISIT HI MDM: CPT | Mod: 25

## 2023-03-11 PROCEDURE — 96375 TX/PRO/DX INJ NEW DRUG ADDON: CPT

## 2023-03-11 PROCEDURE — C9113 INJ PANTOPRAZOLE SODIUM, VIA: HCPCS | Performed by: FAMILY MEDICINE

## 2023-03-11 PROCEDURE — 63600175 PHARM REV CODE 636 W HCPCS: Performed by: FAMILY MEDICINE

## 2023-03-11 PROCEDURE — 85025 COMPLETE CBC W/AUTO DIFF WBC: CPT | Performed by: REGISTERED NURSE

## 2023-03-11 PROCEDURE — C9113 INJ PANTOPRAZOLE SODIUM, VIA: HCPCS | Performed by: NURSE PRACTITIONER

## 2023-03-11 PROCEDURE — 25000003 PHARM REV CODE 250: Performed by: NURSE PRACTITIONER

## 2023-03-11 PROCEDURE — 86920 COMPATIBILITY TEST SPIN: CPT | Performed by: NURSE PRACTITIONER

## 2023-03-11 PROCEDURE — 82607 VITAMIN B-12: CPT | Performed by: NURSE PRACTITIONER

## 2023-03-11 PROCEDURE — 96374 THER/PROPH/DIAG INJ IV PUSH: CPT

## 2023-03-11 PROCEDURE — 84484 ASSAY OF TROPONIN QUANT: CPT | Performed by: REGISTERED NURSE

## 2023-03-11 PROCEDURE — 82746 ASSAY OF FOLIC ACID SERUM: CPT | Performed by: NURSE PRACTITIONER

## 2023-03-11 PROCEDURE — 93010 ELECTROCARDIOGRAM REPORT: CPT | Mod: ,,, | Performed by: INTERNAL MEDICINE

## 2023-03-11 PROCEDURE — 82728 ASSAY OF FERRITIN: CPT | Performed by: NURSE PRACTITIONER

## 2023-03-11 PROCEDURE — 93010 EKG 12-LEAD: ICD-10-PCS | Mod: ,,, | Performed by: INTERNAL MEDICINE

## 2023-03-11 PROCEDURE — 63600175 PHARM REV CODE 636 W HCPCS: Performed by: NURSE PRACTITIONER

## 2023-03-11 PROCEDURE — 86900 BLOOD TYPING SEROLOGIC ABO: CPT | Performed by: REGISTERED NURSE

## 2023-03-11 PROCEDURE — 36430 TRANSFUSION BLD/BLD COMPNT: CPT

## 2023-03-11 PROCEDURE — 96376 TX/PRO/DX INJ SAME DRUG ADON: CPT

## 2023-03-11 PROCEDURE — P9016 RBC LEUKOCYTES REDUCED: HCPCS | Performed by: NURSE PRACTITIONER

## 2023-03-11 PROCEDURE — G0378 HOSPITAL OBSERVATION PER HR: HCPCS

## 2023-03-11 PROCEDURE — 25000003 PHARM REV CODE 250: Performed by: FAMILY MEDICINE

## 2023-03-11 PROCEDURE — 83880 ASSAY OF NATRIURETIC PEPTIDE: CPT | Performed by: REGISTERED NURSE

## 2023-03-11 PROCEDURE — 96361 HYDRATE IV INFUSION ADD-ON: CPT

## 2023-03-11 PROCEDURE — 81003 URINALYSIS AUTO W/O SCOPE: CPT | Performed by: REGISTERED NURSE

## 2023-03-11 PROCEDURE — 93005 ELECTROCARDIOGRAM TRACING: CPT

## 2023-03-11 RX ORDER — PANTOPRAZOLE SODIUM 40 MG/10ML
40 INJECTION, POWDER, LYOPHILIZED, FOR SOLUTION INTRAVENOUS 2 TIMES DAILY
Status: DISCONTINUED | OUTPATIENT
Start: 2023-03-11 | End: 2023-03-12 | Stop reason: HOSPADM

## 2023-03-11 RX ORDER — DULOXETIN HYDROCHLORIDE 30 MG/1
60 CAPSULE, DELAYED RELEASE ORAL 2 TIMES DAILY
Status: DISCONTINUED | OUTPATIENT
Start: 2023-03-11 | End: 2023-03-12 | Stop reason: HOSPADM

## 2023-03-11 RX ORDER — ONDANSETRON 2 MG/ML
4 INJECTION INTRAMUSCULAR; INTRAVENOUS ONCE
Status: COMPLETED | OUTPATIENT
Start: 2023-03-11 | End: 2023-03-11

## 2023-03-11 RX ORDER — CYCLOBENZAPRINE HCL 10 MG
10 TABLET ORAL NIGHTLY
Status: DISCONTINUED | OUTPATIENT
Start: 2023-03-11 | End: 2023-03-12 | Stop reason: HOSPADM

## 2023-03-11 RX ORDER — ISOSORBIDE MONONITRATE 60 MG/1
60 TABLET, EXTENDED RELEASE ORAL DAILY
Status: DISCONTINUED | OUTPATIENT
Start: 2023-03-12 | End: 2023-03-12 | Stop reason: HOSPADM

## 2023-03-11 RX ORDER — ONDANSETRON 2 MG/ML
4 INJECTION INTRAMUSCULAR; INTRAVENOUS EVERY 8 HOURS PRN
Status: DISCONTINUED | OUTPATIENT
Start: 2023-03-11 | End: 2023-03-12 | Stop reason: HOSPADM

## 2023-03-11 RX ORDER — AMLODIPINE BESYLATE 5 MG/1
5 TABLET ORAL NIGHTLY
Status: DISCONTINUED | OUTPATIENT
Start: 2023-03-11 | End: 2023-03-12 | Stop reason: HOSPADM

## 2023-03-11 RX ORDER — ACETAMINOPHEN 325 MG/1
650 TABLET ORAL EVERY 4 HOURS PRN
Status: DISCONTINUED | OUTPATIENT
Start: 2023-03-11 | End: 2023-03-12 | Stop reason: HOSPADM

## 2023-03-11 RX ORDER — SODIUM CHLORIDE 0.9 % (FLUSH) 0.9 %
10 SYRINGE (ML) INJECTION
Status: DISCONTINUED | OUTPATIENT
Start: 2023-03-11 | End: 2023-03-12 | Stop reason: HOSPADM

## 2023-03-11 RX ORDER — PANTOPRAZOLE SODIUM 40 MG/10ML
80 INJECTION, POWDER, LYOPHILIZED, FOR SOLUTION INTRAVENOUS
Status: COMPLETED | OUTPATIENT
Start: 2023-03-11 | End: 2023-03-11

## 2023-03-11 RX ORDER — HYDROCODONE BITARTRATE AND ACETAMINOPHEN 500; 5 MG/1; MG/1
TABLET ORAL
Status: DISCONTINUED | OUTPATIENT
Start: 2023-03-11 | End: 2023-03-12 | Stop reason: HOSPADM

## 2023-03-11 RX ORDER — HYDRALAZINE HYDROCHLORIDE 20 MG/ML
10 INJECTION INTRAMUSCULAR; INTRAVENOUS EVERY 6 HOURS PRN
Status: DISCONTINUED | OUTPATIENT
Start: 2023-03-11 | End: 2023-03-12 | Stop reason: HOSPADM

## 2023-03-11 RX ADMIN — CYCLOBENZAPRINE HYDROCHLORIDE 10 MG: 5 TABLET, FILM COATED ORAL at 10:03

## 2023-03-11 RX ADMIN — PANTOPRAZOLE SODIUM 40 MG: 40 INJECTION, POWDER, FOR SOLUTION INTRAVENOUS at 09:03

## 2023-03-11 RX ADMIN — AMLODIPINE BESYLATE 5 MG: 5 TABLET ORAL at 09:03

## 2023-03-11 RX ADMIN — ONDANSETRON 4 MG: 2 INJECTION INTRAMUSCULAR; INTRAVENOUS at 04:03

## 2023-03-11 RX ADMIN — PANTOPRAZOLE SODIUM 80 MG: 40 INJECTION, POWDER, FOR SOLUTION INTRAVENOUS at 05:03

## 2023-03-11 RX ADMIN — DULOXETINE 60 MG: 30 CAPSULE, DELAYED RELEASE ORAL at 09:03

## 2023-03-11 RX ADMIN — SODIUM CHLORIDE 1000 ML: 9 INJECTION, SOLUTION INTRAVENOUS at 04:03

## 2023-03-11 RX ADMIN — ONDANSETRON 4 MG: 2 INJECTION INTRAMUSCULAR; INTRAVENOUS at 10:03

## 2023-03-11 RX ADMIN — ACETAMINOPHEN 650 MG: 325 TABLET ORAL at 10:03

## 2023-03-11 NOTE — ASSESSMENT & PLAN NOTE
Patient is identified as having Diastolic (HFpEF) heart failure that is Chronic. CHF is currently controlled. Latest ECHO performed and demonstrates- Results for orders placed during the hospital encounter of 01/21/23    Echo Saline Bubble? Yes    Interpretation Summary  · There is no evidence of intracardiac shunting.  · The left ventricle is normal in size with mild concentric hypertrophy and normal systolic function.  · The estimated ejection fraction is 60%.  · Normal left ventricular diastolic function.  · Normal right ventricular size with normal right ventricular systolic function.  · Mild to moderate tricuspid regurgitation.  · Normal central venous pressure (3 mmHg).  · The estimated PA systolic pressure is 36 mmHg.  . Continue ACE/ARB and Furosemide and monitor clinical status closely. Monitor on telemetry. Patient is off CHF pathway.  Monitor strict Is&Os and daily weights.  Place on fluid restriction of 1.5 L. Continue to stress to patient importance of self efficacy and  on diet for CHF. Last BNP reviewed- and noted below   Recent Labs   Lab 03/11/23  1535   *   .

## 2023-03-11 NOTE — ASSESSMENT & PLAN NOTE
Assistance with smoking cessation was offered, including:  [x]  Medications  [x]  Counseling  []  Printed Information on Smoking Cessation  [x]  Referral to a Smoking Cessation Program    Patient was counseled regarding smoking for >10 minutes.

## 2023-03-11 NOTE — ASSESSMENT & PLAN NOTE
Admission in January, transfused 2U PRBC due to GI bleed. Follow-up scheduled 10/2023.    --Daily CBC  --Transfuse with 1 unit PRBC for Hgb <7.0 or <8.0 if symptomatic  --Iron Studies, replete as indicated  --Endorsed palpitations, HA, dizziness, ordered 1u PRBC

## 2023-03-11 NOTE — ED PROVIDER NOTES
SCRIBE #1 NOTE: I, Sammi Carter, am scribing for, and in the presence of, Ciara Roblero MD. I have scribed the entire note.       History     Chief Complaint   Patient presents with    Abdominal Pain     Abdominal pain started around 5 am this morning, nausea and vomiting, shortness of breath, black stools, had gastric bypass surgery.     Review of patient's allergies indicates:   Allergen Reactions    Lisinopril Anaphylaxis     This is only possible agent at the time.  This is only possible agent at the time.  This is only possible agent at the time.    Sulfa (sulfonamide antibiotics) Anaphylaxis    Beta-blockers (beta-adrenergic blocking agts)      Other reaction(s): Other (See Comments)  Syncope & Collapse         History of Present Illness     HPI    3/11/2023, 4:18 PM  History obtained from the patient      History of Present Illness: Joe Ceballos is a 53 y.o. female patient with a PMHx of CHF, HTN, sickle cell trait and stroke who presents to the Emergency Department for evaluation of abdominal pain which onset at 5 AM. The Pt reports that she had bright red stools in her bowel movement yesterday but today, she has had multiple episodes with black stools. She states that she had never experienced this before. Pt added that she had gastric bypass surgery, and that she had blood transfusion last January. Symptoms are constant and moderate in severity. No mitigating or exacerbating factors reported. Associated sxs include n/v, SOB, abdominal pain, CP, and generalized weakness. Patient denies any fever, chills, hematuria, back pain, headaches, numbness, and all other sxs at this time. No prior Tx reported. The Pt was prescribed protonix for GERD by Dr. Sly MD, and Carafate. The Pt is no longer on Plavix. No further complaints or concerns at this time.       Arrival mode: Personal vehicle     PCP: Daphney Heart MD        Past Medical History:  Past Medical History:   Diagnosis Date    Anxiety      CHF (congestive heart failure)     Edema     Hypertension     Initial insomnia     Sickle cell trait     Stroke     Tobacco use        Past Surgical History:  Past Surgical History:   Procedure Laterality Date    BREAST SURGERY       SECTION      x 2    CYST REMOVAL      from left tube    gastric sleeve      Raheel-en-Y gastric bypass      TONSILLECTOMY           Family History:  Family History   Adopted: Yes   Problem Relation Age of Onset    Hypertension Mother     Thyroid disease Paternal Aunt     Hypertension Paternal Grandmother     Breast cancer Neg Hx     Colon cancer Neg Hx     Ovarian cancer Neg Hx        Social History:  Social History     Tobacco Use    Smoking status: Every Day     Packs/day: 0.25     Types: Cigarettes    Smokeless tobacco: Never   Substance and Sexual Activity    Alcohol use: Yes    Drug use: No    Sexual activity: Not Currently     Partners: Male     Birth control/protection: None        Review of Systems     Review of Systems   Constitutional:  Negative for chills and fever.   HENT:  Negative for sore throat.    Respiratory:  Positive for shortness of breath.    Cardiovascular:  Positive for chest pain.   Gastrointestinal:  Positive for abdominal pain, blood in stool (bright red and black in color), diarrhea, nausea and vomiting.   Genitourinary:  Negative for dysuria and hematuria.   Musculoskeletal:  Negative for back pain.   Skin:  Negative for rash.   Neurological:  Positive for weakness (generalized). Negative for numbness and headaches.   Hematological:  Does not bruise/bleed easily.   All other systems reviewed and are negative.     Physical Exam     Initial Vitals [23 1457]   BP Pulse Resp Temp SpO2   107/67 89 20 98.3 °F (36.8 °C) 97 %      MAP       --          Physical Exam  Nursing Notes and Vital Signs Reviewed.  Constitutional: Patient is in no acute distress. Well-developed and well-nourished.  Head: Atraumatic. Normocephalic.  Eyes: PERRL. EOM  intact. Conjunctivae are not pale. No scleral icterus.  ENT: Mucous membranes are moist. Oropharynx is clear and symmetric.    Neck: Supple. Full ROM. No lymphadenopathy.  Cardiovascular: Regular rate. Regular rhythm. No murmurs, rubs, or gallops. Distal pulses are 2+ and symmetric.  Pulmonary/Chest: No respiratory distress. Clear to auscultation bilaterally. No wheezing or rales.  Abdominal: Periumbilical tenderness.  Genitourinary: No CVA tenderness  Musculoskeletal: Moves all extremities. No obvious deformities. No edema. No calf tenderness.  Skin: Warm and dry.  Neurological:  Alert, awake, and appropriate.  Normal speech.  No acute focal neurological deficits are appreciated.  Psychiatric: Normal affect. Good eye contact. Appropriate in content.     ED Course   Critical Care    Date/Time: 3/11/2023 5:42 PM  Performed by: Ciara Roblero MD  Authorized by: Ciara Roblero MD   Direct patient critical care time: 23 minutes  Additional history critical care time: 12 minutes  Ordering / reviewing critical care time: 9 minutes  Documentation critical care time: 4 minutes  Consulting other physicians critical care time: 12 minutes  Total critical care time (exclusive of procedural time) : 60 minutes  Critical care time was exclusive of separately billable procedures and treating other patients and teaching time.  Critical care was necessary to treat or prevent imminent or life-threatening deterioration of the following conditions: GI Bleed.  Critical care was time spent personally by me on the following activities: blood draw for specimens, development of treatment plan with patient or surrogate, discussions with consultants, interpretation of cardiac output measurements, evaluation of patient's response to treatment, examination of patient, obtaining history from patient or surrogate, ordering and review of laboratory studies, ordering and review of radiographic studies, ordering and performing treatments and  "interventions, pulse oximetry, re-evaluation of patient's condition and review of old charts.      ED Vital Signs:  Vitals:    03/11/23 2220 03/11/23 2328 03/12/23 0340 03/12/23 0345   BP: (!) 160/95 (!) 121/54 (!) 179/89 (!) 157/78   Pulse: 80 85 76 77   Resp: 20 20 20 15   Temp: 98.5 °F (36.9 °C)      TempSrc: Oral      SpO2: 100% 100% 100% 99%   Weight:       Height:        03/12/23 0400 03/12/23 0500 03/12/23 0600 03/12/23 0700   BP: (!) 143/71 132/64 125/64 135/71   Pulse: 78 81 77 74   Resp:   20 18   Temp:  98.5 °F (36.9 °C)     TempSrc:  Oral     SpO2: 97% 98% 98% 99%   Weight:       Height:        03/12/23 0800 03/12/23 0902 03/12/23 1221 03/12/23 1242   BP: (!) 175/90 (!) 161/81 (!) 197/95 (!) 136/91   Pulse: 74 76 76 86   Resp: 20  18 20   Temp:   97.9 °F (36.6 °C) 98.1 °F (36.7 °C)   TempSrc:   Temporal Temporal   SpO2: 99% 97% 99% 98%   Weight:       Height:        03/12/23 1252 03/12/23 1302 03/12/23 1320   BP: (!) 145/87 (!) 152/89 (!) 177/96   Pulse: 75 73 73   Resp: 18 16 18   Temp:   98 °F (36.7 °C)   TempSrc:   Temporal   SpO2: 96% 98% 96%   Weight:   80.7 kg (178 lb)   Height:   5' 8" (1.727 m)       Abnormal Lab Results:  Labs Reviewed   CBC W/ AUTO DIFFERENTIAL - Abnormal; Notable for the following components:       Result Value    RBC 3.40 (*)     Hemoglobin 7.8 (*)     Hematocrit 25.7 (*)     MCV 76 (*)     MCH 22.9 (*)     MCHC 30.4 (*)     RDW 20.9 (*)     MPV 8.7 (*)     Gran # (ANC) 9.3 (*)     Immature Grans (Abs) 0.05 (*)     Gran % 81.4 (*)     Lymph % 12.2 (*)     All other components within normal limits   COMPREHENSIVE METABOLIC PANEL - Abnormal; Notable for the following components:    CO2 22 (*)     Glucose 114 (*)     BUN 32 (*)     Albumin 2.9 (*)     All other components within normal limits   B-TYPE NATRIURETIC PEPTIDE - Abnormal; Notable for the following components:     (*)     All other components within normal limits   URINALYSIS, REFLEX TO URINE CULTURE - Abnormal; " Notable for the following components:    Color, UA Colorless (*)     All other components within normal limits    Narrative:     Specimen Source->Urine   OCCULT BLOOD X 1, STOOL - Abnormal; Notable for the following components:    Occult Blood Positive (*)     All other components within normal limits   FERRITIN - Abnormal; Notable for the following components:    Ferritin 16 (*)     All other components within normal limits   VITAMIN B12 - Abnormal; Notable for the following components:    Vitamin B-12 <148 (*)     All other components within normal limits   BASIC METABOLIC PANEL - Abnormal; Notable for the following components:    Chloride 112 (*)     CO2 21 (*)     BUN 32 (*)     Calcium 8.6 (*)     All other components within normal limits   CBC W/ AUTO DIFFERENTIAL - Abnormal; Notable for the following components:    RBC 3.44 (*)     Hemoglobin 8.3 (*)     Hematocrit 26.9 (*)     MCV 78 (*)     MCH 24.1 (*)     MCHC 30.9 (*)     RDW 21.1 (*)     MPV 9.0 (*)     Gran # (ANC) 8.1 (*)     Gran % 77.3 (*)     Lymph % 15.2 (*)     All other components within normal limits   TROPONIN I   FOLATE   MAGNESIUM   IRON AND TIBC   TYPE & SCREEN   PREPARE RBC SOFT        All Lab Results:  Results for orders placed or performed during the hospital encounter of 03/11/23   CBC auto differential   Result Value Ref Range    WBC 11.48 3.90 - 12.70 K/uL    RBC 3.40 (L) 4.00 - 5.40 M/uL    Hemoglobin 7.8 (L) 12.0 - 16.0 g/dL    Hematocrit 25.7 (L) 37.0 - 48.5 %    MCV 76 (L) 82 - 98 fL    MCH 22.9 (L) 27.0 - 31.0 pg    MCHC 30.4 (L) 32.0 - 36.0 g/dL    RDW 20.9 (H) 11.5 - 14.5 %    Platelets 432 150 - 450 K/uL    MPV 8.7 (L) 9.2 - 12.9 fL    Immature Granulocytes 0.4 0.0 - 0.5 %    Gran # (ANC) 9.3 (H) 1.8 - 7.7 K/uL    Immature Grans (Abs) 0.05 (H) 0.00 - 0.04 K/uL    Lymph # 1.4 1.0 - 4.8 K/uL    Mono # 0.6 0.3 - 1.0 K/uL    Eos # 0.0 0.0 - 0.5 K/uL    Baso # 0.05 0.00 - 0.20 K/uL    nRBC 0 0 /100 WBC    Gran % 81.4 (H) 38.0 -  73.0 %    Lymph % 12.2 (L) 18.0 - 48.0 %    Mono % 5.3 4.0 - 15.0 %    Eosinophil % 0.3 0.0 - 8.0 %    Basophil % 0.4 0.0 - 1.9 %    Differential Method Automated    Comprehensive metabolic panel   Result Value Ref Range    Sodium 141 136 - 145 mmol/L    Potassium 4.3 3.5 - 5.1 mmol/L    Chloride 106 95 - 110 mmol/L    CO2 22 (L) 23 - 29 mmol/L    Glucose 114 (H) 70 - 110 mg/dL    BUN 32 (H) 6 - 20 mg/dL    Creatinine 1.1 0.5 - 1.4 mg/dL    Calcium 9.1 8.7 - 10.5 mg/dL    Total Protein 7.9 6.0 - 8.4 g/dL    Albumin 2.9 (L) 3.5 - 5.2 g/dL    Total Bilirubin 0.5 0.1 - 1.0 mg/dL    Alkaline Phosphatase 126 55 - 135 U/L    AST 18 10 - 40 U/L    ALT 15 10 - 44 U/L    Anion Gap 13 8 - 16 mmol/L    eGFR >60 >60 mL/min/1.73 m^2   Brain natriuretic peptide   Result Value Ref Range     (H) 0 - 99 pg/mL   Troponin I   Result Value Ref Range    Troponin I 0.006 0.000 - 0.026 ng/mL   Urinalysis, Reflex to Urine Culture Urine, Clean Catch    Specimen: Urine   Result Value Ref Range    Specimen UA Urine, Clean Catch     Color, UA Colorless (A) Yellow, Straw, Sherrie    Appearance, UA Clear Clear    pH, UA 6.0 5.0 - 8.0    Specific Gravity, UA 1.010 1.005 - 1.030    Protein, UA Negative Negative    Glucose, UA Negative Negative    Ketones, UA Negative Negative    Bilirubin (UA) Negative Negative    Occult Blood UA Negative Negative    Nitrite, UA Negative Negative    Urobilinogen, UA Negative <2.0 EU/dL    Leukocytes, UA Negative Negative   Occult blood x 1, stool    Specimen: Stool   Result Value Ref Range    Occult Blood Positive (A) Negative   Ferritin   Result Value Ref Range    Ferritin 16 (L) 20.0 - 300.0 ng/mL   Vitamin B12   Result Value Ref Range    Vitamin B-12 <148 (L) 210 - 950 pg/mL   Folate   Result Value Ref Range    Folate 7.9 4.0 - 24.0 ng/mL   Basic Metabolic Panel (BMP)   Result Value Ref Range    Sodium 142 136 - 145 mmol/L    Potassium 4.1 3.5 - 5.1 mmol/L    Chloride 112 (H) 95 - 110 mmol/L    CO2 21 (L)  23 - 29 mmol/L    Glucose 98 70 - 110 mg/dL    BUN 32 (H) 6 - 20 mg/dL    Creatinine 1.1 0.5 - 1.4 mg/dL    Calcium 8.6 (L) 8.7 - 10.5 mg/dL    Anion Gap 9 8 - 16 mmol/L    eGFR >60 >60 mL/min/1.73 m^2   Magnesium   Result Value Ref Range    Magnesium 2.2 1.6 - 2.6 mg/dL   CBC with Auto Differential   Result Value Ref Range    WBC 10.40 3.90 - 12.70 K/uL    RBC 3.44 (L) 4.00 - 5.40 M/uL    Hemoglobin 8.3 (L) 12.0 - 16.0 g/dL    Hematocrit 26.9 (L) 37.0 - 48.5 %    MCV 78 (L) 82 - 98 fL    MCH 24.1 (L) 27.0 - 31.0 pg    MCHC 30.9 (L) 32.0 - 36.0 g/dL    RDW 21.1 (H) 11.5 - 14.5 %    Platelets 346 150 - 450 K/uL    MPV 9.0 (L) 9.2 - 12.9 fL    Immature Granulocytes 0.3 0.0 - 0.5 %    Gran # (ANC) 8.1 (H) 1.8 - 7.7 K/uL    Immature Grans (Abs) 0.03 0.00 - 0.04 K/uL    Lymph # 1.6 1.0 - 4.8 K/uL    Mono # 0.6 0.3 - 1.0 K/uL    Eos # 0.1 0.0 - 0.5 K/uL    Baso # 0.04 0.00 - 0.20 K/uL    nRBC 0 0 /100 WBC    Gran % 77.3 (H) 38.0 - 73.0 %    Lymph % 15.2 (L) 18.0 - 48.0 %    Mono % 6.2 4.0 - 15.0 %    Eosinophil % 0.6 0.0 - 8.0 %    Basophil % 0.4 0.0 - 1.9 %    Differential Method Automated    Type & Screen   Result Value Ref Range    Group & Rh A POS     Indirect Michael NEG    Prepare RBC 1 Unit   Result Value Ref Range    UNIT NUMBER [id number]     Product Code O1269D89     DISPENSE STATUS TRANSFUSED     CODING SYSTEM [id number]     Unit Blood Type Code 5100     Unit Blood Type O POS     Unit Expiration [id number]     CROSSMATCH INTERPRETATION Compatible          Imaging Results:  Imaging Results    None          The EKG was ordered, reviewed, and independently interpreted by the ED provider.  Interpretation time: 15:43  Rate: 80 BPM  Rhythm: normal sinus rhythm with sinus arrhythmia   Interpretation: ST and Marked T-wave abnormality, consider inferolateral ischemia. No STEMI.           The Emergency Provider reviewed the vital signs and test results, which are outlined above.     ED Discussion     5:01 PM: Discussed  case with Dr. Rojelio MD (Gastroenterology). Dr. Cowan agrees with current care and management of pt and accepts admission.   Admitting Service: Hospital Medicine  Admitting Physician: Dr. Cowan  Admit to: Obs    5:03 PM: Re-evaluated pt. I have discussed test results, shared treatment plan, and the need for admission with patient and family at bedside. Pt and family express understanding at this time and agree with all information. All questions answered. Pt and family have no further questions or concerns at this time. Pt is ready for admit.     Medical Decision Making:   Clinical Tests:   Lab Tests: Ordered and Reviewed  Medical Tests: Ordered and Reviewed         ED Medication(s):  Medications   sodium chloride 0.9% flush 10 mL (has no administration in time range)   acetaminophen tablet 650 mg (650 mg Oral Given 3/11/23 2218)   ondansetron injection 4 mg (4 mg Intravenous Given 3/11/23 2218)   pantoprazole injection 40 mg (40 mg Intravenous Given 3/12/23 0845)   amLODIPine tablet 5 mg (5 mg Oral Given 3/11/23 2105)   DULoxetine DR capsule 60 mg (0 mg Oral Hold 3/12/23 0900)   isosorbide mononitrate 24 hr tablet 60 mg (0 mg Oral Hold 3/12/23 0900)   hydrALAZINE injection 10 mg (10 mg Intravenous Given 3/12/23 1355)   0.9%  NaCl infusion (for blood administration) (has no administration in time range)   cyclobenzaprine tablet 10 mg (10 mg Oral Given 3/11/23 2248)   sodium chloride 0.9% bolus 1,000 mL 1,000 mL (0 mLs Intravenous Stopped 3/11/23 1759)   ondansetron injection 4 mg (4 mg Intravenous Given 3/11/23 1642)   pantoprazole injection 80 mg (80 mg Intravenous Given 3/11/23 1715)   iron sucrose (VENOFER) 400 mg in sodium chloride 0.9% 250 mL IVPB (0 mg Intravenous Stopped 3/12/23 1111)   ondansetron injection 4 mg (4 mg Intravenous Given 3/12/23 1257)       Current Discharge Medication List        START taking these medications    Details   omeprazole (PRILOSEC) 40 MG capsule Take 1 capsule (40 mg total) by  mouth once daily.  Qty: 90 capsule, Refills: 0      sucralfate (CARAFATE) 1 gram tablet Take 1 tablet (1 g total) by mouth 4 (four) times daily. for 10 days  Qty: 40 tablet, Refills: 0              Follow-up Information       Daphney Heart MD Follow up.    Specialty: Family Medicine  Contact information:  [address] [facility]  [address]  [place] [address]  [phone]               Ray Serrato MD Follow up.    Specialty: Gastroenterology  Why: Follow-up with Gastroenterology within 4 weeks upon discharge     Recommend to undergo repeat EGD in 12 weeks  Contact information:  [address] [place] [address] [address] [place] [address]  [phone]                                 Scribe Attestation:   Scribe #1: I performed the above scribed service and the documentation accurately describes the services I performed. I attest to the accuracy of the note.     Attending:   Physician Attestation Statement for Scribe #1: I, Ciara Roblero MD, personally performed the services described in this documentation, as scribed by Sammi Carter, in my presence, and it is both accurate and complete.           Clinical Impression       ICD-10-CM ICD-9-CM   1. Upper GI bleed  K92.2 578.9   2. Shortness of breath  R06.02 786.05   3. Congestive heart failure, unspecified HF chronicity, unspecified heart failure type  I50.9 428.0   4. Tobacco abuse  Z72.0 305.1       Disposition:   Disposition: Placed in Observation  Condition: Stable      Ciara Roblero MD  03/12/23 3279

## 2023-03-11 NOTE — HPI
53 y.o. female patient with a PMHx of Hx of GI bleed, anemia, Hx Gastric Bypass Sx, CHF, HTN, sickle cell trait and stroke who presents to the Emergency Department for evaluation of abdominal pain which onset at 5 AM. The Pt reports that she had bright red stools in her bowel movement yesterday but today, she had black stools. She states that this had never experienced this before. Symptoms are constant and moderate in severity. No mitigating or exacerbating factors reported. Associated sxs include n/v, SOB, abdominal pain, and CP. Patient denies any fever, chills, diarrhea, headaches, numbness, weakness, and all other sxs at this time. No prior Tx reported. In the ED, occult stool +, Hgb: 7.8. BP: 107/67, 89, 20. Given IV fluids, PPI. GI consulted. Patient is a full code, surrogate decision maker is Elham Chong. Placed in observation under the care of Hospital Medicine.

## 2023-03-11 NOTE — SUBJECTIVE & OBJECTIVE
Past Medical History:   Diagnosis Date    Anxiety     CHF (congestive heart failure)     Edema     Hypertension     Initial insomnia     Sickle cell trait     Stroke     Tobacco use        Past Surgical History:   Procedure Laterality Date    BREAST SURGERY       SECTION      x 2    CYST REMOVAL      from left tube    gastric sleeve      Raheel-en-Y gastric bypass      TONSILLECTOMY         Review of patient's allergies indicates:   Allergen Reactions    Lisinopril Anaphylaxis     This is only possible agent at the time.  This is only possible agent at the time.  This is only possible agent at the time.    Sulfa (sulfonamide antibiotics) Anaphylaxis    Beta-blockers (beta-adrenergic blocking agts)      Other reaction(s): Other (See Comments)  Syncope & Collapse       No current facility-administered medications on file prior to encounter.     Current Outpatient Medications on File Prior to Encounter   Medication Sig    amLODIPine (NORVASC) 5 MG tablet Take 1 tablet (5 mg total) by mouth every evening.    aspirin (ECOTRIN) 81 MG EC tablet Take 1 tablet (81 mg total) by mouth once daily.    DULoxetine (CYMBALTA) 60 MG capsule Take 1 capsule (60 mg total) by mouth 2 (two) times a day.    furosemide (LASIX) 40 MG tablet Take 2 tablets (80 mg total) by mouth once daily AND 1 tablet (40 mg total) every evening.    isosorbide mononitrate (IMDUR) 60 MG 24 hr tablet Take 1 tablet (60 mg total) by mouth once daily.    losartan (COZAAR) 100 MG tablet TAKE 1 TABLET BY MOUTH EVERY DAY    pantoprazole (PROTONIX) 40 MG tablet TAKE 1 TABLET BY MOUTH EVERY DAY    atorvastatin (LIPITOR) 80 MG tablet Take 1 tablet (80 mg total) by mouth every evening.    promethazine (PHENERGAN) 25 MG tablet Take 1 tablet (25 mg total) by mouth every 4 (four) hours.     Family History       Problem Relation (Age of Onset)    Hypertension Mother, Paternal Grandmother    Thyroid disease Paternal Aunt          Tobacco Use    Smoking  status: Every Day     Packs/day: 0.25     Types: Cigarettes    Smokeless tobacco: Never   Substance and Sexual Activity    Alcohol use: Yes    Drug use: No    Sexual activity: Not Currently     Partners: Male     Birth control/protection: None     Review of Systems   Constitutional:  Positive for fatigue. Negative for appetite change, chills, diaphoresis, fever and unexpected weight change.   HENT:  Negative for congestion, hearing loss, mouth sores, postnasal drip, rhinorrhea, sore throat and trouble swallowing.    Eyes:  Negative for discharge and visual disturbance.   Respiratory:  Positive for shortness of breath. Negative for cough and chest tightness.    Cardiovascular:  Positive for palpitations. Negative for chest pain and leg swelling.   Gastrointestinal:  Positive for abdominal distention, abdominal pain and blood in stool. Negative for constipation, diarrhea, nausea and vomiting.   Endocrine: Negative for cold intolerance and heat intolerance.   Genitourinary:  Negative for difficulty urinating, dyspareunia, flank pain and hematuria.   Musculoskeletal:  Negative for arthralgias, back pain and myalgias.   Skin: Negative.    Neurological:  Positive for dizziness and headaches. Negative for weakness and light-headedness.   Hematological:  Negative for adenopathy. Does not bruise/bleed easily.   Psychiatric/Behavioral:  Negative for agitation, behavioral problems and confusion. The patient is nervous/anxious.    Objective:     Vital Signs (Most Recent):  Temp: 98.3 °F (36.8 °C) (03/11/23 1457)  Pulse: 74 (03/11/23 1630)  Resp: 20 (03/11/23 1630)  BP: (!) 145/82 (03/11/23 1630)  SpO2: 98 % (03/11/23 1630)   Vital Signs (24h Range):  Temp:  [98.3 °F (36.8 °C)] 98.3 °F (36.8 °C)  Pulse:  [74-89] 74  Resp:  [20] 20  SpO2:  [97 %-98 %] 98 %  BP: (107-145)/(67-82) 145/82     Weight: 80.7 kg (178 lb)  Body mass index is 27.06 kg/m².    Physical Exam  Vitals and nursing note reviewed.   Constitutional:       General:  She is not in acute distress.     Appearance: She is well-developed. She is ill-appearing.   HENT:      Head: Normocephalic and atraumatic.      Right Ear: Hearing and external ear normal.      Left Ear: Hearing and external ear normal.      Nose: No rhinorrhea.      Right Sinus: No maxillary sinus tenderness or frontal sinus tenderness.      Left Sinus: No maxillary sinus tenderness or frontal sinus tenderness.      Mouth/Throat:      Mouth: No oral lesions.      Pharynx: Uvula midline.   Eyes:      General:         Right eye: No discharge.         Left eye: No discharge.      Conjunctiva/sclera: Conjunctivae normal.      Pupils: Pupils are equal, round, and reactive to light.   Neck:      Thyroid: No thyromegaly.      Vascular: No carotid bruit.      Trachea: No tracheal deviation.   Cardiovascular:      Rate and Rhythm: Normal rate and regular rhythm.      Pulses:           Dorsalis pedis pulses are 2+ on the right side and 2+ on the left side.      Heart sounds: Normal heart sounds, S1 normal and S2 normal. No murmur heard.  Pulmonary:      Effort: Pulmonary effort is normal. No respiratory distress.      Breath sounds: Normal breath sounds.   Abdominal:      General: Bowel sounds are increased. There is no distension.      Palpations: Abdomen is soft. There is no mass.      Tenderness: There is no abdominal tenderness.   Musculoskeletal:         General: Normal range of motion.      Cervical back: Normal range of motion.   Lymphadenopathy:      Cervical: No cervical adenopathy.      Upper Body:      Right upper body: No supraclavicular adenopathy.      Left upper body: No supraclavicular adenopathy.   Skin:     General: Skin is warm and dry.      Capillary Refill: Capillary refill takes less than 2 seconds.      Findings: No rash.   Neurological:      Mental Status: She is alert and oriented to person, place, and time.      Sensory: No sensory deficit.      Coordination: Coordination normal.      Gait: Gait  normal.   Psychiatric:         Mood and Affect: Mood is not anxious or depressed.         Speech: Speech normal.         Behavior: Behavior normal.         Thought Content: Thought content normal.         Judgment: Judgment normal.         CRANIAL NERVES     CN III, IV, VI   Pupils are equal, round, and reactive to light.     Significant Labs: All pertinent labs within the past 24 hours have been reviewed.  CBC:   Recent Labs   Lab 03/11/23  1535   WBC 11.48   HGB 7.8*   HCT 25.7*        CMP:   Recent Labs   Lab 03/11/23  1535      K 4.3      CO2 22*   *   BUN 32*   CREATININE 1.1   CALCIUM 9.1   PROT 7.9   ALBUMIN 2.9*   BILITOT 0.5   ALKPHOS 126   AST 18   ALT 15   ANIONGAP 13       Significant Imaging: I have reviewed all pertinent imaging results/findings within the past 24 hours.

## 2023-03-11 NOTE — H&P
[facility] - Emergency Dept.  [facility]  History & Physical    Patient Name: Joe Ceballos  MRN: [id number]  Patient Class: OP- Observation  Admission Date: 3/11/2023  Attending Physician: Aimee Cowan, *   Primary Care Provider: Daphney Heart MD         Patient information was obtained from patient, past medical records and ER records.     Subjective:     Principal Problem:GI bleed    Chief Complaint:   Chief Complaint   Patient presents with    Abdominal Pain     Abdominal pain started around 5 am this morning, nausea and vomiting, shortness of breath, black stools, had gastric bypass surgery.        HPI: 53 y.o. female patient with a PMHx of Hx of GI bleed, anemia, Hx Gastric Bypass Sx, CHF, HTN, sickle cell trait and stroke who presents to the Emergency Department for evaluation of abdominal pain which onset at 5 AM. The Pt reports that she had bright red stools in her bowel movement yesterday but today, she had black stools. She states that this had never experienced this before. Symptoms are constant and moderate in severity. No mitigating or exacerbating factors reported. Associated sxs include n/v, SOB, abdominal pain, and CP. Patient denies any fever, chills, diarrhea, headaches, numbness, weakness, and all other sxs at this time. No prior Tx reported. In the ED, occult stool +, Hgb: 7.8. BP: 107/67, 89, 20. Given IV fluids, PPI. GI consulted. Patient is a full code, surrogate decision maker is Elham Chong. Placed in observation under the care of Hospital Medicine.       Past Medical History:   Diagnosis Date    Anxiety     CHF (congestive heart failure)     Edema     Hypertension     Initial insomnia     Sickle cell trait     Stroke     Tobacco use        Past Surgical History:   Procedure Laterality Date    BREAST SURGERY       SECTION      x 2    CYST REMOVAL      from left tube    gastric sleeve      Raheel-en-Y gastric bypass      TONSILLECTOMY          Review of patient's allergies indicates:   Allergen Reactions    Lisinopril Anaphylaxis     This is only possible agent at the time.  This is only possible agent at the time.  This is only possible agent at the time.    Sulfa (sulfonamide antibiotics) Anaphylaxis    Beta-blockers (beta-adrenergic blocking agts)      Other reaction(s): Other (See Comments)  Syncope & Collapse       No current facility-administered medications on file prior to encounter.     Current Outpatient Medications on File Prior to Encounter   Medication Sig    amLODIPine (NORVASC) 5 MG tablet Take 1 tablet (5 mg total) by mouth every evening.    aspirin (ECOTRIN) 81 MG EC tablet Take 1 tablet (81 mg total) by mouth once daily.    DULoxetine (CYMBALTA) 60 MG capsule Take 1 capsule (60 mg total) by mouth 2 (two) times a day.    furosemide (LASIX) 40 MG tablet Take 2 tablets (80 mg total) by mouth once daily AND 1 tablet (40 mg total) every evening.    isosorbide mononitrate (IMDUR) 60 MG 24 hr tablet Take 1 tablet (60 mg total) by mouth once daily.    losartan (COZAAR) 100 MG tablet TAKE 1 TABLET BY MOUTH EVERY DAY    pantoprazole (PROTONIX) 40 MG tablet TAKE 1 TABLET BY MOUTH EVERY DAY    atorvastatin (LIPITOR) 80 MG tablet Take 1 tablet (80 mg total) by mouth every evening.    promethazine (PHENERGAN) 25 MG tablet Take 1 tablet (25 mg total) by mouth every 4 (four) hours.     Family History       Problem Relation (Age of Onset)    Hypertension Mother, Paternal Grandmother    Thyroid disease Paternal Aunt          Tobacco Use    Smoking status: Every Day     Packs/day: 0.25     Types: Cigarettes    Smokeless tobacco: Never   Substance and Sexual Activity    Alcohol use: Yes    Drug use: No    Sexual activity: Not Currently     Partners: Male     Birth control/protection: None     Review of Systems   Constitutional:  Positive for fatigue. Negative for appetite change, chills, diaphoresis, fever and unexpected weight  change.   HENT:  Negative for congestion, hearing loss, mouth sores, postnasal drip, rhinorrhea, sore throat and trouble swallowing.    Eyes:  Negative for discharge and visual disturbance.   Respiratory:  Positive for shortness of breath. Negative for cough and chest tightness.    Cardiovascular:  Positive for palpitations. Negative for chest pain and leg swelling.   Gastrointestinal:  Positive for abdominal distention, abdominal pain and blood in stool. Negative for constipation, diarrhea, nausea and vomiting.   Endocrine: Negative for cold intolerance and heat intolerance.   Genitourinary:  Negative for difficulty urinating, dyspareunia, flank pain and hematuria.   Musculoskeletal:  Negative for arthralgias, back pain and myalgias.   Skin: Negative.    Neurological:  Positive for dizziness and headaches. Negative for weakness and light-headedness.   Hematological:  Negative for adenopathy. Does not bruise/bleed easily.   Psychiatric/Behavioral:  Negative for agitation, behavioral problems and confusion. The patient is nervous/anxious.    Objective:     Vital Signs (Most Recent):  Temp: 98.3 °F (36.8 °C) (03/11/23 1457)  Pulse: 74 (03/11/23 1630)  Resp: 20 (03/11/23 1630)  BP: (!) 145/82 (03/11/23 1630)  SpO2: 98 % (03/11/23 1630)   Vital Signs (24h Range):  Temp:  [98.3 °F (36.8 °C)] 98.3 °F (36.8 °C)  Pulse:  [74-89] 74  Resp:  [20] 20  SpO2:  [97 %-98 %] 98 %  BP: (107-145)/(67-82) 145/82     Weight: 80.7 kg (178 lb)  Body mass index is 27.06 kg/m².    Physical Exam  Vitals and nursing note reviewed.   Constitutional:       General: She is not in acute distress.     Appearance: She is well-developed. She is ill-appearing.   HENT:      Head: Normocephalic and atraumatic.      Right Ear: Hearing and external ear normal.      Left Ear: Hearing and external ear normal.      Nose: No rhinorrhea.      Right Sinus: No maxillary sinus tenderness or frontal sinus tenderness.      Left Sinus: No maxillary sinus  tenderness or frontal sinus tenderness.      Mouth/Throat:      Mouth: No oral lesions.      Pharynx: Uvula midline.   Eyes:      General:         Right eye: No discharge.         Left eye: No discharge.      Conjunctiva/sclera: Conjunctivae normal.      Pupils: Pupils are equal, round, and reactive to light.   Neck:      Thyroid: No thyromegaly.      Vascular: No carotid bruit.      Trachea: No tracheal deviation.   Cardiovascular:      Rate and Rhythm: Normal rate and regular rhythm.      Pulses:           Dorsalis pedis pulses are 2+ on the right side and 2+ on the left side.      Heart sounds: Normal heart sounds, S1 normal and S2 normal. No murmur heard.  Pulmonary:      Effort: Pulmonary effort is normal. No respiratory distress.      Breath sounds: Normal breath sounds.   Abdominal:      General: Bowel sounds are increased. There is no distension.      Palpations: Abdomen is soft. There is no mass.      Tenderness: There is no abdominal tenderness.   Musculoskeletal:         General: Normal range of motion.      Cervical back: Normal range of motion.   Lymphadenopathy:      Cervical: No cervical adenopathy.      Upper Body:      Right upper body: No supraclavicular adenopathy.      Left upper body: No supraclavicular adenopathy.   Skin:     General: Skin is warm and dry.      Capillary Refill: Capillary refill takes less than 2 seconds.      Findings: No rash.   Neurological:      Mental Status: She is alert and oriented to person, place, and time.      Sensory: No sensory deficit.      Coordination: Coordination normal.      Gait: Gait normal.   Psychiatric:         Mood and Affect: Mood is not anxious or depressed.         Speech: Speech normal.         Behavior: Behavior normal.         Thought Content: Thought content normal.         Judgment: Judgment normal.         CRANIAL NERVES     CN III, IV, VI   Pupils are equal, round, and reactive to light.     Significant Labs: All pertinent labs within the  past 24 hours have been reviewed.  CBC:   Recent Labs   Lab 03/11/23  1535   WBC 11.48   HGB 7.8*   HCT 25.7*        CMP:   Recent Labs   Lab 03/11/23  1535      K 4.3      CO2 22*   *   BUN 32*   CREATININE 1.1   CALCIUM 9.1   PROT 7.9   ALBUMIN 2.9*   BILITOT 0.5   ALKPHOS 126   AST 18   ALT 15   ANIONGAP 13       Significant Imaging: I have reviewed all pertinent imaging results/findings within the past 24 hours.    Assessment/Plan:     * GI bleed  Occult stool +, melena per ED physician, similar presentation on previous admission 1/2023.     --GI consulted  --Plan for EGD in AM  --Protonix IV 40mg BID  --NPO after midnight    Acute blood loss anemia        Tobacco abuse  Assistance with smoking cessation was offered, including:  [x]  Medications  [x]  Counseling  []  Printed Information on Smoking Cessation  [x]  Referral to a Smoking Cessation Program    Patient was counseled regarding smoking for >10 minutes.      (HFpEF) heart failure with preserved ejection fraction  Patient is identified as having Diastolic (HFpEF) heart failure that is Chronic. CHF is currently controlled. Latest ECHO performed and demonstrates- Results for orders placed during the hospital encounter of 01/21/23    Echo Saline Bubble? Yes    Interpretation Summary  · There is no evidence of intracardiac shunting.  · The left ventricle is normal in size with mild concentric hypertrophy and normal systolic function.  · The estimated ejection fraction is 60%.  · Normal left ventricular diastolic function.  · Normal right ventricular size with normal right ventricular systolic function.  · Mild to moderate tricuspid regurgitation.  · Normal central venous pressure (3 mmHg).  · The estimated PA systolic pressure is 36 mmHg.  . Continue ACE/ARB and Furosemide and monitor clinical status closely. Monitor on telemetry. Patient is off CHF pathway.  Monitor strict Is&Os and daily weights.  Place on fluid restriction of 1.5  L. Continue to stress to patient importance of self efficacy and  on diet for CHF. Last BNP reviewed- and noted below   Recent Labs   Lab 03/11/23  1535   *   .        Symptomatic anemia  Admission in January, transfused 2U PRBC due to GI bleed. Follow-up scheduled 10/2023.    --Daily CBC  --Transfuse with 1 unit PRBC for Hgb <7.0 or <8.0 if symptomatic  --Iron Studies, replete as indicated  --Endorsed palpitations, HA, dizziness, ordered 1u PRBC      History of gastric bypass  Seen by GI previously, case discussed with GI, melena noted by ED physician.     --NPO after midnight      Primary hypertension  Chronic, well controlled as outpatient, mild hypotension on arrival    --Vitals Q4H          VTE Risk Mitigation (From admission, onward)         Ordered     Place sequential compression device  Until discontinued         03/11/23 1717     IP VTE LOW RISK PATIENT  Once         03/11/23 1717 April COLEEN Olson NP  Department of Hospital Medicine   [facility] - Emergency Dept.

## 2023-03-11 NOTE — ASSESSMENT & PLAN NOTE
Occult stool +, melena per ED physician, similar presentation on previous admission 1/2023.     --GI consulted  --Plan for EGD in AM  --Protonix IV 40mg BID  --NPO after midnight

## 2023-03-11 NOTE — ASSESSMENT & PLAN NOTE
Seen by GI previously, case discussed with GI, melena noted by ED physician.     --NPO after midnight

## 2023-03-11 NOTE — FIRST PROVIDER EVALUATION
Medical screening examination initiated.  I have conducted a focused provider triage encounter, findings are as follows:    Brief history of present illness:   generalized abdominal pain, dark stools and shortness of breath    Vitals:    03/11/23 1457   BP: 107/67   BP Location: Right arm   Patient Position: Sitting   Pulse: 89   Resp: 20   Temp: 98.3 °F (36.8 °C)   TempSrc: Oral   SpO2: 97%       Pertinent physical exam:   no acute distress, vital signs stable, patient alert and oriented    Brief workup plan:   workup    Preliminary workup initiated; this workup will be continued and followed by the physician or advanced practice provider that is assigned to the patient when roomed.

## 2023-03-11 NOTE — Clinical Note
Diagnosis: Upper GI bleed [135081]   Future Attending Provider: LONG TAYLOR [[id number]]   Admitting Provider:: GRETCHEN GANDARA [[id number]]

## 2023-03-12 ENCOUNTER — ANESTHESIA (OUTPATIENT)
Dept: ENDOSCOPY | Facility: HOSPITAL | Age: 53
End: 2023-03-12
Payer: MEDICAID

## 2023-03-12 ENCOUNTER — ANESTHESIA EVENT (OUTPATIENT)
Dept: ENDOSCOPY | Facility: HOSPITAL | Age: 53
End: 2023-03-12
Payer: MEDICAID

## 2023-03-12 VITALS
HEART RATE: 73 BPM | DIASTOLIC BLOOD PRESSURE: 96 MMHG | TEMPERATURE: 98 F | HEIGHT: 68 IN | WEIGHT: 178 LBS | SYSTOLIC BLOOD PRESSURE: 177 MMHG | RESPIRATION RATE: 18 BRPM | BODY MASS INDEX: 26.98 KG/M2 | OXYGEN SATURATION: 96 %

## 2023-03-12 LAB
ANION GAP SERPL CALC-SCNC: 9 MMOL/L (ref 8–16)
BASOPHILS # BLD AUTO: 0.04 K/UL (ref 0–0.2)
BASOPHILS NFR BLD: 0.4 % (ref 0–1.9)
BUN SERPL-MCNC: 32 MG/DL (ref 6–20)
CALCIUM SERPL-MCNC: 8.6 MG/DL (ref 8.7–10.5)
CHLORIDE SERPL-SCNC: 112 MMOL/L (ref 95–110)
CO2 SERPL-SCNC: 21 MMOL/L (ref 23–29)
CREAT SERPL-MCNC: 1.1 MG/DL (ref 0.5–1.4)
DIFFERENTIAL METHOD: ABNORMAL
EOSINOPHIL # BLD AUTO: 0.1 K/UL (ref 0–0.5)
EOSINOPHIL NFR BLD: 0.6 % (ref 0–8)
ERYTHROCYTE [DISTWIDTH] IN BLOOD BY AUTOMATED COUNT: 21.1 % (ref 11.5–14.5)
EST. GFR  (NO RACE VARIABLE): >60 ML/MIN/1.73 M^2
FOLATE SERPL-MCNC: 7.9 NG/ML (ref 4–24)
GLUCOSE SERPL-MCNC: 98 MG/DL (ref 70–110)
HCT VFR BLD AUTO: 26.9 % (ref 37–48.5)
HGB BLD-MCNC: 8.3 G/DL (ref 12–16)
IMM GRANULOCYTES # BLD AUTO: 0.03 K/UL (ref 0–0.04)
IMM GRANULOCYTES NFR BLD AUTO: 0.3 % (ref 0–0.5)
LYMPHOCYTES # BLD AUTO: 1.6 K/UL (ref 1–4.8)
LYMPHOCYTES NFR BLD: 15.2 % (ref 18–48)
MAGNESIUM SERPL-MCNC: 2.2 MG/DL (ref 1.6–2.6)
MCH RBC QN AUTO: 24.1 PG (ref 27–31)
MCHC RBC AUTO-ENTMCNC: 30.9 G/DL (ref 32–36)
MCV RBC AUTO: 78 FL (ref 82–98)
MONOCYTES # BLD AUTO: 0.6 K/UL (ref 0.3–1)
MONOCYTES NFR BLD: 6.2 % (ref 4–15)
NEUTROPHILS # BLD AUTO: 8.1 K/UL (ref 1.8–7.7)
NEUTROPHILS NFR BLD: 77.3 % (ref 38–73)
NRBC BLD-RTO: 0 /100 WBC
PLATELET # BLD AUTO: 346 K/UL (ref 150–450)
PMV BLD AUTO: 9 FL (ref 9.2–12.9)
POTASSIUM SERPL-SCNC: 4.1 MMOL/L (ref 3.5–5.1)
RBC # BLD AUTO: 3.44 M/UL (ref 4–5.4)
SODIUM SERPL-SCNC: 142 MMOL/L (ref 136–145)
VIT B12 SERPL-MCNC: <148 PG/ML (ref 210–950)
WBC # BLD AUTO: 10.4 K/UL (ref 3.9–12.7)

## 2023-03-12 PROCEDURE — 63600175 PHARM REV CODE 636 W HCPCS: Performed by: NURSE PRACTITIONER

## 2023-03-12 PROCEDURE — 96375 TX/PRO/DX INJ NEW DRUG ADDON: CPT | Mod: 59

## 2023-03-12 PROCEDURE — 96376 TX/PRO/DX INJ SAME DRUG ADON: CPT

## 2023-03-12 PROCEDURE — 96376 TX/PRO/DX INJ SAME DRUG ADON: CPT | Mod: 59

## 2023-03-12 PROCEDURE — 37000008 HC ANESTHESIA 1ST 15 MINUTES: Performed by: INTERNAL MEDICINE

## 2023-03-12 PROCEDURE — 43235 PR EGD, FLEX, DIAGNOSTIC: ICD-10-PCS | Mod: ,,, | Performed by: INTERNAL MEDICINE

## 2023-03-12 PROCEDURE — 43235 EGD DIAGNOSTIC BRUSH WASH: CPT | Performed by: INTERNAL MEDICINE

## 2023-03-12 PROCEDURE — 00731 ANES UPR GI NDSC PX NOS: CPT | Performed by: INTERNAL MEDICINE

## 2023-03-12 PROCEDURE — 96366 THER/PROPH/DIAG IV INF ADDON: CPT | Mod: 59

## 2023-03-12 PROCEDURE — 25000003 PHARM REV CODE 250: Performed by: NURSE PRACTITIONER

## 2023-03-12 PROCEDURE — 96365 THER/PROPH/DIAG IV INF INIT: CPT | Mod: 59

## 2023-03-12 PROCEDURE — 99223 1ST HOSP IP/OBS HIGH 75: CPT | Mod: 25,,, | Performed by: INTERNAL MEDICINE

## 2023-03-12 PROCEDURE — 43235 EGD DIAGNOSTIC BRUSH WASH: CPT | Mod: ,,, | Performed by: INTERNAL MEDICINE

## 2023-03-12 PROCEDURE — G0378 HOSPITAL OBSERVATION PER HR: HCPCS

## 2023-03-12 PROCEDURE — 99223 PR INITIAL HOSPITAL CARE,LEVL III: ICD-10-PCS | Mod: 25,,, | Performed by: INTERNAL MEDICINE

## 2023-03-12 PROCEDURE — 80048 BASIC METABOLIC PNL TOTAL CA: CPT | Performed by: NURSE PRACTITIONER

## 2023-03-12 PROCEDURE — 25000003 PHARM REV CODE 250: Performed by: NURSE ANESTHETIST, CERTIFIED REGISTERED

## 2023-03-12 PROCEDURE — 63600175 PHARM REV CODE 636 W HCPCS: Performed by: NURSE ANESTHETIST, CERTIFIED REGISTERED

## 2023-03-12 PROCEDURE — C9113 INJ PANTOPRAZOLE SODIUM, VIA: HCPCS | Performed by: NURSE PRACTITIONER

## 2023-03-12 PROCEDURE — 85025 COMPLETE CBC W/AUTO DIFF WBC: CPT | Performed by: NURSE PRACTITIONER

## 2023-03-12 PROCEDURE — 83735 ASSAY OF MAGNESIUM: CPT | Performed by: NURSE PRACTITIONER

## 2023-03-12 PROCEDURE — 63600175 PHARM REV CODE 636 W HCPCS: Performed by: INTERNAL MEDICINE

## 2023-03-12 RX ORDER — ONDANSETRON 2 MG/ML
4 INJECTION INTRAMUSCULAR; INTRAVENOUS ONCE
Status: COMPLETED | OUTPATIENT
Start: 2023-03-12 | End: 2023-03-12

## 2023-03-12 RX ORDER — OMEPRAZOLE 40 MG/1
40 CAPSULE, DELAYED RELEASE ORAL DAILY
Qty: 90 CAPSULE | Refills: 0 | Status: ON HOLD | OUTPATIENT
Start: 2023-03-12 | End: 2023-12-15

## 2023-03-12 RX ORDER — LIDOCAINE HYDROCHLORIDE 10 MG/ML
INJECTION, SOLUTION EPIDURAL; INFILTRATION; INTRACAUDAL; PERINEURAL
Status: DISCONTINUED | OUTPATIENT
Start: 2023-03-12 | End: 2023-03-12

## 2023-03-12 RX ORDER — SUCRALFATE 1 G/1
1 TABLET ORAL 4 TIMES DAILY
Qty: 40 TABLET | Refills: 0 | Status: SHIPPED | OUTPATIENT
Start: 2023-03-12 | End: 2023-03-22

## 2023-03-12 RX ORDER — PROPOFOL 10 MG/ML
VIAL (ML) INTRAVENOUS
Status: DISCONTINUED | OUTPATIENT
Start: 2023-03-12 | End: 2023-03-12

## 2023-03-12 RX ORDER — ONDANSETRON 2 MG/ML
INJECTION INTRAMUSCULAR; INTRAVENOUS
Status: DISCONTINUED | OUTPATIENT
Start: 2023-03-12 | End: 2023-03-12

## 2023-03-12 RX ORDER — SODIUM CHLORIDE, SODIUM LACTATE, POTASSIUM CHLORIDE, CALCIUM CHLORIDE 600; 310; 30; 20 MG/100ML; MG/100ML; MG/100ML; MG/100ML
INJECTION, SOLUTION INTRAVENOUS CONTINUOUS PRN
Status: DISCONTINUED | OUTPATIENT
Start: 2023-03-12 | End: 2023-03-12

## 2023-03-12 RX ADMIN — ONDANSETRON 4 MG: 2 INJECTION INTRAMUSCULAR; INTRAVENOUS at 12:03

## 2023-03-12 RX ADMIN — HYDRALAZINE HYDROCHLORIDE 10 MG: 20 INJECTION, SOLUTION INTRAMUSCULAR; INTRAVENOUS at 01:03

## 2023-03-12 RX ADMIN — PROPOFOL 50 MG: 10 INJECTION, EMULSION INTRAVENOUS at 12:03

## 2023-03-12 RX ADMIN — IRON SUCROSE 400 MG: 20 INJECTION, SOLUTION INTRAVENOUS at 08:03

## 2023-03-12 RX ADMIN — PROPOFOL 100 MG: 10 INJECTION, EMULSION INTRAVENOUS at 12:03

## 2023-03-12 RX ADMIN — HYDRALAZINE HYDROCHLORIDE 10 MG: 20 INJECTION, SOLUTION INTRAMUSCULAR; INTRAVENOUS at 03:03

## 2023-03-12 RX ADMIN — LIDOCAINE HYDROCHLORIDE 50 MG: 10 INJECTION, SOLUTION EPIDURAL; INFILTRATION; INTRACAUDAL; PERINEURAL at 12:03

## 2023-03-12 RX ADMIN — SODIUM CHLORIDE, SODIUM LACTATE, POTASSIUM CHLORIDE, AND CALCIUM CHLORIDE: 600; 310; 30; 20 INJECTION, SOLUTION INTRAVENOUS at 12:03

## 2023-03-12 RX ADMIN — PANTOPRAZOLE SODIUM 40 MG: 40 INJECTION, POWDER, FOR SOLUTION INTRAVENOUS at 08:03

## 2023-03-12 NOTE — DISCHARGE INSTRUCTIONS
Follow-up with Gastroenterology within 4 weeks upon discharge     Recommend to undergo repeat EGD in 12 weeks    Recommend to be compliant with home medications, recommend to take omeprazole, sucralfate

## 2023-03-12 NOTE — HOSPITAL COURSE
3/12      S/p EGD on 3/12:    Evidence of a Raheel-en-Y gastrojejunostomy was found. The        gastrojejunal anastomosis was characterized by clean based        ulceration and visible sutures.        The cardia was normal.        The examined esophagus was normal.        The Z-line was regular and was found 38 cm from the incisors.   Impression:            - Normal examined jejunum.                          - Raheel-en-Y gastrojejunostomy with gastrojejunal                          anastomosis clean based ulcer.                          - Normal cardia.                          - Normal esophagus.                          - Z-line regular, 38 cm from the incisors.                          - No specimens collected.   Recommendation:        - [facility].                          - Resume previous diet.                          - Use Prilosec (omeprazole) 40 mg PO daily for 12                          weeks.                          - Use sucralfate suspension 1 gram PO QID for 10                          days.                          -We will arrange follow-up in GI clinic.                          -Repeat EGD in 12 weeks.                          -Please request endoscopy reports from [facility].     Patient was initiated on clear liquid diet, bland advance to regular diet as tolerated.      However patient stated that she prefers to go home, does not want to stay here, feels well, explained that we need to monitor for any signs of bleeding, also stated that he need to advance diet slowly as tolerated, explained the consequences of the any further episodes of GI bleed, explained that if in the further episodes of GI bleed happens to closely monitor H&H and consider transfusions accordingly.  Also stated that he patient and sulfate significant GI bleed, will have to self cardiac arrest/death-- however patient appeared alert and oriented x3, stated that she understood the consequences of  leaving hospital AMA, but still prefers to go home signing the AMA form.  Nurse as witness;     Provided following instructions:    Follow-up with Gastroenterology within 4 weeks upon discharge     Recommend to undergo repeat EGD in 12 weeks;     Recommend to be compliant with home medications, recommend to take omeprazole, sucralfate; medications were sent to patient preferred pharmacy.    Patient was provided instructions to follow-up with GI, on to undergo repeat EGD in 12 weeks.

## 2023-03-12 NOTE — DISCHARGE SUMMARY
[facility] ([facility])  [facility] Medicine  Discharge Summary      Patient Name: Joe Ceballos  MRN: [id number]  [facility]: [id number]  Patient Class: OP- Observation  Admission Date: 3/11/2023  Hospital Length of Stay: 0 days  Discharge Date and Time: 3/12/2023  Attending Physician: Aimee Cowan, *   Discharging Provider: Aimee Cowan MD  Primary Care Provider: Daphney Heart MD    Primary Care Team: Networked reference to record PCT     HPI:   53 y.o. female patient with a PMHx of Hx of GI bleed, anemia, Hx Gastric Bypass Sx, CHF, HTN, sickle cell trait and stroke who presents to the Emergency Department for evaluation of abdominal pain which onset at 5 AM. The Pt reports that she had bright red stools in her bowel movement yesterday but today, she had black stools. She states that this had never experienced this before. Symptoms are constant and moderate in severity. No mitigating or exacerbating factors reported. Associated sxs include n/v, SOB, abdominal pain, and CP. Patient denies any fever, chills, diarrhea, headaches, numbness, weakness, and all other sxs at this time. No prior Tx reported. In the ED, occult stool +, Hgb: 7.8. BP: 107/67, 89, 20. Given IV fluids, PPI. GI consulted. Patient is a full code, surrogate decision maker is Elham Chong. Placed in observation under the care of [facility].       Procedure(s) (LRB):  EGD (ESOPHAGOGASTRODUODENOSCOPY) (N/A)      Hospital Course:   3/12      S/p EGD on 3/12:    Evidence of a Raheel-en-Y gastrojejunostomy was found. The        gastrojejunal anastomosis was characterized by clean based        ulceration and visible sutures.        The cardia was normal.        The examined esophagus was normal.        The Z-line was regular and was found 38 cm from the incisors.   Impression:            - Normal examined jejunum.                          - Raheel-en-Y gastrojejunostomy with gastrojejunal                          anastomosis  clean based ulcer.                          - Normal cardia.                          - Normal esophagus.                          - Z-line regular, 38 cm from the incisors.                          - No specimens collected.   Recommendation:        - Return patient to hospital [facility] for ongoing care.                          - Resume previous diet.                          - Use Prilosec (omeprazole) 40 mg PO daily for 12                          weeks.                          - Use sucralfate suspension 1 gram PO QID for 10                          days.                          -We will arrange follow-up in GI clinic.                          -Repeat EGD in 12 weeks.                          -Please request endoscopy reports from [facility].     Patient was initiated on clear liquid diet, bland advance to regular diet as tolerated.      However patient stated that she prefers to go home, does not want to stay here, feels well, explained that we need to monitor for any signs of bleeding, also stated that he need to advance diet slowly as tolerated, explained the consequences of the any further episodes of GI bleed, explained that if in the further episodes of GI bleed happens to closely monitor H&H and consider transfusions accordingly.  Also stated that he patient and sulfate significant GI bleed, will have to self cardiac arrest/death-- however patient appeared alert and oriented x3, stated that she understood the consequences of leaving hospital AMA, but still prefers to go home signing the AMA form.  Nurse as witness;     Provided following instructions:    Follow-up with Gastroenterology within 4 weeks upon discharge     Recommend to undergo repeat EGD in 12 weeks;     Recommend to be compliant with home medications, recommend to take omeprazole, sucralfate; medications were sent to patient preferred pharmacy.    Patient was provided instructions to follow-up with GI, on to undergo repeat EGD in 12 weeks.           Review of Systems   Constitutional:  Positive for fatigue. Negative for appetite change, chills, diaphoresis, fever and unexpected weight change.   HENT:  Negative for congestion, hearing loss, mouth sores, postnasal drip, rhinorrhea, sore throat and trouble swallowing.    Eyes:  Negative for discharge and visual disturbance.   Respiratory:  Negative for cough and chest tightness.    Cardiovascular: . Negative for chest pain and leg swelling.   Gastrointestinal:  denied any further GI bleed;  Negative for constipation, diarrhea, nausea and vomiting.   Endocrine: Negative for cold intolerance and heat intolerance.   Genitourinary:  Negative for difficulty urinating, dyspareunia, flank pain and hematuria.   Musculoskeletal:  Negative for arthralgias, back pain and myalgias.   Skin: Negative.    Neurological:  Positive for headaches. Negative for weakness and light-headedness.   Hematological:  Negative for adenopathy. Does not bruise/bleed easily.   Psychiatric/Behavioral:  Negative for agitation, behavioral problems and confusion. The patient is nervous/anxious.    Physical Exam  Vitals and nursing note reviewed.   Constitutional:       General: She is not in acute distress.     Appearance: She is well-developed. She is ill-appearing.   HENT:      Head: Normocephalic and atraumatic.      Right Ear: Hearing and external ear normal.      Left Ear: Hearing and external ear normal.      Nose: No rhinorrhea.      Right Sinus: No maxillary sinus tenderness or frontal sinus tenderness.      Left Sinus: No maxillary sinus tenderness or frontal sinus tenderness.      Mouth/Throat:      Mouth: No oral lesions.      Pharynx: Uvula midline.   Eyes:      General:         Right eye: No discharge.         Left eye: No discharge.      Conjunctiva/sclera: Conjunctivae normal.      Pupils: Pupils are equal, round, and reactive to light.   Neck:      Thyroid: No thyromegaly.      Vascular: No carotid bruit.      Trachea: No  tracheal deviation.   Cardiovascular:      Rate and Rhythm: Normal rate and regular rhythm.      Pulses:           Dorsalis pedis pulses are 2+ on the right side and 2+ on the left side.      Heart sounds: Normal heart sounds, S1 normal and S2 normal. No murmur heard.  Pulmonary:      Effort: Pulmonary effort is normal. No respiratory distress.      Breath sounds: Normal breath sounds.   Abdominal:      General: Bowel sounds are increased. There is no distension.      Palpations: Abdomen is soft. There is no mass.      Tenderness: There is no abdominal tenderness.   Musculoskeletal:         General: Normal range of motion.      Cervical back: Normal range of motion.   Lymphadenopathy:      Cervical: No cervical adenopathy.      Upper Body:      Right upper body: No supraclavicular adenopathy.      Left upper body: No supraclavicular adenopathy.   Skin:     General: Skin is warm and dry.      Capillary Refill: Capillary refill takes less than 2 seconds.      Findings: No rash.   Neurological:      Mental Status: She is alert and oriented to person, place, and time.      Sensory: No sensory deficit.      Coordination: Coordination normal.      Gait: Gait normal.   Psychiatric:         Mood and Affect: Mood is not anxious or depressed.         Speech: Speech normal.         Behavior: Behavior normal.         Thought Content: Thought content normal.         Judgment: Judgment normal.          Goals of Care Treatment Preferences:  Code Status: Full Code      Consults:   Consults (From admission, onward)        Status Ordering Provider     Inpatient consult to Gastroenterology  Once        Provider:  Ray Serrato MD    Completed SHARON, APRIL J.          No new Assessment & Plan notes have been filed under this hospital service since the last note was generated.  Service: Hospital Medicine    Final Active Diagnoses:    Diagnosis Date Noted POA    PRINCIPAL PROBLEM:  GI bleed [K92.2] 03/11/2023 Yes    Symptomatic  anemia [D64.9] 01/22/2023 Yes    (HFpEF) heart failure with preserved ejection fraction [I50.30] 01/22/2023 Yes    Tobacco abuse [Z72.0] 01/22/2023 Yes    History of gastric bypass [Z98.84] 01/11/2017 Not Applicable    Primary hypertension [I10] 03/21/2014 Yes      Problems Resolved During this Admission:       Discharged Condition: against medical advice    Disposition: Home or Self Care    Follow Up:   Follow-up Information     Daphney Heart MD Follow up.    Specialty: Family Medicine  Contact information:  [address]  [address]  [place] [address]  [phone]             Ray Serrato MD Follow up.    Specialty: Gastroenterology  Why: Follow-up with Gastroenterology within 4 weeks upon discharge     Recommend to undergo repeat EGD in 12 weeks  Contact information:  [address] [place] [address]  [phone]                       Patient Instructions:      Ambulatory referral/consult to Smoking Cessation Program   Standing Status: Future   Referral Priority: Routine Referral Type: Consultation   Referral Reason: Specialty Services Required   Requested Specialty: CTTS   Number of Visits Requested: 1     Reason for not Prescribing Nicotine Replacement     Order Specific Question Answer Comments   Reason for not Prescribing: Patient refused        Significant Diagnostic Studies:   Results for orders placed or performed during the hospital encounter of 03/11/23   CBC auto differential   Result Value Ref Range    WBC 11.48 3.90 - 12.70 K/uL    RBC 3.40 (L) 4.00 - 5.40 M/uL    Hemoglobin 7.8 (L) 12.0 - 16.0 g/dL    Hematocrit 25.7 (L) 37.0 - 48.5 %    MCV 76 (L) 82 - 98 fL    MCH 22.9 (L) 27.0 - 31.0 pg    MCHC 30.4 (L) 32.0 - 36.0 g/dL    RDW 20.9 (H) 11.5 - 14.5 %    Platelets 432 150 - 450 K/uL    MPV 8.7 (L) 9.2 - 12.9 fL    Immature Granulocytes 0.4 0.0 - 0.5 %    Gran # (ANC) 9.3 (H) 1.8 - 7.7 K/uL    Immature Grans (Abs) 0.05 (H) 0.00 - 0.04 K/uL    Lymph # 1.4 1.0 -  4.8 K/uL    Mono # 0.6 0.3 - 1.0 K/uL    Eos # 0.0 0.0 - 0.5 K/uL    Baso # 0.05 0.00 - 0.20 K/uL    nRBC 0 0 /100 WBC    Gran % 81.4 (H) 38.0 - 73.0 %    Lymph % 12.2 (L) 18.0 - 48.0 %    Mono % 5.3 4.0 - 15.0 %    Eosinophil % 0.3 0.0 - 8.0 %    Basophil % 0.4 0.0 - 1.9 %    Differential Method Automated    Comprehensive metabolic panel   Result Value Ref Range    Sodium 141 136 - 145 mmol/L    Potassium 4.3 3.5 - 5.1 mmol/L    Chloride 106 95 - 110 mmol/L    CO2 22 (L) 23 - 29 mmol/L    Glucose 114 (H) 70 - 110 mg/dL    BUN 32 (H) 6 - 20 mg/dL    Creatinine 1.1 0.5 - 1.4 mg/dL    Calcium 9.1 8.7 - 10.5 mg/dL    Total Protein 7.9 6.0 - 8.4 g/dL    Albumin 2.9 (L) 3.5 - 5.2 g/dL    Total Bilirubin 0.5 0.1 - 1.0 mg/dL    Alkaline Phosphatase 126 55 - 135 U/L    AST 18 10 - 40 U/L    ALT 15 10 - 44 U/L    Anion Gap 13 8 - 16 mmol/L    eGFR >60 >60 mL/min/1.73 m^2   Brain natriuretic peptide   Result Value Ref Range     (H) 0 - 99 pg/mL   Troponin I   Result Value Ref Range    Troponin I 0.006 0.000 - 0.026 ng/mL   Urinalysis, Reflex to Urine Culture Urine, Clean Catch    Specimen: Urine   Result Value Ref Range    Specimen UA Urine, Clean Catch     Color, UA Colorless (A) Yellow, Straw, Sherrie    Appearance, UA Clear Clear    pH, UA 6.0 5.0 - 8.0    Specific Gravity, UA 1.010 1.005 - 1.030    Protein, UA Negative Negative    Glucose, UA Negative Negative    Ketones, UA Negative Negative    Bilirubin (UA) Negative Negative    Occult Blood UA Negative Negative    Nitrite, UA Negative Negative    Urobilinogen, UA Negative <2.0 EU/dL    Leukocytes, UA Negative Negative   Occult blood x 1, stool    Specimen: Stool   Result Value Ref Range    Occult Blood Positive (A) Negative   Ferritin   Result Value Ref Range    Ferritin 16 (L) 20.0 - 300.0 ng/mL   Vitamin B12   Result Value Ref Range    Vitamin B-12 <148 (L) 210 - 950 pg/mL   Folate   Result Value Ref Range    Folate 7.9 4.0 - 24.0 ng/mL   Basic Metabolic Panel  (BMP)   Result Value Ref Range    Sodium 142 136 - 145 mmol/L    Potassium 4.1 3.5 - 5.1 mmol/L    Chloride 112 (H) 95 - 110 mmol/L    CO2 21 (L) 23 - 29 mmol/L    Glucose 98 70 - 110 mg/dL    BUN 32 (H) 6 - 20 mg/dL    Creatinine 1.1 0.5 - 1.4 mg/dL    Calcium 8.6 (L) 8.7 - 10.5 mg/dL    Anion Gap 9 8 - 16 mmol/L    eGFR >60 >60 mL/min/1.73 m^2   Magnesium   Result Value Ref Range    Magnesium 2.2 1.6 - 2.6 mg/dL   CBC with Auto Differential   Result Value Ref Range    WBC 10.40 3.90 - 12.70 K/uL    RBC 3.44 (L) 4.00 - 5.40 M/uL    Hemoglobin 8.3 (L) 12.0 - 16.0 g/dL    Hematocrit 26.9 (L) 37.0 - 48.5 %    MCV 78 (L) 82 - 98 fL    MCH 24.1 (L) 27.0 - 31.0 pg    MCHC 30.9 (L) 32.0 - 36.0 g/dL    RDW 21.1 (H) 11.5 - 14.5 %    Platelets 346 150 - 450 K/uL    MPV 9.0 (L) 9.2 - 12.9 fL    Immature Granulocytes 0.3 0.0 - 0.5 %    Gran # (ANC) 8.1 (H) 1.8 - 7.7 K/uL    Immature Grans (Abs) 0.03 0.00 - 0.04 K/uL    Lymph # 1.6 1.0 - 4.8 K/uL    Mono # 0.6 0.3 - 1.0 K/uL    Eos # 0.1 0.0 - 0.5 K/uL    Baso # 0.04 0.00 - 0.20 K/uL    nRBC 0 0 /100 WBC    Gran % 77.3 (H) 38.0 - 73.0 %    Lymph % 15.2 (L) 18.0 - 48.0 %    Mono % 6.2 4.0 - 15.0 %    Eosinophil % 0.6 0.0 - 8.0 %    Basophil % 0.4 0.0 - 1.9 %    Differential Method Automated    Type & Screen   Result Value Ref Range    Group & Rh A POS     Indirect Michael NEG    Prepare RBC 1 Unit   Result Value Ref Range    UNIT NUMBER [id number]     Product Code F5963K84     DISPENSE STATUS TRANSFUSED     CODING SYSTEM [id number]     Unit Blood Type Code 5100     Unit Blood Type O POS     Unit Expiration [id number]     CROSSMATCH INTERPRETATION Compatible        Imaging Results    None         Pending Diagnostic Studies:     Procedure Component Value Units Date/Time    Iron and TIBC [[id number]] Collected: 03/11/23 1807    Order Status: Sent Lab Status: In process Updated: 03/11/23 1809    Specimen: Blood          Medications:         Medication List      START taking these  medications    omeprazole 40 MG capsule  Commonly known as: PRILOSEC  Take 1 capsule (40 mg total) by mouth once daily.     sucralfate 1 gram tablet  Commonly known as: CARAFATE  Take 1 tablet (1 g total) by mouth 4 (four) times daily. for 10 days        CONTINUE taking these medications    amLODIPine 5 MG tablet  Commonly known as: NORVASC  Take 1 tablet (5 mg total) by mouth every evening.     atorvastatin 80 MG tablet  Commonly known as: LIPITOR  Take 1 tablet (80 mg total) by mouth every evening.     DULoxetine 60 MG capsule  Commonly known as: CYMBALTA  Take 1 capsule (60 mg total) by mouth 2 (two) times a day.     FLEXERIL ORAL     furosemide 40 MG tablet  Commonly known as: LASIX  Take 2 tablets (80 mg total) by mouth once daily AND 1 tablet (40 mg total) every evening.     isosorbide mononitrate 60 MG 24 hr tablet  Commonly known as: IMDUR  Take 1 tablet (60 mg total) by mouth once daily.     losartan 100 MG tablet  Commonly known as: COZAAR  TAKE 1 TABLET BY MOUTH EVERY DAY     promethazine 25 MG tablet  Commonly known as: PHENERGAN  Take 1 tablet (25 mg total) by mouth every 4 (four) hours.        STOP taking these medications    aspirin 81 MG EC tablet  Commonly known as: ECOTRIN     pantoprazole 40 MG tablet  Commonly known as: PROTONIX           Where to Get Your Medications      These medications were sent to [facility]/pharmacy #[id number] [address] [address]    Phone: [phone]   · omeprazole 40 MG capsule  · sucralfate 1 gram tablet         Indwelling Lines/Drains at time of discharge:   Lines/Drains/Airways     None                 Time spent on the discharge of patient: 81 minutes         Amiee Cowan MD  Department of Hospital Medicine  [facility] ([facility])

## 2023-03-12 NOTE — PLAN OF CARE
Pt received transfer education, they verbalized understanding. Nausea improved after zofran. No complaints. VSS. Will transfer to Telemetry unit.

## 2023-03-12 NOTE — ED NOTES
Responded to pt. Call for use of restroom. Pt. Verbalized many concerns. Pt. Asked me to leave and to speak to manager. I escalated pt. Concerns to charge nurse. I was unable to help pt. With use of restroom.

## 2023-03-12 NOTE — ANESTHESIA PREPROCEDURE EVALUATION
03/12/2023  Joe Ceballos is a 53 y.o., female.      Pre-op Assessment    I have reviewed the Patient Summary Reports.     I have reviewed the Nursing Notes. I have reviewed the NPO Status.   I have reviewed the Medications.     Review of Systems  Anesthesia Hx:  No problems with previous Anesthesia    Social:  Smoker    Hematology/Oncology:     Oncology Normal    -- Anemia: Hematology Comments: Sickle cell trait    EENT/Dental:EENT/Dental Normal   Cardiovascular:   Hypertension CHF ECG has been reviewed. ECHO 01/2023    Summary    ? There is no evidence of intracardiac shunting.  ? The left ventricle is normal in size with mild concentric hypertrophy and normal systolic function.  ? The estimated ejection fraction is 60%.  ? Normal left ventricular diastolic function.  ? Normal right ventricular size with normal right ventricular systolic function.  ? Mild to moderate tricuspid regurgitation.  ? Normal central venous pressure (3 mmHg).  ? The estimated PA systolic pressure is 36 mmHg.   Pulmonary:  Pulmonary Normal    Renal/:  Renal/ Normal     Hepatic/GI:   GERD    Musculoskeletal:  Musculoskeletal Normal    Neurological:   CVA, residual symptoms Left sided weakness   Endocrine:  Endocrine Normal    Dermatological:  Skin Normal    Psych:   anxiety Insomnia          Physical Exam  General: Well nourished, Cooperative, Alert and Oriented    Airway:  Mallampati: II   Mouth Opening: Normal  TM Distance: Normal  Tongue: Normal  Neck ROM: Normal ROM    Dental:  Dentures        Anesthesia Plan  Type of Anesthesia, risks & benefits discussed:    Anesthesia Type: MAC  Intra-op Monitoring Plan: Standard ASA Monitors  Post Op Pain Control Plan: multimodal analgesia  Informed Consent: Informed consent signed with the Patient and all parties understand the risks and agree with anesthesia plan.  All questions  answered.   ASA Score: 3 Emergent  Day of Surgery Review of History & Physical: H&P Update referred to the surgeon/provider.    Ready For Surgery From Anesthesia Perspective.     .

## 2023-03-12 NOTE — CONSULTS
[facility] ([facility])  Gastroenterology  Consult Note    Patient Name: Joe Ceballos  MRN: [id number]  Admission Date: 3/11/2023  Hospital Length of Stay: 0 days  Code Status: Full Code   Attending Provider: Aimee Cowan, *   Consulting Provider: Ray Serrato MD  Primary Care Physician: Daphney Heart MD  Principal Problem:GI bleed    IP consult to case management  Consult performed by: Ray Serrato MD  Consult ordered by: Joe Barrientos MD  Reason for consult: Melena        Subjective:     HPI:  Ms Ceballos is a pleasant 53 y.o. female patient with a PMHx of Hx of GI bleed, anemia, Hx Gastric Bypass Sx, CHF, HTN, sickle cell trait and stroke who presented to the Emergency Department for evaluation of abdominal pain, nausea and melena that had been occurring for 1.5 days.   Patient denies any fever, chills, diarrhea, headaches, numbness, or weakness.  Hemoglobin was 7.8 (down from 9.5 one month prior).   Denies NSAID use.   Last EGD performed by Dr. Delcid at [facility] prior to her hernia repair. We do not have access to those records.  Last colonoscopy 5 years ago while residing in [place], [place].   Patient is not Plavix. Last dose was last year.   S/p 1U PRBCs.       Past Medical History:   Diagnosis Date    Anxiety     CHF (congestive heart failure)     Edema     Hypertension     Initial insomnia     Sickle cell trait     Stroke     Tobacco use        Past Surgical History:   Procedure Laterality Date    BREAST SURGERY       SECTION      x 2    CYST REMOVAL      from left tube    gastric sleeve      Raheel-en-Y gastric bypass      TONSILLECTOMY         Review of patient's allergies indicates:   Allergen Reactions    Lisinopril Anaphylaxis     This is only possible agent at the time.  This is only possible agent at the time.  This is only possible agent at the time.    Sulfa (sulfonamide antibiotics) Anaphylaxis    Beta-blockers (beta-adrenergic blocking  agts)      Other reaction(s): Other (See Comments)  Syncope & Collapse     Family History       Problem Relation (Age of Onset)    Hypertension Mother, Paternal Grandmother    Thyroid disease Paternal Aunt          Tobacco Use    Smoking status: Every Day     Packs/day: 0.25     Types: Cigarettes    Smokeless tobacco: Never   Substance and Sexual Activity    Alcohol use: Yes    Drug use: No    Sexual activity: Not Currently     Partners: Male     Birth control/protection: None     Review of Systems   Gastrointestinal:  Positive for abdominal pain, blood in stool and nausea.   Musculoskeletal:  Positive for arthralgias.   Objective:     Vital Signs (Most Recent):  Temp: 97.9 °F (36.6 °C) (03/12/23 1221)  Pulse: 76 (03/12/23 1221)  Resp: 18 (03/12/23 1221)  BP: (!) 161/81 (03/12/23 0902)  SpO2: 99 % (03/12/23 1221)   Vital Signs (24h Range):  Temp:  [97.9 °F (36.6 °C)-98.8 °F (37.1 °C)] 97.9 °F (36.6 °C)  Pulse:  [] 76  Resp:  [15-24] 18  SpO2:  [97 %-100 %] 99 %  BP: (107-179)/(54-95) 161/81     Weight: 80.7 kg (178 lb) (03/11/23 1622)  Body mass index is 27.06 kg/m².      Intake/Output Summary (Last 24 hours) at 3/12/2023 1224  Last data filed at 3/12/2023 1111  Gross per 24 hour   Intake 1599.58 ml   Output --   Net 1599.58 ml       Lines/Drains/Airways       Peripheral Intravenous Line  Duration                  Peripheral IV - Single Lumen 03/11/23 1536 22 G Left;Posterior Hand <1 day         Peripheral IV - Single Lumen 03/11/23 1906 20 G Anterior;Right Forearm <1 day                    Physical Exam  Constitutional:       Appearance: Normal appearance.   HENT:      Head: Normocephalic.   Eyes:      Conjunctiva/sclera: Conjunctivae normal.   Abdominal:      General: There is no distension.      Palpations: Abdomen is soft.      Tenderness: There is no abdominal tenderness. There is no guarding.   Neurological:      Mental Status: She is alert.       Significant Labs:  CBC:   Recent Labs   Lab  03/11/23  1535 03/12/23  0528   WBC 11.48 10.40   HGB 7.8* 8.3*   HCT 25.7* 26.9*    346     CMP:   Recent Labs   Lab 03/11/23  1535 03/12/23  0528   * 98   CALCIUM 9.1 8.6*   ALBUMIN 2.9*  --    PROT 7.9  --     142   K 4.3 4.1   CO2 22* 21*    112*   BUN 32* 32*   CREATININE 1.1 1.1   ALKPHOS 126  --    ALT 15  --    AST 18  --    BILITOT 0.5  --        Significant Imaging:  Imaging results within the past 24 hours have been reviewed.    Assessment/Plan:     GI  * Melena   Plan:   -Will plan for EGD   -NPO after MN   -Hold DVT prophylaxis   -PPI IV BID   -Avoid NSAIDs           Thank you for your consult. I will follow-up with patient. Please contact us if you have any additional questions.    Ray Serrato MD  Gastroenterology  [facility] ([facility])

## 2023-03-12 NOTE — TRANSFER OF CARE
Anesthesia Transfer of Care Note    Patient: Joe Ceballos    Procedure(s) Performed: Procedure(s) (LRB):  EGD (ESOPHAGOGASTRODUODENOSCOPY) (N/A)    Patient location: GI    Anesthesia Type: MAC    Transport from OR: Transported from OR on room air with adequate spontaneous ventilation    Post pain: adequate analgesia    Post assessment: no apparent anesthetic complications and tolerated procedure well    Post vital signs: stable    Level of consciousness: awake, alert and oriented    Nausea/Vomiting: no nausea/vomiting    Complications: none    Transfer of care protocol was followed      Last vitals:   Visit Vitals  BP (!) 136/91 (BP Location: Left arm, Patient Position: Lying)   Pulse 86   Temp 36.7 °C (98.1 °F) (Temporal)   Resp 20   Wt 80.7 kg (178 lb)   SpO2 98%   Breastfeeding No   BMI 27.06 kg/m²

## 2023-03-12 NOTE — HPI
Ms Ceballos is a pleasant 53 y.o. female patient with a PMHx of Hx of GI bleed, anemia, Hx Gastric Bypass Sx, CHF, HTN, sickle cell trait and stroke who presented to the Emergency Department for evaluation of abdominal pain, nausea and melena that had been occurring for 1.5 days.   Patient denies any fever, chills, diarrhea, headaches, numbness, or weakness.  Hemoglobin was 7.8 (down from 9.5 one month prior).   Denies NSAID use.   Last EGD performed by Dr. Delcid at [facility] prior to her hernia repair. We do not have access to those records.  Last colonoscopy 5 years ago while residing in [place], [place].   Patient is not Plavix. Last dose was last year.   S/p 1U PRBCs.

## 2023-03-12 NOTE — PROVATION PATIENT INSTRUCTIONS
Discharge Summary/Instructions after an Endoscopic Procedure  Patient Name: Joe Ceballos  Patient MRN: [id number]  Patient YOB: 1970 Sunday, March 12, 2023 Ray Serrato MD  Dear patient,  As a result of recent federal legislation (The Federal Cures Act), you may   receive lab or pathology results from your procedure in your [facility]   account before your physician is able to contact you. Your physician or   their representative will relay the results to you with their   recommendations at their soonest availability.  Thank you,  RESTRICTIONS:  During your procedure today, you received medications for sedation.  These   medications may affect your judgment, balance and coordination.  Therefore,   for 24 hours, you have the following restrictions:   - DO NOT drive a car, operate machinery, make legal/financial decisions,   sign important papers or drink alcohol.    ACTIVITY:  Today: no heavy lifting, straining or running due to procedural   sedation/anesthesia.  The following day: return to full activity including work.  DIET:  Eat and drink normally unless instructed otherwise.     TREATMENT FOR COMMON SIDE EFFECTS:  - Mild abdominal pain, nausea, belching, bloating or excessive gas:  rest,   eat lightly and use a heating pad.  - Sore Throat: treat with throat lozenges and/or gargle with warm salt   water.  - Because air was used during the procedure, expelling large amounts of air   from your rectum or belching is normal.  - If a bowel prep was taken, you may not have a bowel movement for 1-3 days.    This is normal.  SYMPTOMS TO WATCH FOR AND REPORT TO YOUR PHYSICIAN:  1. Abdominal pain or bloating, other than gas cramps.  2. Chest pain.  3. Back pain.  4. Signs of infection such as: chills or fever occurring within 24 hours   after the procedure.  5. Rectal bleeding, which would show as bright red, maroon, or black stools.   (A tablespoon of blood from the rectum is not serious, especially if    hemorrhoids are present.)  6. Vomiting.  7. Weakness or dizziness.  GO DIRECTLY TO THE NEAREST EMERGENCY ROOM IF YOU HAVE ANY OF THE FOLLOWING:      Difficulty breathing              Chills and/or fever over 101 F   Persistent vomiting and/or vomiting blood   Severe abdominal pain   Severe chest pain   Black, tarry stools   Bleeding- more than one tablespoon   Any other symptom or condition that you feel may need urgent attention  Your doctor recommends these additional instructions:  If any biopsies were taken, your doctors clinic will contact you in 1 to 2   weeks with any results.  - [facility] patient to [facility] for [facility] care.   - Resume previous diet.   - Use Prilosec (omeprazole) 40 mg PO daily for 12 weeks.   - Use sucralfate suspension 1 gram PO QID for 10 days.   -We will arrange follow-up in GI clinic.   -Repeat EGD in 12 weeks.   -Please request endoscopy reports from [facility].  For questions, problems or results please call your physician Ray Serrato MD at Work:  [phone]  If you have any questions about the above instructions, call the GI   department at [phone] or call the endoscopy unit at [phone]   from 7am until 3 pm.  [facility], EMERGENCY ROOM PHONE NUMBER:   [phone]  IF A COMPLICATION OR EMERGENCY SITUATION ARISES AND YOU ARE UNABLE TO REACH   YOUR PHYSICIAN - GO DIRECTLY TO THE EMERGENCY ROOM.  I have read or have had read to me these discharge instructions for my   procedure and have received a written copy.  I understand these   instructions and will follow-up with my physician if I have any questions.     __________________________________       _____________________________________  Nurse Signature                                          Patient/Designated   Responsible Party Signature  Ray Serrato MD  3/12/2023 12:43:58 PM  This report has been verified and signed electronically.  Dear patient,  As a result of recent  federal legislation (The Federal Cures Act), you may   receive lab or pathology results from your procedure in your [facility]   account before your physician is able to contact you. Your physician or   their representative will relay the results to you with their   recommendations at their soonest availability.  Thank you,  PROVATION

## 2023-03-12 NOTE — ED NOTES
Pt completed 1 unit PRBC, repeat AM labs pending. NPO after midnight. PRN Hydralazine given this shift. No BM overnight. Pt rested well, turned independently. Will continue to monitor.

## 2023-03-12 NOTE — ANESTHESIA POSTPROCEDURE EVALUATION
Anesthesia Post Evaluation    Patient: Joe Ceballos    Procedure(s) Performed: Procedure(s) (LRB):  EGD (ESOPHAGOGASTRODUODENOSCOPY) (N/A)    Final Anesthesia Type: MAC      Patient location during evaluation: GI PACU  Patient participation: Yes- Able to Participate  Level of consciousness: awake and alert and oriented  Post-procedure vital signs: reviewed and stable  Pain management: adequate  Airway patency: patent  DIA mitigation strategies: Multimodal analgesia  PONV status at discharge: No PONV  Anesthetic complications: no      Cardiovascular status: hemodynamically stable  Respiratory status: unassisted, spontaneous ventilation and room air  Hydration status: euvolemic  Follow-up not needed.          Vitals Value Taken Time   /91 03/12/23 1242   Temp 36.7 °C (98.1 °F) 03/12/23 1242   Pulse 86 03/12/23 1242   Resp 20 03/12/23 1242   SpO2 98 % 03/12/23 1242         No case tracking events are documented in the log.      Pain/Roxy Score: Pain Rating Prior to Med Admin: 3 (3/11/2023 10:18 PM)  Pain Rating Post Med Admin: 2 (3/12/2023 12:54 AM)  Roxy Score: 10 (3/12/2023 12:42 PM)

## 2023-03-12 NOTE — SUBJECTIVE & OBJECTIVE
Past Medical History:   Diagnosis Date    Anxiety     CHF (congestive heart failure)     Edema     Hypertension     Initial insomnia     Sickle cell trait     Stroke     Tobacco use        Past Surgical History:   Procedure Laterality Date    BREAST SURGERY       SECTION      x 2    CYST REMOVAL      from left tube    gastric sleeve      Raheel-en-Y gastric bypass      TONSILLECTOMY         Review of patient's allergies indicates:   Allergen Reactions    Lisinopril Anaphylaxis     This is only possible agent at the time.  This is only possible agent at the time.  This is only possible agent at the time.    Sulfa (sulfonamide antibiotics) Anaphylaxis    Beta-blockers (beta-adrenergic blocking agts)      Other reaction(s): Other (See Comments)  Syncope & Collapse     Family History       Problem Relation (Age of Onset)    Hypertension Mother, Paternal Grandmother    Thyroid disease Paternal Aunt          Tobacco Use    Smoking status: Every Day     Packs/day: 0.25     Types: Cigarettes    Smokeless tobacco: Never   Substance and Sexual Activity    Alcohol use: Yes    Drug use: No    Sexual activity: Not Currently     Partners: Male     Birth control/protection: None     Review of Systems   Gastrointestinal:  Positive for abdominal pain, blood in stool and nausea.   Musculoskeletal:  Positive for arthralgias.   Objective:     Vital Signs (Most Recent):  Temp: 97.9 °F (36.6 °C) (23 1221)  Pulse: 76 (23 1221)  Resp: 18 (23 1221)  BP: (!) 161/81 (23 0902)  SpO2: 99 % (23 1221)   Vital Signs (24h Range):  Temp:  [97.9 °F (36.6 °C)-98.8 °F (37.1 °C)] 97.9 °F (36.6 °C)  Pulse:  [] 76  Resp:  [15-24] 18  SpO2:  [97 %-100 %] 99 %  BP: (107-179)/(54-95) 161/81     Weight: 80.7 kg (178 lb) (23 1622)  Body mass index is 27.06 kg/m².      Intake/Output Summary (Last 24 hours) at 3/12/2023 1224  Last data filed at 3/12/2023 1111  Gross per 24 hour   Intake 1599.58 ml   Output  --   Net 1599.58 ml       Lines/Drains/Airways       Peripheral Intravenous Line  Duration                  Peripheral IV - Single Lumen 03/11/23 1536 22 G Left;Posterior Hand <1 day         Peripheral IV - Single Lumen 03/11/23 1906 20 G Anterior;Right Forearm <1 day                    Physical Exam  Constitutional:       Appearance: Normal appearance.   HENT:      Head: Normocephalic.   Eyes:      Conjunctiva/sclera: Conjunctivae normal.   Abdominal:      General: There is no distension.      Palpations: Abdomen is soft.      Tenderness: There is no abdominal tenderness. There is no guarding.   Neurological:      Mental Status: She is alert.       Significant Labs:  CBC:   Recent Labs   Lab 03/11/23  1535 03/12/23  0528   WBC 11.48 10.40   HGB 7.8* 8.3*   HCT 25.7* 26.9*    346     CMP:   Recent Labs   Lab 03/11/23  1535 03/12/23  0528   * 98   CALCIUM 9.1 8.6*   ALBUMIN 2.9*  --    PROT 7.9  --     142   K 4.3 4.1   CO2 22* 21*    112*   BUN 32* 32*   CREATININE 1.1 1.1   ALKPHOS 126  --    ALT 15  --    AST 18  --    BILITOT 0.5  --        Significant Imaging:  Imaging results within the past 24 hours have been reviewed.

## 2023-03-15 ENCOUNTER — PATIENT MESSAGE (OUTPATIENT)
Dept: GASTROENTEROLOGY | Facility: CLINIC | Age: 53
End: 2023-03-15
Payer: MEDICAID

## 2023-03-30 ENCOUNTER — TELEPHONE (OUTPATIENT)
Dept: SMOKING CESSATION | Facility: CLINIC | Age: 53
End: 2023-03-30
Payer: MEDICAID

## 2023-03-30 NOTE — TELEPHONE ENCOUNTER
Call to patient with voicemail left to remind patient of her scheduled appointment Monday 4/3/2023 at 1 pm.

## 2023-04-03 ENCOUNTER — TELEPHONE (OUTPATIENT)
Dept: SMOKING CESSATION | Facility: CLINIC | Age: 53
End: 2023-04-03
Payer: MEDICAID

## 2023-04-12 ENCOUNTER — PATIENT OUTREACH (OUTPATIENT)
Dept: ADMINISTRATIVE | Facility: HOSPITAL | Age: 53
End: 2023-04-12
Payer: MEDICAID

## 2023-04-12 DIAGNOSIS — Z12.11 ENCOUNTER FOR COLORECTAL CANCER SCREENING: Primary | ICD-10-CM

## 2023-04-12 DIAGNOSIS — Z12.12 ENCOUNTER FOR COLORECTAL CANCER SCREENING: Primary | ICD-10-CM

## 2023-04-12 DIAGNOSIS — Z12.11 SCREENING FOR COLORECTAL CANCER: ICD-10-CM

## 2023-04-12 DIAGNOSIS — Z12.12 SCREENING FOR COLORECTAL CANCER: ICD-10-CM

## 2023-04-14 DIAGNOSIS — I10 ESSENTIAL HYPERTENSION: ICD-10-CM

## 2023-04-14 DIAGNOSIS — I50.30 HEART FAILURE WITH PRESERVED EJECTION FRACTION, UNSPECIFIED HF CHRONICITY: ICD-10-CM

## 2023-04-17 ENCOUNTER — TELEPHONE (OUTPATIENT)
Dept: PRIMARY CARE CLINIC | Facility: CLINIC | Age: 53
End: 2023-04-17
Payer: MEDICAID

## 2023-04-17 RX ORDER — AMLODIPINE BESYLATE 5 MG/1
5 TABLET ORAL NIGHTLY
Qty: 30 TABLET | Refills: 3 | Status: SHIPPED | OUTPATIENT
Start: 2023-04-17 | End: 2023-10-02 | Stop reason: ALTCHOICE

## 2023-04-17 RX ORDER — ISOSORBIDE MONONITRATE 60 MG/1
60 TABLET, EXTENDED RELEASE ORAL DAILY
Qty: 30 TABLET | Refills: 0 | Status: SHIPPED | OUTPATIENT
Start: 2023-04-17 | End: 2023-08-28 | Stop reason: SDUPTHER

## 2023-04-17 RX ORDER — LOSARTAN POTASSIUM 100 MG/1
100 TABLET ORAL DAILY
Qty: 30 TABLET | Refills: 0 | Status: SHIPPED | OUTPATIENT
Start: 2023-04-17 | End: 2023-06-23

## 2023-04-17 RX ORDER — FUROSEMIDE 40 MG/1
TABLET ORAL
Qty: 90 TABLET | Refills: 0 | Status: SHIPPED | OUTPATIENT
Start: 2023-04-17 | End: 2023-06-23

## 2023-04-19 ENCOUNTER — PATIENT MESSAGE (OUTPATIENT)
Dept: ADMINISTRATIVE | Facility: HOSPITAL | Age: 53
End: 2023-04-19
Payer: MEDICAID

## 2023-04-19 ENCOUNTER — TELEPHONE (OUTPATIENT)
Dept: PREADMISSION TESTING | Facility: HOSPITAL | Age: 53
End: 2023-04-19
Payer: MEDICAID

## 2023-04-19 NOTE — TELEPHONE ENCOUNTER
----- Message from Nolvia Jaime sent at 4/18/2023  5:20 PM CDT -----  Regarding: call back  Contact: patient  Patient called back , cause nurse called she explained it was bad connection, no one tried to call her back, she needs to have a repeat of EGD and a colonoscopy done,  please call her her back at [phone]      Thanks

## 2023-04-19 NOTE — TELEPHONE ENCOUNTER
Pt is requesting to have procedures for EGD/Colonoscopy to be done. I see a note that order was placed but no current referral seen to be placed on the endo Schedulers schedule. Please advise on this referral.

## 2023-04-26 DIAGNOSIS — F41.0 GENERALIZED ANXIETY DISORDER WITH PANIC ATTACKS: ICD-10-CM

## 2023-04-26 DIAGNOSIS — F41.1 GENERALIZED ANXIETY DISORDER WITH PANIC ATTACKS: ICD-10-CM

## 2023-04-28 RX ORDER — DULOXETIN HYDROCHLORIDE 60 MG/1
CAPSULE, DELAYED RELEASE ORAL
Qty: 60 CAPSULE | Refills: 2 | Status: SHIPPED | OUTPATIENT
Start: 2023-04-28 | End: 2023-10-02 | Stop reason: ALTCHOICE

## 2023-05-19 ENCOUNTER — PATIENT OUTREACH (OUTPATIENT)
Dept: ADMINISTRATIVE | Facility: HOSPITAL | Age: 53
End: 2023-05-19
Payer: MEDICAID

## 2023-06-09 ENCOUNTER — HOSPITAL ENCOUNTER (EMERGENCY)
Facility: HOSPITAL | Age: 53
Discharge: HOME | End: 2023-06-10
Attending: FAMILY MEDICINE
Payer: MEDICAID

## 2023-06-09 DIAGNOSIS — R52 BODY ACHES: Primary | ICD-10-CM

## 2023-06-09 LAB
INFLUENZA A, MOLECULAR: NEGATIVE
INFLUENZA B, MOLECULAR: NEGATIVE
SARS-COV-2 RDRP RESP QL NAA+PROBE: NEGATIVE
SPECIMEN SOURCE: NORMAL

## 2023-06-09 PROCEDURE — U0002 COVID-19 LAB TEST NON-CDC: HCPCS | Performed by: FAMILY MEDICINE

## 2023-06-09 PROCEDURE — 25000003 PHARM REV CODE 250: Performed by: FAMILY MEDICINE

## 2023-06-09 PROCEDURE — 87502 INFLUENZA DNA AMP PROBE: CPT | Performed by: FAMILY MEDICINE

## 2023-06-09 PROCEDURE — 99285 EMERGENCY DEPT VISIT HI MDM: CPT | Mod: 25

## 2023-06-09 RX ORDER — ONDANSETRON 4 MG/1
4 TABLET, ORALLY DISINTEGRATING ORAL
Status: COMPLETED | OUTPATIENT
Start: 2023-06-09 | End: 2023-06-09

## 2023-06-09 RX ADMIN — ONDANSETRON 4 MG: 4 TABLET, ORALLY DISINTEGRATING ORAL at 10:06

## 2023-06-10 VITALS
DIASTOLIC BLOOD PRESSURE: 59 MMHG | TEMPERATURE: 99 F | WEIGHT: 151.13 LBS | RESPIRATION RATE: 19 BRPM | HEART RATE: 77 BPM | SYSTOLIC BLOOD PRESSURE: 110 MMHG | BODY MASS INDEX: 22.98 KG/M2 | OXYGEN SATURATION: 96 %

## 2023-06-10 LAB
ALBUMIN SERPL BCP-MCNC: 2.3 G/DL (ref 3.5–5.2)
ALP SERPL-CCNC: 129 U/L (ref 55–135)
ALT SERPL W/O P-5'-P-CCNC: 6 U/L (ref 10–44)
ANION GAP SERPL CALC-SCNC: 12 MMOL/L (ref 8–16)
AST SERPL-CCNC: 16 U/L (ref 10–40)
BASOPHILS # BLD AUTO: 0.09 K/UL (ref 0–0.2)
BASOPHILS NFR BLD: 0.6 % (ref 0–1.9)
BILIRUB SERPL-MCNC: 0.4 MG/DL (ref 0.1–1)
BUN SERPL-MCNC: 14 MG/DL (ref 6–20)
CALCIUM SERPL-MCNC: 8.5 MG/DL (ref 8.7–10.5)
CHLORIDE SERPL-SCNC: 106 MMOL/L (ref 95–110)
CO2 SERPL-SCNC: 19 MMOL/L (ref 23–29)
CREAT SERPL-MCNC: 1.5 MG/DL (ref 0.5–1.4)
DIFFERENTIAL METHOD: ABNORMAL
EOSINOPHIL # BLD AUTO: 0 K/UL (ref 0–0.5)
EOSINOPHIL NFR BLD: 0.2 % (ref 0–8)
ERYTHROCYTE [DISTWIDTH] IN BLOOD BY AUTOMATED COUNT: 18.7 % (ref 11.5–14.5)
EST. GFR  (NO RACE VARIABLE): 42 ML/MIN/1.73 M^2
GLUCOSE SERPL-MCNC: 85 MG/DL (ref 70–110)
HCT VFR BLD AUTO: 31 % (ref 37–48.5)
HGB BLD-MCNC: 10 G/DL (ref 12–16)
IMM GRANULOCYTES # BLD AUTO: 0.13 K/UL (ref 0–0.04)
IMM GRANULOCYTES NFR BLD AUTO: 0.9 % (ref 0–0.5)
LIPASE SERPL-CCNC: 16 U/L (ref 4–60)
LYMPHOCYTES # BLD AUTO: 1.6 K/UL (ref 1–4.8)
LYMPHOCYTES NFR BLD: 11.2 % (ref 18–48)
MCH RBC QN AUTO: 26.2 PG (ref 27–31)
MCHC RBC AUTO-ENTMCNC: 32.3 G/DL (ref 32–36)
MCV RBC AUTO: 81 FL (ref 82–98)
MONOCYTES # BLD AUTO: 0.8 K/UL (ref 0.3–1)
MONOCYTES NFR BLD: 5.8 % (ref 4–15)
NEUTROPHILS # BLD AUTO: 11.6 K/UL (ref 1.8–7.7)
NEUTROPHILS NFR BLD: 81.3 % (ref 38–73)
NRBC BLD-RTO: 0 /100 WBC
PLATELET # BLD AUTO: 329 K/UL (ref 150–450)
PMV BLD AUTO: 8.5 FL (ref 9.2–12.9)
POTASSIUM SERPL-SCNC: 5.1 MMOL/L (ref 3.5–5.1)
PROT SERPL-MCNC: 7.5 G/DL (ref 6–8.4)
RBC # BLD AUTO: 3.82 M/UL (ref 4–5.4)
SODIUM SERPL-SCNC: 137 MMOL/L (ref 136–145)
WBC # BLD AUTO: 14.31 K/UL (ref 3.9–12.7)

## 2023-06-10 PROCEDURE — 63600175 PHARM REV CODE 636 W HCPCS: Performed by: FAMILY MEDICINE

## 2023-06-10 PROCEDURE — 96365 THER/PROPH/DIAG IV INF INIT: CPT

## 2023-06-10 PROCEDURE — 85025 COMPLETE CBC W/AUTO DIFF WBC: CPT | Performed by: FAMILY MEDICINE

## 2023-06-10 PROCEDURE — 83690 ASSAY OF LIPASE: CPT | Performed by: FAMILY MEDICINE

## 2023-06-10 PROCEDURE — 25000003 PHARM REV CODE 250: Performed by: FAMILY MEDICINE

## 2023-06-10 PROCEDURE — 80053 COMPREHEN METABOLIC PANEL: CPT | Performed by: FAMILY MEDICINE

## 2023-06-10 RX ORDER — TRAMADOL HYDROCHLORIDE 50 MG/1
50 TABLET ORAL EVERY 6 HOURS PRN
Qty: 12 TABLET | Refills: 0 | Status: SHIPPED | OUTPATIENT
Start: 2023-06-10

## 2023-06-10 RX ORDER — ONDANSETRON 4 MG/1
4 TABLET, FILM COATED ORAL EVERY 6 HOURS
Qty: 12 TABLET | Refills: 0 | Status: SHIPPED | OUTPATIENT
Start: 2023-06-10

## 2023-06-10 RX ADMIN — PROMETHAZINE HYDROCHLORIDE 25 MG: 25 INJECTION INTRAMUSCULAR; INTRAVENOUS at 12:06

## 2023-06-10 RX ADMIN — SODIUM CHLORIDE 1000 ML: 9 INJECTION, SOLUTION INTRAVENOUS at 12:06

## 2023-06-10 NOTE — ED PROVIDER NOTES
SCRIBE #1 NOTE: I, Sammi Carter, am scribing for, and in the presence of, Ciara Roblero MD. I have scribed the entire note.       History     Chief Complaint   Patient presents with    Generalized Body Aches     Reports runny nose, body aches, nausea, vomiting, drainage from eyes in the morning. Also reports left sided rib pain.      Review of patient's allergies indicates:   Allergen Reactions    Lisinopril Anaphylaxis     This is only possible agent at the time.  This is only possible agent at the time.  This is only possible agent at the time.    Sulfa (sulfonamide antibiotics) Anaphylaxis    Beta-blockers (beta-adrenergic blocking agts)      Other reaction(s): Other (See Comments)  Syncope & Collapse         History of Present Illness     HPI    2023, 10:44 PM  History obtained from the patient      History of Present Illness: Joe Ceballos is a 53 y.o. female patient with a PMHx of anxiety, CHF, HTN, initial insomnia, sickle cell trait, stroke and tobacco use who presents to the Emergency Department for evaluation of generalized body aches which onset gradually this morning. Symptoms are constant and moderate in severity. No mitigating or exacerbating factors reported. Associated sxs include rhinorrhea, arthralgia, myalgia, nausea, vomiting, left-sided rib pain, and drainage from eyes in the morning. Patient denies any fever, chills, CP, SOB, headaches, weakness, and all other sxs at this time. No prior Tx reported. No further complaints or concerns at this time.       Arrival mode: Personal vehicle    PCP: Daphney Heart MD        Past Medical History:  Past Medical History:   Diagnosis Date    Anxiety     CHF (congestive heart failure)     Edema     Hypertension     Initial insomnia     Sickle cell trait     Stroke     Tobacco use        Past Surgical History:  Past Surgical History:   Procedure Laterality Date    BREAST SURGERY       SECTION      x 2    CYST REMOVAL      from left  tube    ESOPHAGOGASTRODUODENOSCOPY N/A 3/12/2023    Procedure: EGD (ESOPHAGOGASTRODUODENOSCOPY);  Surgeon: Ray Serrato MD;  [facility]: [facility];  Service: Gastroenterology;  Laterality: N/A;    gastric sleeve      Raheel-en-Y gastric bypass  02/022017    TONSILLECTOMY           Family History:  Family History   Adopted: Yes   Problem Relation Age of Onset    Hypertension Mother     Thyroid disease Paternal Aunt     Hypertension Paternal Grandmother     Breast cancer Neg Hx     Colon cancer Neg Hx     Ovarian cancer Neg Hx        Social History:  Social History     Tobacco Use    Smoking status: Every Day     Packs/day: 0.25     Types: Cigarettes    Smokeless tobacco: Never   Substance and Sexual Activity    Alcohol use: Yes    Drug use: No    Sexual activity: Not Currently     Partners: Male     Birth control/protection: None        Review of Systems     Review of Systems   Constitutional:  Negative for chills and fever.   HENT:  Positive for rhinorrhea. Negative for sore throat.    Eyes:         (+) eye drainage   Respiratory:  Negative for shortness of breath.    Cardiovascular:  Negative for chest pain.        (+) left-sided rib pain   Gastrointestinal:  Positive for nausea and vomiting. Negative for diarrhea.   Genitourinary:  Negative for dysuria.   Musculoskeletal:  Positive for arthralgias and myalgias. Negative for back pain.   Skin:  Negative for rash.   Neurological:  Negative for weakness and headaches.   Hematological:  Does not bruise/bleed easily.   All other systems reviewed and are negative.     Physical Exam     Initial Vitals [06/09/23 2034]   BP Pulse Resp Temp SpO2   (!) 98/53 96 20 97.9 °F (36.6 °C) 98 %      MAP       --          Physical Exam  Nursing Notes and Vital Signs Reviewed.  Constitutional: Patient is in no acute distress. Well-developed and well-nourished.  Head: Atraumatic. Normocephalic.  Eyes: PERRL. EOM intact. Conjunctivae are not pale. No scleral icterus.  ENT: Mucous  membranes are moist. Oropharynx is clear and symmetric.    Neck: Supple. Full ROM. No lymphadenopathy.  Cardiovascular: Regular rate. Regular rhythm. No murmurs, rubs, or gallops. Distal pulses are 2+ and symmetric.  Pulmonary/Chest: No respiratory distress. Clear to auscultation bilaterally. No wheezing or rales.  Abdominal: Soft and non-distended.  There is no tenderness.  No rebound, guarding, or rigidity. Good bowel sounds.  Genitourinary: No CVA tenderness  Musculoskeletal: Moves all extremities. No obvious deformities. No edema. No calf tenderness.  Skin: Warm and dry.  Neurological:  Alert, awake, and appropriate.  Normal speech.  No acute focal neurological deficits are appreciated.  Psychiatric: Normal affect. Good eye contact. Appropriate in content.     ED Course   Procedures  ED Vital Signs:  Vitals:    06/09/23 2034 06/09/23 2115 06/09/23 2215 06/10/23 0130   BP: (!) 98/53 101/60 (!) 113/59 (!) 110/59   Pulse: 96 91 83 77   Resp: 20  20 19   Temp: 97.9 °F (36.6 °C)   98.6 °F (37 °C)   TempSrc: Oral   Oral   SpO2: 98% 99% 95% 96%   Weight: 68.6 kg (151 lb 2 oz)          Abnormal Lab Results:  Labs Reviewed   CBC W/ AUTO DIFFERENTIAL - Abnormal; Notable for the following components:       Result Value    WBC 14.31 (*)     RBC 3.82 (*)     Hemoglobin 10.0 (*)     Hematocrit 31.0 (*)     MCV 81 (*)     MCH 26.2 (*)     RDW 18.7 (*)     MPV 8.5 (*)     Immature Granulocytes 0.9 (*)     Gran # (ANC) 11.6 (*)     Immature Grans (Abs) 0.13 (*)     Gran % 81.3 (*)     Lymph % 11.2 (*)     All other components within normal limits   COMPREHENSIVE METABOLIC PANEL - Abnormal; Notable for the following components:    CO2 19 (*)     Creatinine 1.5 (*)     Calcium 8.5 (*)     Albumin 2.3 (*)     ALT 6 (*)     eGFR 42 (*)     All other components within normal limits   INFLUENZA A & B BY MOLECULAR   SARS-COV-2 RNA AMPLIFICATION, QUAL   LIPASE        All Lab Results:  Results for orders placed or performed during the  hospital encounter of 06/09/23   Influenza A & B by Molecular    Specimen: Nasopharyngeal Swab   Result Value Ref Range    Influenza A, Molecular Negative Negative    Influenza B, Molecular Negative Negative    Flu A & B Source Nasal swab    COVID-19 Rapid Screening   Result Value Ref Range    SARS-CoV-2 RNA, Amplification, Qual Negative Negative   CBC auto differential   Result Value Ref Range    WBC 14.31 (H) 3.90 - 12.70 K/uL    RBC 3.82 (L) 4.00 - 5.40 M/uL    Hemoglobin 10.0 (L) 12.0 - 16.0 g/dL    Hematocrit 31.0 (L) 37.0 - 48.5 %    MCV 81 (L) 82 - 98 fL    MCH 26.2 (L) 27.0 - 31.0 pg    MCHC 32.3 32.0 - 36.0 g/dL    RDW 18.7 (H) 11.5 - 14.5 %    Platelets 329 150 - 450 K/uL    MPV 8.5 (L) 9.2 - 12.9 fL    Immature Granulocytes 0.9 (H) 0.0 - 0.5 %    Gran # (ANC) 11.6 (H) 1.8 - 7.7 K/uL    Immature Grans (Abs) 0.13 (H) 0.00 - 0.04 K/uL    Lymph # 1.6 1.0 - 4.8 K/uL    Mono # 0.8 0.3 - 1.0 K/uL    Eos # 0.0 0.0 - 0.5 K/uL    Baso # 0.09 0.00 - 0.20 K/uL    nRBC 0 0 /100 WBC    Gran % 81.3 (H) 38.0 - 73.0 %    Lymph % 11.2 (L) 18.0 - 48.0 %    Mono % 5.8 4.0 - 15.0 %    Eosinophil % 0.2 0.0 - 8.0 %    Basophil % 0.6 0.0 - 1.9 %    Differential Method Automated    Comprehensive metabolic panel   Result Value Ref Range    Sodium 137 136 - 145 mmol/L    Potassium 5.1 3.5 - 5.1 mmol/L    Chloride 106 95 - 110 mmol/L    CO2 19 (L) 23 - 29 mmol/L    Glucose 85 70 - 110 mg/dL    BUN 14 6 - 20 mg/dL    Creatinine 1.5 (H) 0.5 - 1.4 mg/dL    Calcium 8.5 (L) 8.7 - 10.5 mg/dL    Total Protein 7.5 6.0 - 8.4 g/dL    Albumin 2.3 (L) 3.5 - 5.2 g/dL    Total Bilirubin 0.4 0.1 - 1.0 mg/dL    Alkaline Phosphatase 129 55 - 135 U/L    AST 16 10 - 40 U/L    ALT 6 (L) 10 - 44 U/L    Anion Gap 12 8 - 16 mmol/L    eGFR 42 (A) >60 mL/min/1.73 m^2   Lipase   Result Value Ref Range    Lipase 16 4 - 60 U/L         Imaging Results:  Imaging Results              CT Abdomen Pelvis  Without Contrast (Final result)  Result time 06/09/23  23:46:17      Final result by Edward Aguilar MD (06/09/23 23:46:17)                   Impression:      Left Asmita psoas cystic lesion persist now measuring 4.9 x 4.1 x 5.1 cm similar to prior exam.    Bilateral adrenal enlargement again noted.    Status post gastric bypass    Persistent cardiomegaly with mild persistent left-sided pleural effusion. Decreased left basilar opacity    Layering hyperdensity in the gallbladder consistent with stones or sludge    All CT scans   are performed using dose optimization techniques including the following: automated exposure control; adjustment of the mA and/or kV; use of iterative reconstruction technique.  Dose modulation was employed for ALARA by means of: Automated exposure control; adjustment of the mA and/or kV according to patient size (this includes techniques or standardized protocols for targeted exams where dose is matched to indication/reason for exam; i.e. extremities or head); and/or use of iterative reconstructive technique.      Electronically signed by: Edward Aguilar  Date:    06/09/2023  Time:    23:46               Narrative:    EXAMINATION:  CT ABDOMEN PELVIS WITHOUT CONTRAST    CLINICAL HISTORY:  Abdominal pain, acute, nonlocalized;    TECHNIQUE:  Low dose axial images, sagittal and coronal reformations were obtained from the lung bases to the pubic symphysis,    COMPARISON:  None    FINDINGS:  Comparison is made to the 05/25/2022 exam.    Left Asmita psoas cystic lesion persist now measuring 4.9 x 4.1 x 5.1 cm similar to prior exam.    A bilateral adrenal enlargement again noted.    Status post gastric bypass    Persistent cardiomegaly with mild left-sided pleural effusion.  Decreased left basilar opacity    Layering hyperdensity in the gallbladder consistent with stones or sludge    No bowel obstruction    Atherosclerotic changes    Spleen pancreas kidneys are otherwise unremarkable.  Uterus and bladder are normal unremarkable.  Right-sided hip prosthesis  identified.                                             The Emergency Provider reviewed the vital signs and test results, which are outlined above.     ED Discussion     1:21 AM: Reassessed pt at this time. Discussed with pt all pertinent ED information and results. Discussed pt dx and plan of tx. Gave pt all f/u and return to the ED instructions. All questions and concerns were addressed at this time. Pt expresses understanding of information and instructions, and is comfortable with plan to discharge. Pt is stable for discharge.    I discussed with patient and/or family/caretaker that evaluation in the ED does not suggest any emergent or life threatening medical conditions requiring immediate intervention beyond what was provided in the ED, and I believe patient is safe for discharge.  Regardless, an unremarkable evaluation in the ED does not preclude the development or presence of a serious of life threatening condition. As such, patient was instructed to return immediately for any worsening or change in current symptoms.       Medical Decision Making:   Clinical Tests:   Lab Tests: Ordered and Reviewed  Radiological Study: Ordered and Reviewed         ED Medication(s):  Medications   ondansetron disintegrating tablet 4 mg (4 mg Oral Given 6/9/23 1140)   sodium chloride 0.9% bolus 1,000 mL 1,000 mL (1,000 mLs Intravenous New Bag 6/10/23 0010)   promethazine (PHENERGAN) 25 mg in dextrose 5 % (D5W) 50 mL IVPB (0 mg Intravenous Stopped 6/10/23 0045)       Discharge Medication List as of 6/10/2023  1:17 AM        START taking these medications    Details   ondansetron (ZOFRAN) 4 MG tablet Take 1 tablet (4 mg total) by mouth every 6 (six) hours., Starting Sat 6/10/2023, Normal      traMADoL (ULTRAM) 50 mg tablet Take 1 tablet (50 mg total) by mouth every 6 (six) hours as needed., Starting Sat 6/10/2023, Print              Follow-up Information       Schedule an appointment as soon as possible for a visit  with Daphney COTTON  MD Sly.    Specialty: Family Medicine  Why: As needed  Contact information:  [address]  [address]  [place] [address]  [phone]                               Scribe Attestation:   Scribe #1: I performed the above scribed service and the documentation accurately describes the services I performed. I attest to the accuracy of the note.     Attending:   Physician Attestation Statement for Scribe #1: I, Ciara Roblero MD, personally performed the services described in this documentation, as scribed by Sammi Carter, in my presence, and it is both accurate and complete.           Clinical Impression       ICD-10-CM ICD-9-CM   1. Body aches  R52 780.96       Disposition:   Disposition: Discharged  Condition: Stable       Ciara Roblero MD  06/10/23 1567

## 2023-06-12 PROBLEM — K92.2 GI BLEED: Status: RESOLVED | Noted: 2023-03-11 | Resolved: 2023-06-12

## 2023-06-16 ENCOUNTER — TELEPHONE (OUTPATIENT)
Dept: PRIMARY CARE CLINIC | Facility: CLINIC | Age: 53
End: 2023-06-16
Payer: MEDICAID

## 2023-06-16 NOTE — TELEPHONE ENCOUNTER
----- Message from Lien Mitchell sent at 6/16/2023 11:06 AM CDT -----  Type:  Sooner Apoointment Request    Caller is requesting a sooner appointment.  Caller declined first available appointment listed below.  Caller will not accept being placed on the waitlist and is requesting a message be sent to doctor.  Name of Caller:brenda  When is the first available appointment?unknown  Symptoms:hospital follow up  Would the patient rather a call back or a response via [facility]? call  Best Call Back Number:[phone]  Additional Information: one week f/u

## 2023-06-16 NOTE — TELEPHONE ENCOUNTER
Returned call to patient in regards to appointment request. Patient states she didn't call for appointment she is currently in hospital at [facility]. She went to [facility] Friday and they diagnosed her with stomach virus after performing CT and sent her home. She reported to [facility] on Monday and they did a CT of her abdomen and informed her she has an ulcer. She states is currently being tested and treated at [facility] hospital since Monday. Informed patient once she is discharged she can give us a call to schedule her hospital follow up appointment with PCP. She voiced understanding.

## 2023-06-22 ENCOUNTER — NURSE TRIAGE (OUTPATIENT)
Dept: ADMINISTRATIVE | Facility: CLINIC | Age: 53
End: 2023-06-22
Payer: MEDICAID

## 2023-06-22 VITALS
DIASTOLIC BLOOD PRESSURE: 80 MMHG | HEIGHT: 68 IN | HEART RATE: 116 BPM | WEIGHT: 146.19 LBS | SYSTOLIC BLOOD PRESSURE: 139 MMHG | RESPIRATION RATE: 20 BRPM | TEMPERATURE: 98 F | OXYGEN SATURATION: 99 % | BODY MASS INDEX: 22.16 KG/M2

## 2023-06-22 PROCEDURE — 99281 EMR DPT VST MAYX REQ PHY/QHP: CPT

## 2023-06-22 NOTE — TELEPHONE ENCOUNTER
Pt states that she was discharge from hospital on a liquid diet. Pt states that she can no longer swallow liquids or take medication. Pt c/o feeling weak, SOB when walking and feeling dizzy. Pt states that she is home alone and got dizzy and fell to sofa. Advised to call 911 per protocol. Verbalized understanding. Encounter routed to provider.     Reason for Disposition   Wheezing, stridor, hoarseness, or difficulty breathing    Additional Information   Negative: SEVERE difficulty swallowing (e.g., drooling or spitting) and started suddenly after taking a medicine or allergic foods    Protocols used: Swallowing Difficulty-A-OH    
ADHD (attention deficit hyperactivity disorder)    Migraine

## 2023-06-23 ENCOUNTER — HOSPITAL ENCOUNTER (EMERGENCY)
Facility: HOSPITAL | Age: 53
Discharge: LEFT BEFORE DISCHARGE | End: 2023-06-23
Payer: MEDICARE

## 2023-06-23 DIAGNOSIS — I50.30 HEART FAILURE WITH PRESERVED EJECTION FRACTION, UNSPECIFIED HF CHRONICITY: ICD-10-CM

## 2023-06-23 DIAGNOSIS — I10 ESSENTIAL HYPERTENSION: ICD-10-CM

## 2023-06-23 RX ORDER — LOSARTAN POTASSIUM 100 MG/1
TABLET ORAL
Qty: 30 TABLET | Refills: 0 | Status: SHIPPED | OUTPATIENT
Start: 2023-06-23 | End: 2023-08-28 | Stop reason: SDUPTHER

## 2023-06-23 RX ORDER — FUROSEMIDE 40 MG/1
TABLET ORAL
Qty: 90 TABLET | Refills: 8 | Status: SHIPPED | OUTPATIENT
Start: 2023-06-23 | End: 2023-10-02 | Stop reason: ALTCHOICE

## 2023-06-23 NOTE — TELEPHONE ENCOUNTER
Care Due:                  Date            Visit Type   Department     Provider  --------------------------------------------------------------------------------                                ESTABLISHED   [facility] PRIMARY  Last Visit: 07-      PATIENT      CARE           Daphney Heart  Next Visit: None Scheduled  None         None Found                                                            Last  Test          Frequency    Reason                     Performed    Due Date  --------------------------------------------------------------------------------    Office Visit  12 months..  amLODIPine, atorvastatin,   07- 07-                             furosemide, isosorbide,                             losartan.................    [employer] Embedded Care Due Messages. Reference number: [id number].   6/23/2023 12:54:47 AM CDT

## 2023-06-23 NOTE — FIRST PROVIDER EVALUATION
"Medical screening examination initiated.  I have conducted a focused provider triage encounter, findings are as follows:    Brief history of present illness:  Patient presents with abdominal pain, nausea and vomiting.  Patient was seen at [facility] and was admitted to the hospital but left because the room was dirty.    Vitals:    06/22/23 2320   BP: 139/80   BP Location: Right arm   Patient Position: Sitting   Pulse: (!) 116   Resp: 20   Temp: 97.5 °F (36.4 °C)   TempSrc: Oral   SpO2: 99%   Weight: 66.3 kg (146 lb 2.6 oz)   Height: 5' 8" (1.727 m)       Pertinent physical exam:      Brief workup plan:      Preliminary workup initiated; this workup will be continued and followed by the physician or advanced practice provider that is assigned to the patient when roomed.  "

## 2023-07-09 NOTE — PATIENT INSTRUCTIONS
Avoid caffeine, smoking, alcohol, and NSAIDs (Advil/ibuprofen, Aleve/naprosen).  OK to take Tylenol/acetaminophen.   
1% lidocaine

## 2023-07-10 ENCOUNTER — TELEPHONE (OUTPATIENT)
Dept: PRIMARY CARE CLINIC | Facility: CLINIC | Age: 53
End: 2023-07-10
Payer: MEDICAID

## 2023-07-10 NOTE — TELEPHONE ENCOUNTER
Patient called regarding medical records from [facility], patient informed that she will need to fill out medical release form and have it faxed over to [facility]. Patient voiced understanding.  ----- Message from Gume Mcclellan sent at 7/10/2023  2:35 PM CDT -----  Contact: Pt [phone]  Consult    She was admitted to [facility] and she wants to know if you can get her records from [facility] and send to fax @ [phone]. She need the EEG and colonoscopy ordered by Luis JIMENEZ 6/30/23    Thank you

## 2023-07-16 ENCOUNTER — TELEPHONE (OUTPATIENT)
Dept: PRIMARY CARE CLINIC | Facility: CLINIC | Age: 53
End: 2023-07-16
Payer: MEDICAID

## 2023-07-16 DIAGNOSIS — E78.2 MIXED HYPERLIPIDEMIA: ICD-10-CM

## 2023-07-16 DIAGNOSIS — Z12.31 BREAST CANCER SCREENING BY MAMMOGRAM: ICD-10-CM

## 2023-07-16 DIAGNOSIS — Z86.73 HISTORY OF STROKE: ICD-10-CM

## 2023-07-16 DIAGNOSIS — Z01.419 WELL WOMAN EXAM: Primary | ICD-10-CM

## 2023-07-16 DIAGNOSIS — Z12.11 COLON CANCER SCREENING: ICD-10-CM

## 2023-07-16 RX ORDER — ATORVASTATIN CALCIUM 80 MG/1
80 TABLET, FILM COATED ORAL NIGHTLY
Qty: 90 TABLET | Refills: 2 | Status: SHIPPED | OUTPATIENT
Start: 2023-07-16 | End: 2024-03-24 | Stop reason: SDUPTHER

## 2023-07-17 ENCOUNTER — TELEPHONE (OUTPATIENT)
Dept: PRIMARY CARE CLINIC | Facility: CLINIC | Age: 53
End: 2023-07-17
Payer: MEDICAID

## 2023-07-27 ENCOUNTER — TELEPHONE (OUTPATIENT)
Dept: PRIMARY CARE CLINIC | Facility: CLINIC | Age: 53
End: 2023-07-27
Payer: MEDICAID

## 2023-07-27 NOTE — TELEPHONE ENCOUNTER
----- Message from Sugar Cardenas sent at 7/27/2023  2:04 PM CDT -----  Contact: pt [phone]  Patient states that she has to move due to her passing out. States that paperwork was sent to you.    Please contact the patient with the status of this request.     Thank You

## 2023-08-02 ENCOUNTER — TELEPHONE (OUTPATIENT)
Dept: PRIMARY CARE CLINIC | Facility: CLINIC | Age: 53
End: 2023-08-02
Payer: MEDICAID

## 2023-08-02 NOTE — TELEPHONE ENCOUNTER
----- Message from Stephanie Allenson sent at 8/2/2023 12:35 PM CDT -----  Contact: [phone]  Pt states she is calling to check on the status of her housing paper work that was sent in last Friday. Please call and advise. Thanks

## 2023-08-02 NOTE — TELEPHONE ENCOUNTER
Returned call to patient in regards to message for housing accomodation form. Informed patient we have received paperwork will give to provider to review and will give a call with update. Offered to schedule appt patient declined and states she does not need an appt nobody contact her to from her last appt. Informed patient clinic tried to contact her once for appt and the other for paperwork request. Patient became aggressive and demanded form to be filled out while she was on phone. Patient then states that she will come and  form and has someone else to sign. Informed patient form has Jeff Arvizu as the provider, patient started yelling and demanded to ask provider to fill out form while she is otp. Provider notified, form was filled out. Informed patient she can  paperwork at . She voiced understanding.

## 2023-08-28 DIAGNOSIS — I10 ESSENTIAL HYPERTENSION: ICD-10-CM

## 2023-08-28 NOTE — TELEPHONE ENCOUNTER
Care Due:                  Date            Visit Type   Department     Provider  --------------------------------------------------------------------------------                                ESTABLISHED   [facility] PRIMARY  Last Visit: 07-      PATIENT      CARE           Daphney Heart  Next Visit: None Scheduled  None         None Found                                                            Last  Test          Frequency    Reason                     Performed    Due Date  --------------------------------------------------------------------------------    Office Visit  12 months..  amLODIPine, atorvastatin,   07- 07-                             isosorbide, losartan.....    [employer] Embedded Care Due Messages. Reference number: [id number].   8/28/2023 4:30:50 PM CDT

## 2023-08-29 RX ORDER — LOSARTAN POTASSIUM 100 MG/1
100 TABLET ORAL DAILY
Qty: 30 TABLET | Refills: 0 | Status: SHIPPED | OUTPATIENT
Start: 2023-08-29 | End: 2023-10-02 | Stop reason: ALTCHOICE

## 2023-08-29 RX ORDER — ISOSORBIDE MONONITRATE 60 MG/1
60 TABLET, EXTENDED RELEASE ORAL DAILY
Qty: 30 TABLET | Refills: 0 | Status: SHIPPED | OUTPATIENT
Start: 2023-08-29 | End: 2023-10-02 | Stop reason: ALTCHOICE

## 2023-08-29 NOTE — TELEPHONE ENCOUNTER
----- Message from Dapnhey Heart MD sent at 8/28/2023  7:26 PM CDT -----  Contact: Patient, [phone]  @ staff: Advise to make an appt for follow up to discuss her care gap and med refill of controlled substance cannot be done if patient has not been seen in the past 3 mons. See last appt date.     ----- Message -----  From: Luz Elena Terrazas MA  Sent: 8/28/2023   4:28 PM CDT  To: Daphney Heart MD    Patient requesting medication not letting me send as refill. Lorazepam and flexeril. Please advise. Thank you. Last visit 1/31/23  ----- Message -----  From: Feli Alvarado  Sent: 8/28/2023   4:25 PM CDT  To: Sly Shelley Staff    Requesting an RX refill or new RX.  Is this a refill or new RX: Refill  RX name and strength (copy/paste from chart):  isosorbide mononitrate (IMDUR) 60 MG 24 hr tablet  Is this a 30 day or 90 day RX:   Pharmacy name and phone # (copy/paste from chart):    [facility] #[id number] [address] & [address] [address]  Phone: [phone] Fax: [phone]  The doctors have asked that we provide their patients with the following 2 reminders -- prescription refills can take up to 72 hours, and a friendly reminder that in the future you can use your [facility] account to request refills: Yes        Requesting an RX refill or new RX.  Is this a refill or new RX: Refill  RX name and strength (copy/paste from chart):  losartan (COZAAR) 100 MG tablet  Is this a 30 day or 90 day RX:   Pharmacy name and phone # (copy/paste from chart):    [facility] #[id number] [place] [address] [facility] & [address] [address]  Phone: [phone] Fax: [phone]  The doctors have asked that we provide their patients with the following 2 reminders -- prescription refills can take up to 72 hours, and a friendly reminder that in the future you can use your [facility]  account to request refills: Yes          Requesting an RX refill or new RX.  Is this a refill or new RX: Refill  RX name and strength (copy/paste from chart):  cyclobenzaprine HCl (FLEXERIL ORAL)  Is this a 30 day or 90 day RX:   Pharmacy name and phone # (copy/paste from chart):    [facility] #[id number] [address] & [address] [address]  Phone: [phone] Fax: [phone]  The doctors have asked that we provide their patients with the following 2 reminders -- prescription refills can take up to 72 hours, and a friendly reminder that in the future you can use your [facility] account to request refills: Yes        Requesting an RX refill or new RX.  Is this a refill or new RX: Refill  RX name and strength (copy/paste from chart):  Lorazepam 0.5 mg  Is this a 30 day or 90 day RX:   Pharmacy name and phone # (copy/paste from chart):    [facility] #[id number] [facility] [address] [facility] & [address] [address]  Phone: [phone] Fax: [phone]  The doctors have asked that we provide their patients with the following 2 reminders -- prescription refills can take up to 72 hours, and a friend

## 2023-09-26 ENCOUNTER — TELEPHONE (OUTPATIENT)
Dept: PRIMARY CARE CLINIC | Facility: CLINIC | Age: 53
End: 2023-09-26
Payer: MEDICAID

## 2023-09-26 NOTE — TELEPHONE ENCOUNTER
Returned the call the patient has been scheduled, the patient also requesting Home health referral.        ----- Message from Noel Witt sent at 9/26/2023  2:11 PM CDT -----  Contact: Self [phone]  Per pt just discharge from  general need home health care, shower chair and walker stated her legs or hurting and also need hosp f/u.    Please call and advise

## 2023-10-02 ENCOUNTER — HOSPITAL ENCOUNTER (EMERGENCY)
Facility: HOSPITAL | Age: 53
Discharge: LEFT BEFORE DISCHARGE | End: 2023-10-02
Payer: MEDICAID

## 2023-10-02 ENCOUNTER — OFFICE VISIT (OUTPATIENT)
Dept: PRIMARY CARE CLINIC | Facility: CLINIC | Age: 53
End: 2023-10-02
Payer: MEDICAID

## 2023-10-02 VITALS
BODY MASS INDEX: 20.56 KG/M2 | OXYGEN SATURATION: 98 % | TEMPERATURE: 99 F | WEIGHT: 135.63 LBS | DIASTOLIC BLOOD PRESSURE: 78 MMHG | SYSTOLIC BLOOD PRESSURE: 104 MMHG | HEIGHT: 68 IN | HEART RATE: 78 BPM

## 2023-10-02 VITALS
TEMPERATURE: 98 F | RESPIRATION RATE: 18 BRPM | BODY MASS INDEX: 20.6 KG/M2 | HEART RATE: 86 BPM | DIASTOLIC BLOOD PRESSURE: 63 MMHG | WEIGHT: 135.5 LBS | SYSTOLIC BLOOD PRESSURE: 124 MMHG | OXYGEN SATURATION: 100 %

## 2023-10-02 DIAGNOSIS — I10 ESSENTIAL HYPERTENSION: ICD-10-CM

## 2023-10-02 DIAGNOSIS — Z78.9 DEFICIT IN ACTIVITIES OF DAILY LIVING (ADL): ICD-10-CM

## 2023-10-02 DIAGNOSIS — E78.2 MIXED HYPERLIPIDEMIA: ICD-10-CM

## 2023-10-02 DIAGNOSIS — M79.89 LEG SWELLING: ICD-10-CM

## 2023-10-02 DIAGNOSIS — R26.89 IMPAIRED GAIT AND MOBILITY: ICD-10-CM

## 2023-10-02 DIAGNOSIS — R60.0 BILATERAL LEG EDEMA: ICD-10-CM

## 2023-10-02 DIAGNOSIS — I50.30 HEART FAILURE WITH PRESERVED EJECTION FRACTION, UNSPECIFIED HF CHRONICITY: ICD-10-CM

## 2023-10-02 DIAGNOSIS — F41.0 GENERALIZED ANXIETY DISORDER WITH PANIC ATTACKS: ICD-10-CM

## 2023-10-02 DIAGNOSIS — Z09 HOSPITAL DISCHARGE FOLLOW-UP: Primary | ICD-10-CM

## 2023-10-02 DIAGNOSIS — N18.4 CKD (CHRONIC KIDNEY DISEASE) STAGE 4, GFR 15-29 ML/MIN: ICD-10-CM

## 2023-10-02 DIAGNOSIS — F41.1 GENERALIZED ANXIETY DISORDER WITH PANIC ATTACKS: ICD-10-CM

## 2023-10-02 LAB
ALBUMIN SERPL BCP-MCNC: 2.9 G/DL (ref 3.5–5.2)
ALP SERPL-CCNC: 135 U/L (ref 55–135)
ALT SERPL W/O P-5'-P-CCNC: 18 U/L (ref 10–44)
ANION GAP SERPL CALC-SCNC: 12 MMOL/L (ref 8–16)
AST SERPL-CCNC: 18 U/L (ref 10–40)
BASOPHILS # BLD AUTO: 0.06 K/UL (ref 0–0.2)
BASOPHILS NFR BLD: 0.7 % (ref 0–1.9)
BILIRUB SERPL-MCNC: 0.5 MG/DL (ref 0.1–1)
BNP SERPL-MCNC: 683 PG/ML (ref 0–99)
BUN SERPL-MCNC: 24 MG/DL (ref 6–20)
CALCIUM SERPL-MCNC: 8.1 MG/DL (ref 8.7–10.5)
CHLORIDE SERPL-SCNC: 117 MMOL/L (ref 95–110)
CO2 SERPL-SCNC: 17 MMOL/L (ref 23–29)
CREAT SERPL-MCNC: 1.9 MG/DL (ref 0.5–1.4)
DIFFERENTIAL METHOD: ABNORMAL
EOSINOPHIL # BLD AUTO: 0 K/UL (ref 0–0.5)
EOSINOPHIL NFR BLD: 0.3 % (ref 0–8)
ERYTHROCYTE [DISTWIDTH] IN BLOOD BY AUTOMATED COUNT: 15.3 % (ref 11.5–14.5)
EST. GFR  (NO RACE VARIABLE): 31 ML/MIN/1.73 M^2
GLUCOSE SERPL-MCNC: 100 MG/DL (ref 70–110)
HCT VFR BLD AUTO: 23.5 % (ref 37–48.5)
HGB BLD-MCNC: 8.1 G/DL (ref 12–16)
IMM GRANULOCYTES # BLD AUTO: 0.02 K/UL (ref 0–0.04)
IMM GRANULOCYTES NFR BLD AUTO: 0.2 % (ref 0–0.5)
LYMPHOCYTES # BLD AUTO: 1.4 K/UL (ref 1–4.8)
LYMPHOCYTES NFR BLD: 16.4 % (ref 18–48)
MCH RBC QN AUTO: 33.3 PG (ref 27–31)
MCHC RBC AUTO-ENTMCNC: 34.5 G/DL (ref 32–36)
MCV RBC AUTO: 97 FL (ref 82–98)
MONOCYTES # BLD AUTO: 0.5 K/UL (ref 0.3–1)
MONOCYTES NFR BLD: 5.5 % (ref 4–15)
NEUTROPHILS # BLD AUTO: 6.8 K/UL (ref 1.8–7.7)
NEUTROPHILS NFR BLD: 76.9 % (ref 38–73)
NRBC BLD-RTO: 0 /100 WBC
PLATELET # BLD AUTO: 285 K/UL (ref 150–450)
PMV BLD AUTO: 9.5 FL (ref 9.2–12.9)
POTASSIUM SERPL-SCNC: 3.5 MMOL/L (ref 3.5–5.1)
PROT SERPL-MCNC: 6.1 G/DL (ref 6–8.4)
RBC # BLD AUTO: 2.43 M/UL (ref 4–5.4)
SODIUM SERPL-SCNC: 146 MMOL/L (ref 136–145)
TROPONIN I SERPL DL<=0.01 NG/ML-MCNC: 0.02 NG/ML (ref 0–0.03)
WBC # BLD AUTO: 8.8 K/UL (ref 3.9–12.7)

## 2023-10-02 PROCEDURE — 80053 COMPREHEN METABOLIC PANEL: CPT | Performed by: NURSE PRACTITIONER

## 2023-10-02 PROCEDURE — 84484 ASSAY OF TROPONIN QUANT: CPT | Performed by: NURSE PRACTITIONER

## 2023-10-02 PROCEDURE — 4010F ACE/ARB THERAPY RXD/TAKEN: CPT | Mod: CPTII,,, | Performed by: FAMILY MEDICINE

## 2023-10-02 PROCEDURE — 83880 ASSAY OF NATRIURETIC PEPTIDE: CPT | Performed by: NURSE PRACTITIONER

## 2023-10-02 PROCEDURE — 4010F PR ACE/ARB THEARPY RXD/TAKEN: ICD-10-PCS | Mod: CPTII,,, | Performed by: FAMILY MEDICINE

## 2023-10-02 PROCEDURE — 99999 PR PBB SHADOW E&M-EST. PATIENT-LVL III: CPT | Mod: PBBFAC,,, | Performed by: FAMILY MEDICINE

## 2023-10-02 PROCEDURE — 85025 COMPLETE CBC W/AUTO DIFF WBC: CPT | Performed by: NURSE PRACTITIONER

## 2023-10-02 PROCEDURE — 99214 PR OFFICE/OUTPT VISIT, EST, LEVL IV, 30-39 MIN: ICD-10-PCS | Mod: S$PBB,,, | Performed by: FAMILY MEDICINE

## 2023-10-02 PROCEDURE — 99284 EMERGENCY DEPT VISIT MOD MDM: CPT | Mod: 25,27

## 2023-10-02 PROCEDURE — 99999 PR PBB SHADOW E&M-EST. PATIENT-LVL III: ICD-10-PCS | Mod: PBBFAC,,, | Performed by: FAMILY MEDICINE

## 2023-10-02 PROCEDURE — 99213 OFFICE O/P EST LOW 20 MIN: CPT | Mod: PBBFAC,PN,25 | Performed by: FAMILY MEDICINE

## 2023-10-02 PROCEDURE — 99214 OFFICE O/P EST MOD 30 MIN: CPT | Mod: S$PBB,,, | Performed by: FAMILY MEDICINE

## 2023-10-02 RX ORDER — HYDROXYZINE HYDROCHLORIDE 25 MG/1
25 TABLET, FILM COATED ORAL 2 TIMES DAILY PRN
Qty: 60 TABLET | Refills: 5 | Status: SHIPPED | OUTPATIENT
Start: 2023-10-02 | End: 2024-03-30

## 2023-10-02 RX ORDER — HYDROXYZINE PAMOATE 25 MG/1
25 CAPSULE ORAL
Status: ON HOLD | COMMUNITY
End: 2024-01-24 | Stop reason: CLARIF

## 2023-10-02 NOTE — ED NOTES
Pt. Given urine collection cup and asked to collect urine specimen. Unable to perform detailed nurse assessment as pt. On phone with someone, conversing about child cutody/child support matters.

## 2023-10-02 NOTE — PROGRESS NOTES
Subjective:       Patient ID: Joe Ceballos is a 53 y.o. female.    Chief Complaint: Other Misc (HOSPITAL F/U)      History of Present Illness:   Joe Ceballos 53 y.o. female presents today with   HPI     Other Misc     Additional comments: HOSPITAL F/U          Last edited by Luz Elena Terrazas MA on 10/2/2023 11:22 AM.        The patient's last visit with me was on 7/12/2022.   She reports that she was admitted at [facility] for BRBPR from 09/10/23 to 09/28.   Since discharge, she has not seen any improvement, unable to take care of herself at home, falling and was stuck in her bath tub for hours yesterday, BLE edema is worse despite furosemide 60 mg as ordered and elevation of legs. Also with pain and tenderness to bilateral legs. Also anxiety is worse. Asking for refills on pain and prn antianxiety meds.  She is not able to fix meals for herself and have not been able to get follow up appt with nephrology.  She does have CKD 4, low albumin, chronically.  Durinf this hospitalization, she was diagnosed with miguel angel hydronephrosis due to a left pelvic mass, which was biopsied but no results yet.  BRBPR was found to be due to bleeding anastomosis ulcer. Bleeding has resolved.       Past Medical History:   Diagnosis Date    Anxiety     CHF (congestive heart failure)     Edema     Hypertension     Initial insomnia     Sickle cell trait     Stroke     Tobacco use      Family History   Adopted: Yes   Problem Relation Age of Onset    Hypertension Mother     Thyroid disease Paternal Aunt     Hypertension Paternal Grandmother     Breast cancer Neg Hx     Colon cancer Neg Hx     Ovarian cancer Neg Hx      Social History     Socioeconomic History    Marital status: Single   Tobacco Use    Smoking status: Every Day     Current packs/day: 0.25     Types: Cigarettes    Smokeless tobacco: Never   Substance and Sexual Activity    Alcohol use: Yes    Drug use: No    Sexual activity: Not Currently     Partners: Male     Birth  control/protection: None     Outpatient Encounter Medications as of 10/2/2023   Medication Sig Dispense Refill    atorvastatin (LIPITOR) 80 MG tablet Take 1 tablet (80 mg total) by mouth every evening. 90 tablet 2    cyclobenzaprine HCl (FLEXERIL ORAL) Take 10 mg by mouth.      ondansetron (ZOFRAN) 4 MG tablet Take 1 tablet (4 mg total) by mouth every 6 (six) hours. 12 tablet 0    traMADoL (ULTRAM) 50 mg tablet Take 1 tablet (50 mg total) by mouth every 6 (six) hours as needed. 12 tablet 0    [DISCONTINUED] amLODIPine (NORVASC) 5 MG tablet Take 1 tablet (5 mg total) by mouth every evening. 30 tablet 3    [DISCONTINUED] DULoxetine (CYMBALTA) 60 MG capsule TAKE 1 CAPSULE BY MOUTH 2 TIMES A DAY. 60 capsule 2    [DISCONTINUED] furosemide (LASIX) 40 MG tablet TAKE 2 TABLETS BY MOUTH DAILY & 1 TABLET EVERY EVENING 90 tablet 8    [DISCONTINUED] promethazine (PHENERGAN) 25 MG tablet Take 1 tablet (25 mg total) by mouth every 4 (four) hours. 12 tablet 0    hydrOXYzine HCL (ATARAX) 25 MG tablet Take 1 tablet (25 mg total) by mouth 2 (two) times daily as needed for Anxiety. 60 tablet 5    hydrOXYzine pamoate (VISTARIL) 25 MG Cap Take 25 mg by mouth.      omeprazole (PRILOSEC) 40 MG capsule Take 1 capsule (40 mg total) by mouth once daily. 90 capsule 0    [DISCONTINUED] isosorbide mononitrate (IMDUR) 60 MG 24 hr tablet Take 1 tablet (60 mg total) by mouth once daily. 30 tablet 0    [DISCONTINUED] losartan (COZAAR) 100 MG tablet Take 1 tablet (100 mg total) by mouth once daily. 30 tablet 0     No facility-administered encounter medications on file as of 10/2/2023.       Review of Systems      A complete 10 point ROS was completed and are positive as per above HPI. Otherwise negative for fever, diplopia, chest pain, shortness of breath, vomiting, blood in urine.  Objective:      There were no vitals taken for this visit.  Physical Exam  Vitals (crying) and nursing note reviewed.   Constitutional:       Appearance: She is  ill-appearing.   Cardiovascular:      Rate and Rhythm: Normal rate.   Pulmonary:      Effort: Pulmonary effort is normal.      Breath sounds: Normal breath sounds.   Musculoskeletal:      Right lower leg: Edema present.      Left lower leg: Edema present.           Results for orders placed or performed during the hospital encounter of 06/09/23   Influenza A & B by Molecular    Specimen: Nasopharyngeal Swab   Result Value Ref Range    Influenza A, Molecular Negative Negative    Influenza B, Molecular Negative Negative    Flu A & B Source Nasal swab    COVID-19 Rapid Screening   Result Value Ref Range    SARS-CoV-2 RNA, Amplification, Qual Negative Negative   CBC auto differential   Result Value Ref Range    WBC 14.31 (H) 3.90 - 12.70 K/uL    RBC 3.82 (L) 4.00 - 5.40 M/uL    Hemoglobin 10.0 (L) 12.0 - 16.0 g/dL    Hematocrit 31.0 (L) 37.0 - 48.5 %    MCV 81 (L) 82 - 98 fL    MCH 26.2 (L) 27.0 - 31.0 pg    MCHC 32.3 32.0 - 36.0 g/dL    RDW 18.7 (H) 11.5 - 14.5 %    Platelets 329 150 - 450 K/uL    MPV 8.5 (L) 9.2 - 12.9 fL    Immature Granulocytes 0.9 (H) 0.0 - 0.5 %    Gran # (ANC) 11.6 (H) 1.8 - 7.7 K/uL    Immature Grans (Abs) 0.13 (H) 0.00 - 0.04 K/uL    Lymph # 1.6 1.0 - 4.8 K/uL    Mono # 0.8 0.3 - 1.0 K/uL    Eos # 0.0 0.0 - 0.5 K/uL    Baso # 0.09 0.00 - 0.20 K/uL    nRBC 0 0 /100 WBC    Gran % 81.3 (H) 38.0 - 73.0 %    Lymph % 11.2 (L) 18.0 - 48.0 %    Mono % 5.8 4.0 - 15.0 %    Eosinophil % 0.2 0.0 - 8.0 %    Basophil % 0.6 0.0 - 1.9 %    Differential Method Automated    Comprehensive metabolic panel   Result Value Ref Range    Sodium 137 136 - 145 mmol/L    Potassium 5.1 3.5 - 5.1 mmol/L    Chloride 106 95 - 110 mmol/L    CO2 19 (L) 23 - 29 mmol/L    Glucose 85 70 - 110 mg/dL    BUN 14 6 - 20 mg/dL    Creatinine 1.5 (H) 0.5 - 1.4 mg/dL    Calcium 8.5 (L) 8.7 - 10.5 mg/dL    Total Protein 7.5 6.0 - 8.4 g/dL    Albumin 2.3 (L) 3.5 - 5.2 g/dL    Total Bilirubin 0.4 0.1 - 1.0 mg/dL    Alkaline Phosphatase 129 55  - 135 U/L    AST 16 10 - 40 U/L    ALT 6 (L) 10 - 44 U/L    Anion Gap 12 8 - 16 mmol/L    eGFR 42 (A) >60 mL/min/1.73 m^2   Lipase   Result Value Ref Range    Lipase 16 4 - 60 U/L     Assessment:       1. Hospital discharge follow-up    2. Generalized anxiety disorder with panic attacks    3. Essential hypertension    4. Heart failure with preserved ejection fraction, unspecified HF chronicity    5. Bilateral leg edema    6. Impaired gait and mobility    7. Deficit in activities of daily living (ADL)    8. CKD (chronic kidney disease) stage 4, GFR 15-29 ml/min        Plan:   Hospital discharge follow-up    Generalized anxiety disorder with panic attacks  -     hydrOXYzine HCL (ATARAX) 25 MG tablet; Take 1 tablet (25 mg total) by mouth 2 (two) times daily as needed for Anxiety.  Dispense: 60 tablet; Refill: 5    Essential hypertension    Heart failure with preserved ejection fraction, unspecified HF chronicity    Bilateral leg edema  -     Ambulatory referral/consult to Physical/Occupational Therapy; Future; Expected date: 10/09/2023    Impaired gait and mobility  -     BATH/SHOWER CHAIR FOR HOME USE  -     WALKER FOR HOME USE  -     Ambulatory referral/consult to Home Health; Future; Expected date: 10/03/2023    Deficit in activities of daily living (ADL)    CKD (chronic kidney disease) stage 4, GFR 15-29 ml/min  Comments:  -needs lab update, wants to go to the ER.      She is crying, diff to follow the story, asking for readmission into the hospital, ok to go to ER for further eval and stat lab. Meanwhile, will work on walker and the things she should have to help in the house.    I have reviewed all of the patient's clinical history available in care everywhere and Epic and have utilized this in my evaluation and management recommendations today.      Treatment options and alternatives were discussed with the patient. Patient was given ample time to ask questions. All questions were answered. Voices understanding  and acceptance of this advice. Will call back if any further questions or concerns.              Daphney Heart MD  [facility] [place]

## 2023-10-03 ENCOUNTER — TELEPHONE (OUTPATIENT)
Dept: PRIMARY CARE CLINIC | Facility: CLINIC | Age: 53
End: 2023-10-03
Payer: MEDICAID

## 2023-10-03 DIAGNOSIS — M79.605 BILATERAL LEG PAIN: ICD-10-CM

## 2023-10-03 DIAGNOSIS — M79.671 BILATERAL LEG AND FOOT PAIN: Primary | ICD-10-CM

## 2023-10-03 DIAGNOSIS — M79.605 BILATERAL LEG AND FOOT PAIN: Primary | ICD-10-CM

## 2023-10-03 DIAGNOSIS — M79.604 BILATERAL LEG PAIN: ICD-10-CM

## 2023-10-03 DIAGNOSIS — M79.604 BILATERAL LEG AND FOOT PAIN: Primary | ICD-10-CM

## 2023-10-03 DIAGNOSIS — M79.672 BILATERAL LEG AND FOOT PAIN: Primary | ICD-10-CM

## 2023-10-03 RX ORDER — CYCLOBENZAPRINE HCL 10 MG
10 TABLET ORAL 3 TIMES DAILY PRN
Qty: 90 TABLET | Refills: 0 | Status: SHIPPED | OUTPATIENT
Start: 2023-10-03 | End: 2023-11-02

## 2023-10-03 NOTE — TELEPHONE ENCOUNTER
Returned the call patient requesting gabapentin and promethazine.        ----- Message from Jm Mary sent at 10/3/2023 11:15 AM CDT -----  Contact: [phone]  [email or web address]     Patient is calling for Medical Advice regarding: meds was never called in she was seen yesterday     How long has patient had these symptoms:    Pharmacy name and phone#:  [facility] #[id number] - [address] & [address] [address]  Phone: [phone] Fax: [phone]        Would like response via [employer]t:  call back     Comments:    Pt wants a call back about her meds

## 2023-10-05 ENCOUNTER — HOSPITAL ENCOUNTER (EMERGENCY)
Facility: OTHER | Age: 53
Discharge: LEFT BEFORE DISCHARGE | End: 2023-10-05
Attending: EMERGENCY MEDICINE
Payer: MEDICARE

## 2023-10-05 VITALS
RESPIRATION RATE: 16 BRPM | BODY MASS INDEX: 19.7 KG/M2 | HEIGHT: 68 IN | SYSTOLIC BLOOD PRESSURE: 118 MMHG | TEMPERATURE: 98 F | HEART RATE: 101 BPM | DIASTOLIC BLOOD PRESSURE: 53 MMHG | OXYGEN SATURATION: 98 % | WEIGHT: 130 LBS

## 2023-10-05 DIAGNOSIS — R60.0 BILATERAL LEG EDEMA: Primary | ICD-10-CM

## 2023-10-05 PROCEDURE — 99281 EMR DPT VST MAYX REQ PHY/QHP: CPT

## 2023-10-06 NOTE — ED NOTES
"After Provider Dr. Thakkar's walked out of patient room. Patient pointed middle finger at RN. "Stay out of my business.". Patient then left unit. Dr. Thakkar notified.  "

## 2023-10-06 NOTE — ED PROVIDER NOTES
Encounter Date: 10/5/2023    SCRIBE #1 NOTE: I, Margret Sy, am scribing for, and in the presence of,  Molly Thakkar MD.       History     Chief Complaint   Patient presents with    Foot Swelling     Reports bilateral feet swelling, onset last Tuesday.      Joe Ceballos is a 53 y.o. female, with a PMHx of HTN, CHF, who presents to the ED for evaluation of bilateral lower leg swelling onset several weeks ago which acutely worsened yesterday. Pt was seen on 10/3/23 at [facility] for similar complaints with full workup and unremarkable results. She states she was admitted to [facility] earlier last month on 9/10 for 2 weeks for urinary retention and started on Lasix. Her Lasix was increased to 60 mg x2 daily 2 days ago although she states she has been taking this once daily.  PT states she has not been able to ambulate secondary to pain. Associated symptoms include nausea, shortness of breath, and dizziness which were also onset yesterday. No chest pain. This is the extent of the patient's complaints at this time.    The history is provided by the patient and medical records.     Review of patient's allergies indicates:   Allergen Reactions    Lisinopril Anaphylaxis     This is only possible agent at the time.  This is only possible agent at the time.  This is only possible agent at the time.    Sulfa (sulfonamide antibiotics) Anaphylaxis and Hives    Beta-blockers (beta-adrenergic blocking agts)      Other reaction(s): Other (See Comments)  Syncope & Collapse     Past Medical History:   Diagnosis Date    Anxiety     CHF (congestive heart failure)     Edema     Hypertension     Initial insomnia     Sickle cell trait     Stroke     Tobacco use      Past Surgical History:   Procedure Laterality Date    BREAST SURGERY       SECTION      x 2    CYST REMOVAL      from left tube    ESOPHAGOGASTRODUODENOSCOPY N/A 3/12/2023    Procedure: EGD (ESOPHAGOGASTRODUODENOSCOPY);  Surgeon: Ray MITCHELL  MD Rojelio;  Location: [facility];  Service: Gastroenterology;  Laterality: N/A;    gastric sleeve      Raheel-en-Y gastric bypass  02/022017    TONSILLECTOMY       Family History   Adopted: Yes   Problem Relation Age of Onset    Hypertension Mother     Thyroid disease Paternal Aunt     Hypertension Paternal Grandmother     Breast cancer Neg Hx     Colon cancer Neg Hx     Ovarian cancer Neg Hx      Social History     Tobacco Use    Smoking status: Every Day     Current packs/day: 0.25     Types: Cigarettes    Smokeless tobacco: Never   Substance Use Topics    Alcohol use: Yes    Drug use: No     Review of Systems   Constitutional:  Negative for chills and fever.   HENT:  Negative for congestion and sore throat.    Eyes:  Negative for visual disturbance.   Respiratory:  Positive for shortness of breath. Negative for cough.    Cardiovascular:  Positive for leg swelling. Negative for chest pain and palpitations.   Gastrointestinal:  Positive for nausea. Negative for abdominal pain, diarrhea and vomiting.   Genitourinary:  Negative for decreased urine volume, dysuria and vaginal discharge.   Musculoskeletal:  Negative for joint swelling, neck pain and neck stiffness.   Skin:  Negative for rash and wound.   Neurological:  Positive for dizziness. Negative for weakness, numbness and headaches.   Psychiatric/Behavioral:  Negative for confusion.        Physical Exam     Initial Vitals [10/05/23 2157]   BP Pulse Resp Temp SpO2   (!) 118/53 101 16 98.1 °F (36.7 °C) 98 %      MAP       --         Physical Exam    Constitutional: She appears well-developed and well-nourished. She does not have a sickly appearance. No distress.   HENT:   Head: Normocephalic and atraumatic.   Right Ear: External ear normal.   Left Ear: External ear normal.   Eyes: Conjunctivae, EOM and lids are normal. Right eye exhibits no discharge. Left eye exhibits no discharge. Right conjunctiva is not injected. Right conjunctiva has no hemorrhage. Left  conjunctiva is not injected. Left conjunctiva has no hemorrhage. No scleral icterus.   Neck: Phonation normal. No stridor present. No tracheal deviation present.   JVD is present.   Normal range of motion.  Cardiovascular:  Normal rate, regular rhythm and normal heart sounds.     Exam reveals no friction rub.       No murmur heard.  Pulses:       Radial pulses are 2+ on the right side and 2+ on the left side.        Dorsalis pedis pulses are 2+ on the right side and 2+ on the left side.   Pulmonary/Chest: Breath sounds normal. No respiratory distress. She has no wheezes. She has no rhonchi. She has no rales.   Musculoskeletal:         General: Edema present.      Cervical back: Normal range of motion.      Comments: 2+ pitting edema to bilateral lower extremities below the knees.     Neurological: She is alert and oriented to person, place, and time. She has normal strength. GCS eye subscore is 4. GCS verbal subscore is 5. GCS motor subscore is 6.   Skin: Skin is warm.   Psychiatric: She has a normal mood and affect. Her speech is normal and behavior is normal. Judgment and thought content normal. Cognition and memory are normal.         ED Course   Procedures  Labs Reviewed - No data to display       Imaging Results    None          Medications - No data to display  Medical Decision Making  Amount and/or Complexity of Data Reviewed  External Data Reviewed: notes.            Scribe Attestation:   Scribe #1: I performed the above scribed service and the documentation accurately describes the services I performed. I attest to the accuracy of the note.      I, Molly Thakkar  , personally performed the services described in this documentation. All medical record entries made by the scribe were at my direction and in my presence. I have reviewed the chart and agree that the record reflects my personal performance and is accurate and complete.      ED Course as of 10/05/23 2222   u Oct 05, 2023   2212 52 y/o F with h/ HF  with EF of 55-60% presented with c/o progressive BLE edema despite complaince with lasix.  She also reported nausea, dizziness and dyspnea.  On exam she appeared in no resp distress and had clear lungs but did have 2+ pitting edema of BLE.  Per the hx provided by the patient, she has not been taking the lasix as prescribed during her ED visit 2 days ago at [facility].  The patient became defensive when I pointed this out.  She then slapped my hands away when I tried to examiner her legs.  I d/w her the plan of care which included CXR, labs and IV lasix.  As I was placing the orders the nurse informed me that the patient had eloped - less than 15 min after being placed in the room. [AA]      ED Course User Index  [AA] Molly Thakkar MD                    Clinical Impression:   Final diagnoses:  [R60.0] Bilateral leg edema (Primary)        ED Disposition Condition    Eloped Stable                Molly Thakkar MD  10/05/23 0221

## 2023-10-12 ENCOUNTER — TELEPHONE (OUTPATIENT)
Dept: PRIMARY CARE CLINIC | Facility: CLINIC | Age: 53
End: 2023-10-12
Payer: MEDICAID

## 2023-10-13 ENCOUNTER — DOCUMENTATION ONLY (OUTPATIENT)
Dept: REHABILITATION | Facility: HOSPITAL | Age: 53
End: 2023-10-13

## 2023-10-13 ENCOUNTER — TELEPHONE (OUTPATIENT)
Dept: PRIMARY CARE CLINIC | Facility: CLINIC | Age: 53
End: 2023-10-13
Payer: MEDICAID

## 2023-10-13 NOTE — TELEPHONE ENCOUNTER
----- Message from Annita Marcus sent at 10/13/2023 10:32 AM CDT -----  Contact: Pt [phone]  Patient is requesting a call back due to her appt needing to be rescheduled.  She cannot wait until November to see Dr Heart.    Please call and advise.    Thank You

## 2023-10-16 ENCOUNTER — DOCUMENTATION ONLY (OUTPATIENT)
Dept: REHABILITATION | Facility: HOSPITAL | Age: 53
End: 2023-10-16

## 2023-10-16 NOTE — PROGRESS NOTES
Pt did not show for NEW Physical Therapy Evaluation 10/16/23 200pm.    Visit was rescheduled from 10/13/23

## 2023-11-09 ENCOUNTER — OFFICE VISIT (OUTPATIENT)
Dept: PRIMARY CARE CLINIC | Facility: CLINIC | Age: 53
End: 2023-11-09
Payer: MEDICAID

## 2023-11-09 VITALS
HEIGHT: 68 IN | SYSTOLIC BLOOD PRESSURE: 128 MMHG | HEART RATE: 97 BPM | WEIGHT: 117.38 LBS | BODY MASS INDEX: 17.79 KG/M2 | TEMPERATURE: 99 F | DIASTOLIC BLOOD PRESSURE: 88 MMHG | OXYGEN SATURATION: 97 %

## 2023-11-09 DIAGNOSIS — M79.672 BILATERAL LEG AND FOOT PAIN: ICD-10-CM

## 2023-11-09 DIAGNOSIS — M79.604 BILATERAL LEG AND FOOT PAIN: ICD-10-CM

## 2023-11-09 DIAGNOSIS — R26.89 IMPAIRED GAIT AND MOBILITY: ICD-10-CM

## 2023-11-09 DIAGNOSIS — M79.605 BILATERAL LEG AND FOOT PAIN: ICD-10-CM

## 2023-11-09 DIAGNOSIS — R53.1 GENERALIZED WEAKNESS: ICD-10-CM

## 2023-11-09 DIAGNOSIS — M79.671 BILATERAL LEG AND FOOT PAIN: ICD-10-CM

## 2023-11-09 DIAGNOSIS — N18.4 CKD (CHRONIC KIDNEY DISEASE) STAGE 4, GFR 15-29 ML/MIN: ICD-10-CM

## 2023-11-09 DIAGNOSIS — Z09 HOSPITAL DISCHARGE FOLLOW-UP: ICD-10-CM

## 2023-11-09 DIAGNOSIS — J18.9 PNEUMONIA DUE TO INFECTIOUS ORGANISM, UNSPECIFIED LATERALITY, UNSPECIFIED PART OF LUNG: Primary | ICD-10-CM

## 2023-11-09 PROCEDURE — 1159F MED LIST DOCD IN RCRD: CPT | Mod: CPTII,,, | Performed by: NURSE PRACTITIONER

## 2023-11-09 PROCEDURE — 4010F PR ACE/ARB THEARPY RXD/TAKEN: ICD-10-PCS | Mod: CPTII,,, | Performed by: NURSE PRACTITIONER

## 2023-11-09 PROCEDURE — 99215 OFFICE O/P EST HI 40 MIN: CPT | Mod: PBBFAC,PN | Performed by: NURSE PRACTITIONER

## 2023-11-09 PROCEDURE — 3008F PR BODY MASS INDEX (BMI) DOCUMENTED: ICD-10-PCS | Mod: CPTII,,, | Performed by: NURSE PRACTITIONER

## 2023-11-09 PROCEDURE — 99999 PR PBB SHADOW E&M-EST. PATIENT-LVL V: ICD-10-PCS | Mod: PBBFAC,,, | Performed by: NURSE PRACTITIONER

## 2023-11-09 PROCEDURE — 3008F BODY MASS INDEX DOCD: CPT | Mod: CPTII,,, | Performed by: NURSE PRACTITIONER

## 2023-11-09 PROCEDURE — 99999 PR PBB SHADOW E&M-EST. PATIENT-LVL V: CPT | Mod: PBBFAC,,, | Performed by: NURSE PRACTITIONER

## 2023-11-09 PROCEDURE — 3079F DIAST BP 80-89 MM HG: CPT | Mod: CPTII,,, | Performed by: NURSE PRACTITIONER

## 2023-11-09 PROCEDURE — 99214 PR OFFICE/OUTPT VISIT, EST, LEVL IV, 30-39 MIN: ICD-10-PCS | Mod: S$PBB,,, | Performed by: NURSE PRACTITIONER

## 2023-11-09 PROCEDURE — 3074F PR MOST RECENT SYSTOLIC BLOOD PRESSURE < 130 MM HG: ICD-10-PCS | Mod: CPTII,,, | Performed by: NURSE PRACTITIONER

## 2023-11-09 PROCEDURE — 4010F ACE/ARB THERAPY RXD/TAKEN: CPT | Mod: CPTII,,, | Performed by: NURSE PRACTITIONER

## 2023-11-09 PROCEDURE — 3074F SYST BP LT 130 MM HG: CPT | Mod: CPTII,,, | Performed by: NURSE PRACTITIONER

## 2023-11-09 PROCEDURE — 1160F RVW MEDS BY RX/DR IN RCRD: CPT | Mod: CPTII,,, | Performed by: NURSE PRACTITIONER

## 2023-11-09 PROCEDURE — 1159F PR MEDICATION LIST DOCUMENTED IN MEDICAL RECORD: ICD-10-PCS | Mod: CPTII,,, | Performed by: NURSE PRACTITIONER

## 2023-11-09 PROCEDURE — 1160F PR REVIEW ALL MEDS BY PRESCRIBER/CLIN PHARMACIST DOCUMENTED: ICD-10-PCS | Mod: CPTII,,, | Performed by: NURSE PRACTITIONER

## 2023-11-09 PROCEDURE — 99214 OFFICE O/P EST MOD 30 MIN: CPT | Mod: S$PBB,,, | Performed by: NURSE PRACTITIONER

## 2023-11-09 PROCEDURE — 3079F PR MOST RECENT DIASTOLIC BLOOD PRESSURE 80-89 MM HG: ICD-10-PCS | Mod: CPTII,,, | Performed by: NURSE PRACTITIONER

## 2023-11-09 NOTE — PROGRESS NOTES
"Subjective:      Patient ID: Joe Ceballos is a 53 y.o. female.    Chief Complaint: Follow-up (Hospital f/u appt  weakness dizziness and) and Leg Pain (Trouble walking )    /88 (BP Location: Left arm, Patient Position: Sitting, BP Method: Medium (Manual))   Pulse 97   Temp 98.9 °F (37.2 °C) (Oral)   Ht 5' 8" (1.727 m)   Wt 53.3 kg (117 lb 6.4 oz)   SpO2 97%   BMI 17.85 kg/m²     54 y/o female presents for follow up after ER discharge for pneumonia on 11/5/2023. PMH of HTN, CHF, CKD, Anxiety and Stroke. Says she felt weak and was unable to cough up mucous. Was prescribed abx and nausea medication but still has not filled the prescription. Says the ER suggested she follow up with a kidney doctor as well. Still c/o bilateral leg edema and left leg weakness. Says she just wants to "get myself together". Requesting a referral to PT.     Follow-up  Pertinent negatives include no chest pain, chills, congestion, coughing, fever, headaches, nausea, sore throat or vomiting.   Leg Pain         Review of patient's allergies indicates:   Allergen Reactions    Lisinopril Anaphylaxis     This is only possible agent at the time.  This is only possible agent at the time.  This is only possible agent at the time.    Sulfa (sulfonamide antibiotics) Anaphylaxis and Hives    Beta-blockers (beta-adrenergic blocking agts)      Other reaction(s): Other (See Comments)  Syncope & Collapse        Review of Systems   Constitutional:  Negative for chills and fever.   HENT:  Negative for congestion and sore throat.    Respiratory:  Negative for cough and shortness of breath.    Cardiovascular:  Negative for chest pain and palpitations.   Gastrointestinal:  Negative for nausea and vomiting.   Genitourinary: Negative.    Skin: Negative.    Neurological:  Negative for headaches.      Objective:      Physical Exam  Vitals reviewed.   Constitutional:       Appearance: She is well-developed.   HENT:      Head: Normocephalic and " atraumatic.      Right Ear: External ear normal.      Left Ear: External ear normal.      Nose: Nose normal. No mucosal edema or rhinorrhea.      Mouth/Throat:      Mouth: Mucous membranes are moist.      Pharynx: Uvula midline.   Eyes:      General: Lids are normal. Gaze aligned appropriately.      Extraocular Movements: Extraocular movements intact.      Conjunctiva/sclera: Conjunctivae normal.   Cardiovascular:      Rate and Rhythm: Normal rate and regular rhythm.      Heart sounds: Normal heart sounds.   Pulmonary:      Effort: Pulmonary effort is normal.      Breath sounds: Normal breath sounds. No decreased breath sounds.   Musculoskeletal:         General: Normal range of motion.      Cervical back: Normal range of motion and neck supple.      Right lower leg: Edema present.      Left lower leg: Edema present.      Comments: Excoriated skin noted to bilateral feet   Lymphadenopathy:      Cervical: No cervical adenopathy.   Skin:     General: Skin is warm and dry.      Capillary Refill: Capillary refill takes less than 2 seconds.      Coloration: Skin is not cyanotic or pale.   Neurological:      Mental Status: She is alert and oriented to person, place, and time.      Cranial Nerves: No cranial nerve deficit.   Psychiatric:         Attention and Perception: Attention normal.         Mood and Affect: Mood normal.         Speech: Speech normal.         Behavior: Behavior normal.         Thought Content: Thought content normal.         Cognition and Memory: Cognition normal.         LABS REVIEWED  Admission on 10/02/2023, Discharged on 10/02/2023   Component Date Value Ref Range Status    WBC 10/02/2023 8.80  3.90 - 12.70 K/uL Final    RBC 10/02/2023 2.43 (L)  4.00 - 5.40 M/uL Final    Hemoglobin 10/02/2023 8.1 (L)  12.0 - 16.0 g/dL Final    Hematocrit 10/02/2023 23.5 (L)  37.0 - 48.5 % Final    MCV 10/02/2023 97  82 - 98 fL Final    MCH 10/02/2023 33.3 (H)  27.0 - 31.0 pg Final    MCHC 10/02/2023 34.5  32.0 -  36.0 g/dL Final    RDW 10/02/2023 15.3 (H)  11.5 - 14.5 % Final    Platelets 10/02/2023 285  150 - 450 K/uL Final    MPV 10/02/2023 9.5  9.2 - 12.9 fL Final    Immature Granulocytes 10/02/2023 0.2  0.0 - 0.5 % Final    Gran # (ANC) 10/02/2023 6.8  1.8 - 7.7 K/uL Final    Immature Grans (Abs) 10/02/2023 0.02  0.00 - 0.04 K/uL Final    Lymph # 10/02/2023 1.4  1.0 - 4.8 K/uL Final    Mono # 10/02/2023 0.5  0.3 - 1.0 K/uL Final    Eos # 10/02/2023 0.0  0.0 - 0.5 K/uL Final    Baso # 10/02/2023 0.06  0.00 - 0.20 K/uL Final    nRBC 10/02/2023 0  0 /100 WBC Final    Gran % 10/02/2023 76.9 (H)  38.0 - 73.0 % Final    Lymph % 10/02/2023 16.4 (L)  18.0 - 48.0 % Final    Mono % 10/02/2023 5.5  4.0 - 15.0 % Final    Eosinophil % 10/02/2023 0.3  0.0 - 8.0 % Final    Basophil % 10/02/2023 0.7  0.0 - 1.9 % Final    Differential Method 10/02/2023 Automated   Final    Sodium 10/02/2023 146 (H)  136 - 145 mmol/L Final    Potassium 10/02/2023 3.5  3.5 - 5.1 mmol/L Final    Chloride 10/02/2023 117 (H)  95 - 110 mmol/L Final    CO2 10/02/2023 17 (L)  23 - 29 mmol/L Final    Glucose 10/02/2023 100  70 - 110 mg/dL Final    BUN 10/02/2023 24 (H)  6 - 20 mg/dL Final    Creatinine 10/02/2023 1.9 (H)  0.5 - 1.4 mg/dL Final    Calcium 10/02/2023 8.1 (L)  8.7 - 10.5 mg/dL Final    Total Protein 10/02/2023 6.1  6.0 - 8.4 g/dL Final    Albumin 10/02/2023 2.9 (L)  3.5 - 5.2 g/dL Final    Total Bilirubin 10/02/2023 0.5  0.1 - 1.0 mg/dL Final    Alkaline Phosphatase 10/02/2023 135  55 - 135 U/L Final    AST 10/02/2023 18  10 - 40 U/L Final    ALT 10/02/2023 18  10 - 44 U/L Final    eGFR 10/02/2023 31 (A)  >60 mL/min/1.73 m^2 Final    Anion Gap 10/02/2023 12  8 - 16 mmol/L Final    Troponin I 10/02/2023 0.018  0.000 - 0.026 ng/mL Final    BNP 10/02/2023 683 (H)  0 - 99 pg/mL Final      Assessment:       1. Pneumonia due to infectious organism, unspecified laterality, unspecified part of lung    2. Bilateral leg and foot pain    3. CKD (chronic kidney  disease) stage 4, GFR 15-29 ml/min    4. Hospital discharge follow-up    5. Impaired gait and mobility    6. Generalized weakness          Plan:     Pneumonia due to infectious organism, unspecified laterality, unspecified part of lung    Bilateral leg and foot pain    CKD (chronic kidney disease) stage 4, GFR 15-29 ml/min  -     Ambulatory referral/consult to Nephrology; Future; Expected date: 11/16/2023    Hospital discharge follow-up    Impaired gait and mobility    Generalized weakness    Chart reviewed.  Orders placed on 10/2/23 for PT, walker, and bath/shower chair.  Informed pt that she had two PT appts she did not show up for last month. She needs to reach out to the facility to reschedule.   Referral placed to nephrology for Stage 4 CKD.  Advised pt to get access to patient portal.    Treatment options and alternatives discussed with patient and all questions/concerns addressed. Pt verbalizes understanding and is agreeable with current treatment plan.

## 2023-11-09 NOTE — PATIENT INSTRUCTIONS
Reach out to [facility] to reschedule your physical therapy appointment.  Take all medication as prescribed.

## 2023-12-13 ENCOUNTER — HOSPITAL ENCOUNTER (INPATIENT)
Facility: HOSPITAL | Age: 53
LOS: 2 days | Discharge: HOME HEALTH | DRG: 683 | End: 2023-12-15
Attending: EMERGENCY MEDICINE | Admitting: INTERNAL MEDICINE
Payer: MEDICARE

## 2023-12-13 DIAGNOSIS — N17.9 AKI (ACUTE KIDNEY INJURY): Primary | ICD-10-CM

## 2023-12-13 DIAGNOSIS — N17.9 ACUTE KIDNEY INJURY SUPERIMPOSED ON CHRONIC KIDNEY DISEASE: ICD-10-CM

## 2023-12-13 DIAGNOSIS — R07.9 PAIN IN THE CHEST: ICD-10-CM

## 2023-12-13 DIAGNOSIS — R11.2 NAUSEA AND VOMITING, UNSPECIFIED VOMITING TYPE: ICD-10-CM

## 2023-12-13 DIAGNOSIS — R07.9 CHEST PAIN: ICD-10-CM

## 2023-12-13 DIAGNOSIS — R10.9 ABDOMINAL PAIN: ICD-10-CM

## 2023-12-13 DIAGNOSIS — N18.9 ACUTE KIDNEY INJURY SUPERIMPOSED ON CHRONIC KIDNEY DISEASE: ICD-10-CM

## 2023-12-13 DIAGNOSIS — E86.0 DEHYDRATION: ICD-10-CM

## 2023-12-13 PROBLEM — R64 CACHEXIA: Status: ACTIVE | Noted: 2023-12-13

## 2023-12-13 PROBLEM — R10.84 GENERALIZED ABDOMINAL PAIN: Status: ACTIVE | Noted: 2023-12-13

## 2023-12-13 PROBLEM — R19.7 DIARRHEA: Status: ACTIVE | Noted: 2023-12-13

## 2023-12-13 LAB
ALBUMIN SERPL BCP-MCNC: 2.3 G/DL (ref 3.5–5.2)
ALP SERPL-CCNC: 173 U/L (ref 55–135)
ALT SERPL W/O P-5'-P-CCNC: 12 U/L (ref 10–44)
ANION GAP SERPL CALC-SCNC: 10 MMOL/L (ref 8–16)
AST SERPL-CCNC: 16 U/L (ref 10–40)
BACTERIA #/AREA URNS HPF: ABNORMAL /HPF
BASOPHILS # BLD AUTO: 0.04 K/UL (ref 0–0.2)
BASOPHILS NFR BLD: 0.7 % (ref 0–1.9)
BILIRUB SERPL-MCNC: 0.5 MG/DL (ref 0.1–1)
BILIRUB UR QL STRIP: NEGATIVE
BUN SERPL-MCNC: 35 MG/DL (ref 6–20)
CALCIUM SERPL-MCNC: 8.2 MG/DL (ref 8.7–10.5)
CHLORIDE SERPL-SCNC: 110 MMOL/L (ref 95–110)
CLARITY UR: CLEAR
CO2 SERPL-SCNC: 22 MMOL/L (ref 23–29)
COLOR UR: YELLOW
CREAT SERPL-MCNC: 4 MG/DL (ref 0.5–1.4)
DIFFERENTIAL METHOD: ABNORMAL
EOSINOPHIL # BLD AUTO: 0 K/UL (ref 0–0.5)
EOSINOPHIL NFR BLD: 0.4 % (ref 0–8)
ERYTHROCYTE [DISTWIDTH] IN BLOOD BY AUTOMATED COUNT: 14.2 % (ref 11.5–14.5)
EST. GFR  (NO RACE VARIABLE): 13 ML/MIN/1.73 M^2
GLUCOSE SERPL-MCNC: 83 MG/DL (ref 70–110)
GLUCOSE UR QL STRIP: NEGATIVE
HCT VFR BLD AUTO: 29.3 % (ref 37–48.5)
HCV AB SERPL QL IA: NEGATIVE
HEP C VIRUS HOLD SPECIMEN: NORMAL
HGB BLD-MCNC: 10.2 G/DL (ref 12–16)
HGB UR QL STRIP: NEGATIVE
HYALINE CASTS #/AREA URNS LPF: 5 /LPF
IMM GRANULOCYTES # BLD AUTO: 0.01 K/UL (ref 0–0.04)
IMM GRANULOCYTES NFR BLD AUTO: 0.2 % (ref 0–0.5)
KETONES UR QL STRIP: NEGATIVE
LACTATE SERPL-SCNC: 1.1 MMOL/L (ref 0.5–2.2)
LEUKOCYTE ESTERASE UR QL STRIP: NEGATIVE
LIPASE SERPL-CCNC: 21 U/L (ref 4–60)
LYMPHOCYTES # BLD AUTO: 1.1 K/UL (ref 1–4.8)
LYMPHOCYTES NFR BLD: 19.5 % (ref 18–48)
MCH RBC QN AUTO: 33.7 PG (ref 27–31)
MCHC RBC AUTO-ENTMCNC: 34.8 G/DL (ref 32–36)
MCV RBC AUTO: 97 FL (ref 82–98)
MICROSCOPIC COMMENT: ABNORMAL
MONOCYTES # BLD AUTO: 0.2 K/UL (ref 0.3–1)
MONOCYTES NFR BLD: 3.2 % (ref 4–15)
NEUTROPHILS # BLD AUTO: 4.3 K/UL (ref 1.8–7.7)
NEUTROPHILS NFR BLD: 76 % (ref 38–73)
NITRITE UR QL STRIP: NEGATIVE
NRBC BLD-RTO: 0 /100 WBC
PH UR STRIP: 5 [PH] (ref 5–8)
PLATELET # BLD AUTO: 168 K/UL (ref 150–450)
PMV BLD AUTO: 9.8 FL (ref 9.2–12.9)
POTASSIUM SERPL-SCNC: 3.5 MMOL/L (ref 3.5–5.1)
PROT SERPL-MCNC: 7 G/DL (ref 6–8.4)
PROT UR QL STRIP: ABNORMAL
RBC # BLD AUTO: 3.03 M/UL (ref 4–5.4)
RBC #/AREA URNS HPF: 0 /HPF (ref 0–4)
SODIUM SERPL-SCNC: 142 MMOL/L (ref 136–145)
SP GR UR STRIP: 1.01 (ref 1–1.03)
SQUAMOUS #/AREA URNS HPF: 7 /HPF
UNIDENT CRYS URNS QL MICRO: ABNORMAL
URN SPEC COLLECT METH UR: ABNORMAL
UROBILINOGEN UR STRIP-ACNC: NEGATIVE EU/DL
WBC # BLD AUTO: 5.68 K/UL (ref 3.9–12.7)
WBC #/AREA URNS HPF: 2 /HPF (ref 0–5)

## 2023-12-13 PROCEDURE — 80053 COMPREHEN METABOLIC PANEL: CPT | Performed by: EMERGENCY MEDICINE

## 2023-12-13 PROCEDURE — 96374 THER/PROPH/DIAG INJ IV PUSH: CPT

## 2023-12-13 PROCEDURE — 93005 ELECTROCARDIOGRAM TRACING: CPT

## 2023-12-13 PROCEDURE — 99285 EMERGENCY DEPT VISIT HI MDM: CPT | Mod: 25

## 2023-12-13 PROCEDURE — 21400001 HC TELEMETRY ROOM

## 2023-12-13 PROCEDURE — 25000003 PHARM REV CODE 250: Performed by: INTERNAL MEDICINE

## 2023-12-13 PROCEDURE — 83605 ASSAY OF LACTIC ACID: CPT | Performed by: EMERGENCY MEDICINE

## 2023-12-13 PROCEDURE — 93010 EKG 12-LEAD: ICD-10-PCS | Mod: ,,, | Performed by: INTERNAL MEDICINE

## 2023-12-13 PROCEDURE — 81000 URINALYSIS NONAUTO W/SCOPE: CPT | Performed by: EMERGENCY MEDICINE

## 2023-12-13 PROCEDURE — 63600175 PHARM REV CODE 636 W HCPCS: Performed by: EMERGENCY MEDICINE

## 2023-12-13 PROCEDURE — 99223 1ST HOSP IP/OBS HIGH 75: CPT | Mod: ,,, | Performed by: INTERNAL MEDICINE

## 2023-12-13 PROCEDURE — 83690 ASSAY OF LIPASE: CPT | Performed by: EMERGENCY MEDICINE

## 2023-12-13 PROCEDURE — A9698 NON-RAD CONTRAST MATERIALNOC: HCPCS | Performed by: EMERGENCY MEDICINE

## 2023-12-13 PROCEDURE — 99223 PR INITIAL HOSPITAL CARE,LEVL III: ICD-10-PCS | Mod: ,,, | Performed by: INTERNAL MEDICINE

## 2023-12-13 PROCEDURE — 25000003 PHARM REV CODE 250: Performed by: EMERGENCY MEDICINE

## 2023-12-13 PROCEDURE — 85025 COMPLETE CBC W/AUTO DIFF WBC: CPT | Performed by: EMERGENCY MEDICINE

## 2023-12-13 PROCEDURE — 93010 ELECTROCARDIOGRAM REPORT: CPT | Mod: ,,, | Performed by: INTERNAL MEDICINE

## 2023-12-13 PROCEDURE — 25500020 PHARM REV CODE 255: Performed by: EMERGENCY MEDICINE

## 2023-12-13 PROCEDURE — 27000207 HC ISOLATION

## 2023-12-13 PROCEDURE — 86803 HEPATITIS C AB TEST: CPT | Performed by: EMERGENCY MEDICINE

## 2023-12-13 RX ORDER — ONDANSETRON 2 MG/ML
4 INJECTION INTRAMUSCULAR; INTRAVENOUS
Status: COMPLETED | OUTPATIENT
Start: 2023-12-13 | End: 2023-12-13

## 2023-12-13 RX ORDER — TALC
6 POWDER (GRAM) TOPICAL NIGHTLY PRN
Status: DISCONTINUED | OUTPATIENT
Start: 2023-12-13 | End: 2023-12-15 | Stop reason: HOSPADM

## 2023-12-13 RX ORDER — SODIUM,POTASSIUM PHOSPHATES 280-250MG
2 POWDER IN PACKET (EA) ORAL
Status: DISCONTINUED | OUTPATIENT
Start: 2023-12-13 | End: 2023-12-14

## 2023-12-13 RX ORDER — ACETAMINOPHEN 325 MG/1
650 TABLET ORAL EVERY 4 HOURS PRN
Status: DISCONTINUED | OUTPATIENT
Start: 2023-12-13 | End: 2023-12-15 | Stop reason: HOSPADM

## 2023-12-13 RX ORDER — NALOXONE HCL 0.4 MG/ML
0.02 VIAL (ML) INJECTION
Status: DISCONTINUED | OUTPATIENT
Start: 2023-12-13 | End: 2023-12-15 | Stop reason: HOSPADM

## 2023-12-13 RX ORDER — SODIUM CHLORIDE 0.9 % (FLUSH) 0.9 %
10 SYRINGE (ML) INJECTION
Status: DISCONTINUED | OUTPATIENT
Start: 2023-12-13 | End: 2023-12-15 | Stop reason: HOSPADM

## 2023-12-13 RX ORDER — BISACODYL 10 MG
10 SUPPOSITORY, RECTAL RECTAL DAILY PRN
Status: DISCONTINUED | OUTPATIENT
Start: 2023-12-13 | End: 2023-12-15 | Stop reason: HOSPADM

## 2023-12-13 RX ORDER — FUROSEMIDE 40 MG/1
40 TABLET ORAL 2 TIMES DAILY
COMMUNITY

## 2023-12-13 RX ORDER — LANOLIN ALCOHOL/MO/W.PET/CERES
800 CREAM (GRAM) TOPICAL
Status: DISCONTINUED | OUTPATIENT
Start: 2023-12-13 | End: 2023-12-14

## 2023-12-13 RX ORDER — SIMETHICONE 80 MG
1 TABLET,CHEWABLE ORAL 4 TIMES DAILY PRN
Status: DISCONTINUED | OUTPATIENT
Start: 2023-12-13 | End: 2023-12-15 | Stop reason: HOSPADM

## 2023-12-13 RX ORDER — ONDANSETRON 8 MG/1
8 TABLET, ORALLY DISINTEGRATING ORAL EVERY 8 HOURS PRN
Status: DISCONTINUED | OUTPATIENT
Start: 2023-12-13 | End: 2023-12-15 | Stop reason: HOSPADM

## 2023-12-13 RX ORDER — ISOSORBIDE MONONITRATE 60 MG/1
60 TABLET, EXTENDED RELEASE ORAL DAILY
COMMUNITY

## 2023-12-13 RX ORDER — SODIUM CHLORIDE 9 MG/ML
INJECTION, SOLUTION INTRAVENOUS CONTINUOUS
Status: ACTIVE | OUTPATIENT
Start: 2023-12-13 | End: 2023-12-14

## 2023-12-13 RX ORDER — ONDANSETRON 2 MG/ML
4 INJECTION INTRAMUSCULAR; INTRAVENOUS EVERY 8 HOURS PRN
Status: DISCONTINUED | OUTPATIENT
Start: 2023-12-13 | End: 2023-12-15 | Stop reason: HOSPADM

## 2023-12-13 RX ORDER — MAG HYDROX/ALUMINUM HYD/SIMETH 200-200-20
30 SUSPENSION, ORAL (FINAL DOSE FORM) ORAL 4 TIMES DAILY PRN
Status: DISCONTINUED | OUTPATIENT
Start: 2023-12-13 | End: 2023-12-15 | Stop reason: HOSPADM

## 2023-12-13 RX ADMIN — SODIUM CHLORIDE 1000 ML: 9 INJECTION, SOLUTION INTRAVENOUS at 02:12

## 2023-12-13 RX ADMIN — IOHEXOL 500 ML: 9 SOLUTION ORAL at 03:12

## 2023-12-13 RX ADMIN — ONDANSETRON 4 MG: 2 INJECTION INTRAMUSCULAR; INTRAVENOUS at 02:12

## 2023-12-13 RX ADMIN — SODIUM CHLORIDE: 9 INJECTION, SOLUTION INTRAVENOUS at 08:12

## 2023-12-13 NOTE — ASSESSMENT & PLAN NOTE
Chronic, controlled. Latest blood pressure and vitals reviewed-     Temp:  [98.5 °F (36.9 °C)]   Pulse:  [74]   Resp:  [16]   BP: (128)/(85)   SpO2:  [100 %] .   Home meds for hypertension were reviewed and noted below.   Hypertension Medications               furosemide (LASIX) 40 MG tablet Take 40 mg by mouth 2 (two) times a day.            While in the hospital, will manage blood pressure as follows; Continue home antihypertensive regimen as BP tolerates once home meds has been verified    Will utilize p.r.n. blood pressure medication only if patient's blood pressure greater than 180/110 and she develops symptoms such as worsening chest pain or shortness of breath.

## 2023-12-13 NOTE — ASSESSMENT & PLAN NOTE
Unclear etiology, patient states that this has improved after she took Imodium prior to ED presentation   Stool studies ordered

## 2023-12-13 NOTE — ASSESSMENT & PLAN NOTE
Concern for cachexia as evidenced by BMI less than 20 in the presence of known chronic disease and albumen level less than 3.2. Contributing factors include underlying  unknown . Treatment to include nutrition consult, physical therapy, and occupational therapy.      Body mass index is 14.05 kg/m².  Albumin   Date Value Ref Range Status   12/13/2023 2.3 (L) 3.5 - 5.2 g/dL Final

## 2023-12-13 NOTE — ED PROVIDER NOTES
SCRIBE #1 NOTE: I, Freedom Rosas, am scribing for, and in the presence of, Jonathan Davila MD. I have scribed the HPI, ROS, and PEx.     SCRIBE #2 NOTE: I, Hyacinth Kaye, am scribing for, and in the presence of,  Lena Patel DO. I have scribed the remaining portions of the note not scribed by Scribe #1.     History      Chief Complaint   Patient presents with    Vomiting     With nausea and diarrhea  per EMS; patient given Droperidol 2.5 mg IM prior to arrival to ED       Review of patient's allergies indicates:   Allergen Reactions    Lisinopril Anaphylaxis     This is only possible agent at the time.  This is only possible agent at the time.  This is only possible agent at the time.    Sulfa (sulfonamide antibiotics) Anaphylaxis and Hives    Beta-blockers (beta-adrenergic blocking agts)      Other reaction(s): Other (See Comments)  Syncope & Collapse        HPI   HPI    12/13/2023, 1:47 PM   History obtained from the patient      History of Present Illness: Joe Ceballos is a 53 y.o. female patient with a PMHx of HTN, CHF and an SHx of Raheel-en-Y gastric bypass who presents to the Emergency Department for n/v/d, onset several days PTA. Symptoms are episodic and moderate in severity. No mitigating or exacerbating factors reported. Associated sxs include generalized abdominal pain. Patient denies any fever, chills, SOB, CP, weakness, numbness, dizziness, headache, and all other sxs at this time. Pt was given 2.5 mg Droperidol en route to the ED, but has also been taking Imodium and Pepto-Bismol at home. No further complaints or concerns at this time.    Arrival mode: EMS    PCP: Daphney Heart MD       Past Medical History:  Past Medical History:   Diagnosis Date    Anxiety     CHF (congestive heart failure)     Edema     Hypertension     Initial insomnia     Sickle cell trait     Stroke     Tobacco use        Past Surgical History:  Past Surgical History:   Procedure Laterality Date    BREAST SURGERY        SECTION      x 2    CYST REMOVAL      from left tube    ESOPHAGOGASTRODUODENOSCOPY N/A 3/12/2023    Procedure: EGD (ESOPHAGOGASTRODUODENOSCOPY);  Surgeon: Ray Serrato MD;  Location: [facility];  Service: Gastroenterology;  Laterality: N/A;    gastric sleeve      Raheel-en-Y gastric bypass      TONSILLECTOMY           Family History:  Family History   Adopted: Yes   Problem Relation Age of Onset    Hypertension Mother     No Known Problems Father     Thyroid disease Paternal Aunt     Hypertension Paternal Grandmother     Breast cancer Neg Hx     Colon cancer Neg Hx     Ovarian cancer Neg Hx        Social History:  Social History     Tobacco Use    Smoking status: Every Day     Current packs/day: 0.25     Types: Cigarettes    Smokeless tobacco: Never   Substance and Sexual Activity    Alcohol use: Yes    Drug use: No    Sexual activity: Not Currently     Partners: Male     Birth control/protection: None       ROS   Review of Systems   Constitutional:  Negative for chills and fever.   HENT:  Negative for sore throat.    Respiratory:  Negative for shortness of breath.    Cardiovascular:  Negative for chest pain.   Gastrointestinal:  Positive for abdominal pain (generalized), diarrhea, nausea and vomiting.   Genitourinary:  Negative for dysuria.   Musculoskeletal:  Negative for back pain.   Skin:  Negative for rash.   Neurological:  Negative for dizziness, weakness, numbness and headaches.   Hematological:  Does not bruise/bleed easily.   All other systems reviewed and are negative.    Physical Exam      Initial Vitals [23 1256]   BP Pulse Resp Temp SpO2   128/85 74 16 98.5 °F (36.9 °C) 100 %      MAP       --          Physical Exam  Nursing Notes and Vital Signs Reviewed.  Constitutional: Patient is in no acute distress. Well-developed and well-nourished.  Head: Atraumatic. Normocephalic.  Eyes: PERRL. EOM intact. Conjunctivae are not pale. No scleral icterus.  ENT: Mucous membranes are  moist. Oropharynx is clear and symmetric.    Neck: Supple. Full ROM.   Cardiovascular: Regular rate. Regular rhythm. No murmurs, rubs, or gallops. Distal pulses are 2+ and symmetric.  Pulmonary/Chest: No respiratory distress. Clear to auscultation bilaterally. No wheezing or rales.  Abdominal: Soft and non-distended.  There is diffuse tenderness. Prominent aortic pulsation. No rebound, guarding, or rigidity. Slightly decreased bowel sounds.  Musculoskeletal: Moves all extremities. No obvious deformities. No edema.   Skin: Warm and dry.  Neurological:  Alert, awake, and appropriate.  Normal speech.  No acute focal neurological deficits are appreciated.  Psychiatric: Normal affect. Good eye contact. Appropriate in content.    ED Course    Critical Care    Date/Time: 12/13/2023 4:32 PM    Performed by: Jonathan Davila MD  Authorized by: Jonathan Davila MD  Direct patient critical care time: 15 minutes  Additional history critical care time: 10 minutes  Ordering / reviewing critical care time: 10 minutes  Documentation critical care time: 10 minutes  Consulting other physicians critical care time: 5 minutes  Total critical care time (exclusive of procedural time) : 50 minutes  Critical care was necessary to treat or prevent imminent or life-threatening deterioration of the following conditions: renal failure.  Critical care was time spent personally by me on the following activities: discussions with primary provider, development of treatment plan with patient or surrogate, interpretation of cardiac output measurements, evaluation of patient's response to treatment, examination of patient, obtaining history from patient or surrogate, ordering and performing treatments and interventions, ordering and review of laboratory studies, ordering and review of radiographic studies, re-evaluation of patient's condition and review of old charts.        ED Vital Signs:  Vitals:    12/13/23 1256 12/13/23 1403   BP: 128/85    Pulse: 74   "  Resp: 16    Temp: 98.5 °F (36.9 °C)    TempSrc: Oral    SpO2: 100%    Weight:  41.9 kg (92 lb 6 oz)   Height: 5' 8" (1.727 m)        Abnormal Lab Results:  Labs Reviewed   CBC W/ AUTO DIFFERENTIAL - Abnormal; Notable for the following components:       Result Value    RBC 3.03 (*)     Hemoglobin 10.2 (*)     Hematocrit 29.3 (*)     MCH 33.7 (*)     Mono # 0.2 (*)     Gran % 76.0 (*)     Mono % 3.2 (*)     All other components within normal limits   COMPREHENSIVE METABOLIC PANEL - Abnormal; Notable for the following components:    CO2 22 (*)     BUN 35 (*)     Creatinine 4.0 (*)     Calcium 8.2 (*)     Albumin 2.3 (*)     Alkaline Phosphatase 173 (*)     eGFR 13 (*)     All other components within normal limits   HEPATITIS C ANTIBODY    Narrative:     Release to patient->Immediate   HEP C VIRUS HOLD SPECIMEN    Narrative:     Release to patient->Immediate   LIPASE   LACTIC ACID, PLASMA   URINALYSIS, REFLEX TO URINE CULTURE        All Lab Results:  Results for orders placed or performed during the hospital encounter of 12/13/23   Hepatitis C Antibody   Result Value Ref Range    Hepatitis C Ab Negative Negative   HCV Virus Hold Specimen   Result Value Ref Range    HEP C Virus Hold Specimen Hold for HCV sendout    CBC W/ AUTO DIFFERENTIAL   Result Value Ref Range    WBC 5.68 3.90 - 12.70 K/uL    RBC 3.03 (L) 4.00 - 5.40 M/uL    Hemoglobin 10.2 (L) 12.0 - 16.0 g/dL    Hematocrit 29.3 (L) 37.0 - 48.5 %    MCV 97 82 - 98 fL    MCH 33.7 (H) 27.0 - 31.0 pg    MCHC 34.8 32.0 - 36.0 g/dL    RDW 14.2 11.5 - 14.5 %    Platelets 168 150 - 450 K/uL    MPV 9.8 9.2 - 12.9 fL    Immature Granulocytes 0.2 0.0 - 0.5 %    Gran # (ANC) 4.3 1.8 - 7.7 K/uL    Immature Grans (Abs) 0.01 0.00 - 0.04 K/uL    Lymph # 1.1 1.0 - 4.8 K/uL    Mono # 0.2 (L) 0.3 - 1.0 K/uL    Eos # 0.0 0.0 - 0.5 K/uL    Baso # 0.04 0.00 - 0.20 K/uL    nRBC 0 0 /100 WBC    Gran % 76.0 (H) 38.0 - 73.0 %    Lymph % 19.5 18.0 - 48.0 %    Mono % 3.2 (L) 4.0 - 15.0 %    " Eosinophil % 0.4 0.0 - 8.0 %    Basophil % 0.7 0.0 - 1.9 %    Differential Method Automated    Comp. Metabolic Panel   Result Value Ref Range    Sodium 142 136 - 145 mmol/L    Potassium 3.5 3.5 - 5.1 mmol/L    Chloride 110 95 - 110 mmol/L    CO2 22 (L) 23 - 29 mmol/L    Glucose 83 70 - 110 mg/dL    BUN 35 (H) 6 - 20 mg/dL    Creatinine 4.0 (H) 0.5 - 1.4 mg/dL    Calcium 8.2 (L) 8.7 - 10.5 mg/dL    Total Protein 7.0 6.0 - 8.4 g/dL    Albumin 2.3 (L) 3.5 - 5.2 g/dL    Total Bilirubin 0.5 0.1 - 1.0 mg/dL    Alkaline Phosphatase 173 (H) 55 - 135 U/L    AST 16 10 - 40 U/L    ALT 12 10 - 44 U/L    eGFR 13 (A) >60 mL/min/1.73 m^2    Anion Gap 10 8 - 16 mmol/L   Lipase   Result Value Ref Range    Lipase 21 4 - 60 U/L   Lactic acid, plasma   Result Value Ref Range    Lactate (Lactic Acid) 1.1 0.5 - 2.2 mmol/L     Imaging Results:  Imaging Results              CT Abdomen Pelvis  Without Contrast (Final result)  Result time 12/13/23 15:51:22      Final result by Iesha Romo MD (12/13/23 15:51:22)                   Impression:      No obstructive bowel findings, small volume ascites, anasarca and other findings detailed above degraded imaging extensive artifact      Electronically signed by: Iesha Romo  Date:    12/13/2023  Time:    15:51               Narrative:    EXAMINATION:  CT ABDOMEN PELVIS WITHOUT CONTRAST    CLINICAL HISTORY:  Bowel obstruction suspected;    TECHNIQUE:  Low dose axial images, sagittal and coronal reformations were obtained from the lung bases to the pubic symphysis.  Contrast was not administered.    COMPARISON:  June 2023    FINDINGS:  Left basilar dependent atelectasis/pleural effusion redemonstrated    Unenhanced liver and spleen stable size.  Gallstones versus sludge.    Pancreas stable unenhanced    Adrenal glands similar enlargement with associated calcification    Kidneys without hydronephrosis    No obstructive bowel findings.  Epigastric surgical change redemonstrated.  Small  volume ascites.    Left lower quadrant peritoneal circumscribed cystic or low-density lesion similar to prior 4-5 cm    Urinary bladder decompressed    Anasarca present                                     The EKG was ordered, reviewed, and independently interpreted by the ED provider.  Interpretation time: 14:38  Rate: 67 BPM  Rhythm: normal sinus rhythm  Interpretation: Nonspecific ST and T wave abnormalities. ST depression inferolaterally, new when compared to previous EKG on 1/21/23. No STEMI.           The Emergency Provider reviewed the vital signs and test results, which are outlined above.    ED Discussion         4:36 PM I reviewed the case with Dr. Peña with Hospital Medicine and they agree with the current management.   will handle all additional orders    Observation to med/surg for Dr. Zelaya      ED Course as of 12/13/23 1633   Wed Dec 13, 2023   1617 Hemoglobin(!): 10.2 [JASIEL]   1617 Hematocrit(!): 29.3 [JASIEL]   1617 BUN(!): 35 [JASIEL]   1617 Creatinine(!): 4.0 [JASIEL]   1617 Potassium: 3.5 [JASIEL]   1618 Lipase: 21 [JASIEL]   1618 Lactate, Gerald: 1.1     [JASIEL]      ED Course User Index  [JASIEL] Jonathan Davila MD       ED Medication(s):  Medications   sodium chloride 0.9% bolus 1,000 mL 1,000 mL (1,000 mLs Intravenous New Bag 12/13/23 1416)   ondansetron injection 4 mg (4 mg Intravenous Given 12/13/23 1418)   iohexoL (OMNIPAQUE 9) oral solution 500 mL (500 mLs Oral Given 12/13/23 1530)         New Prescriptions    No medications on file         Medical Decision Making    Medical Decision Making  Problems Addressed:  Abdominal pain: acute illness or injury that poses a threat to life or bodily functions  AMY (acute kidney injury): acute illness or injury that poses a threat to life or bodily functions  Dehydration: acute illness or injury that poses a threat to life or bodily functions  Nausea and vomiting, unspecified vomiting type: acute illness or injury that poses a threat to life or bodily functions    Amount  "and/or Complexity of Data Reviewed  External Data Reviewed: notes.     Details: Echo performed at [facility] on 7/4/23 was normal, with an EF of 55-65%.  Labs: ordered. Decision-making details documented in ED Course.  Radiology: ordered. Decision-making details documented in ED Course.     Details: No free air or obstruction noted  ECG/medicine tests: ordered and independent interpretation performed. Decision-making details documented in ED Course.     Details: No STEMI  Discussion of management or test interpretation with external provider(s): I discussed the case with Hospital Medicine and they agree with the current management-they will handle all additional orders    Risk  Prescription drug management.  Decision regarding hospitalization.  Diagnosis or treatment significantly limited by social determinants of health.  Risk Details: Differential diagnosis includes obstruction/internal hernia, perforation, colitis, liver gallbladder pathology, pancreatitis, AAA, ACS, pyelonephritis, etc.    53 y.o. female patient with a PMHx of Hx of GI bleed, anemia, Hx Gastric Bypass Sx, CHF, HTN, sickle cell trait and stroke who presents to the Emergency Department for evaluation of abdominal pain, nausea, vomiting, and diarrhea.  She was diffusely tender with no acute abdomen and with her history of gastric bypass I was concerned about internal hernia etc..  Patient was slightly acidotic and BUN/creatinine 35/4 which is well above her baseline (potassium was within normal limits at 3.5).  Serum lactate was 1.1 and CBC was unremarkable from her baseline.  CT of the abdomen/pelvis  Impression:  "No obstructive bowel findings, small volume ascites, anasarca and other findings detailed above degraded imaging extensive artifact." She feels slightly improved here in the emergency department after some Zofran and IV fluids but I feel she needs further observation, IV fluids, antiemetics, serial abdominal exams and repeat labs. "       Critical Care  Total time providing critical care: 50 minutes                Scribe Attestation:   Scribe #1: I performed the above scribed service and the documentation accurately describes the services I performed. I attest to the accuracy of the note.    Attending:   Physician Attestation Statement for Scribe #1: I, Jonathan Davila MD, personally performed the services described in this documentation, as scribed by Freedom Rosas, in my presence, and it is both accurate and complete.         Clinical Impression       ICD-10-CM ICD-9-CM   1. AMY (acute kidney injury)  N17.9 584.9   2. Abdominal pain  R10.9 789.00   3. Nausea and vomiting, unspecified vomiting type  R11.2 787.01   4. Dehydration  E86.0 276.51       Disposition:   Disposition: Placed in Observation  Condition: Stable         Jonathan Davila MD  12/13/23 0944

## 2023-12-13 NOTE — HPI
Joe Ceballos is a 53 y.o. female with anxiety, CHF, Hypertension, Sickle cell trait, history of stroke who presented to ED on 12/13/2023 with complaints of weakness, abdominal pain, nausea, vomiting, diarrhea.  Patient is poor historian, reports that she is been having weakness some time since her last PCP follow-up in November.  Several days ago started having diarrhea, nausea and vomiting.  Diarrhea better after she took some Imodium.  States that she took other over-the-counter medication, however unable to recall what they were.  Admits that she is had significant decrease in urine output as well as weight loss.    Workup in ED significant for BUN/creatinine 35/4.0. CT of abdomen and pelvis which showed no obstructive bowel findings, small volume ascites, anasarca.  Placed in observation on hospital medicine service for further management.      PCP: Daphney Heart MD    SDM:  AuntFrancine    CODE STATUS:  Full code

## 2023-12-13 NOTE — H&P
[facility] - Emergency Dept.  Hospital Medicine  History & Physical    Patient Name: Joe Ceballos  MRN: [id number]  Patient Class: OP- Observation  Admission Date: 2023  Attending Physician: Maureen Zelaya DO   Primary Care Provider: Daphney Heart MD         Patient information was obtained from patient, past medical records, and ER records.     Subjective:     Principal Problem:Acute kidney injury superimposed on chronic kidney disease    Chief Complaint:   Chief Complaint   Patient presents with    Vomiting     With nausea and diarrhea  per EMS; patient given Droperidol 2.5 mg IM prior to arrival to ED        HPI: Joe Ceballos is a 53 y.o. female with anxiety, CHF, Hypertension, Sickle cell trait, history of stroke who presented to ED on 2023 with complaints of weakness, abdominal pain, nausea, vomiting, diarrhea.  Patient is poor historian, reports that she is been having weakness some time since her last PCP follow-up in November.  Several days ago started having diarrhea, nausea and vomiting.  Diarrhea better after she took some Imodium.  States that she took other over-the-counter medication, however unable to recall what they were.  Admits that she is had significant decrease in urine output as well as weight loss.    Workup in ED significant for BUN/creatinine 35/4.0. CT of abdomen and pelvis which showed no obstructive bowel findings, small volume ascites, anasarca.  Placed in observation on hospital medicine service for further management.      PCP: Daphney Heart MD    SDM:  Francine Rosales    CODE STATUS:  Full code      Past Medical History:   Diagnosis Date    Anxiety     CHF (congestive heart failure)     Edema     Hypertension     Initial insomnia     Sickle cell trait     Stroke     Tobacco use        Past Surgical History:   Procedure Laterality Date    BREAST SURGERY       SECTION      x 2    CYST REMOVAL      from left tube    ESOPHAGOGASTRODUODENOSCOPY N/A 3/12/2023     Procedure: EGD (ESOPHAGOGASTRODUODENOSCOPY);  Surgeon: Ray Serrato MD;  Location: [facility];  Service: Gastroenterology;  Laterality: N/A;    gastric sleeve      Raheel-en-Y gastric bypass  02/022017    TONSILLECTOMY         Review of patient's allergies indicates:   Allergen Reactions    Lisinopril Anaphylaxis     This is only possible agent at the time.  This is only possible agent at the time.  This is only possible agent at the time.    Sulfa (sulfonamide antibiotics) Anaphylaxis and Hives    Beta-blockers (beta-adrenergic blocking agts)      Other reaction(s): Other (See Comments)  Syncope & Collapse       No current facility-administered medications on file prior to encounter.     Current Outpatient Medications on File Prior to Encounter   Medication Sig    atorvastatin (LIPITOR) 80 MG tablet Take 1 tablet (80 mg total) by mouth every evening.    cyclobenzaprine HCl (FLEXERIL ORAL) Take 10 mg by mouth.    furosemide (LASIX) 40 MG tablet Take 40 mg by mouth 2 (two) times a day.    hydrOXYzine HCL (ATARAX) 25 MG tablet Take 1 tablet (25 mg total) by mouth 2 (two) times daily as needed for Anxiety.    hydrOXYzine pamoate (VISTARIL) 25 MG Cap Take 25 mg by mouth.    omeprazole (PRILOSEC) 40 MG capsule Take 1 capsule (40 mg total) by mouth once daily.    ondansetron (ZOFRAN) 4 MG tablet Take 1 tablet (4 mg total) by mouth every 6 (six) hours.    traMADoL (ULTRAM) 50 mg tablet Take 1 tablet (50 mg total) by mouth every 6 (six) hours as needed.     Family History       Problem Relation (Age of Onset)    Hypertension Mother, Paternal Grandmother    No Known Problems Father    Thyroid disease Paternal Aunt          Tobacco Use    Smoking status: Former     Types: Cigarettes    Smokeless tobacco: Never   Substance and Sexual Activity    Alcohol use: Yes    Drug use: No    Sexual activity: Not Currently     Partners: Male     Birth control/protection: None     Review of Systems   Constitutional:  Positive for  activity change, appetite change and unexpected weight change (Over the past couple of weeks). Negative for fever.   Respiratory:  Negative for shortness of breath and wheezing.    Cardiovascular:  Positive for leg swelling (Improved compared to baseline). Negative for chest pain.   Gastrointestinal:  Positive for abdominal pain, diarrhea (Better after she took Imodium), nausea and vomiting. Negative for abdominal distention and constipation.   Genitourinary:  Positive for decreased urine volume.   Neurological:  Positive for weakness. Negative for dizziness.   All other systems reviewed and are negative.    Objective:     Vital Signs (Most Recent):  Temp: 98.5 °F (36.9 °C) (12/13/23 1256)  Pulse: 74 (12/13/23 1256)  Resp: 16 (12/13/23 1256)  BP: 128/85 (12/13/23 1256)  SpO2: 100 % (12/13/23 1256) Vital Signs (24h Range):  Temp:  [98.5 °F (36.9 °C)] 98.5 °F (36.9 °C)  Pulse:  [74] 74  Resp:  [16] 16  SpO2:  [100 %] 100 %  BP: (128)/(85) 128/85     Weight: 41.9 kg (92 lb 6 oz)  Body mass index is 14.05 kg/m².     Physical Exam  Vitals and nursing note reviewed.   Constitutional:       General: She is not in acute distress.     Appearance: She is cachectic. She is ill-appearing. She is not toxic-appearing or diaphoretic.   HENT:      Head: Atraumatic.      Right Ear: External ear normal.      Left Ear: External ear normal.      Nose: Nose normal.      Mouth/Throat:      Mouth: Mucous membranes are moist.   Eyes:      Pupils: Pupils are equal, round, and reactive to light.   Cardiovascular:      Rate and Rhythm: Normal rate and regular rhythm.   Pulmonary:      Effort: Pulmonary effort is normal. No accessory muscle usage or respiratory distress.      Breath sounds: No wheezing or rhonchi.      Comments: On room air  Abdominal:      General: There is no distension.      Palpations: Abdomen is soft.      Tenderness: There is no abdominal tenderness. There is no guarding or rebound.   Musculoskeletal:      Cervical back:  Neck supple.      Right lower leg: Pitting Edema present.      Left lower leg: Pitting Edema present.   Skin:     General: Skin is dry.   Neurological:      General: No focal deficit present.      Mental Status: She is alert and oriented to person, place, and time.   Psychiatric:         Mood and Affect: Affect is flat.         Behavior: Behavior is cooperative.              CRANIAL NERVES     CN III, IV, VI   Pupils are equal, round, and reactive to light.       Significant Labs: All pertinent labs within the past 24 hours have been reviewed.  CBC:   Recent Labs   Lab 12/13/23  1402   WBC 5.68   HGB 10.2*   HCT 29.3*        CMP:   Recent Labs   Lab 12/13/23  1402      K 3.5      CO2 22*   GLU 83   BUN 35*   CREATININE 4.0*   CALCIUM 8.2*   PROT 7.0   ALBUMIN 2.3*   BILITOT 0.5   ALKPHOS 173*   AST 16   ALT 12   ANIONGAP 10       Significant Imaging: I have reviewed all pertinent imaging results/findings within the past 24 hours.  Assessment/Plan:     * Acute kidney injury superimposed on chronic kidney disease  Patient with acute kidney injury/acute renal failure likely due to pre-renal azotemia due to IVVD AMY is currently  noted with creatinine of 4.0 . Baseline creatinine  unclear, appears to be around 2  - Labs reviewed- Renal function/electrolytes with Estimated Creatinine Clearance: 10.9 mL/min (A) (based on SCr of 4 mg/dL (H)). according to latest data. Monitor urine output and serial BMP and adjust therapy as needed. Avoid nephrotoxins and renally dose meds for GFR listed above.    Urine studies ordered  Nephrology consulted    Cachexia  Concern for cachexia as evidenced by BMI less than 20 in the presence of known chronic disease and albumen level less than 3.2. Contributing factors include underlying  unknown . Treatment to include nutrition consult, physical therapy, and occupational therapy.      Body mass index is 14.05 kg/m².  Albumin   Date Value Ref Range Status   12/13/2023 2.3  (L) 3.5 - 5.2 g/dL Final         Generalized abdominal pain  Patient reported that she is been having abdominal pain, nausea, vomiting and diarrhea for several days  CT of abdomen pelvis done contrast due to ongoing AMY showed no obstructive bowel findings, small volume ascites, anasarca although images were degraded by extensive motion artifact   Stool studies ordered  Antiemetics p.r.n.      Diarrhea  Unclear etiology, patient states that this has improved after she took Imodium prior to ED presentation   Stool studies ordered      (HFpEF) heart failure with preserved ejection fraction  Reports that she has been been taking her fluid pill  Hold home diuretics in setting AMY  Check BNP  Strict Is&Os      Primary hypertension  Chronic, controlled. Latest blood pressure and vitals reviewed-     Temp:  [98.5 °F (36.9 °C)]   Pulse:  [74]   Resp:  [16]   BP: (128)/(85)   SpO2:  [100 %] .   Home meds for hypertension were reviewed and noted below.   Hypertension Medications               furosemide (LASIX) 40 MG tablet Take 40 mg by mouth 2 (two) times a day.            While in the hospital, will manage blood pressure as follows; Continue home antihypertensive regimen as BP tolerates once home meds has been verified    Will utilize p.r.n. blood pressure medication only if patient's blood pressure greater than 180/110 and she develops symptoms such as worsening chest pain or shortness of breath.      VTE Risk Mitigation (From admission, onward)           Ordered     IP VTE LOW RISK PATIENT  Once         12/13/23 1652     Place sequential compression device  Until discontinued         12/13/23 1652                       On 12/13/2023, patient should be placed in hospital observation services under my care.             Maureen Zelaya DO  Department of Hospital Medicine  [facility] - Emergency Dept.

## 2023-12-13 NOTE — Clinical Note
Diagnosis: AMY (acute kidney injury) [846780]   Future Attending Provider: BIRDIE VERA [[id number]]   Admitting Provider:: BIRDIE VERA [[id number]]

## 2023-12-13 NOTE — ASSESSMENT & PLAN NOTE
Patient reported that she is been having abdominal pain, nausea, vomiting and diarrhea for several days  CT of abdomen pelvis done contrast due to ongoing AMY showed no obstructive bowel findings, small volume ascites, anasarca although images were degraded by extensive motion artifact   Stool studies ordered  Antiemetics p.r.n.

## 2023-12-13 NOTE — ASSESSMENT & PLAN NOTE
Reports that she has been been taking her fluid pill  Hold home diuretics in setting AMY  Check BNP  Strict Is&Os

## 2023-12-13 NOTE — ASSESSMENT & PLAN NOTE
Patient with acute kidney injury/acute renal failure likely due to pre-renal azotemia due to IVVD AMY is currently  noted with creatinine of 4.0 . Baseline creatinine  unclear, appears to be around 2  - Labs reviewed- Renal function/electrolytes with Estimated Creatinine Clearance: 10.9 mL/min (A) (based on SCr of 4 mg/dL (H)). according to latest data. Monitor urine output and serial BMP and adjust therapy as needed. Avoid nephrotoxins and renally dose meds for GFR listed above.    Urine studies ordered  Nephrology consulted

## 2023-12-13 NOTE — ED NOTES
"Pt ambulatory to restroom to attempt urine collection. Pt unable to void at this time. Pt states "I feel under pressure". Pt refusing to be cathed. Pt wants something to drink. Attending aware of situation.  "

## 2023-12-13 NOTE — SUBJECTIVE & OBJECTIVE
Past Medical History:   Diagnosis Date    Anxiety     CHF (congestive heart failure)     Edema     Hypertension     Initial insomnia     Sickle cell trait     Stroke     Tobacco use        Past Surgical History:   Procedure Laterality Date    BREAST SURGERY       SECTION      x 2    CYST REMOVAL      from left tube    ESOPHAGOGASTRODUODENOSCOPY N/A 3/12/2023    Procedure: EGD (ESOPHAGOGASTRODUODENOSCOPY);  Surgeon: Ray Serrato MD;  Location: [facility];  Service: Gastroenterology;  Laterality: N/A;    gastric sleeve      Raheel-en-Y gastric bypass      TONSILLECTOMY         Review of patient's allergies indicates:   Allergen Reactions    Lisinopril Anaphylaxis     This is only possible agent at the time.  This is only possible agent at the time.  This is only possible agent at the time.    Sulfa (sulfonamide antibiotics) Anaphylaxis and Hives    Beta-blockers (beta-adrenergic blocking agts)      Other reaction(s): Other (See Comments)  Syncope & Collapse       No current facility-administered medications on file prior to encounter.     Current Outpatient Medications on File Prior to Encounter   Medication Sig    atorvastatin (LIPITOR) 80 MG tablet Take 1 tablet (80 mg total) by mouth every evening.    cyclobenzaprine HCl (FLEXERIL ORAL) Take 10 mg by mouth.    furosemide (LASIX) 40 MG tablet Take 40 mg by mouth 2 (two) times a day.    hydrOXYzine HCL (ATARAX) 25 MG tablet Take 1 tablet (25 mg total) by mouth 2 (two) times daily as needed for Anxiety.    hydrOXYzine pamoate (VISTARIL) 25 MG Cap Take 25 mg by mouth.    omeprazole (PRILOSEC) 40 MG capsule Take 1 capsule (40 mg total) by mouth once daily.    ondansetron (ZOFRAN) 4 MG tablet Take 1 tablet (4 mg total) by mouth every 6 (six) hours.    traMADoL (ULTRAM) 50 mg tablet Take 1 tablet (50 mg total) by mouth every 6 (six) hours as needed.     Family History       Problem Relation (Age of Onset)    Hypertension Mother, Paternal Grandmother     No Known Problems Father    Thyroid disease Paternal Aunt          Tobacco Use    Smoking status: Former     Types: Cigarettes    Smokeless tobacco: Never   Substance and Sexual Activity    Alcohol use: Yes    Drug use: No    Sexual activity: Not Currently     Partners: Male     Birth control/protection: None     Review of Systems   Constitutional:  Positive for activity change, appetite change and unexpected weight change (Over the past couple of weeks). Negative for fever.   Respiratory:  Negative for shortness of breath and wheezing.    Cardiovascular:  Positive for leg swelling (Improved compared to baseline). Negative for chest pain.   Gastrointestinal:  Positive for abdominal pain, diarrhea (Better after she took Imodium), nausea and vomiting. Negative for abdominal distention and constipation.   Genitourinary:  Positive for decreased urine volume.   Neurological:  Positive for weakness. Negative for dizziness.   All other systems reviewed and are negative.    Objective:     Vital Signs (Most Recent):  Temp: 98.5 °F (36.9 °C) (12/13/23 1256)  Pulse: 74 (12/13/23 1256)  Resp: 16 (12/13/23 1256)  BP: 128/85 (12/13/23 1256)  SpO2: 100 % (12/13/23 1256) Vital Signs (24h Range):  Temp:  [98.5 °F (36.9 °C)] 98.5 °F (36.9 °C)  Pulse:  [74] 74  Resp:  [16] 16  SpO2:  [100 %] 100 %  BP: (128)/(85) 128/85     Weight: 41.9 kg (92 lb 6 oz)  Body mass index is 14.05 kg/m².     Physical Exam  Vitals and nursing note reviewed.   Constitutional:       General: She is not in acute distress.     Appearance: She is cachectic. She is ill-appearing. She is not toxic-appearing or diaphoretic.   HENT:      Head: Atraumatic.      Right Ear: External ear normal.      Left Ear: External ear normal.      Nose: Nose normal.      Mouth/Throat:      Mouth: Mucous membranes are moist.   Eyes:      Pupils: Pupils are equal, round, and reactive to light.   Cardiovascular:      Rate and Rhythm: Normal rate and regular rhythm.   Pulmonary:       Effort: Pulmonary effort is normal. No accessory muscle usage or respiratory distress.      Breath sounds: No wheezing or rhonchi.      Comments: On room air  Abdominal:      General: There is no distension.      Palpations: Abdomen is soft.      Tenderness: There is no abdominal tenderness. There is no guarding or rebound.   Musculoskeletal:      Cervical back: Neck supple.      Right lower leg: Pitting Edema present.      Left lower leg: Pitting Edema present.   Skin:     General: Skin is dry.   Neurological:      General: No focal deficit present.      Mental Status: She is alert and oriented to person, place, and time.   Psychiatric:         Mood and Affect: Affect is flat.         Behavior: Behavior is cooperative.              CRANIAL NERVES     CN III, IV, VI   Pupils are equal, round, and reactive to light.       Significant Labs: All pertinent labs within the past 24 hours have been reviewed.  CBC:   Recent Labs   Lab 12/13/23  1402   WBC 5.68   HGB 10.2*   HCT 29.3*        CMP:   Recent Labs   Lab 12/13/23  1402      K 3.5      CO2 22*   GLU 83   BUN 35*   CREATININE 4.0*   CALCIUM 8.2*   PROT 7.0   ALBUMIN 2.3*   BILITOT 0.5   ALKPHOS 173*   AST 16   ALT 12   ANIONGAP 10       Significant Imaging: I have reviewed all pertinent imaging results/findings within the past 24 hours.

## 2023-12-14 PROBLEM — E44.0 MALNUTRITION OF MODERATE DEGREE: Status: ACTIVE | Noted: 2023-12-14

## 2023-12-14 LAB
ALBUMIN SERPL BCP-MCNC: 1.6 G/DL (ref 3.5–5.2)
ALP SERPL-CCNC: 118 U/L (ref 55–135)
ALT SERPL W/O P-5'-P-CCNC: 9 U/L (ref 10–44)
ANION GAP SERPL CALC-SCNC: 10 MMOL/L (ref 8–16)
AST SERPL-CCNC: 11 U/L (ref 10–40)
BASOPHILS # BLD AUTO: 0.04 K/UL (ref 0–0.2)
BASOPHILS NFR BLD: 0.8 % (ref 0–1.9)
BILIRUB SERPL-MCNC: 0.4 MG/DL (ref 0.1–1)
BUN SERPL-MCNC: 32 MG/DL (ref 6–20)
CALCIUM SERPL-MCNC: 7.2 MG/DL (ref 8.7–10.5)
CHLORIDE SERPL-SCNC: 114 MMOL/L (ref 95–110)
CO2 SERPL-SCNC: 18 MMOL/L (ref 23–29)
CREAT SERPL-MCNC: 3.5 MG/DL (ref 0.5–1.4)
DIFFERENTIAL METHOD: ABNORMAL
EOSINOPHIL # BLD AUTO: 0.1 K/UL (ref 0–0.5)
EOSINOPHIL NFR BLD: 1.5 % (ref 0–8)
ERYTHROCYTE [DISTWIDTH] IN BLOOD BY AUTOMATED COUNT: 13.9 % (ref 11.5–14.5)
EST. GFR  (NO RACE VARIABLE): 15 ML/MIN/1.73 M^2
GLUCOSE SERPL-MCNC: 70 MG/DL (ref 70–110)
HCT VFR BLD AUTO: 21.2 % (ref 37–48.5)
HGB BLD-MCNC: 7.4 G/DL (ref 12–16)
IMM GRANULOCYTES # BLD AUTO: 0.01 K/UL (ref 0–0.04)
IMM GRANULOCYTES NFR BLD AUTO: 0.2 % (ref 0–0.5)
LYMPHOCYTES # BLD AUTO: 1.3 K/UL (ref 1–4.8)
LYMPHOCYTES NFR BLD: 27 % (ref 18–48)
MAGNESIUM SERPL-MCNC: 2 MG/DL (ref 1.6–2.6)
MCH RBC QN AUTO: 33.6 PG (ref 27–31)
MCHC RBC AUTO-ENTMCNC: 34.9 G/DL (ref 32–36)
MCV RBC AUTO: 96 FL (ref 82–98)
MONOCYTES # BLD AUTO: 0.3 K/UL (ref 0.3–1)
MONOCYTES NFR BLD: 6.8 % (ref 4–15)
NEUTROPHILS # BLD AUTO: 3 K/UL (ref 1.8–7.7)
NEUTROPHILS NFR BLD: 63.7 % (ref 38–73)
NRBC BLD-RTO: 0 /100 WBC
PLATELET # BLD AUTO: 120 K/UL (ref 150–450)
PMV BLD AUTO: 10.4 FL (ref 9.2–12.9)
POTASSIUM SERPL-SCNC: 3.3 MMOL/L (ref 3.5–5.1)
PROT SERPL-MCNC: 4.8 G/DL (ref 6–8.4)
RBC # BLD AUTO: 2.2 M/UL (ref 4–5.4)
SODIUM SERPL-SCNC: 142 MMOL/L (ref 136–145)
TROPONIN I SERPL DL<=0.01 NG/ML-MCNC: <0.006 NG/ML (ref 0–0.03)
WBC # BLD AUTO: 4.71 K/UL (ref 3.9–12.7)

## 2023-12-14 PROCEDURE — 93005 ELECTROCARDIOGRAM TRACING: CPT

## 2023-12-14 PROCEDURE — 85025 COMPLETE CBC W/AUTO DIFF WBC: CPT | Performed by: INTERNAL MEDICINE

## 2023-12-14 PROCEDURE — 80053 COMPREHEN METABOLIC PANEL: CPT | Performed by: INTERNAL MEDICINE

## 2023-12-14 PROCEDURE — 25000003 PHARM REV CODE 250: Performed by: NURSE PRACTITIONER

## 2023-12-14 PROCEDURE — 21400001 HC TELEMETRY ROOM

## 2023-12-14 PROCEDURE — 84484 ASSAY OF TROPONIN QUANT: CPT | Performed by: NURSE PRACTITIONER

## 2023-12-14 PROCEDURE — 99233 SBSQ HOSP IP/OBS HIGH 50: CPT | Mod: ,,, | Performed by: INTERNAL MEDICINE

## 2023-12-14 PROCEDURE — 93010 ELECTROCARDIOGRAM REPORT: CPT | Mod: ,,, | Performed by: INTERNAL MEDICINE

## 2023-12-14 PROCEDURE — 83735 ASSAY OF MAGNESIUM: CPT | Performed by: INTERNAL MEDICINE

## 2023-12-14 PROCEDURE — 25000003 PHARM REV CODE 250: Performed by: INTERNAL MEDICINE

## 2023-12-14 PROCEDURE — 93010 EKG 12-LEAD: ICD-10-PCS | Mod: ,,, | Performed by: INTERNAL MEDICINE

## 2023-12-14 PROCEDURE — 36415 COLL VENOUS BLD VENIPUNCTURE: CPT | Performed by: INTERNAL MEDICINE

## 2023-12-14 PROCEDURE — 25000003 PHARM REV CODE 250: Performed by: STUDENT IN AN ORGANIZED HEALTH CARE EDUCATION/TRAINING PROGRAM

## 2023-12-14 PROCEDURE — 99233 PR SUBSEQUENT HOSPITAL CARE,LEVL III: ICD-10-PCS | Mod: ,,, | Performed by: INTERNAL MEDICINE

## 2023-12-14 RX ORDER — HYDROXYZINE HYDROCHLORIDE 25 MG/1
25 TABLET, FILM COATED ORAL 3 TIMES DAILY PRN
Status: DISCONTINUED | OUTPATIENT
Start: 2023-12-14 | End: 2023-12-15 | Stop reason: HOSPADM

## 2023-12-14 RX ORDER — HYDROCODONE BITARTRATE AND ACETAMINOPHEN 5; 325 MG/1; MG/1
1 TABLET ORAL EVERY 6 HOURS PRN
Status: DISCONTINUED | OUTPATIENT
Start: 2023-12-14 | End: 2023-12-15 | Stop reason: HOSPADM

## 2023-12-14 RX ORDER — POTASSIUM CHLORIDE 20 MEQ/1
40 TABLET, EXTENDED RELEASE ORAL ONCE
Status: COMPLETED | OUTPATIENT
Start: 2023-12-14 | End: 2023-12-14

## 2023-12-14 RX ADMIN — ONDANSETRON 8 MG: 8 TABLET, ORALLY DISINTEGRATING ORAL at 03:12

## 2023-12-14 RX ADMIN — POTASSIUM CHLORIDE 40 MEQ: 1500 TABLET, EXTENDED RELEASE ORAL at 10:12

## 2023-12-14 RX ADMIN — HYDROCODONE BITARTRATE AND ACETAMINOPHEN 1 TABLET: 5; 325 TABLET ORAL at 08:12

## 2023-12-14 RX ADMIN — MELATONIN TAB 3 MG 6 MG: 3 TAB at 08:12

## 2023-12-14 RX ADMIN — HYDROXYZINE HYDROCHLORIDE 25 MG: 25 TABLET ORAL at 09:12

## 2023-12-14 NOTE — ASSESSMENT & PLAN NOTE
Malnutrition Type:  Context: chronic illness  Level: moderate    Related to (etiology):   Alteration in GI tract    Signs and Symptoms (as evidenced by):   Diarrhea, Underweight with loss of muscle    Malnutrition Characteristic Summary:  Weight Loss (Malnutrition): greater than 10% in 6 months  Muscle Mass (Malnutrition): moderate depletion      Interventions/Recommendations (treatment strategy):  1. Commercial Beverage  2. Commercial Medical Food  3. Collaboration with other providers.      Nutrition Diagnosis Status:   New

## 2023-12-14 NOTE — ASSESSMENT & PLAN NOTE
Chronic  Likely complication of past gastric bypass  Noted has lost almost 200 lbs! Since gastric bypass in 2016  Pt likely is malnourished

## 2023-12-14 NOTE — NURSING
Received awake and alert, resp even and unlabored, transferred self to bed. Assessment per flowsheet, denies any pain or discomfort at this time. Plan of care discussed, pt verbalizes understanding. Bed low, call light within reach. Will continue to monitor.

## 2023-12-14 NOTE — CONSULTS
[facility] - Telemetry ([facility])  Adult Nutrition  Consult Note    SUMMARY     Recommendations  1. Recommend pt continues Cardiac Isolation Tray - Styrofoam.   2. Recommend pt continues to receive Boost breeze TID.   3. NFPE performed during RD follow up if appropriate.   4. Recommend pt receive Banatrol plus TID   5. weigh twice weekly.    Goals:   1. Pt will continue to consume >75% of EEN/EPN prior to RD follow up.   2. NFPE will be preformed during RD follow up.   3. Pt diarrhea will improve prior to RD follow up  Nutrition Goal Status: new  Communication of RD Recs: reviewed with RN (POC: Sticky Note)    Assessment and Plan    Endocrine  Malnutrition of moderate degree  Malnutrition Type:  Context: chronic illness  Level: moderate    Related to (etiology):   Alteration in GI tract    Signs and Symptoms (as evidenced by):   Diarrhea, Underweight with loss of muscle    Malnutrition Characteristic Summary:  Weight Loss (Malnutrition): greater than 10% in 6 months  Muscle Mass (Malnutrition): moderate depletion      Interventions/Recommendations (treatment strategy):  1. Commercial Beverage  2. Commercial Medical Food  3. Collaboration with other providers.      Nutrition Diagnosis Status:   New         Malnutrition Assessment  Malnutrition Context: chronic illness  Malnutrition Level: moderate  Skin (Micronutrient):  (Tang = 19)       Weight Loss (Malnutrition): greater than 10% in 6 months  Muscle Mass (Malnutrition): moderate depletion       Clavicle Bone Region (Muscle Loss): moderate depletion (Prominent bone)                 Reason for Assessment    Reason For Assessment: consult  Diagnosis: renal disease  Relevant Medical History: AMY, HTN, HF, Diarrhea, Generalized abdominal pain, Cachexia  Interdisciplinary Rounds: did not attend  General Information Comments:   12/14: 54 y/o female admitted with active principal problem of AMY on CKD. Pt states her appetite is good. However, she is not able to tolerate  "heavy, greasy, fatty foods. Pt reports consuming x3 meals daily. Pt provided brief diet history consisting of increased carbohydrate meals including melons, yogurts, Peanut butter and jelly and more. NFPE not performed per pt on special contact Isostation. Pt s/p gastric bypass procedure (2016). Pt with wt loss of 200 lbs. Pt currently charted to weigh 92 lbs 6 oz, BMI 14.05 (Protein-Calorie malnutrition). Pt reported to have chronic diarrhea which pt states isn't nothing new. Attempted to provide pt with diarrhea nutrition education however, pt not interested in information. Pt states she understands her body and everyone who has had a gastric bypass does not require the same treatments or regiments. Pt endorses x3 diarrhea bowel movement. Pt denies n/v, chewing, and swallowing difficulties. Pt LBM: 12/13 RD will continue to follow  Nutrition Discharge Planning: Cardiac, renal diet    Nutrition Risk Screen    Nutrition Risk Screen: no indicators present    Nutrition/Diet History    Spiritual, Cultural Beliefs, Denominational Practices, Values that Affect Care: no  Food Allergies: NKFA  Factors Affecting Nutritional Intake: abdominal pain, diarrhea    Anthropometrics    Temp: 97.7 °F (36.5 °C)  Height Method: Stated  Height: 5' 8" (172.7 cm)  Height (inches): 68 in  Weight Method: Bed Scale  Weight: 41.9 kg (92 lb 6 oz)  Weight (lb): 92.37 lb  Ideal Body Weight (IBW), Female: 140 lb  % Ideal Body Weight, Female (lb): 65.98 %  BMI (Calculated): 14  BMI Grade: less than 16 protein-energy malnutrition grade III     Wt Readings from Last 20 Encounters:   12/14/23 41.9 kg (92 lb 6 oz)   12/04/23 46.3 kg (102 lb)   11/09/23 53.3 kg (117 lb 6.4 oz)   10/05/23 59 kg (130 lb)   10/02/23 61.4 kg (135 lb 7.6 oz)   10/02/23 61.5 kg (135 lb 9.6 oz)   06/22/23 66.3 kg (146 lb 2.6 oz)   06/09/23 68.6 kg (151 lb 2 oz)   03/12/23 80.7 kg (178 lb)   01/31/23 77.5 kg (170 lb 12.8 oz)   01/23/23 80.2 kg (176 lb 12.9 oz)   01/04/23 84.8 kg " "(187 lb)   08/15/22 85.1 kg (187 lb 9.8 oz)   07/12/22 90.3 kg (199 lb)   06/23/22 89 kg (196 lb 3.4 oz)   06/07/22 92 kg (202 lb 13.2 oz)   06/03/22 96.8 kg (213 lb 6.4 oz)   05/25/22 99.3 kg (218 lb 14.7 oz)   09/17/18 79.7 kg (175 lb 11.3 oz)   04/05/22 100.7 kg (222 lb)   ]  Lab/Procedures/Meds  BMP  Lab Results   Component Value Date     12/14/2023    K 3.3 (L) 12/14/2023     (H) 12/14/2023    CO2 18 (L) 12/14/2023    BUN 32 (H) 12/14/2023    CREATININE 3.5 (H) 12/14/2023    CALCIUM 7.2 (L) 12/14/2023    ANIONGAP 10 12/14/2023    EGFRNORACEVR 15 (A) 12/14/2023     Lab Results   Component Value Date    CALCIUM 7.2 (L) 12/14/2023     No results for input(s): "POCTGLUCOSE" in the last 24 hours.  Lab Results   Component Value Date    ALT 9 (L) 12/14/2023    AST 11 12/14/2023    ALKPHOS 118 12/14/2023    BILITOT 0.4 12/14/2023       Pertinent Labs Reviewed: reviewed  Pertinent Medications Reviewed: reviewed  Scheduled Meds:   potassium bicarbonate  50 mEq Oral Once     Continuous Infusions:   sodium chloride 0.9% 50 mL/hr at 12/13/23 2042     PRN Meds:.acetaminophen, aluminum-magnesium hydroxide-simethicone, bisacodyL, melatonin, naloxone, ondansetron, ondansetron, simethicone, sodium chloride 0.9%      Estimated/Assessed Needs    Weight Used For Calorie Calculations: 41.9 kg (92 lb 6 oz)  Energy Calorie Requirements (kcal): 0261-4204 (30-35 kcal/kg per Underweight vs CKD)  Energy Need Method: Kcal/kg  Protein Requirements: 34-42 (0.8-1.0 g/kg per AMY)  Weight Used For Protein Calculations: 41.9 kg (92 lb 6 oz)  Fluid Requirements (mL): 500 + Total output     RDA Method (mL): 1257  CHO Requirement: 157-183      Nutrition Prescription Ordered    Current Diet Order: Cardiac Isolation Tray - Styrofoam    Evaluation of Received Nutrient/Fluid Intake    I/O: No data charted  Energy Calories Required: meeting needs  Protein Required: meeting needs  Fluid Required:  (JAE)  Tolerance: tolerating  % Intake of " Estimated Energy Needs: 75 - 100 %  % Meal Intake: 75 - 100 %    Nutrition Risk    Level of Risk/Frequency of Follow-up: low (x1 weekly)       Monitor and Evaluation    Food and Nutrient Intake: energy intake, food and beverage intake  Food and Nutrient Adminstration: diet order  Knowledge/Beliefs/Attitudes: food and nutrition knowledge/skill, beliefs and attitudes  Anthropometric Measurements: weight, weight change, body mass index  Biochemical Data, Medical Tests and Procedures: electrolyte and renal panel, gastrointestinal profile, glucose/endocrine profile, inflammatory profile  Nutrition-Focused Physical Findings: overall appearance, extremities, muscles and bones, head and eyes, skin       Nutrition Follow-Up    RD Follow-up?: Yes  Travis Alarcon, Registration Eligible, Provisional LDN

## 2023-12-14 NOTE — ASSESSMENT & PLAN NOTE
Has mild generalized abd discomfort and n/v  N/v appear already improved or resolved  Likely acute gastroenteritis   Diarrhea is not new, chronic

## 2023-12-14 NOTE — HPI
"Thank you for referring the pt to us. H/o and chart were reviewed. Pt was seen and examined. Pt is a 52 y/o female with h/o of CKD stage 3, HTN, diastolic CHF, prior obesity (used to weigh almost 300 lbs), and h/o of Raheel-en-Y gastric bypass in 2016, who presented with n/v and diarrhea. Pt says that diarrhea is not new, has "become part of my life", but n/v is new x 3 days. Pt however did not vomit today. S Cr has worsened from baseline almost close to normal to 4.0. No other causes of AMY, no NSAIds, no abx, no other pertinent issues.  "

## 2023-12-14 NOTE — NURSING TRANSFER
Nursing Transfer Note      12/13/2023 2014      Nurse giving handoff:  Nurse receiving handoff: Zane    Reason patient is being transferred: Observation    Transfer From: ED    Transfer via bed    Transfer with IV pole    Transported by ED    Transfer Vital Signs:  Blood Pressure: see flowsheet  Heart Rate: see flowsheet  O2: see flowsheet  Temperature: see flowsheet  Respirations: see flowsheet    Telemetry: Box Number [id number]   Order for Tele Monitor? Yes    Additional Lines:     4eyes on Skin: yes    Medicines sent: No    Any special needs or follow-up needed: No    Patient belongings transferred with patient: Yes    Chart send with patient: No    Notified: No    Patient reassessed at:  1  Upon arrival to floor: cardiac monitor applied, patient oriented to room, call bell in reach, and bed in lowest position

## 2023-12-14 NOTE — PLAN OF CARE
Nutrition Recs: 12/14  1. Recommend pt continues Cardiac Isolation Tray - Styrofoam.   2. Recommend pt continues to receive Boost breeze TID.   3. NFPE performed during RD follow up if appropriate.   4. Recommend pt receive Banatrol plus TID   5. weigh twice weekly.    Goals:   1. Pt will continue to consume >75% of EEN/EPN prior to RD follow up.   2. NFPE will be preformed during RD follow up.   3. Pt diarrhea will improve prior to RD follow up  Nutrition Goal Status: new  Communication of RD Recs: reviewed with RN (POC: Sticky Note)  Travis Alarcon, Registration Eligible, Provisional LDN

## 2023-12-14 NOTE — SUBJECTIVE & OBJECTIVE
Past Medical History:   Diagnosis Date    Anxiety     CHF (congestive heart failure)     Edema     Hypertension     Initial insomnia     Sickle cell trait     Stroke     Tobacco use        Past Surgical History:   Procedure Laterality Date    BREAST SURGERY       SECTION      x 2    CYST REMOVAL      from left tube    ESOPHAGOGASTRODUODENOSCOPY N/A 3/12/2023    Procedure: EGD (ESOPHAGOGASTRODUODENOSCOPY);  Surgeon: Ray Serrato MD;  Location: [facility];  Service: Gastroenterology;  Laterality: N/A;    gastric sleeve      Raheel-en-Y gastric bypass      TONSILLECTOMY         Review of patient's allergies indicates:   Allergen Reactions    Lisinopril Anaphylaxis     This is only possible agent at the time.  This is only possible agent at the time.  This is only possible agent at the time.    Sulfa (sulfonamide antibiotics) Anaphylaxis and Hives    Beta-blockers (beta-adrenergic blocking agts)      Other reaction(s): Other (See Comments)  Syncope & Collapse     Current Facility-Administered Medications   Medication Frequency    0.9%  NaCl infusion Continuous    acetaminophen tablet 650 mg Q4H PRN    aluminum-magnesium hydroxide-simethicone 200-200-20 mg/5 mL suspension 30 mL QID PRN    bisacodyL suppository 10 mg Daily PRN    magnesium oxide tablet 800 mg PRN    magnesium oxide tablet 800 mg PRN    melatonin tablet 6 mg Nightly PRN    naloxone 0.4 mg/mL injection 0.02 mg PRN    ondansetron disintegrating tablet 8 mg Q8H PRN    ondansetron injection 4 mg Q8H PRN    potassium bicarbonate disintegrating tablet 35 mEq PRN    potassium bicarbonate disintegrating tablet 50 mEq PRN    potassium bicarbonate disintegrating tablet 60 mEq PRN    potassium, sodium phosphates 280-160-250 mg packet 2 packet PRN    potassium, sodium phosphates 280-160-250 mg packet 2 packet PRN    potassium, sodium phosphates 280-160-250 mg packet 2 packet PRN    simethicone chewable tablet 80 mg QID PRN    sodium chloride 0.9%  flush 10 mL PRN     Family History       Problem Relation (Age of Onset)    Hypertension Mother, Paternal Grandmother    No Known Problems Father    Thyroid disease Paternal Aunt          Tobacco Use    Smoking status: Former     Types: Cigarettes    Smokeless tobacco: Never   Substance and Sexual Activity    Alcohol use: Yes    Drug use: No    Sexual activity: Not Currently     Partners: Male     Birth control/protection: None     Review of Systems   Constitutional: Negative.    HENT: Negative.     Respiratory: Negative.     Cardiovascular: Negative.    Gastrointestinal:  Positive for diarrhea, nausea and vomiting.   Genitourinary: Negative.    Musculoskeletal: Negative.    Neurological: Negative.    Psychiatric/Behavioral: Negative.       Objective:     Vital Signs (Most Recent):  Temp: 98.2 °F (36.8 °C) (12/13/23 2014)  Pulse: 78 (12/13/23 2014)  Resp: 18 (12/13/23 2014)  BP: 115/76 (12/13/23 2014)  SpO2: 100 % (12/13/23 2014) Vital Signs (24h Range):  Temp:  [98.2 °F (36.8 °C)-98.5 °F (36.9 °C)] 98.2 °F (36.8 °C)  Pulse:  [70-78] 78  Resp:  [16-18] 18  SpO2:  [100 %] 100 %  BP: (115-155)/(76-88) 115/76     Weight: 41.9 kg (92 lb 6 oz) (12/13/23 1403)  Body mass index is 14.05 kg/m².  Body surface area is 1.42 meters squared.    No intake/output data recorded.     Physical Exam  Vitals and nursing note reviewed.   Constitutional:       General: She is not in acute distress.     Appearance: Normal appearance. She is not ill-appearing.   Cardiovascular:      Rate and Rhythm: Normal rate and regular rhythm.      Pulses: Normal pulses.      Heart sounds: Normal heart sounds.   Pulmonary:      Effort: Pulmonary effort is normal.      Breath sounds: Normal breath sounds. No rales.   Abdominal:      General: Abdomen is flat.      Palpations: Abdomen is soft.      Tenderness: There is no abdominal tenderness.   Musculoskeletal:      Right lower leg: No edema.      Left lower leg: No edema.   Neurological:      Mental  Status: She is alert and oriented to person, place, and time.   Psychiatric:         Behavior: Behavior normal.          Significant Labs: reviewed  BMP  Lab Results   Component Value Date     12/13/2023    K 3.5 12/13/2023     12/13/2023    CO2 22 (L) 12/13/2023    BUN 35 (H) 12/13/2023    CREATININE 4.0 (H) 12/13/2023    CALCIUM 8.2 (L) 12/13/2023    ANIONGAP 10 12/13/2023    EGFRNORACEVR 13 (A) 12/13/2023     Lab Results   Component Value Date    WBC 5.68 12/13/2023    HGB 10.2 (L) 12/13/2023    HCT 29.3 (L) 12/13/2023    MCV 97 12/13/2023     12/13/2023     Lactic acid 1.1  U/a noted    Significant Imaging: reviewed

## 2023-12-14 NOTE — ASSESSMENT & PLAN NOTE
AMY by h/o is suspected to be prerenal azotemia  H/o of chronic diarrhea since gastric bypass  Presented with new n/v  Also was taking lasix 40 mg po bid  Mild borderline hypokalemia despite AMY, c/w with GI losses  Metabolic acidosis c/w diarrhea, renal failure    Advised pt to lower lasix from 40 to 20 mg po qd to bid whenever her chronic diarrhea is worse.  Pt resistant to medical advice  Will send urine Na and Cr

## 2023-12-14 NOTE — PLAN OF CARE
"[facility] - Telemetry (Hospital)  Initial Discharge Assessment       Primary Care Provider: Daphney Heart MD    Admission Diagnosis: Dehydration [E86.0]  AMY (acute kidney injury) [N17.9]  Abdominal pain [R10.9]  Chest pain [R07.9]  Acute kidney injury superimposed on chronic kidney disease [N17.9, N18.9]  Nausea and vomiting, unspecified vomiting type [R11.2]    Admission Date: 12/13/2023  Expected Discharge Date:          Payor: MEDICAID / Plan: [place] [employer] / Product Type: Managed Medicaid /     Extended Emergency Contact Information  Primary Emergency Contact: El[place] [address] [facility]  Home Phone: [phone]  Relation: Relative    Discharge Plan A: Home, Home Health         [id number] - [place] - [address] & [address] [address]  Phone: [phone] Fax: [phone]      Initial Assessment (most recent)       Adult Discharge Assessment - 12/14/23 1402          Discharge Assessment    Assessment Type Discharge Planning Assessment     Confirmed/corrected address, phone number and insurance Yes     Confirmed Demographics Correct on Facesheet     Source of Information patient     Communicated ALEXA with patient/caregiver Date not available/Unable to determine     Reason For Admission AMY on CKD     People in Home alone     Facility Arrived From: Home     Do you expect to return to your current living situation? Yes     Do you have help at home or someone to help you manage your care at home? --   "not really"    Prior to hospitilization cognitive status: Alert/Oriented     Current cognitive status: Alert/Oriented     Walking or Climbing Stairs Difficulty yes     Readmission within 30 days? No     Patient currently being followed by outpatient case management? No     Do you currently have service(s) that help you manage your care at home? No     Do you take prescription medications? " Yes     Do you have prescription coverage? Yes     Do you have any problems affording any of your prescribed medications? No     Is the patient taking medications as prescribed? yes     Who is going to help you get home at discharge? TBD by progress, may need transportation if not family to assist     Are you on dialysis? No     Do you take coumadin? No     Discharge Plan A Home;Home Health     Discharge Plan discussed with: Patient                   IDALMIS spoke with pt to complete discharge assessment. Pt reports recently moving to [place], [place] and states she would need assistance establishing care with new PCP in that area. Pt reports living alone. Pt state she does not have much family support. Pt requested emergency contact be updated to reflect her aunt, Lois Hogan @ [phone]. Pt reports needing assistance with some activities at home such as lifting and some house work but states she is able to care for herself.  Pt requesting a walker and shower chair. SW explained that shower chairs are not covered by insurance but CM can assist with walker prior to discharge. Patient mentioned recently being approved for Medicare but has not received her card yet. SW to email registation to verify if patient has coverage.    Pt advised on how to contact CM if needs arise. SW to follow.

## 2023-12-14 NOTE — ASSESSMENT & PLAN NOTE
Stable, has diastolic CHF.  Well preserved EF  Has trace peripheral edema  Lungs clear    Recommend hold lasix

## 2023-12-15 ENCOUNTER — TELEPHONE (OUTPATIENT)
Dept: PRIMARY CARE CLINIC | Facility: CLINIC | Age: 53
End: 2023-12-15
Payer: MEDICARE

## 2023-12-15 VITALS
HEIGHT: 68 IN | TEMPERATURE: 99 F | SYSTOLIC BLOOD PRESSURE: 139 MMHG | OXYGEN SATURATION: 96 % | RESPIRATION RATE: 18 BRPM | WEIGHT: 112 LBS | DIASTOLIC BLOOD PRESSURE: 92 MMHG | BODY MASS INDEX: 16.97 KG/M2 | HEART RATE: 79 BPM

## 2023-12-15 LAB
ALBUMIN SERPL BCP-MCNC: 1.7 G/DL (ref 3.5–5.2)
ALP SERPL-CCNC: 126 U/L (ref 55–135)
ALT SERPL W/O P-5'-P-CCNC: 9 U/L (ref 10–44)
ANION GAP SERPL CALC-SCNC: 8 MMOL/L (ref 8–16)
AST SERPL-CCNC: 11 U/L (ref 10–40)
BASOPHILS # BLD AUTO: 0.02 K/UL (ref 0–0.2)
BASOPHILS NFR BLD: 0.4 % (ref 0–1.9)
BILIRUB SERPL-MCNC: 0.5 MG/DL (ref 0.1–1)
BUN SERPL-MCNC: 30 MG/DL (ref 6–20)
CALCIUM SERPL-MCNC: 7.5 MG/DL (ref 8.7–10.5)
CHLORIDE SERPL-SCNC: 117 MMOL/L (ref 95–110)
CO2 SERPL-SCNC: 18 MMOL/L (ref 23–29)
CREAT SERPL-MCNC: 3.2 MG/DL (ref 0.5–1.4)
DIFFERENTIAL METHOD: ABNORMAL
EOSINOPHIL # BLD AUTO: 0 K/UL (ref 0–0.5)
EOSINOPHIL NFR BLD: 0.7 % (ref 0–8)
ERYTHROCYTE [DISTWIDTH] IN BLOOD BY AUTOMATED COUNT: 14.4 % (ref 11.5–14.5)
EST. GFR  (NO RACE VARIABLE): 17 ML/MIN/1.73 M^2
GLUCOSE SERPL-MCNC: 86 MG/DL (ref 70–110)
HCT VFR BLD AUTO: 22.9 % (ref 37–48.5)
HGB BLD-MCNC: 7.9 G/DL (ref 12–16)
IMM GRANULOCYTES # BLD AUTO: 0.01 K/UL (ref 0–0.04)
IMM GRANULOCYTES NFR BLD AUTO: 0.2 % (ref 0–0.5)
LYMPHOCYTES # BLD AUTO: 1.2 K/UL (ref 1–4.8)
LYMPHOCYTES NFR BLD: 20.7 % (ref 18–48)
MAGNESIUM SERPL-MCNC: 1.9 MG/DL (ref 1.6–2.6)
MCH RBC QN AUTO: 34.1 PG (ref 27–31)
MCHC RBC AUTO-ENTMCNC: 34.5 G/DL (ref 32–36)
MCV RBC AUTO: 99 FL (ref 82–98)
MONOCYTES # BLD AUTO: 0.3 K/UL (ref 0.3–1)
MONOCYTES NFR BLD: 5.8 % (ref 4–15)
NEUTROPHILS # BLD AUTO: 4.1 K/UL (ref 1.8–7.7)
NEUTROPHILS NFR BLD: 72.2 % (ref 38–73)
NRBC BLD-RTO: 0 /100 WBC
PLATELET # BLD AUTO: 142 K/UL (ref 150–450)
PMV BLD AUTO: 10 FL (ref 9.2–12.9)
POTASSIUM SERPL-SCNC: 4.5 MMOL/L (ref 3.5–5.1)
PROT SERPL-MCNC: 5.3 G/DL (ref 6–8.4)
RBC # BLD AUTO: 2.32 M/UL (ref 4–5.4)
SODIUM SERPL-SCNC: 143 MMOL/L (ref 136–145)
TROPONIN I SERPL DL<=0.01 NG/ML-MCNC: <0.006 NG/ML (ref 0–0.03)
WBC # BLD AUTO: 5.7 K/UL (ref 3.9–12.7)

## 2023-12-15 PROCEDURE — 85025 COMPLETE CBC W/AUTO DIFF WBC: CPT | Performed by: INTERNAL MEDICINE

## 2023-12-15 PROCEDURE — 84484 ASSAY OF TROPONIN QUANT: CPT | Performed by: NURSE PRACTITIONER

## 2023-12-15 PROCEDURE — 25000003 PHARM REV CODE 250: Performed by: STUDENT IN AN ORGANIZED HEALTH CARE EDUCATION/TRAINING PROGRAM

## 2023-12-15 PROCEDURE — 80053 COMPREHEN METABOLIC PANEL: CPT | Performed by: INTERNAL MEDICINE

## 2023-12-15 PROCEDURE — 83735 ASSAY OF MAGNESIUM: CPT | Performed by: INTERNAL MEDICINE

## 2023-12-15 RX ORDER — SUCRALFATE 1 G/1
1 TABLET ORAL
Status: DISCONTINUED | OUTPATIENT
Start: 2023-12-15 | End: 2023-12-15 | Stop reason: HOSPADM

## 2023-12-15 RX ORDER — PANTOPRAZOLE SODIUM 40 MG/1
40 TABLET, DELAYED RELEASE ORAL DAILY
Status: DISCONTINUED | OUTPATIENT
Start: 2023-12-15 | End: 2023-12-15 | Stop reason: HOSPADM

## 2023-12-15 RX ORDER — SUCRALFATE 1 G/1
1 TABLET ORAL
Qty: 120 TABLET | Refills: 3 | Status: ON HOLD | OUTPATIENT
Start: 2023-12-15 | End: 2024-01-30

## 2023-12-15 RX ORDER — PANTOPRAZOLE SODIUM 40 MG/1
40 TABLET, DELAYED RELEASE ORAL DAILY
Status: DISCONTINUED | OUTPATIENT
Start: 2023-12-15 | End: 2023-12-15

## 2023-12-15 RX ORDER — OMEPRAZOLE 40 MG/1
40 CAPSULE, DELAYED RELEASE ORAL DAILY
Qty: 30 CAPSULE | Refills: 0 | Status: ON HOLD | OUTPATIENT
Start: 2023-12-15 | End: 2024-01-30

## 2023-12-15 RX ADMIN — SUCRALFATE 1 G: 1 TABLET ORAL at 12:12

## 2023-12-15 RX ADMIN — PANTOPRAZOLE SODIUM 40 MG: 40 TABLET, DELAYED RELEASE ORAL at 01:12

## 2023-12-15 RX ADMIN — SUCRALFATE 1 G: 1 TABLET ORAL at 01:12

## 2023-12-15 RX ADMIN — SUCRALFATE 1 G: 1 TABLET ORAL at 08:12

## 2023-12-15 NOTE — SUBJECTIVE & OBJECTIVE
Interval History: Pt was seen and examined. Labs and meds reviewed. Discussed with other providers. No new c/o's. No n/v today, no diarrhea reported. Feels better.    Review of patient's allergies indicates:   Allergen Reactions    Lisinopril Anaphylaxis     This is only possible agent at the time.  This is only possible agent at the time.  This is only possible agent at the time.    Sulfa (sulfonamide antibiotics) Anaphylaxis and Hives    Beta-blockers (beta-adrenergic blocking agts)      Other reaction(s): Other (See Comments)  Syncope & Collapse     Current Facility-Administered Medications   Medication Frequency    0.9%  NaCl infusion Continuous    acetaminophen tablet 650 mg Q4H PRN    aluminum-magnesium hydroxide-simethicone 200-200-20 mg/5 mL suspension 30 mL QID PRN    bisacodyL suppository 10 mg Daily PRN    melatonin tablet 6 mg Nightly PRN    naloxone 0.4 mg/mL injection 0.02 mg PRN    ondansetron disintegrating tablet 8 mg Q8H PRN    ondansetron injection 4 mg Q8H PRN    potassium bicarbonate disintegrating tablet 50 mEq Once    simethicone chewable tablet 80 mg QID PRN    sodium chloride 0.9% flush 10 mL PRN       Objective:     Vital Signs (Most Recent):  Temp: 97.7 °F (36.5 °C) (12/14/23 1615)  Pulse: 80 (12/14/23 1615)  Resp: 16 (12/14/23 1615)  BP: 118/73 (12/14/23 1615)  SpO2: 98 % (12/14/23 1615) Vital Signs (24h Range):  Temp:  [97.7 °F (36.5 °C)-98.5 °F (36.9 °C)] 97.7 °F (36.5 °C)  Pulse:  [62-83] 80  Resp:  [16-18] 16  SpO2:  [96 %-100 %] 98 %  BP: (106-155)/(65-93) 118/73     Weight: 41.9 kg (92 lb 6 oz) (12/14/23 1629)  Body mass index is 14.05 kg/m².  Body surface area is 1.42 meters squared.    No intake/output data recorded.     Physical Exam  Vitals and nursing note reviewed.   Constitutional:       Appearance: Normal appearance.   Cardiovascular:      Rate and Rhythm: Normal rate and regular rhythm.   Pulmonary:      Effort: Pulmonary effort is normal.      Breath sounds: Normal breath  sounds.   Abdominal:      Palpations: Abdomen is soft.      Tenderness: There is no abdominal tenderness.   Musculoskeletal:      Right lower leg: No edema.      Left lower leg: No edema.   Neurological:      Mental Status: She is alert and oriented to person, place, and time.   Psychiatric:         Behavior: Behavior normal.          Significant Labs: reviewed  BMP  Lab Results   Component Value Date     12/14/2023    K 3.3 (L) 12/14/2023     (H) 12/14/2023    CO2 18 (L) 12/14/2023    BUN 32 (H) 12/14/2023    CREATININE 3.5 (H) 12/14/2023    CALCIUM 7.2 (L) 12/14/2023    ANIONGAP 10 12/14/2023    EGFRNORACEVR 15 (A) 12/14/2023     Lab Results   Component Value Date    WBC 4.71 12/14/2023    HGB 7.4 (L) 12/14/2023    HCT 21.2 (L) 12/14/2023    MCV 96 12/14/2023     (L) 12/14/2023         Significant Imaging: reviewed

## 2023-12-15 NOTE — SUBJECTIVE & OBJECTIVE
Interval History: patient expresses frustration about not knowing biopsy results from left pelvic mass and staff questioning her about diarrhea. She denies acute diarrhea. Requesting normal diet.     Addendum: 1820 patient complains of chest tightness going into abdomen. Denies other symptoms. EKG and troponin ordered.     Review of Systems   Constitutional:  Positive for activity change, appetite change and unexpected weight change (weight loss). Negative for fever.   Respiratory:  Negative for shortness of breath and wheezing.    Cardiovascular:  Positive for leg swelling (chronic-Improved compared to baseline). Negative for chest pain.   Gastrointestinal:  Positive for diarrhea (chronic). Negative for abdominal distention, abdominal pain, constipation, nausea and vomiting.   Genitourinary:  Positive for decreased urine volume.   Neurological:  Positive for weakness. Negative for dizziness.   All other systems reviewed and are negative.      Objective:     Vital Signs (Most Recent):  Temp: 98.5 °F (36.9 °C) (12/15/23 0424)  Pulse: 67 (12/15/23 0424)  Resp: 17 (12/15/23 0424)  BP: 138/86 (12/15/23 0424)  SpO2: 99 % (12/15/23 0424) Vital Signs (24h Range):  Temp:  [97.7 °F (36.5 °C)-99.4 °F (37.4 °C)] 98.5 °F (36.9 °C)  Pulse:  [62-83] 67  Resp:  [16-18] 17  SpO2:  [95 %-100 %] 99 %  BP: (106-158)/() 138/86     Weight: 50.8 kg (111 lb 15.9 oz)  Body mass index is 17.03 kg/m².  No intake or output data in the 24 hours ending 12/15/23 0655      Physical Exam  Vitals and nursing note reviewed.   Constitutional:       General: She is not in acute distress.     Appearance: She is cachectic. She is ill-appearing. She is not toxic-appearing or diaphoretic.   HENT:      Head: Atraumatic.      Right Ear: External ear normal.      Left Ear: External ear normal.      Nose: Nose normal.      Mouth/Throat:      Mouth: Mucous membranes are moist.   Eyes:      Pupils: Pupils are equal, round, and reactive to light.    Cardiovascular:      Rate and Rhythm: Normal rate and regular rhythm.   Pulmonary:      Effort: Pulmonary effort is normal. No accessory muscle usage or respiratory distress.      Breath sounds: No wheezing or rhonchi.      Comments: On room air  Abdominal:      General: There is no distension.      Palpations: Abdomen is soft.      Tenderness: There is no abdominal tenderness. There is no guarding or rebound.   Musculoskeletal:      Cervical back: Neck supple.      Right lower leg: Pitting Edema present.      Left lower leg: Pitting Edema present.   Skin:     General: Skin is dry.   Neurological:      General: No focal deficit present.      Mental Status: She is alert and oriented to person, place, and time.   Psychiatric:         Mood and Affect: Affect is angry.         Behavior: Behavior is agitated. Behavior is cooperative.             Significant Labs: All pertinent labs within the past 24 hours have been reviewed.  CBC:   Recent Labs   Lab 12/13/23  1402 12/14/23  0517   WBC 5.68 4.71   HGB 10.2* 7.4*   HCT 29.3* 21.2*    120*     CMP:   Recent Labs   Lab 12/13/23  1402 12/14/23  0517    142   K 3.5 3.3*    114*   CO2 22* 18*   GLU 83 70   BUN 35* 32*   CREATININE 4.0* 3.5*   CALCIUM 8.2* 7.2*   PROT 7.0 4.8*   ALBUMIN 2.3* 1.6*   BILITOT 0.5 0.4   ALKPHOS 173* 118   AST 16 11   ALT 12 9*   ANIONGAP 10 10       Significant Imaging: I have reviewed all pertinent imaging results/findings within the past 24 hours.

## 2023-12-15 NOTE — DISCHARGE SUMMARY
[facility])  [facility] Medicine  Discharge Summary      Patient Name: Joe Ceballos  MRN: [id number]  JAYCEE: [id number]  Patient Class: IP- Inpatient  Admission Date: 12/13/2023  Hospital Length of Stay: 2 days  Discharge Date and Time:  12/15/2023 4:15 PM  Attending Physician: Justina att. providers found   Discharging Provider: Judy Jessica NP  Primary Care Provider: Daphney Heart MD    Primary Care Team: Networked reference to record PCT     HPI:   Joe Ceballos is a 53 y.o. female with anxiety, CHF, Hypertension, Sickle cell trait, history of stroke who presented to ED on 12/13/2023 with complaints of weakness, abdominal pain, nausea, vomiting, diarrhea.  Patient is poor historian, reports that she is been having weakness some time since her last PCP follow-up in November.  Several days ago started having diarrhea, nausea and vomiting.  Diarrhea better after she took some Imodium.  States that she took other over-the-counter medication, however unable to recall what they were.  Admits that she is had significant decrease in urine output as well as weight loss.    Workup in ED significant for BUN/creatinine 35/4.0. CT of abdomen and pelvis which showed no obstructive bowel findings, small volume ascites, anasarca.  Placed in observation on hospital medicine service for further management.      PCP: Daphney Heart MD    SDM:  Francine Rosales    CODE STATUS:  Full code      * No surgery found *      Hospital Course:   Janette Ceballos is a 53 year old female who was admitted for acute renal failure relate in setting of N/V and chronic diarrhea. Renal function on admit was 4.0  (baseline 2.0). CT abdomen did not show acute findings. She was gently hydrated and seen by Nephrology who recommended holding Lasix that she regularly takes for lower extremity edema. She mentions having pelvic mass that was biopsied two months ago. Documents were requested and patient was noted to have ovarian  mass negative for malignancy.     12/15/23  Overnight patient complained of abdominal pain that resolved with PPI and carafate. Patient expressed frustration related to hospital policies, medication orders, and dietary choices that contributed to her lack of cooperation with staff. She desired to leave AMA twice overnight but retracted decision after extended discussion with staff members. Attempts were made to mitigate feelings with minimal resolution. Patients creatine has continued to improve with gentle hydration. She will follow up with new PCP in two weeks. Ambulatory order placed to Nephrology for acute on chronic renal failure and GI to discuss possible PEG placement for malnutrition. She is stable for discharge.        Goals of Care Treatment Preferences:  Code Status: Full Code      Consults:   Consults (From admission, onward)          Status Ordering Provider     Inpatient consult to Social Work  Once        Provider:  (Not yet assigned)    Completed MYLES RODRIGUEZ     Inpatient consult to Social Work  Once        Provider:  (Not yet assigned)    Completed MYLES RODRIGUEZ     Inpatient consult to Registered Dietitian/Nutritionist  Once        Provider:  (Not yet assigned)    Completed BIRDIE VERA     Inpatient consult to Nephrology  Once        Provider:  Beverly Crane MD    Acknowledged IBRDIE VERA            No new Assessment & Plan notes have been filed under this hospital service since the last note was generated.  Service: Hospital Medicine    Final Active Diagnoses:    Diagnosis Date Noted POA    PRINCIPAL PROBLEM:  Acute kidney injury superimposed on chronic kidney disease [N17.9, N18.9] 12/13/2023 Yes    Malnutrition of moderate degree [E44.0] 12/14/2023 Yes    Diarrhea [R19.7] 12/13/2023 Yes    Generalized abdominal pain [R10.84] 12/13/2023 Yes    Cachexia [R64] 12/13/2023 Yes    (HFpEF) heart failure with preserved ejection fraction [I50.30] 01/22/2023 Yes    Primary hypertension [I10]  03/21/2014 Yes      Problems Resolved During this Admission:       Discharged Condition: stable    Disposition: Home or Self Care    Follow Up:   Contact information for after-discharge care       Home Medical Care       [facility] .    Service: Home Health Services  Contact information:  [address]  [place] [facility] [place] [facility] [address]  [phone]                                 Patient Instructions:      Ambulatory referral/consult to Nephrology   Standing Status: Future   Referral Priority: Routine Referral Type: Consultation   Referral Reason: Specialty Services Required   Requested Specialty: Nephrology   Number of Visits Requested: 1     Ambulatory referral/consult to Gastroenterology   Standing Status: Future   Referral Priority: Routine Referral Type: Consultation   Referral Reason: Specialty Services Required   Requested Specialty: Gastroenterology   Number of Visits Requested: 1       Significant Diagnostic Studies: Labs: CMP   Recent Labs   Lab 12/14/23  0517 12/15/23  0930    143   K 3.3* 4.5   * 117*   CO2 18* 18*   GLU 70 86   BUN 32* 30*   CREATININE 3.5* 3.2*   CALCIUM 7.2* 7.5*   PROT 4.8* 5.3*   ALBUMIN 1.6* 1.7*   BILITOT 0.4 0.5   ALKPHOS 118 126   AST 11 11   ALT 9* 9*   ANIONGAP 10 8    and CBC   Recent Labs   Lab 12/14/23  0517 12/15/23  0931   WBC 4.71 5.70   HGB 7.4* 7.9*   HCT 21.2* 22.9*   * 142*       Pending Diagnostic Studies:       None           Medications:  Reconciled Home Medications:      Medication List        START taking these medications      sucralfate 1 gram tablet  Commonly known as: CARAFATE  Take 1 tablet (1 g total) by mouth 4 (four) times daily before meals and nightly.            CONTINUE taking these medications      atorvastatin 80 MG tablet  Commonly known as: LIPITOR  Take 1 tablet (80 mg total) by mouth every evening.     FLEXERIL ORAL  Take 10 mg by mouth.     furosemide 40 MG tablet  Commonly known as:  LASIX  Take 40 mg by mouth 2 (two) times a day.     hydrOXYzine HCL 25 MG tablet  Commonly known as: ATARAX  Take 1 tablet (25 mg total) by mouth 2 (two) times daily as needed for Anxiety.     hydrOXYzine pamoate 25 MG Cap  Commonly known as: VISTARIL  Take 25 mg by mouth.     isosorbide mononitrate 60 MG 24 hr tablet  Commonly known as: IMDUR  Take 60 mg by mouth once daily.     omeprazole 40 MG capsule  Commonly known as: PRILOSEC  Take 1 capsule (40 mg total) by mouth once daily.     ondansetron 4 MG tablet  Commonly known as: ZOFRAN  Take 1 tablet (4 mg total) by mouth every 6 (six) hours.     traMADoL 50 mg tablet  Commonly known as: ULTRAM  Take 1 tablet (50 mg total) by mouth every 6 (six) hours as needed.              Indwelling Lines/Drains at time of discharge:   Lines/Drains/Airways       None                   Time spent on the discharge of patient: >35 minutes         Judy Jessica NP  Department of Hospital Medicine  '[place] - Telemetry ([facility])

## 2023-12-15 NOTE — ASSESSMENT & PLAN NOTE
52 y/o female with AMY and GI losses while taking large dose lasix:     Acute kidney injury superimposed on chronic kidney disease  AMY by h/o is suspected to be prerenal azotemia  H/o of chronic diarrhea since gastric bypass  Presented with new n/v  Also was taking lasix 40 mg po bid  Mild borderline hypokalemia despite AMY, c/w with GI losses  Metabolic acidosis c/w diarrhea, renal failure     Advised pt to lower lasix from 40 to 20 mg po qd to bid whenever her chronic diarrhea is worse.  Pt resistant to medical advice  Will send urine Na and Cr     Generalized abdominal pain  Has mild generalized abd discomfort and n/v  N/v appear already improved or resolved  Likely acute gastroenteritis   Diarrhea is not new, chronic     Diarrhea  Chronic  Likely complication of past gastric bypass  Noted has lost almost 200 lbs! Since gastric bypass in 2016  Pt likely is malnourished     Diastolic CHF(HFpEF) heart failure with preserved ejection fraction  Stable, has diastolic CHF.  Well preserved EF  Has trace peripheral edema  Lungs clear     Recommend hold lasix        Plans and recommendations:  As discussed above  Total time spent 70 minutes including time needed to review the records, the   patient evaluation, documentation, face-to-face discussion with the patient,   more than 50% of the time was spent on coordination of care and counseling.    Level V visit.

## 2023-12-15 NOTE — NURSING
House sup called to let me know Ms. Ceballos called her directly about several issues such as the temp of the room, not getting a dinner tray, not getting to take a shower, no nurse or doctor coming into her room all day- all of which had been addressed by the day shift.  Shortly after, I was told she stated that she wanted to leave and did not want to receive anymore care.  I entered the room with an AMA form.  She stated that we were trying to kick her out and began expressing all of her complaints to me.  I explained that the temp was set as high as it could go and offered her additional blankets, as the room was extremely warm and I did not feel that there was an issue with the thermostat.  She refused additional blankets. I offered another room for her but that was refused as well, as that room would not get any warmer than her current room.  She then stated that she did not need me to come to her room anymore tonight, that she would address all concerns with the house supervisor directly.  I explained that she would need to be patient while waiting for house sup, as she is extremely busy dealing with the entire hospital.  Lois presented to room around 2130

## 2023-12-15 NOTE — PROGRESS NOTES
"[facility] - Telemetry ([facility])  Nephrology  Progress Note    Patient Name: Joe Ceballos  MRN: [id number]  Admission Date: 12/13/2023  Hospital Length of Stay: 1 days  Attending Provider: Maureen Zelaya DO   Primary Care Physician: Daphney Heart MD  Principal Problem:Acute kidney injury superimposed on chronic kidney disease    Subjective:     HPI: Thank you for referring the pt to us. H/o and chart were reviewed. Pt was seen and examined. Pt is a 54 y/o female with h/o of CKD stage 3, HTN, diastolic CHF, prior obesity (used to weigh almost 300 lbs), and h/o of Raheel-en-Y gastric bypass in 2016, who presented with n/v and diarrhea. Pt says that diarrhea is not new, has "become part of my life", but n/v is new x 3 days. Pt however did not vomit today. S Cr has worsened from baseline almost close to normal to 4.0. No other causes of AMY, no NSAIds, no abx, no other pertinent issues.    Interval History: Pt was seen and examined. Labs and meds reviewed. Discussed with other providers. No new c/o's. No n/v today, no diarrhea reported. Feels better.    Review of patient's allergies indicates:   Allergen Reactions    Lisinopril Anaphylaxis     This is only possible agent at the time.  This is only possible agent at the time.  This is only possible agent at the time.    Sulfa (sulfonamide antibiotics) Anaphylaxis and Hives    Beta-blockers (beta-adrenergic blocking agts)      Other reaction(s): Other (See Comments)  Syncope & Collapse     Current Facility-Administered Medications   Medication Frequency    0.9%  NaCl infusion Continuous    acetaminophen tablet 650 mg Q4H PRN    aluminum-magnesium hydroxide-simethicone 200-200-20 mg/5 mL suspension 30 mL QID PRN    bisacodyL suppository 10 mg Daily PRN    melatonin tablet 6 mg Nightly PRN    naloxone 0.4 mg/mL injection 0.02 mg PRN    ondansetron disintegrating tablet 8 mg Q8H PRN    ondansetron injection 4 mg Q8H PRN    potassium bicarbonate disintegrating tablet 50 mEq " Once    simethicone chewable tablet 80 mg QID PRN    sodium chloride 0.9% flush 10 mL PRN       Objective:     Vital Signs (Most Recent):  Temp: 97.7 °F (36.5 °C) (12/14/23 1615)  Pulse: 80 (12/14/23 1615)  Resp: 16 (12/14/23 1615)  BP: 118/73 (12/14/23 1615)  SpO2: 98 % (12/14/23 1615) Vital Signs (24h Range):  Temp:  [97.7 °F (36.5 °C)-98.5 °F (36.9 °C)] 97.7 °F (36.5 °C)  Pulse:  [62-83] 80  Resp:  [16-18] 16  SpO2:  [96 %-100 %] 98 %  BP: (106-155)/(65-93) 118/73     Weight: 41.9 kg (92 lb 6 oz) (12/14/23 1629)  Body mass index is 14.05 kg/m².  Body surface area is 1.42 meters squared.    No intake/output data recorded.     Physical Exam  Vitals and nursing note reviewed.   Constitutional:       Appearance: Normal appearance.   Cardiovascular:      Rate and Rhythm: Normal rate and regular rhythm.   Pulmonary:      Effort: Pulmonary effort is normal.      Breath sounds: Normal breath sounds.   Abdominal:      Palpations: Abdomen is soft.      Tenderness: There is no abdominal tenderness.   Musculoskeletal:      Right lower leg: No edema.      Left lower leg: No edema.   Neurological:      Mental Status: She is alert and oriented to person, place, and time.   Psychiatric:         Behavior: Behavior normal.          Significant Labs: reviewed  BMP  Lab Results   Component Value Date     12/14/2023    K 3.3 (L) 12/14/2023     (H) 12/14/2023    CO2 18 (L) 12/14/2023    BUN 32 (H) 12/14/2023    CREATININE 3.5 (H) 12/14/2023    CALCIUM 7.2 (L) 12/14/2023    ANIONGAP 10 12/14/2023    EGFRNORACEVR 15 (A) 12/14/2023     Lab Results   Component Value Date    WBC 4.71 12/14/2023    HGB 7.4 (L) 12/14/2023    HCT 21.2 (L) 12/14/2023    MCV 96 12/14/2023     (L) 12/14/2023         Significant Imaging: reviewed      Assessment/Plan:       54 y/o female with AMY and GI losses while taking large dose lasix:     Acute kidney injury superimposed on chronic kidney disease  S Cr stable, lower, improving  Stable  renal function. AMY resolving, AMY due to prerenal azotemia  H/o of chronic diarrhea since gastric bypass, but today pt denied that  Presented with new n/v  Also was taking lasix 40 mg po bid  Mild borderline hypokalemia despite AMY, c/w with GI losses  Metabolic acidosis c/w diarrhea, renal failure     Advised pt to lower lasix from 40 to 20 mg po qd to bid whenever her chronic diarrhea is worse.     Generalized abdominal pain  Improved, stable  Likely acute gastroenteritis   Diarrhea is not new, chronic     Diarrhea  Chronic  Likely complication of past gastric bypass  Noted has lost almost 200 lbs! Since gastric bypass in 2016  Pt likely is malnourished     Diastolic CHF(HFpEF) heart failure with preserved ejection fraction  Stable, has diastolic CHF.  Well preserved EF  Has trace peripheral edema  Lungs clear     Recommend hold lasix        Plans and recommendations:  As discussed above  Total time spent 40 minutes including time needed to review the records, the   patient evaluation, documentation, face-to-face discussion with the patient,   more than 50% of the time was spent on coordination of care and counseling.    Level V visit.        Beverly Crane MD  Nephrology  [facility] ([facility])

## 2023-12-15 NOTE — CONSULTS
[facility] - Tele[facility] (Hospital)  Discharge Final Note    Primary Care Provider: Daphney Heart MD    Expected Discharge Date: 12/15/2023    Final Discharge Note (most recent)       Final Note - 12/15/23 1320          Final Note    Assessment Type Final Discharge Note     Anticipated Discharge Disposition Home-Health Care [facility]     Hospital Resources/Appts/Education Provided Post-Acute resouces added to AVS;Appointments scheduled and added to AVS        Post-Acute Status    Post-Acute Authorization Home Health     Home Health Status Set-up Complete/Auth obtained     Discharge Delays None known at this time                   Pt to discharge today. Soonest new PCP scheduled on AVS: Hospital Follow Up with Walter Arvizu MD  Friday Dec 29, 2023 1:20 PM    [facility] accepting via [facility]; contact added to AVS.  Discharging NP placed referrals for pulmonology and GI follow up.     No additional CM needs/consults for discharge.     Important Message from Medicare             After-discharge care                Home Medical Care       [facility]   Service: [facility] Services    [address],   [place] [address]   Phone: [phone]

## 2023-12-15 NOTE — PLAN OF CARE
A251/A251 DELILAH Ceballos is a 53 y.o.female admitted on 12/13/2023 for Acute kidney injury superimposed on chronic kidney disease   Code Status: Full Code MRN: [id number]   Review of patient's allergies indicates:   Allergen Reactions    Lisinopril Anaphylaxis     This is only possible agent at the time.  This is only possible agent at the time.  This is only possible agent at the time.    Sulfa (sulfonamide antibiotics) Anaphylaxis and Hives    Beta-blockers (beta-adrenergic blocking agts)      Other reaction(s): Other (See Comments)  Syncope & Collapse     Past Medical History:   Diagnosis Date    Anxiety     CHF (congestive heart failure)     Edema     Hypertension     Initial insomnia     Sickle cell trait     Stroke     Tobacco use       PRN meds    acetaminophen, 650 mg, Q4H PRN  aluminum-magnesium hydroxide-simethicone, 30 mL, QID PRN  bisacodyL, 10 mg, Daily PRN  HYDROcodone-acetaminophen, 1 tablet, Q6H PRN  hydrOXYzine HCL, 25 mg, TID PRN  melatonin, 6 mg, Nightly PRN  naloxone, 0.02 mg, PRN  ondansetron, 8 mg, Q8H PRN  ondansetron, 4 mg, Q8H PRN  simethicone, 1 tablet, QID PRN  sodium chloride 0.9%, 10 mL, PRN      Discharge instructions received and reviewed with pt and family at bedside.  Pt voiced understanding and all questions answered to satisfaction.  Stressed importance to making and keeping all follow up appointments.  Medications sent to pt pharmacy and reviewed with pt.  Tele monitor removed and brought to monitor tech.  IV d/c'd with tip intact, pressure dressing applied.  Pt transported to front of hospital via w/c by PCT to be discharged home.       Orientation: oriented x 4  Thuan Coma Scale Score: 15     Lead Monitored: Lead II Rhythm: normal sinus rhythm    Cardiac/Telemetry Box Number: [id number]  VTE Required Core Measure: Provider determined low risk VTE Last Bowel Movement: 12/13/23  Diet Adult Regular (IDDSI Level 7) Standard Tray  Voiding Characteristics: voids spontaneously without  difficulty  Tang Score: 19  Fall Risk Score: 5  Accucheck []   Freq?      Lines/Drains/Airways       None

## 2023-12-15 NOTE — NURSING
Received in bed awake and alert c/o abdominal pain and stating she did not have dinner. Assessment per flowsheet. Plan of care discussed. Pt also stating she's cold but refused extra blankets. Bed low, call light within reach.

## 2023-12-15 NOTE — PROGRESS NOTES
[facility] Medicine  Progress Note    Patient Name: Joe Ceballos  MRN: [id number]  Patient Class: IP- Inpatient   Admission Date: 12/13/2023  Length of Stay: 2 days  Attending Physician: Maureen Zelaya DO  Primary Care Provider: Daphney Heart MD        Subjective:     Principal Problem:Acute kidney injury superimposed on chronic kidney disease        HPI:  Joe Ceballos is a 53 y.o. female with anxiety, CHF, Hypertension, Sickle cell trait, history of stroke who presented to ED on 12/13/2023 with complaints of weakness, abdominal pain, nausea, vomiting, diarrhea.  Patient is poor historian, reports that she is been having weakness some time since her last PCP follow-up in November.  Several days ago started having diarrhea, nausea and vomiting.  Diarrhea better after she took some Imodium.  States that she took other over-the-counter medication, however unable to recall what they were.  Admits that she is had significant decrease in urine output as well as weight loss.    Workup in ED significant for BUN/creatinine 35/4.0. CT of abdomen and pelvis which showed no obstructive bowel findings, small volume ascites, anasarca.  Placed in observation on hospital medicine service for further management.      PCP: Daphney Heart MD    SDM:  Francine Rosales    CODE STATUS:  Full code      Overview/Hospital Course:  Janette Ceballos is a 53 year old female who was admitted for acute renal failure relate in setting of N/V and chronic diarrhea. Renal function on admit was 4.0  (baseline 2.0). CT abdomen did not show acute findings. She was gently hydrated and seen by Nephrology who recommended holding Lasix that she regularly takes for lower extremity edema. She mentions having pelvic mass that was biopsied two months ago. Documents were requested and patient was noted to have ovarian mass negative for malignancy.     Interval History: patient expresses frustration about not knowing  biopsy results from left pelvic mass and staff questioning her about diarrhea. She denies acute diarrhea. Requesting normal diet.     Addendum: 1820 patient complains of chest tightness going into abdomen. Denies other symptoms. EKG and troponin ordered.     Review of Systems   Constitutional:  Positive for activity change, appetite change and unexpected weight change (weight loss). Negative for fever.   Respiratory:  Negative for shortness of breath and wheezing.    Cardiovascular:  Positive for leg swelling (chronic-Improved compared to baseline). Negative for chest pain.   Gastrointestinal:  Positive for diarrhea (chronic). Negative for abdominal distention, abdominal pain, constipation, nausea and vomiting.   Genitourinary:  Positive for decreased urine volume.   Neurological:  Positive for weakness. Negative for dizziness.   All other systems reviewed and are negative.      Objective:     Vital Signs (Most Recent):  Temp: 98.5 °F (36.9 °C) (12/15/23 0424)  Pulse: 67 (12/15/23 0424)  Resp: 17 (12/15/23 0424)  BP: 138/86 (12/15/23 0424)  SpO2: 99 % (12/15/23 0424) Vital Signs (24h Range):  Temp:  [97.7 °F (36.5 °C)-99.4 °F (37.4 °C)] 98.5 °F (36.9 °C)  Pulse:  [62-83] 67  Resp:  [16-18] 17  SpO2:  [95 %-100 %] 99 %  BP: (106-158)/() 138/86     Weight: 50.8 kg (111 lb 15.9 oz)  Body mass index is 17.03 kg/m².  No intake or output data in the 24 hours ending 12/15/23 0655      Physical Exam  Vitals and nursing note reviewed.   Constitutional:       General: She is not in acute distress.     Appearance: She is cachectic. She is ill-appearing. She is not toxic-appearing or diaphoretic.   HENT:      Head: Atraumatic.      Right Ear: External ear normal.      Left Ear: External ear normal.      Nose: Nose normal.      Mouth/Throat:      Mouth: Mucous membranes are moist.   Eyes:      Pupils: Pupils are equal, round, and reactive to light.   Cardiovascular:      Rate and Rhythm: Normal rate and regular rhythm.    Pulmonary:      Effort: Pulmonary effort is normal. No accessory muscle usage or respiratory distress.      Breath sounds: No wheezing or rhonchi.      Comments: On room air  Abdominal:      General: There is no distension.      Palpations: Abdomen is soft.      Tenderness: There is no abdominal tenderness. There is no guarding or rebound.   Musculoskeletal:      Cervical back: Neck supple.      Right lower leg: Pitting Edema present.      Left lower leg: Pitting Edema present.   Skin:     General: Skin is dry.   Neurological:      General: No focal deficit present.      Mental Status: She is alert and oriented to person, place, and time.   Psychiatric:         Mood and Affect: Affect is angry.         Behavior: Behavior is agitated. Behavior is cooperative.             Significant Labs: All pertinent labs within the past 24 hours have been reviewed.  CBC:   Recent Labs   Lab 12/13/23  1402 12/14/23  0517   WBC 5.68 4.71   HGB 10.2* 7.4*   HCT 29.3* 21.2*    120*     CMP:   Recent Labs   Lab 12/13/23  1402 12/14/23  0517    142   K 3.5 3.3*    114*   CO2 22* 18*   GLU 83 70   BUN 35* 32*   CREATININE 4.0* 3.5*   CALCIUM 8.2* 7.2*   PROT 7.0 4.8*   ALBUMIN 2.3* 1.6*   BILITOT 0.5 0.4   ALKPHOS 173* 118   AST 16 11   ALT 12 9*   ANIONGAP 10 10       Significant Imaging: I have reviewed all pertinent imaging results/findings within the past 24 hours.    Assessment/Plan:      * Acute kidney injury superimposed on chronic kidney disease  Patient with acute kidney injury/acute renal failure likely due to pre-renal azotemia due to IVVD AMY is currently  noted with creatinine of 4.0 . Baseline creatinine  unclear, appears to be around 2  - Labs reviewed- Renal function/electrolytes with Estimated Creatinine Clearance: 15.1 mL/min (A) (based on SCr of 3.5 mg/dL (H)). according to latest data. Monitor urine output and serial BMP and adjust therapy as needed. Avoid nephrotoxins and renally dose meds for  GFR listed above.    Urine studies ordered  Nephrology consulted    12/14/23  Input from Nephrology appreciated-suspect prerenal from GI losses. CT Abdomen did not show acute process involving kidneys. Continue gentle hydration    Malnutrition of moderate degree  Nutrition consulted. Most recent weight and BMI monitored-     Measurements:  Wt Readings from Last 1 Encounters:   12/15/23 50.8 kg (111 lb 15.9 oz)   Body mass index is 17.03 kg/m².    Patient has been screened and assessed by RD.    Malnutrition Type:  Context: chronic illness  Level: moderate    Malnutrition Characteristic Summary:  Weight Loss (Malnutrition): greater than 10% in 6 months  Muscle Mass (Malnutrition): moderate depletion    Interventions/Recommendations (treatment strategy):  1. Recommend pt continues Cardiac Isolation Tray - Styrofoam . 2. Recommend pt continues to receive Boost breeze TID. 3. NFPE performed during RD follow up if appropriate. 4. Recommend pt receive Banatrol plus TID 5. weigh twice weekly.      Cachexia  Concern for cachexia as evidenced by BMI less than 20 in the presence of known chronic disease and albumen level less than 3.2. Contributing factors include underlying  unknown . Treatment to include nutrition consult, physical therapy, and occupational therapy.      Body mass index is 14.05 kg/m².  Albumin   Date Value Ref Range Status   12/13/2023 2.3 (L) 3.5 - 5.2 g/dL Final         Generalized abdominal pain  Patient reported that she is been having abdominal pain, nausea, vomiting and diarrhea for several days  CT of abdomen pelvis done contrast due to ongoing AMY showed no obstructive bowel findings, small volume ascites, anasarca although images were degraded by extensive motion artifact   Stool studies ordered  Antiemetics p.r.n.    12/14/23  Reports feeling better. Requesting regular diet. Has ovarian mass biopsied 10/3/2023 at  [facility]. Pathology negative for malignancy. See below.         Diarrhea  Unclear  etiology, patient states that this has improved after she took Imodium prior to ED presentation   Stool studies ordered    12/14/23  States she has had chronic intermittent diarrhea since bariatric surgery. Has not had episode since admission.      (HFpEF) heart failure with preserved ejection fraction  Reports that she has been been taking her fluid pill  Hold home diuretics in setting AMY  Check BNP  Strict Is&Os      Primary hypertension  Chronic, controlled. Latest blood pressure and vitals reviewed-     Temp:  [98.5 °F (36.9 °C)]   Pulse:  [74]   Resp:  [16]   BP: (128)/(85)   SpO2:  [100 %] .   Home meds for hypertension were reviewed and noted below.   Hypertension Medications               furosemide (LASIX) 40 MG tablet Take 40 mg by mouth 2 (two) times a day.            While in the hospital, will manage blood pressure as follows; Continue home antihypertensive regimen as BP tolerates once home meds has been verified    Will utilize p.r.n. blood pressure medication only if patient's blood pressure greater than 180/110 and she develops symptoms such as worsening chest pain or shortness of breath.      VTE Risk Mitigation (From admission, onward)           Ordered     IP VTE LOW RISK PATIENT  Once         12/13/23 1652     Place sequential compression device  Until discontinued         12/13/23 1652                    Discharge Planning   ALEXA:      Code Status: Full Code   Is the patient medically ready for discharge?:     Reason for patient still in hospital (select all that apply): Treatment  Discharge Plan A: Home, Home Health            Judy eJssica NP  Department of Hospital Medicine   [facility] - Telemetry ([facility])

## 2023-12-15 NOTE — ASSESSMENT & PLAN NOTE
Patient reported that she is been having abdominal pain, nausea, vomiting and diarrhea for several days  CT of abdomen pelvis done contrast due to ongoing AMY showed no obstructive bowel findings, small volume ascites, anasarca although images were degraded by extensive motion artifact   Stool studies ordered  Antiemetics p.r.n.    12/14/23  Reports feeling better. Requesting regular diet. Has ovarian mass biopsied 10/3/2023 at  [facility]. Pathology negative for malignancy. See below.

## 2023-12-15 NOTE — ASSESSMENT & PLAN NOTE
Unclear etiology, patient states that this has improved after she took Imodium prior to ED presentation   Stool studies ordered    12/14/23  States she has had chronic intermittent diarrhea since bariatric surgery. Has not had episode since admission.

## 2023-12-15 NOTE — HOSPITAL COURSE
Janette del rio a Tucker is a 53 year old female who was admitted for acute renal failure relate in setting of N/V and chronic diarrhea. Renal function on admit was 4.0  (baseline 2.0). CT abdomen did not show acute findings. She was gently hydrated and seen by Nephrology who recommended holding Lasix that she regularly takes for lower extremity edema. She mentions having pelvic mass that was biopsied two months ago. Documents were requested and patient was noted to have ovarian mass negative for malignancy.     12/15/23  Overnight patient complained of abdominal pain that resolved with PPI and carafate. Patient expressed frustration related to hospital policies, medication orders, and dietary choices that contributed to her lack of cooperation with staff. She desired to leave AMA twice overnight but retracted decision after extended discussion with staff members. Attempts were made to mitigate feelings with minimal resolution. Patients creatine has continued to improve with gentle hydration. She will follow up with new PCP in two weeks. Ambulatory order placed to Nephrology for acute on chronic renal failure and GI to discuss possible PEG placement for malnutrition. She is stable for discharge.

## 2023-12-15 NOTE — ASSESSMENT & PLAN NOTE
Nutrition consulted. Most recent weight and BMI monitored-     Measurements:  Wt Readings from Last 1 Encounters:   12/15/23 50.8 kg (111 lb 15.9 oz)   Body mass index is 17.03 kg/m².    Patient has been screened and assessed by RD.    Malnutrition Type:  Context: chronic illness  Level: moderate    Malnutrition Characteristic Summary:  Weight Loss (Malnutrition): greater than 10% in 6 months  Muscle Mass (Malnutrition): moderate depletion    Interventions/Recommendations (treatment strategy):  1. Recommend pt continues Cardiac Isolation Tray - Natalie . 2. Recommend pt continues to receive Boost breeze TID. 3. NFPE performed during RD follow up if appropriate. 4. Recommend pt receive Banatrol plus TID 5. weigh twice weekly.

## 2023-12-15 NOTE — ASSESSMENT & PLAN NOTE
Patient with acute kidney injury/acute renal failure likely due to pre-renal azotemia due to IVVD AMY is currently  noted with creatinine of 4.0 . Baseline creatinine  unclear, appears to be around 2  - Labs reviewed- Renal function/electrolytes with Estimated Creatinine Clearance: 15.1 mL/min (A) (based on SCr of 3.5 mg/dL (H)). according to latest data. Monitor urine output and serial BMP and adjust therapy as needed. Avoid nephrotoxins and renally dose meds for GFR listed above.    Urine studies ordered  Nephrology consulted    12/14/23  Input from Nephrology appreciated-suspect prerenal from GI losses. CT Abdomen did not show acute process involving kidneys. Continue gentle hydration

## 2024-01-05 ENCOUNTER — TELEPHONE (OUTPATIENT)
Dept: PRIMARY CARE CLINIC | Facility: CLINIC | Age: 54
End: 2024-01-05
Payer: MEDICARE

## 2024-01-24 ENCOUNTER — HOSPITAL ENCOUNTER (INPATIENT)
Facility: HOSPITAL | Age: 54
LOS: 5 days | Discharge: HOME HEALTH | DRG: 291 | End: 2024-01-30
Attending: EMERGENCY MEDICINE | Admitting: HOSPITALIST
Payer: COMMERCIAL

## 2024-01-24 DIAGNOSIS — R06.02 SHORTNESS OF BREATH: ICD-10-CM

## 2024-01-24 DIAGNOSIS — R06.02 SOB (SHORTNESS OF BREATH): Primary | ICD-10-CM

## 2024-01-24 DIAGNOSIS — I82.412 ACUTE DEEP VEIN THROMBOSIS (DVT) OF FEMORAL VEIN OF LEFT LOWER EXTREMITY: ICD-10-CM

## 2024-01-24 DIAGNOSIS — I82.402 ACUTE DEEP VEIN THROMBOSIS (DVT) OF LEFT LOWER EXTREMITY: ICD-10-CM

## 2024-01-24 DIAGNOSIS — E44.0 MALNUTRITION OF MODERATE DEGREE: ICD-10-CM

## 2024-01-24 DIAGNOSIS — E78.2 MIXED HYPERLIPIDEMIA: ICD-10-CM

## 2024-01-24 DIAGNOSIS — E16.2 HYPOGLYCEMIA: ICD-10-CM

## 2024-01-24 DIAGNOSIS — R00.0 TACHYCARDIA: ICD-10-CM

## 2024-01-24 DIAGNOSIS — N18.9 CHRONIC KIDNEY DISEASE, UNSPECIFIED CKD STAGE: ICD-10-CM

## 2024-01-24 DIAGNOSIS — D62 ACUTE BLOOD LOSS ANEMIA: ICD-10-CM

## 2024-01-24 DIAGNOSIS — M79.89 LEG SWELLING: ICD-10-CM

## 2024-01-24 DIAGNOSIS — I50.9 CHF (CONGESTIVE HEART FAILURE): ICD-10-CM

## 2024-01-24 DIAGNOSIS — R77.0 LOW SERUM ALBUMIN: ICD-10-CM

## 2024-01-24 PROBLEM — I50.33 ACUTE ON CHRONIC HEART FAILURE WITH PRESERVED EJECTION FRACTION: Status: ACTIVE | Noted: 2023-01-22

## 2024-01-24 PROBLEM — L97.909 VENOUS STASIS ULCER: Status: ACTIVE | Noted: 2024-01-24

## 2024-01-24 PROBLEM — Z87.19 HISTORY OF GI BLEED: Status: ACTIVE | Noted: 2024-01-24

## 2024-01-24 PROBLEM — I83.009 VENOUS STASIS ULCER: Status: ACTIVE | Noted: 2024-01-24

## 2024-01-24 PROBLEM — N18.4 CKD (CHRONIC KIDNEY DISEASE), STAGE IV: Status: ACTIVE | Noted: 2024-01-24

## 2024-01-24 LAB
ALBUMIN SERPL BCP-MCNC: 1.7 G/DL (ref 3.5–5.2)
ALP SERPL-CCNC: 279 U/L (ref 55–135)
ALT SERPL W/O P-5'-P-CCNC: 36 U/L (ref 10–44)
ANION GAP SERPL CALC-SCNC: 8 MMOL/L (ref 8–16)
APTT PPP: 27.7 SEC (ref 21–32)
AST SERPL-CCNC: 34 U/L (ref 10–40)
BASOPHILS # BLD AUTO: 0.06 K/UL (ref 0–0.2)
BASOPHILS # BLD AUTO: 0.06 K/UL (ref 0–0.2)
BASOPHILS NFR BLD: 0.7 % (ref 0–1.9)
BASOPHILS NFR BLD: 0.8 % (ref 0–1.9)
BILIRUB SERPL-MCNC: 0.3 MG/DL (ref 0.1–1)
BNP SERPL-MCNC: 551 PG/ML (ref 0–99)
BUN SERPL-MCNC: 28 MG/DL (ref 6–20)
CALCIUM SERPL-MCNC: 7.5 MG/DL (ref 8.7–10.5)
CHLORIDE SERPL-SCNC: 117 MMOL/L (ref 95–110)
CO2 SERPL-SCNC: 16 MMOL/L (ref 23–29)
CREAT SERPL-MCNC: 2.6 MG/DL (ref 0.5–1.4)
DIFFERENTIAL METHOD BLD: ABNORMAL
DIFFERENTIAL METHOD BLD: ABNORMAL
EOSINOPHIL # BLD AUTO: 0.1 K/UL (ref 0–0.5)
EOSINOPHIL # BLD AUTO: 0.1 K/UL (ref 0–0.5)
EOSINOPHIL NFR BLD: 0.8 % (ref 0–8)
EOSINOPHIL NFR BLD: 1.1 % (ref 0–8)
ERYTHROCYTE [DISTWIDTH] IN BLOOD BY AUTOMATED COUNT: 14.5 % (ref 11.5–14.5)
ERYTHROCYTE [DISTWIDTH] IN BLOOD BY AUTOMATED COUNT: 14.6 % (ref 11.5–14.5)
EST. GFR  (NO RACE VARIABLE): 21 ML/MIN/1.73 M^2
GLUCOSE SERPL-MCNC: 34 MG/DL (ref 70–110)
HCT VFR BLD AUTO: 28.4 % (ref 37–48.5)
HCT VFR BLD AUTO: 28.9 % (ref 37–48.5)
HGB BLD-MCNC: 9.4 G/DL (ref 12–16)
HGB BLD-MCNC: 9.8 G/DL (ref 12–16)
IMM GRANULOCYTES # BLD AUTO: 0.02 K/UL (ref 0–0.04)
IMM GRANULOCYTES # BLD AUTO: 0.02 K/UL (ref 0–0.04)
IMM GRANULOCYTES NFR BLD AUTO: 0.2 % (ref 0–0.5)
IMM GRANULOCYTES NFR BLD AUTO: 0.3 % (ref 0–0.5)
INR PPP: 1.2 (ref 0.8–1.2)
LYMPHOCYTES # BLD AUTO: 1.3 K/UL (ref 1–4.8)
LYMPHOCYTES # BLD AUTO: 1.4 K/UL (ref 1–4.8)
LYMPHOCYTES NFR BLD: 14.9 % (ref 18–48)
LYMPHOCYTES NFR BLD: 19.8 % (ref 18–48)
MCH RBC QN AUTO: 31.2 PG (ref 27–31)
MCH RBC QN AUTO: 31.4 PG (ref 27–31)
MCHC RBC AUTO-ENTMCNC: 33.1 G/DL (ref 32–36)
MCHC RBC AUTO-ENTMCNC: 33.9 G/DL (ref 32–36)
MCV RBC AUTO: 93 FL (ref 82–98)
MCV RBC AUTO: 94 FL (ref 82–98)
MONOCYTES # BLD AUTO: 0.5 K/UL (ref 0.3–1)
MONOCYTES # BLD AUTO: 0.5 K/UL (ref 0.3–1)
MONOCYTES NFR BLD: 5.4 % (ref 4–15)
MONOCYTES NFR BLD: 6.4 % (ref 4–15)
NEUTROPHILS # BLD AUTO: 5.1 K/UL (ref 1.8–7.7)
NEUTROPHILS # BLD AUTO: 6.9 K/UL (ref 1.8–7.7)
NEUTROPHILS NFR BLD: 71.6 % (ref 38–73)
NEUTROPHILS NFR BLD: 78 % (ref 38–73)
NRBC BLD-RTO: 0 /100 WBC
NRBC BLD-RTO: 0 /100 WBC
PLATELET # BLD AUTO: 198 K/UL (ref 150–450)
PLATELET # BLD AUTO: 225 K/UL (ref 150–450)
PMV BLD AUTO: 9.6 FL (ref 9.2–12.9)
PMV BLD AUTO: 9.7 FL (ref 9.2–12.9)
POCT GLUCOSE: 63 MG/DL (ref 70–110)
POCT GLUCOSE: 70 MG/DL (ref 70–110)
POCT GLUCOSE: 84 MG/DL (ref 70–110)
POTASSIUM SERPL-SCNC: 3.2 MMOL/L (ref 3.5–5.1)
PROT SERPL-MCNC: 6 G/DL (ref 6–8.4)
PROTHROMBIN TIME: 13 SEC (ref 9–12.5)
RBC # BLD AUTO: 3.01 M/UL (ref 4–5.4)
RBC # BLD AUTO: 3.12 M/UL (ref 4–5.4)
SODIUM SERPL-SCNC: 141 MMOL/L (ref 136–145)
TROPONIN I SERPL DL<=0.01 NG/ML-MCNC: <0.006 NG/ML (ref 0–0.03)
WBC # BLD AUTO: 7.08 K/UL (ref 3.9–12.7)
WBC # BLD AUTO: 8.78 K/UL (ref 3.9–12.7)

## 2024-01-24 PROCEDURE — 63600175 PHARM REV CODE 636 W HCPCS: Performed by: NURSE PRACTITIONER

## 2024-01-24 PROCEDURE — 93010 ELECTROCARDIOGRAM REPORT: CPT | Mod: ,,, | Performed by: INTERNAL MEDICINE

## 2024-01-24 PROCEDURE — 80053 COMPREHEN METABOLIC PANEL: CPT | Performed by: EMERGENCY MEDICINE

## 2024-01-24 PROCEDURE — G0378 HOSPITAL OBSERVATION PER HR: HCPCS

## 2024-01-24 PROCEDURE — 85025 COMPLETE CBC W/AUTO DIFF WBC: CPT | Performed by: EMERGENCY MEDICINE

## 2024-01-24 PROCEDURE — 25000003 PHARM REV CODE 250: Performed by: NURSE PRACTITIONER

## 2024-01-24 PROCEDURE — 93005 ELECTROCARDIOGRAM TRACING: CPT

## 2024-01-24 PROCEDURE — 85730 THROMBOPLASTIN TIME PARTIAL: CPT | Performed by: NURSE PRACTITIONER

## 2024-01-24 PROCEDURE — 99285 EMERGENCY DEPT VISIT HI MDM: CPT | Mod: 25

## 2024-01-24 PROCEDURE — 83880 ASSAY OF NATRIURETIC PEPTIDE: CPT | Performed by: EMERGENCY MEDICINE

## 2024-01-24 PROCEDURE — 85025 COMPLETE CBC W/AUTO DIFF WBC: CPT | Mod: 91 | Performed by: NURSE PRACTITIONER

## 2024-01-24 PROCEDURE — 85610 PROTHROMBIN TIME: CPT | Performed by: NURSE PRACTITIONER

## 2024-01-24 PROCEDURE — 84484 ASSAY OF TROPONIN QUANT: CPT | Performed by: EMERGENCY MEDICINE

## 2024-01-24 PROCEDURE — 96374 THER/PROPH/DIAG INJ IV PUSH: CPT

## 2024-01-24 PROCEDURE — 82962 GLUCOSE BLOOD TEST: CPT

## 2024-01-24 RX ORDER — AMLODIPINE BESYLATE 5 MG/1
5 TABLET ORAL DAILY
Status: DISCONTINUED | OUTPATIENT
Start: 2024-01-25 | End: 2024-01-30 | Stop reason: HOSPADM

## 2024-01-24 RX ORDER — ATORVASTATIN CALCIUM 40 MG/1
40 TABLET, FILM COATED ORAL DAILY
Status: DISCONTINUED | OUTPATIENT
Start: 2024-01-25 | End: 2024-01-28

## 2024-01-24 RX ORDER — ISOSORBIDE MONONITRATE 60 MG/1
60 TABLET, EXTENDED RELEASE ORAL DAILY
Status: DISCONTINUED | OUTPATIENT
Start: 2024-01-25 | End: 2024-01-30 | Stop reason: HOSPADM

## 2024-01-24 RX ORDER — SUCRALFATE 1 G/1
1 TABLET ORAL
Status: DISCONTINUED | OUTPATIENT
Start: 2024-01-25 | End: 2024-01-30 | Stop reason: HOSPADM

## 2024-01-24 RX ORDER — DULOXETIN HYDROCHLORIDE 30 MG/1
30 CAPSULE, DELAYED RELEASE ORAL DAILY
COMMUNITY

## 2024-01-24 RX ORDER — SODIUM BICARBONATE 650 MG/1
1300 TABLET ORAL 3 TIMES DAILY
Status: DISCONTINUED | OUTPATIENT
Start: 2024-01-24 | End: 2024-01-30 | Stop reason: HOSPADM

## 2024-01-24 RX ORDER — HEPARIN SODIUM,PORCINE/D5W 25000/250
0-40 INTRAVENOUS SOLUTION INTRAVENOUS CONTINUOUS
Status: DISCONTINUED | OUTPATIENT
Start: 2024-01-24 | End: 2024-01-25

## 2024-01-24 RX ORDER — AMLODIPINE BESYLATE 5 MG/1
5 TABLET ORAL DAILY
COMMUNITY

## 2024-01-24 RX ORDER — HYDROXYZINE HYDROCHLORIDE 25 MG/1
25 TABLET, FILM COATED ORAL 2 TIMES DAILY PRN
Status: DISCONTINUED | OUTPATIENT
Start: 2024-01-25 | End: 2024-01-30 | Stop reason: HOSPADM

## 2024-01-24 RX ORDER — POTASSIUM CHLORIDE 20 MEQ/1
40 TABLET, EXTENDED RELEASE ORAL ONCE
Status: COMPLETED | OUTPATIENT
Start: 2024-01-25 | End: 2024-01-24

## 2024-01-24 RX ORDER — OLANZAPINE 5 MG/1
5 TABLET ORAL NIGHTLY
COMMUNITY

## 2024-01-24 RX ORDER — FUROSEMIDE 10 MG/ML
40 INJECTION INTRAMUSCULAR; INTRAVENOUS
Status: DISCONTINUED | OUTPATIENT
Start: 2024-01-24 | End: 2024-01-30 | Stop reason: HOSPADM

## 2024-01-24 RX ORDER — OLANZAPINE 5 MG/1
5 TABLET ORAL NIGHTLY
Status: DISCONTINUED | OUTPATIENT
Start: 2024-01-25 | End: 2024-01-30 | Stop reason: HOSPADM

## 2024-01-24 RX ORDER — ONDANSETRON HYDROCHLORIDE 2 MG/ML
4 INJECTION, SOLUTION INTRAVENOUS EVERY 8 HOURS PRN
Status: DISCONTINUED | OUTPATIENT
Start: 2024-01-25 | End: 2024-01-30 | Stop reason: HOSPADM

## 2024-01-24 RX ORDER — SODIUM CHLORIDE 0.9 % (FLUSH) 0.9 %
10 SYRINGE (ML) INJECTION
Status: DISCONTINUED | OUTPATIENT
Start: 2024-01-25 | End: 2024-01-30 | Stop reason: HOSPADM

## 2024-01-24 RX ADMIN — HEPARIN SODIUM 18 UNITS/KG/HR: 10000 INJECTION, SOLUTION INTRAVENOUS at 11:01

## 2024-01-24 RX ADMIN — FUROSEMIDE 40 MG: 10 INJECTION, SOLUTION INTRAMUSCULAR; INTRAVENOUS at 09:01

## 2024-01-24 RX ADMIN — SODIUM BICARBONATE 650 MG TABLET 1300 MG: at 11:01

## 2024-01-24 RX ADMIN — POTASSIUM CHLORIDE 40 MEQ: 1500 TABLET, EXTENDED RELEASE ORAL at 11:01

## 2024-01-24 NOTE — ED PROVIDER NOTES
"Emergency Medicine Provider Note - 1/24/2024       SCRIBE NOTE: NETO, Lei Allred and Shahid Peña, am scribing for, and in the presence of, Kala Moreno DO.     History     Chief Complaint   Patient presents with    Leg Swelling     Reports bilat leg swelling, L worse than R. Reports toes discolored and draining. Reports unable to lift left leg    Shortness of Breath     Reports SOB x "a couple days"       Allergies:  Review of patient's allergies indicates:   Allergen Reactions    Lisinopril Anaphylaxis     This is only possible agent at the time.  This is only possible agent at the time.  This is only possible agent at the time.    Sulfa (sulfonamide antibiotics) Anaphylaxis and Hives    Beta-blockers (beta-adrenergic blocking agts)      Other reaction(s): Other (See Comments)  Syncope & Collapse        History of Present Illness   HPI    1/24/2024, 5:45 PM  The history is provided by the patient    Joe Ceballos is a 54 y.o. female with PMHx:  Bariatric surgery,  anastomotic gastric ulcer, Severe malnutrition, anxiety, chronic diastolic CHF, Hypertension, Sickle cell trait, history of stroke, prior dvt (Not currently on anticoagulation) , Diverticulosis and GI bleed.  She is  presenting to the ED for BLE swelling, L worse than the right.  Patient had been seen at an outlying emergency department on January 21, 2024 for similar complaints.  Patient was given a dose of Lasix 40 mg IV.  She notes that her legs have been weeping, especially her left lower extremity.  She notes a wound to the anterior part of the left leg.  se, which onset gradually.  Additional associated sxs include SOB which onset two days PTA. She denies any CP, HA, fever, N/V, and all other sxs at this time. Pt had bariatric surgery in 2016 with complications.      Arrival mode: Private Vehicle     PCP: Daphney Heart MD     Past Medical History:  Past Medical History:   Diagnosis Date    Anxiety     CHF (congestive heart " failure)     Edema     Hypertension     Initial insomnia     Sickle cell trait     Stroke     Tobacco use        Past Surgical History:  Past Surgical History:   Procedure Laterality Date    BREAST SURGERY       SECTION      x 2    CYST REMOVAL      from left tube    ESOPHAGOGASTRODUODENOSCOPY N/A 3/12/2023    Procedure: EGD (ESOPHAGOGASTRODUODENOSCOPY);  Surgeon: Ray Serrato MD;  Location: [facility];  Service: Gastroenterology;  Laterality: N/A;    gastric sleeve      Raheel-en-Y gastric bypass      TONSILLECTOMY           Family History:  Family History   Adopted: Yes   Problem Relation Age of Onset    Hypertension Mother     No Known Problems Father     Thyroid disease Paternal Aunt     Hypertension Paternal Grandmother     Breast cancer Neg Hx     Colon cancer Neg Hx     Ovarian cancer Neg Hx        Social History:  Social History     Tobacco Use    Smoking status: Former     Types: Cigarettes    Smokeless tobacco: Never   Substance and Sexual Activity    Alcohol use: Yes    Drug use: No    Sexual activity: Not Currently     Partners: Male     Birth control/protection: None        Review of Systems   Review of Systems   Constitutional:  Negative for fever.   HENT:  Negative for sore throat.    Respiratory:  Positive for shortness of breath.    Cardiovascular:  Positive for leg swelling (Left greater than right). Negative for chest pain.   Gastrointestinal:  Negative for nausea and vomiting.   Genitourinary:  Positive for vaginal discharge. Negative for dysuria.   Musculoskeletal:  Negative for back pain.   Skin:  Positive for color change (Reports discoloration of toes.) and wound (Left ankle drainage). Negative for rash.   Neurological:  Negative for weakness and headaches.   Hematological:  Does not bruise/bleed easily.   All other systems reviewed and are negative.       Physical Exam     Initial Vitals   BP Pulse Resp Temp SpO2   24 1641 24 1641 24 1641 24 1641  01/24/24 1643   (!) 155/94 76 19 97.5 °F (36.4 °C) 100 %      MAP       --                 Physical Exam    Nursing Notes and Vital Signs Reviewed.  Constitutional: Patient is in no acute distress. Thin appearing.   Head: Atraumatic. Normocephalic.  Eyes: PERRL. EOM intact. Conjunctivae are  pale. No scleral icterus.  ENT: Mucous membranes are moist. Oropharynx is clear and symmetric.    Neck: Supple. Full ROM. No lymphadenopathy.  Cardiovascular: Regular rate. Regular rhythm. No murmurs, rubs, or gallops. Distal pulses are 2+ and symmetric.  Pulmonary/Chest: No respiratory distress. Clear to auscultation bilaterally. No wheezing or rales.  Abdominal: Soft and non-distended.  There is no tenderness.  No rebound, guarding, or rigidity. Good bowel sounds.  Musculoskeletal: Moves all extremities. No obvious deformities. 4+ pitting pedal edema. No calf tenderness.  Skin: Anterior LLE ulcer.  Neurological:  Alert, awake, and appropriate.  Normal speech.  No acute focal neurological deficits are appreciated.  Psychiatric: Normal affect. Good eye contact. Appropriate in content.      Bilateral lower extremities:  Slight slight discrepancy left greater than right.  There is a ulcer noted to the distal tibia on the left.  There is +4 pitting edema equal bilaterally.  Cap refill less than 3 seconds.  Patient able to wiggle toes.  No cyanosis noted to the toes.       ED Course   ED Procedures:  Critical Care    Date/Time: 1/24/2024 5:45 PM    Performed by: Kala Moreno DO  Authorized by: Lionel Malik MD  Direct patient critical care time: 17 minutes  Ordering / reviewing critical care time: 2 minutes  Documentation critical care time: 5 minutes  Consulting other physicians critical care time: 5 minutes  Total critical care time (exclusive of procedural time) : 29 minutes  Critical care was necessary to treat or prevent imminent or life-threatening deterioration of the following conditions: endocrine crisis.  Critical care  "was time spent personally by me on the following activities: discussions with consultants, blood draw for specimens, interpretation of cardiac output measurements, evaluation of patient's response to treatment, examination of patient, obtaining history from patient or surrogate, ordering and performing treatments and interventions, ordering and review of laboratory studies, ordering and review of radiographic studies, pulse oximetry, re-evaluation of patient's condition and vascular access procedures.          ED Vital Signs:  Vitals:    01/24/24 1641 01/24/24 1643 01/24/24 1737 01/24/24 1745   BP: (!) 155/94   (!) 154/97   Pulse: 76  75 73   Resp: 19  14 18   Temp: 97.5 °F (36.4 °C)      TempSrc: Oral      SpO2:  100% 100% 100%   Weight:   53.9 kg (118 lb 13.3 oz)    Height:        01/24/24 2146   BP: (!) 161/10   Pulse: 63   Resp: 17   Temp: 97.8 °F (36.6 °C)   TempSrc: Oral   SpO2: 100%   Weight: 54.8 kg (120 lb 13 oz)   Height: 5' 8" (1.727 m)       Abnormal Lab Results:  Labs Reviewed   CBC W/ AUTO DIFFERENTIAL - Abnormal; Notable for the following components:       Result Value    RBC 3.01 (*)     Hemoglobin 9.4 (*)     Hematocrit 28.4 (*)     MCH 31.2 (*)     RDW 14.6 (*)     Gran % 78.0 (*)     Lymph % 14.9 (*)     All other components within normal limits   COMPREHENSIVE METABOLIC PANEL - Abnormal; Notable for the following components:    Potassium 3.2 (*)     Chloride 117 (*)     CO2 16 (*)     Glucose 34 (*)     BUN 28 (*)     Creatinine 2.6 (*)     Calcium 7.5 (*)     Albumin 1.7 (*)     Alkaline Phosphatase 279 (*)     eGFR 21 (*)     All other components within normal limits    Narrative:      glu  critical result(s) called and verbal readback obtained from nella pearson rn by [id number] 01/24/2024 17:52   B-TYPE NATRIURETIC PEPTIDE - Abnormal; Notable for the following components:     (*)     All other components within normal limits   PROTIME-INR - Abnormal; Notable for the following components:    " Prothrombin Time 13.0 (*)     All other components within normal limits    Narrative:     Draw baseline aPTT prior to starting the heparin bolus or  infusion  (if patient is on warfarin prior to heparin therapy)   CBC W/ AUTO DIFFERENTIAL - Abnormal; Notable for the following components:    RBC 3.12 (*)     Hemoglobin 9.8 (*)     Hematocrit 28.9 (*)     MCH 31.4 (*)     All other components within normal limits    Narrative:     Draw baseline aPTT prior to starting the heparin bolus or  infusion  (if patient is on warfarin prior to heparin therapy)   POCT GLUCOSE - Abnormal; Notable for the following components:    POCT Glucose 63 (*)     All other components within normal limits   TROPONIN I   APTT    Narrative:     Draw baseline aPTT prior to starting the heparin bolus or  infusion  (if patient is on warfarin prior to heparin therapy)   POCT GLUCOSE   POCT GLUCOSE MONITORING CONTINUOUS   POCT GLUCOSE MONITORING CONTINUOUS        All Lab Results:  Results for orders placed or performed during the hospital encounter of 01/24/24   CBC auto differential   Result Value Ref Range    WBC 8.78 3.90 - 12.70 K/uL    RBC 3.01 (L) 4.00 - 5.40 M/uL    Hemoglobin 9.4 (L) 12.0 - 16.0 g/dL    Hematocrit 28.4 (L) 37.0 - 48.5 %    MCV 94 82 - 98 fL    MCH 31.2 (H) 27.0 - 31.0 pg    MCHC 33.1 32.0 - 36.0 g/dL    RDW 14.6 (H) 11.5 - 14.5 %    Platelets 225 150 - 450 K/uL    MPV 9.6 9.2 - 12.9 fL    Immature Granulocytes 0.2 0.0 - 0.5 %    Gran # (ANC) 6.9 1.8 - 7.7 K/uL    Immature Grans (Abs) 0.02 0.00 - 0.04 K/uL    Lymph # 1.3 1.0 - 4.8 K/uL    Mono # 0.5 0.3 - 1.0 K/uL    Eos # 0.1 0.0 - 0.5 K/uL    Baso # 0.06 0.00 - 0.20 K/uL    nRBC 0 0 /100 WBC    Gran % 78.0 (H) 38.0 - 73.0 %    Lymph % 14.9 (L) 18.0 - 48.0 %    Mono % 5.4 4.0 - 15.0 %    Eosinophil % 0.8 0.0 - 8.0 %    Basophil % 0.7 0.0 - 1.9 %    Differential Method Automated    Comprehensive metabolic panel   Result Value Ref Range    Sodium 141 136 - 145 mmol/L     Potassium 3.2 (L) 3.5 - 5.1 mmol/L    Chloride 117 (H) 95 - 110 mmol/L    CO2 16 (L) 23 - 29 mmol/L    Glucose 34 (LL) 70 - 110 mg/dL    BUN 28 (H) 6 - 20 mg/dL    Creatinine 2.6 (H) 0.5 - 1.4 mg/dL    Calcium 7.5 (L) 8.7 - 10.5 mg/dL    Total Protein 6.0 6.0 - 8.4 g/dL    Albumin 1.7 (L) 3.5 - 5.2 g/dL    Total Bilirubin 0.3 0.1 - 1.0 mg/dL    Alkaline Phosphatase 279 (H) 55 - 135 U/L    AST 34 10 - 40 U/L    ALT 36 10 - 44 U/L    eGFR 21 (A) >60 mL/min/1.73 m^2    Anion Gap 8 8 - 16 mmol/L   Troponin I   Result Value Ref Range    Troponin I <0.006 0.000 - 0.026 ng/mL   Brain natriuretic peptide   Result Value Ref Range     (H) 0 - 99 pg/mL   APTT   Result Value Ref Range    aPTT 27.7 21.0 - 32.0 sec   Protime-INR   Result Value Ref Range    Prothrombin Time 13.0 (H) 9.0 - 12.5 sec    INR 1.2 0.8 - 1.2   CBC auto differential   Result Value Ref Range    WBC 7.08 3.90 - 12.70 K/uL    RBC 3.12 (L) 4.00 - 5.40 M/uL    Hemoglobin 9.8 (L) 12.0 - 16.0 g/dL    Hematocrit 28.9 (L) 37.0 - 48.5 %    MCV 93 82 - 98 fL    MCH 31.4 (H) 27.0 - 31.0 pg    MCHC 33.9 32.0 - 36.0 g/dL    RDW 14.5 11.5 - 14.5 %    Platelets 198 150 - 450 K/uL    MPV 9.7 9.2 - 12.9 fL    Immature Granulocytes 0.3 0.0 - 0.5 %    Gran # (ANC) 5.1 1.8 - 7.7 K/uL    Immature Grans (Abs) 0.02 0.00 - 0.04 K/uL    Lymph # 1.4 1.0 - 4.8 K/uL    Mono # 0.5 0.3 - 1.0 K/uL    Eos # 0.1 0.0 - 0.5 K/uL    Baso # 0.06 0.00 - 0.20 K/uL    nRBC 0 0 /100 WBC    Gran % 71.6 38.0 - 73.0 %    Lymph % 19.8 18.0 - 48.0 %    Mono % 6.4 4.0 - 15.0 %    Eosinophil % 1.1 0.0 - 8.0 %    Basophil % 0.8 0.0 - 1.9 %    Differential Method Automated    POCT glucose   Result Value Ref Range    POCT Glucose 63 (L) 70 - 110 mg/dL   POCT glucose   Result Value Ref Range    POCT Glucose 70 70 - 110 mg/dL      Reviewed Prior Labs:   Latest Reference Range & Units 12/14/23 05:17 12/15/23 09:31 12/30/23 13:31 01/24/24 17:08 01/24/24 20:52   Hemoglobin 12.0 - 16.0 g/dL 7.4 (L) 7.9  (L) 5.5 (LL) (E) 9.4 (L) 9.8 (L)   Hematocrit 37.0 - 48.5 % 21.2 (L) 22.9 (L) 16.4 (L) (E) 28.4 (L) 28.9 (L)   (LL): Data is critically low  (L): Data is abnormally low  (E): External lab result     Latest Reference Range & Units 12/30/23 11:56 01/01/24 09:29 01/02/24 02:58 01/24/24 17:08   BUN 6 - 20 mg/dL 33 (H) (E) 34 (H) (E) 33 (H) (E) 28 (H)   Creatinine 0.5 - 1.4 mg/dL 2.90 (H) (E) 2.96 (H) (E) 2.99 (H) (E) 2.6 (H)   (H): Data is abnormally high  (E): External lab result    The EKG was ordered, reviewed, and independently interpreted by the ED provider:      ECG Results              EKG 12-lead (Preliminary result)  Result time 01/24/24 17:29:02      Wet Read by Kala Moreno DO (01/24/24 17:29:02, '[facility] - Emergency Dept., Emergency Medicine)    Rate of 73 beats per minute.  Sinus rhythm.  Shortened NC.  Nonspecific T-wave changes.  No ST segment elevation.  No STEMI.                                    Imaging Results:  Imaging Results               US Lower Extremity Veins Bilateral (Final result)  Result time 01/24/24 18:36:42      Final result by Iesha Romo MD (01/24/24 18:36:42)                   Impression:      Positive DVT left lower extremity    This report was flagged in Epic as abnormal.      Electronically signed by: Iesha Romo  Date:    01/24/2024  Time:    18:36               Narrative:    EXAMINATION:  US LOWER EXTREMITY VEINS BILATERAL    CLINICAL HISTORY:  Other specified soft tissue disorders    TECHNIQUE:  Duplex and color flow Doppler and dynamic compression was performed of the bilateral lower extremity veins was performed.    COMPARISON:  None    FINDINGS:  There is positive thrombus left proximal femoral and posterior tibial veins with limited compressibility.  Otherwise deep veins appear patent with good compressibility and color flow bilateral lower extremities imaging groin to mid calf                                       X-Ray Chest AP Portable (Final result)   Result time 01/24/24 17:44:27      Final result by Humberto Gonzales MD (01/24/24 17:44:27)                   Impression:      No acute abnormality.      Electronically signed by: Humberto Gonzales  Date:    01/24/2024  Time:    17:44               Narrative:    EXAMINATION:  XR CHEST AP PORTABLE    CLINICAL HISTORY:  Leg swelling;    TECHNIQUE:  Single frontal view of the chest was performed.    COMPARISON:  Multiple priors.    FINDINGS:  The lungs are clear, with normal appearance of pulmonary vasculature and no pleural effusion or pneumothorax.    The cardiac silhouette is normal in size. The hilar and mediastinal contours are unremarkable.    Bones are intact.                                       Type of Interpretation: ED Physician (Independently Interpreted).  Radiology Procedure Done: Portable CXR.  Interpretation: Patient has overlying bra  with wire.  Otherwise the lungs are clear.  Normal pulmonary vasculature.  No pleural effusion.          The Emergency Provider reviewed the vital signs and test results, which are outlined above.     ED Discussion   ED Medication(s):  Medications   heparin 25,000 units in dextrose 5% (100 units/ml) IV bolus from bag INITIAL BOLUS (has no administration in time range)   heparin 25,000 units in dextrose 5% 250 mL (100 units/mL) infusion HIGH INTENSITY nomogram - [facility] (has no administration in time range)   heparin 25,000 units in dextrose 5% (100 units/ml) IV bolus from bag - ADDITIONAL PRN BOLUS - 60 units/kg (has no administration in time range)   heparin 25,000 units in dextrose 5% (100 units/ml) IV bolus from bag - ADDITIONAL PRN BOLUS - 30 units/kg (has no administration in time range)   furosemide injection 40 mg (40 mg Intravenous Given 1/24/24 2103)       ED Course as of 01/24/24 2151 Wed Jan 24, 2024   1739 Hemoglobin(!): 9.4 [LB]   1739 Hematocrit(!): 28.4 [LB]   1741 Hemoglobin(!): 9.4 [LB]   1753 Notified by lab of blood sugar 34.  Will get Accu-Chek. [LB]    1753 BUN(!): 28 [LB]   1753 Creatinine(!): 2.6 [LB]   1753 Glucose(!!): 34 [LB]   1913 Impression:     Positive DVT left lower extremity     This report was flagged in Epic as abnormal.        Electronically signed by: Iesha Balbuenaabhishek  Date:                                            01/24/2024  Time:                                           18:36   [LB]   1947 POCT Glucose: 70 [LB]      ED Course User Index  [LB] Kala Moreno, DO       7:38 PM: Discussed case with Lionel Malik MD ([facility]). Dr. Malik agrees with current care and management of pt and accepts admission.   Admitting Service: [facility]  Admitting Physician: Dr. Malik  Admit to: Obs Med/Tele    7:38 PM: Re-evaluated pt. I have discussed test results, shared treatment plan, and the need for admission with patient and family at bedside. Pt and family express understanding at this time and agree with all information. All questions answered. Pt and family have no further questions or concerns at this time. Pt is ready for admit.        MIPS Measures     Smoker? Yes; Former     Hypertension: History of HTN.       Medical Decision Making           Additional MDM:   Hypoglycemia: The initial ED glucose level was 34. ED Therapy: the patient was given a diet to eat. The repeat glucose level was 70. The patient tolerated the above treatment and did not have any complications.        Medical Decision Making  Differential Diagnosis:  Acute on chronic kidney failure, cellulitis, DVT, anasarca, hypoalbuminemia, acute exacerbation of congestive heart failure    Problems Addressed:  Chronic kidney disease, unspecified CKD stage: chronic illness or injury  Low serum albumin: chronic illness or injury    Amount and/or Complexity of Data Reviewed  External Data Reviewed: labs.     Details: See ED course.  Labs: ordered. Decision-making details documented in ED Course.     Details: White cell count normal at 7.08.  Hemoglobin/hematocrit 9.8/28.9; m  previous was 5.5/16.4.  Patient has chronic kidney disease currently BUN of 28/creatinine 2.6.  Previous creatinine was 2.9.  Mild decrease in potassium 3.2.    Blood sugar on chemistry was 34.  Repeat Accu-Chek 63.  Patient was provided food.  Repeat Accu-Chek 70.  Patient's albumin 1.7.  Radiology: ordered and independent interpretation performed. Decision-making details documented in ED Course.     Details: Radiology report as dictated by radiologist notable for DVT in the femoral and posterior tibial veins.  ECG/medicine tests: ordered and independent interpretation performed. Decision-making details documented in ED Course.    Risk  Decision regarding hospitalization.        Coding    Prescription Management: I performed a review of the patient's current Rx medication list as input by nursing staff.    Current Discharge Medication List        CONTINUE these medications which have NOT CHANGED    Details   atorvastatin (LIPITOR) 80 MG tablet Take 1 tablet (80 mg total) by mouth every evening.  Qty: 90 tablet, Refills: 2    Associated Diagnoses: Mixed hyperlipidemia      cyclobenzaprine HCl (FLEXERIL ORAL) Take 10 mg by mouth.      furosemide (LASIX) 40 MG tablet Take 40 mg by mouth 2 (two) times a day.      hydrOXYzine HCL (ATARAX) 25 MG tablet Take 1 tablet (25 mg total) by mouth 2 (two) times daily as needed for Anxiety.  Qty: 60 tablet, Refills: 5    Associated Diagnoses: Generalized anxiety disorder with panic attacks      hydrOXYzine pamoate (VISTARIL) 25 MG Cap Take 25 mg by mouth.      isosorbide mononitrate (IMDUR) 60 MG 24 hr tablet Take 60 mg by mouth once daily.      omeprazole (PRILOSEC) 40 MG capsule Take 1 capsule (40 mg total) by mouth once daily.  Qty: 30 capsule, Refills: 0      ondansetron (ZOFRAN) 4 MG tablet Take 1 tablet (4 mg total) by mouth every 6 (six) hours.  Qty: 12 tablet, Refills: 0      sucralfate (CARAFATE) 1 gram tablet Take 1 tablet (1 g total) by mouth 4 (four) times daily before  "meals and nightly.  Qty: 120 tablet, Refills: 3      traMADoL (ULTRAM) 50 mg tablet Take 1 tablet (50 mg total) by mouth every 6 (six) hours as needed.  Qty: 12 tablet, Refills: 0              Discussed case with:Hospital Medicine    7:54 PM: Dr. Malik recommended hospitalization.    Portions of this note may have been created with voice recognition software. Occasional "wrong-word" or "sound-a-like" substitutions may have occurred due to the inherent limitations of voice recognition software. Please, read the note carefully and recognize, using context, where substitutions have occurred.          Clinical Impression       ICD-10-CM ICD-9-CM   1. SOB (shortness of breath)  R06.02 786.05   2. Shortness of breath  R06.02 786.05   3. Leg swelling bilaterally  M79.89 729.81   4. Acute deep vein thrombosis (DVT) of femoral vein of left lower extremity  I82.412 453.41   5. Low serum albumin  R77.0 790.99   6. Chronic kidney disease, unspecified CKD stage  N18.9 585.9   7. Hypoglycemia  E16.2 251.2         ED Disposition     Disposition: Admit to Observation, telemetry  Patient condition: Stable        Scribe Attestation:   Scribe #1: I performed the above scribed service and the documentation accurately describes the services I performed. I attest to the accuracy of the note.     Attending:   Physician Attestation Statement for Scribe #1: I, Kala Moreno DO, personally performed the services described in this documentation, as scribed by Lei Allred and Shahid Peña, in my presence, and it is both accurate and complete.                 Kala Moreno DO  01/24/24 0291    "

## 2024-01-25 ENCOUNTER — DOCUMENTATION ONLY (OUTPATIENT)
Dept: CARDIOLOGY | Facility: CLINIC | Age: 54
End: 2024-01-25
Payer: COMMERCIAL

## 2024-01-25 LAB
APTT PPP: >150 SEC (ref 21–32)
POCT GLUCOSE: 139 MG/DL (ref 70–110)
T4 FREE SERPL-MCNC: 0.79 NG/DL (ref 0.71–1.51)
TSH SERPL DL<=0.005 MIU/L-ACNC: 0.9 UIU/ML (ref 0.4–4)

## 2024-01-25 PROCEDURE — 84443 ASSAY THYROID STIM HORMONE: CPT | Performed by: NURSE PRACTITIONER

## 2024-01-25 PROCEDURE — 63600175 PHARM REV CODE 636 W HCPCS: Performed by: NURSE PRACTITIONER

## 2024-01-25 PROCEDURE — 21400001 HC TELEMETRY ROOM

## 2024-01-25 PROCEDURE — 25000003 PHARM REV CODE 250: Performed by: HOSPITALIST

## 2024-01-25 PROCEDURE — 25000003 PHARM REV CODE 250: Performed by: NURSE PRACTITIONER

## 2024-01-25 PROCEDURE — 11000001 HC ACUTE MED/SURG PRIVATE ROOM

## 2024-01-25 PROCEDURE — 84439 ASSAY OF FREE THYROXINE: CPT | Performed by: NURSE PRACTITIONER

## 2024-01-25 PROCEDURE — 36415 COLL VENOUS BLD VENIPUNCTURE: CPT | Performed by: STUDENT IN AN ORGANIZED HEALTH CARE EDUCATION/TRAINING PROGRAM

## 2024-01-25 PROCEDURE — 85730 THROMBOPLASTIN TIME PARTIAL: CPT | Performed by: STUDENT IN AN ORGANIZED HEALTH CARE EDUCATION/TRAINING PROGRAM

## 2024-01-25 RX ORDER — HYDROCODONE BITARTRATE AND ACETAMINOPHEN 5; 325 MG/1; MG/1
1 TABLET ORAL EVERY 6 HOURS PRN
Status: DISCONTINUED | OUTPATIENT
Start: 2024-01-25 | End: 2024-01-30 | Stop reason: HOSPADM

## 2024-01-25 RX ORDER — ACETAMINOPHEN 325 MG/1
650 TABLET ORAL EVERY 6 HOURS PRN
Status: DISCONTINUED | OUTPATIENT
Start: 2024-01-25 | End: 2024-01-30 | Stop reason: HOSPADM

## 2024-01-25 RX ADMIN — FUROSEMIDE 40 MG: 10 INJECTION, SOLUTION INTRAMUSCULAR; INTRAVENOUS at 09:01

## 2024-01-25 RX ADMIN — ONDANSETRON 4 MG: 2 INJECTION INTRAMUSCULAR; INTRAVENOUS at 03:01

## 2024-01-25 RX ADMIN — HYDROCODONE BITARTRATE AND ACETAMINOPHEN 1 TABLET: 5; 325 TABLET ORAL at 06:01

## 2024-01-25 RX ADMIN — ONDANSETRON 4 MG: 2 INJECTION INTRAMUSCULAR; INTRAVENOUS at 11:01

## 2024-01-25 RX ADMIN — HYDROCODONE BITARTRATE AND ACETAMINOPHEN 1 TABLET: 5; 325 TABLET ORAL at 03:01

## 2024-01-25 RX ADMIN — SUCRALFATE 1 G: 1 TABLET ORAL at 09:01

## 2024-01-25 RX ADMIN — OLANZAPINE 5 MG: 5 TABLET, FILM COATED ORAL at 09:01

## 2024-01-25 RX ADMIN — FUROSEMIDE 40 MG: 10 INJECTION, SOLUTION INTRAMUSCULAR; INTRAVENOUS at 08:01

## 2024-01-25 RX ADMIN — SUCRALFATE 1 G: 1 TABLET ORAL at 11:01

## 2024-01-25 RX ADMIN — HYDROXYZINE HYDROCHLORIDE 25 MG: 25 TABLET ORAL at 11:01

## 2024-01-25 RX ADMIN — ONDANSETRON 4 MG: 2 INJECTION INTRAMUSCULAR; INTRAVENOUS at 12:01

## 2024-01-25 RX ADMIN — SODIUM BICARBONATE 650 MG TABLET 1300 MG: at 03:01

## 2024-01-25 RX ADMIN — SUCRALFATE 1 G: 1 TABLET ORAL at 06:01

## 2024-01-25 RX ADMIN — ONDANSETRON 4 MG: 2 INJECTION INTRAMUSCULAR; INTRAVENOUS at 08:01

## 2024-01-25 RX ADMIN — APIXABAN 10 MG: 2.5 TABLET, FILM COATED ORAL at 09:01

## 2024-01-25 NOTE — ASSESSMENT & PLAN NOTE
Chronic, uncontrolled. Latest blood pressure and vitals reviewed-     Temp:  [97.5 °F (36.4 °C)-98.6 °F (37 °C)]   Pulse:  [60-76]   Resp:  [14-20]   BP: (125-168)/(10-97)   SpO2:  [99 %-100 %] .   Home meds for hypertension were reviewed and noted below.   Hypertension Medications               amLODIPine (NORVASC) 5 MG tablet Take 5 mg by mouth once daily.    furosemide (LASIX) 40 MG tablet Take 40 mg by mouth 2 (two) times a day.    isosorbide mononitrate (IMDUR) 60 MG 24 hr tablet Take 60 mg by mouth once daily.            While in the hospital, will manage blood pressure as follows; Continue home antihypertensive regimen    Will utilize p.r.n. blood pressure medication only if patient's blood pressure greater than 160/100 and she develops symptoms such as worsening chest pain or shortness of breath.

## 2024-01-25 NOTE — PLAN OF CARE
[facility] - Med Surg  Initial Discharge Assessment       Primary Care Provider: Daphney Heart MD    Admission Diagnosis: Shortness of breath [R06.02]  SOB (shortness of breath) [R06.02]  Leg swelling [M79.89]    Admission Date: 1/24/2024  Expected Discharge Date: Per Attending     Transition of Care Barriers: None    Payor: BLUE CROSS BLUE SHIELD / Plan: BCBS ALL OUT OF STATE / Product Type: PPO /     Extended Emergency Contact Information  Primary Emergency Contact: Lois Hogan  Mobile Phone: [phone]  Relation: Relative    Discharge Plan A: [email or web address] [id number] [address] [facility] [address] [address]  Phone: [phone] Fax: [phone]      Initial Assessment (most recent)       Adult Discharge Assessment - 01/25/24 0918          Discharge Assessment    Assessment Type Discharge Planning Assessment     Confirmed/corrected address, phone number and insurance Yes     Confirmed Demographics Correct on Facesheet     Source of Information patient     When was your last doctors appointment? --   pt reported recent change to a PCP in [place]; never went to appointment on December 29, 2023    Communicated ALEXA with patient/caregiver Date not available/Unable to determine     Reason For Admission Acute DVT of lower left extremity     People in Home alone     Do you expect to return to your current living situation? --   TBD    Do you have help at home or someone to help you manage your care at home? No     Prior to hospitilization cognitive status: Alert/Oriented     Current cognitive status: Alert/Oriented     Home Layout Able to live on 1st floor     Equipment Currently Used at Home none     Readmission within 30 days? No     Patient currently being followed by outpatient case management? No     Do you currently have service(s) that help you manage your care at home? Yes     Name and Contact number of agency  "[facility] in [place]     Is the pt/caregiver preference to resume services with current agency Yes     Do you take prescription medications? Yes     Do you have prescription coverage? Yes     Coverage BCBS     Do you have any problems affording any of your prescribed medications? No     Who is going to help you get home at discharge? family     How do you get to doctors appointments? health plan transportation     Are you on dialysis? No     Do you take coumadin? No     Discharge Plan A Home Health     DME Needed Upon Discharge  none     Discharge Plan discussed with: Patient     Transition of Care Barriers None                   Sw attempted to meet with pt to complete assessment; pt resistant to answering questions at this time. Sw attempted to ask about d/c plan since last admission and pt claimed "I don't remember, I had a stroke in the past." Sw stated she would review her old chart and pt exclaimed "ma'am, that's what I was trying to tell you!" Pt attempted to argue and raise voice multiple times with Sw during assessment. Pt claims she has Medicare insurance and not BCBS. Pt stated she is wanting to go to a facility prior to d/c.     Sw emailed Registration to confirm pt's insurance.              "

## 2024-01-25 NOTE — CONSULTS
[facility] - Med Surg  Adult Nutrition  Consult Note    SUMMARY     Recommendations  1. Recommend pt continues Low Sodium, 2gm Fluid - 1500mL diet.   2. Recommend pt receives Boost plus BID   3. Recommend pt receives Monroe BID to assist wound healing  4. Recommend pt continues Anti-emetics.   5. Weigh daily.    Goals:   1. Pt will continue to consume >75% of EEN/EPN prior to RD follow up.   2. Pt nausea will continue to improve prior to RD follow up.  3. Pt nausea will remain improved prior to RD follow up.  Nutrition Goal Status: new  Communication of RD Recs: other (comment) (POC: Sticky note)    Assessment and Plan    Nutrition Problem  Increased protein needs    Related to (etiology):   Increased demand for protein    Signs and Symptoms (as evidenced by):   Leg Ulceration (Partial thickness)    Interventions(treatment strategy):  1. Commercial Beverage  2. Mineral/Vitamin supplement therapy for wound healing  3. Prescription medication  4.Collaboration with other providers.      Nutrition Diagnosis Status:   New       Malnutrition Assessment     Skin (Micronutrient): dry, wounds unhealed (Tang = 19)  Musculoskeletal/Lower Extremities: edema       Fluid Accumulation (Malnutrition): moderate                         Reason for Assessment    Reason For Assessment: consult  Diagnosis: cardiac disease  Relevant Medical History: DVT, HTN, History of gastric bypass, CVA, CHF, CKD4,  HLD  Interdisciplinary Rounds: did not attend  General Information Comments:   1/25/24: 54 y.o female admitted with active principal problem of Acute deep vein thrombosis (DVT) of left lower extremity. Pt states her appetite is improving however, she states nausea is affecting her PO intake. Recent RN note states pain and nausea under control with PRN meds. NFPE not performed, Dietitian will assess the need for NFPE during RD follow up. Pt currently charted to weigh 120 lb, BMI 18.25 (Protein-Calorie Malnutrition). +28 lb wt gain since last  "nutrition assessment (12/14/23) x 1 month ago, +30% wt change. Of note, Pt currently charted with 3+ edema within (B) leg, and ankle. Dietitian attempted to provide heart failure nutrition education however, pt was not interested in receiving information. Pt states "I`m 54 and know what to eat and not eat". Pt states her diarrhea has begun to improved. She states she had solo diarrhea incident 1/24/24. Pt LBM: 1/24/24: Pt currently on room air. Per wound care note, pt noted with ulceration to the left lower extremity with partial thickness loss.  Nutrition Discharge Planning: Cardiac, Fluid 1500 mL    Nutrition Risk Screen    Nutrition Risk Screen: no indicators present    Nutrition/Diet History    Spiritual, Cultural Beliefs, Temple Practices, Values that Affect Care: no  Food Allergies: NKFA  Factors Affecting Nutritional Intake: nausea/vomiting    Anthropometrics    Temp: 97.9 °F (36.6 °C)  Height Method: Stated  Height: 5' 8" (172.7 cm)  Height (inches): 68 in  Weight Method: Bed Scale  Weight: 54.4 kg (119 lb 14.9 oz)  Weight (lb): 119.93 lb  Ideal Body Weight (IBW), Female: 140 lb  % Ideal Body Weight, Female (lb): 85.66 %  BMI (Calculated): 18.2  BMI Grade: 17 - 18.4 protein-energy malnutrition grade I     Wt Readings from Last 20 Encounters:   01/25/24 54.4 kg (119 lb 14.9 oz)   12/15/23 50.8 kg (111 lb 15.9 oz)   12/04/23 46.3 kg (102 lb)   11/09/23 53.3 kg (117 lb 6.4 oz)   10/05/23 59 kg (130 lb)   10/02/23 61.4 kg (135 lb 7.6 oz)   10/02/23 61.5 kg (135 lb 9.6 oz)   06/22/23 66.3 kg (146 lb 2.6 oz)   06/09/23 68.6 kg (151 lb 2 oz)   03/12/23 80.7 kg (178 lb)   01/31/23 77.5 kg (170 lb 12.8 oz)   01/23/23 80.2 kg (176 lb 12.9 oz)   01/04/23 84.8 kg (187 lb)   08/15/22 85.1 kg (187 lb 9.8 oz)   07/12/22 90.3 kg (199 lb)   06/23/22 89 kg (196 lb 3.4 oz)   06/07/22 92 kg (202 lb 13.2 oz)   06/03/22 96.8 kg (213 lb 6.4 oz)   05/25/22 99.3 kg (218 lb 14.7 oz)   09/17/18 79.7 kg (175 lb 11.3 oz) "   ]  Lab/Procedures/Meds  BMP  Lab Results   Component Value Date     01/24/2024    K 3.2 (L) 01/24/2024     (H) 01/24/2024    CO2 16 (L) 01/24/2024    BUN 28 (H) 01/24/2024    CREATININE 2.6 (H) 01/24/2024    CALCIUM 7.5 (L) 01/24/2024    ANIONGAP 8 01/24/2024    EGFRNORACEVR 21 (A) 01/24/2024     Lab Results   Component Value Date    CALCIUM 7.5 (L) 01/24/2024     Recent Labs   Lab 01/25/24  0447   POCTGLUCOSE 139*     Lab Results   Component Value Date    ALT 36 01/24/2024    AST 34 01/24/2024    ALKPHOS 279 (H) 01/24/2024    BILITOT 0.3 01/24/2024       Pertinent Labs Reviewed: reviewed  Pertinent Medications Reviewed: reviewed  Scheduled Meds:   amLODIPine  5 mg Oral Daily    atorvastatin  40 mg Oral Daily    furosemide (LASIX) injection  40 mg Intravenous Q12H    isosorbide mononitrate  60 mg Oral Daily    OLANZapine  5 mg Oral QHS    sodium bicarbonate  1,300 mg Oral TID    sucralfate  1 g Oral QID (AC & HS)     Continuous Infusions:   heparin (porcine) in D5W 14 Units/kg/hr (01/25/24 1108)     PRN Meds:.acetaminophen, heparin (PORCINE), heparin (PORCINE), HYDROcodone-acetaminophen, hydrOXYzine HCL, ondansetron, sodium chloride 0.9%      Estimated/Assessed Needs    Weight Used For Calorie Calculations: 54.4 kg (119 lb 14.9 oz)  Energy Calorie Requirements (kcal): 7455-2775 (30-35 kcal/kg per Underweight)  Energy Need Method: Kcal/kg  Protein Requirements: 30-33 (0.55-0.6 g/kg per CKD vs wound healing)  Weight Used For Protein Calculations: 54.4 kg (119 lb 14.9 oz)  Fluid Requirements (mL): 1500 (per CHF)     RDA Method (mL): 1632  CHO Requirement: 204-238      Nutrition Prescription Ordered    Current Diet Order: Low Sodium, 2gm Fluid - 1500mL    Evaluation of Received Nutrient/Fluid Intake    I/O: -1 Since admitted  Energy Calories Required: meeting needs  Protein Required: meeting needs  Fluid Required: not meeting needs  Tolerance: tolerating  % Intake of Estimated Energy Needs: 50 - 75 %  %  Meal Intake: 75 - 100 %    Nutrition Risk    Level of Risk/Frequency of Follow-up: low (x1 weekly)       Monitor and Evaluation    Food and Nutrient Intake: energy intake, food and beverage intake  Knowledge/Beliefs/Attitudes: food and nutrition knowledge/skill, beliefs and attitudes  Physical Activity and Function: nutrition-related ADLs and IADLs  Anthropometric Measurements: weight, weight change, body mass index  Biochemical Data, Medical Tests and Procedures: electrolyte and renal panel, glucose/endocrine profile  Nutrition-Focused Physical Findings: overall appearance, extremities, muscles and bones, head and eyes, skin       Nutrition Follow-Up    RD Follow-up?: Yes  Travis Alarcon, Registration Eligible, Provisional LDN

## 2024-01-25 NOTE — CONSULTS
Patient has ulceration to the left lower extremity. Rachel RN other wound care nurse also at bedside.  Cleaned wound with vashe, applied aquacel ag strip and cover with foam dressing. Patient also has blister noted to surrounding skin towards the medical leg. Ultrasound tech arrived to room to perform test as  we finished with wound care.    01/25/24 0945   WOCN Assessment   WOCN Total Time (mins) 30   Visit Date 01/25/24   Visit Time 0945   Consult Type New   WOCN Speciality Wound   Wound venous   Intervention assessed;applied   Teaching on-going        Altered Skin Integrity 01/25/24 0945 Left anterior;lower Leg Ulceration   Date First Assessed/Time First Assessed: 01/25/24 0945   Altered Skin Integrity Present on Admission - Did Patient arrive to the hospital with altered skin?: yes  Side: Left  Orientation: anterior;lower  Location: Leg  Primary Wound Type: Ulceration   Wound Image     Dressing Appearance Intact;Dried drainage;Moist drainage   Drainage Amount Scant   Drainage Characteristics/Odor Serosanguineous   Appearance Pink;Red;Ecchymotic   Tissue loss description Partial thickness   Periwound Area Blistered   Wound Length (cm) 3 cm   Wound Width (cm) 3 cm   Wound Depth (cm) 0.2 cm   Wound Volume (cm^3) 1.8 cm^3   Wound Surface Area (cm^2) 9 cm^2   Care Cleansed with:;Wound cleanser   Dressing Applied;Hydrofiber;Foam   Periwound Care Skin barrier film applied

## 2024-01-25 NOTE — PLAN OF CARE
Discussed poc with pt, pt verbalized understanding    Purposeful rounding every 2hours    VS wnl    Cardiac monitoring in use, pt is NSR, tele monitor     Fall precautions in place, remains injury free patient is very weak and needs assistance with adls    Pt complains of pain and anxiety  Pain and nausea under control with PRN meds    Heparin infusing at 14units/kg/hr   Accurate I&Os  Abx given as prescribed  Bed locked at lowest position  Call light within reach    Chart check complete  Will cont with POC

## 2024-01-25 NOTE — HOSPITAL COURSE
1/25/24  NAEON, unable to obtain AM labs  Pt c/o pain, swelling in legs, L > R  LLE +DVT, currently on heparin drip  On Lasix 40 mg IV q12h for CHF exacerbation  Continue above treatment  Possibly transition to NOAC and PO Lasix tomorrow    1/26/24  NAEON, patient reports some improvement in BLE edema  States she feels weak and unable to care for herself, consult PT/OT  Transition heparin to apixaban today  Requires continued IV diuresis, continue lasix    1/27/24  NAEON, patient reports pain in BLE, swelling improving, continue IV diuresis  Hg 7.7, check fecal occult blood  Replete Mg IV  PT/OT notes reviewed, patient not participating    1/28/24  Patient refusing PT/OT  H/H 6.9/19.8, transfuse 1 unit PRBC, fecal occult blood+, in setting of known chronic gastric ulcer  Monitor serial H/H, stop Eliquis  Consult IR for IVC filter placement    1/29/24  NAEON, plans for IVC filter placement today  GI and IR following, appreciate assistance  PT/OT with reccs for LIT, HH ordered  Updated patient's mother over phone    1/30/24  Patient s/p IVC filter placement and also EGD yesterday  EGD noted anastomotic ulcer noted, no active bleeding.   GI with reccs for Carafate, PPI BID, f/u with bariatric surgeon - established with Dr. Genny Zhang  CBC this AM with stable H/H  Patient stable for hospital discharge  F/U with PCP in 1-2 weeks for further management

## 2024-01-25 NOTE — PLAN OF CARE
Nutrition Recs: 1/25/24  1. Recommend pt continues Low Sodium, 2gm Fluid - 1500mL diet.   2. Recommend pt receives Boost plus BID   3. Recommend pt receives [place] BID to assist wound healing  4. Recommend pt continues Anti-emetics.   5. Weigh daily.     Goals:   1. Pt will continue to consume >75% of EEN/EPN prior to RD follow up.   2. Pt nausea will continue to improve prior to RD follow up.  3. Pt nausea will remain improved prior to RD follow up.  Nutrition Goal Status: new  Communication of RD Recs: other (comment) (POC: Sticky note)  Travis Alarcon, Registration Eligible, Provisional LDN

## 2024-01-25 NOTE — H&P
"[facility] Medicine  History & Physical    Patient Name: Joe Ceballos  MRN: [id number]  Patient Class: OP- Observation  Admission Date: 1/24/2024  Attending Physician: Lionel Malik MD   Primary Care Provider: Daphney Heart MD         Patient information was obtained from patient and ER records.     Subjective:     Principal Problem:Acute on chronic heart failure with preserved ejection fraction    Chief Complaint:   Chief Complaint   Patient presents with    Leg Swelling     Reports bilat leg swelling, L worse than R. Reports toes discolored and draining. Reports unable to lift left leg    Shortness of Breath     Reports SOB x "a couple days"        HPI: The patient is a 53 yo female with HTN, Diastolic CHF, s/p CVA-mild residual left sided weakness, s/p Gastric bypass, Gastric ulcers- no recent GI bleeding, CKD4, Malnutrition who presented to ED with BLE edema with blisters- onset 6 days ago that progressively worsened. Pt also endorses SOB -onset at the same time. She is unable to ambulate due to pain and swelling to BLE.   Pt states she had a GI bleed 2/2 gastric ulcer 3/2023. She was hospitalized with anemia 12/2023 (hgb 5.5 and received 2 units PRBC) -no active bleeding at that time. Hgb remained stable since then.     In the ED, Afebrile, BP mildly elevated. O2sat 100%. Labs revealed Hgb 9.8, K 3.2, mild hyperchloremic metabolic acidosis. Serum Cr 2.6, . Albumin 1.7. , Troponin normal. Glucose was 34 but improved to 80s with oral intake. EKG showed NSR, ST elevated in inferior leads. CXR-nothing acute. U/S bilateral LE veins showed Acute DVT LLE.     Pt does not know her home medications. Home medications are not reconciled     Past Medical History:   Diagnosis Date    Anxiety     CHF (congestive heart failure)     Edema     Hypertension     Initial insomnia     Sickle cell trait     Stroke     Tobacco use        Past Surgical History:   Procedure Laterality Date    BREAST " SURGERY       SECTION      x 2    CYST REMOVAL      from left tube    ESOPHAGOGASTRODUODENOSCOPY N/A 3/12/2023    Procedure: EGD (ESOPHAGOGASTRODUODENOSCOPY);  Surgeon: Ray Serrato MD;  Location: [facility];  Service: Gastroenterology;  Laterality: N/A;    gastric sleeve      Raheel-en-Y gastric bypass      TONSILLECTOMY         Review of patient's allergies indicates:   Allergen Reactions    Lisinopril Anaphylaxis     This is only possible agent at the time.  This is only possible agent at the time.  This is only possible agent at the time.    Sulfa (sulfonamide antibiotics) Anaphylaxis and Hives    Beta-blockers (beta-adrenergic blocking agts)      Other reaction(s): Other (See Comments)  Syncope & Collapse       Current Facility-Administered Medications on File Prior to Encounter   Medication    [DISCONTINUED] GENERIC EXTERNAL MEDICATION    [DISCONTINUED] GENERIC EXTERNAL MEDICATION     Current Outpatient Medications on File Prior to Encounter   Medication Sig    atorvastatin (LIPITOR) 80 MG tablet Take 1 tablet (80 mg total) by mouth every evening.    cyclobenzaprine HCl (FLEXERIL ORAL) Take 10 mg by mouth.    furosemide (LASIX) 40 MG tablet Take 40 mg by mouth 2 (two) times a day.    hydrOXYzine HCL (ATARAX) 25 MG tablet Take 1 tablet (25 mg total) by mouth 2 (two) times daily as needed for Anxiety.    hydrOXYzine pamoate (VISTARIL) 25 MG Cap Take 25 mg by mouth.    isosorbide mononitrate (IMDUR) 60 MG 24 hr tablet Take 60 mg by mouth once daily.    omeprazole (PRILOSEC) 40 MG capsule Take 1 capsule (40 mg total) by mouth once daily.    ondansetron (ZOFRAN) 4 MG tablet Take 1 tablet (4 mg total) by mouth every 6 (six) hours.    sucralfate (CARAFATE) 1 gram tablet Take 1 tablet (1 g total) by mouth 4 (four) times daily before meals and nightly.    traMADoL (ULTRAM) 50 mg tablet Take 1 tablet (50 mg total) by mouth every 6 (six) hours as needed.     Family History       Problem Relation (Age  of Onset)    Hypertension Mother, Paternal Grandmother    No Known Problems Father    Thyroid disease Paternal Aunt          Tobacco Use    Smoking status: Former     Types: Cigarettes    Smokeless tobacco: Never   Substance and Sexual Activity    Alcohol use: Yes    Drug use: No    Sexual activity: Not Currently     Partners: Male     Birth control/protection: None     Review of Systems   Constitutional:  Positive for activity change, appetite change and fatigue. Negative for chills, diaphoresis, fever and unexpected weight change.   HENT:  Negative for congestion, nosebleeds, sinus pressure and sore throat.    Eyes:  Negative for pain, discharge and visual disturbance.   Respiratory:  Positive for shortness of breath. Negative for cough, chest tightness, wheezing and stridor.    Cardiovascular:  Positive for leg swelling. Negative for chest pain and palpitations.   Gastrointestinal:  Positive for nausea. Negative for abdominal distention, abdominal pain, blood in stool, constipation, diarrhea and vomiting.   Endocrine: Negative for cold intolerance and heat intolerance.   Genitourinary:  Negative for difficulty urinating, dysuria, flank pain, frequency and urgency.   Musculoskeletal:  Positive for gait problem (mostly in wheel chair). Negative for arthralgias, back pain, joint swelling, myalgias, neck pain and neck stiffness.   Skin:  Positive for wound (LLE). Negative for rash.   Allergic/Immunologic: Negative for food allergies and immunocompromised state.   Neurological:  Positive for weakness (generalized). Negative for dizziness, seizures, syncope, facial asymmetry, speech difficulty, light-headedness, numbness and headaches.   Hematological:  Negative for adenopathy.   Psychiatric/Behavioral:  Positive for dysphoric mood. Negative for agitation, confusion and hallucinations.      Objective:     Vital Signs (Most Recent):  Temp: 97.8 °F (36.6 °C) (01/24/24 2146)  Pulse: 69 (01/24/24 2220)  Resp: 17 (01/24/24  2146)  BP: (!) 161/10 (01/24/24 2146)  SpO2: 100 % (01/24/24 2146) Vital Signs (24h Range):  Temp:  [97.5 °F (36.4 °C)-97.8 °F (36.6 °C)] 97.8 °F (36.6 °C)  Pulse:  [63-76] 69  Resp:  [14-19] 17  SpO2:  [100 %] 100 %  BP: (154-161)/(10-97) 161/10     Weight: 54.8 kg (120 lb 13 oz)  Body mass index is 18.37 kg/m².     Physical Exam  Vitals and nursing note reviewed.   Constitutional:       General: She is not in acute distress.     Appearance: She is cachectic. She is not diaphoretic.   HENT:      Head: Normocephalic and atraumatic.      Nose: Nose normal.   Eyes:      General: No scleral icterus.     Conjunctiva/sclera: Conjunctivae normal.   Neck:      Trachea: No tracheal deviation.   Cardiovascular:      Rate and Rhythm: Normal rate and regular rhythm.      Heart sounds: Normal heart sounds. No murmur heard.     No friction rub. No gallop.   Pulmonary:      Effort: Pulmonary effort is normal. No respiratory distress.      Breath sounds: Normal breath sounds. No stridor. No wheezing or rales.   Chest:      Chest wall: No tenderness.   Abdominal:      General: Bowel sounds are normal. There is no distension.      Palpations: Abdomen is soft. There is no mass.      Tenderness: There is no abdominal tenderness. There is no guarding or rebound.   Musculoskeletal:         General: No tenderness or deformity. Normal range of motion.      Cervical back: Normal range of motion and neck supple.      Right lower leg: Edema (+3) present.      Left lower leg: Edema (+3) present.   Skin:     General: Skin is warm and dry.      Coloration: Skin is not pale.      Findings: No erythema or rash.      Comments: Wound to LLE   Neurological:      Mental Status: She is alert and oriented to person, place, and time.      Cranial Nerves: No cranial nerve deficit.      Motor: No abnormal muscle tone.      Coordination: Coordination normal.   Psychiatric:         Behavior: Behavior normal.         Thought Content: Thought content normal.                 Significant Labs: All pertinent labs within the past 24 hours have been reviewed.    Significant Imaging: I have reviewed all pertinent imaging results/findings within the past 24 hours.  Assessment/Plan:     * Acute on chronic heart failure with preserved ejection fraction  Patient is identified as having Diastolic (HFpEF) heart failure that is Acute on chronic. CHF is currently uncontrolled due to Continued edema of extremities. Latest ECHO performed and demonstrates- Results for orders placed during the hospital encounter of 01/21/23    Echo Saline Bubble? Yes    Interpretation Summary  · There is no evidence of intracardiac shunting.  · The left ventricle is normal in size with mild concentric hypertrophy and normal systolic function.  · The estimated ejection fraction is 60%.  · Normal left ventricular diastolic function.  · Normal right ventricular size with normal right ventricular systolic function.  · Mild to moderate tricuspid regurgitation.  · Normal central venous pressure (3 mmHg).  · The estimated PA systolic pressure is 36 mmHg.  . Continue Furosemide and monitor clinical status closely. Monitor on telemetry. Patient is on CHF pathway.  Monitor strict Is&Os and daily weights.  Place on fluid restriction of 1.5 L. Cardiology has not been consulted. Continue to stress to patient importance of self efficacy and  on diet for CHF. Last BNP reviewed- and noted below   Recent Labs   Lab 01/24/24  1708   *     IV Lasix     Acute deep vein thrombosis (DVT) of left lower extremity  DVT LLE  Hx recent GI bleed 3/2023 due to gastric ulcers   IV heparin   Monitor H/H       Venous stasis ulcer  Consult wound care  Check Arterial U/S BLE      History of GI bleed  GI bleed 2/2 gastric ulcer 3/2023. She was hospitalized with anemia 12/2023 (hgb 5.5 and received 2 units PRBC) -no active bleeding at that time. Hgb remained stable since then.       Hypoglycemia        Improved       Accu  checks every 6 hours       CKD, stage 4        Creatine stable for now. BMP reviewed- noted Estimated Creatinine Clearance: 21.6 mL/min (A) (based on SCr of 2.6 mg/dL (H)). according to latest data. Based on current GFR, CKD stage is stage 4 - GFR 15-29.  Monitor UOP and serial BMP and adjust therapy as needed. Renally dose meds. Avoid nephrotoxic medications and procedures.      Malnutrition of moderate degree  Nutrition consulted. Most recent weight and BMI monitored-     Measurements:  Wt Readings from Last 1 Encounters:   01/24/24 54.8 kg (120 lb 13 oz)   Body mass index is 18.37 kg/m².    Patient has been screened and assessed by RD.    Malnutrition Type:  Context:    Level:      Malnutrition Characteristic Summary:       Interventions/Recommendations (treatment strategy):         History of CVA (cerebrovascular accident)  Pt reports residual left sided weakness  Hold ASA 2/2 GI bleed/gastric ulcer   Cont Statin       Normocytic anemia  Patient's anemia is currently controlled. Has not received any PRBCs to date. Etiology likely d/t  check iron studies   Current CBC reviewed-   Lab Results   Component Value Date    HGB 9.8 (L) 01/24/2024    HCT 28.9 (L) 01/24/2024     Monitor serial CBC and transfuse if patient becomes hemodynamically unstable, symptomatic or H/H drops below 7/21.    Primary hypertension  Chronic, uncontrolled. Latest blood pressure and vitals reviewed-     Temp:  [97.5 °F (36.4 °C)-97.8 °F (36.6 °C)]   Pulse:  [63-76]   Resp:  [14-19]   BP: (154-161)/(10-97)   SpO2:  [100 %] .   Home meds for hypertension were reviewed and noted below.   Hypertension Medications               amLODIPine (NORVASC) 5 MG tablet Take 5 mg by mouth once daily.    furosemide (LASIX) 40 MG tablet Take 40 mg by mouth 2 (two) times a day.    isosorbide mononitrate (IMDUR) 60 MG 24 hr tablet Take 60 mg by mouth once daily.            While in the hospital, will manage blood pressure as follows; Continue home  antihypertensive regimen    Will utilize p.r.n. blood pressure medication only if patient's blood pressure greater than 160/100 and she develops symptoms such as worsening chest pain or shortness of breath.      VTE Risk Mitigation (From admission, onward)           Ordered     IP VTE HIGH RISK PATIENT  Once         01/24/24 2334     Place sequential compression device  Until discontinued         01/24/24 2334     heparin 25,000 units in dextrose 5% (100 units/ml) IV bolus from bag - ADDITIONAL PRN BOLUS - 60 units/kg  As needed (PRN)        Question:  Heparin Infusion Adjustment (DO NOT MODIFY ANSWER)  Answer:  \\[email or web address]\epic\Images\Pharmacy\HeparinInfusions\heparin HIGH INTENSITY nomogram for [facility] [id number]    01/24/24 2028     heparin 25,000 units in dextrose 5% (100 units/ml) IV bolus from bag - ADDITIONAL PRN BOLUS - 30 units/kg  As needed (PRN)        Question:  Heparin Infusion Adjustment (DO NOT MODIFY ANSWER)  Answer:  [email or web address]\epic\Images\Pharmacy\HeparinInfusions\heparin HIGH INTENSITY nomogram for [facility] [id number]    01/24/24 2028     heparin 25,000 units in dextrose 5% 250 mL (100 units/mL) infusion HIGH INTENSITY nomogram - [facility]  Continuous        Question:  Begin at (units/kg/hr)  Answer:  18    01/24/24 2028                         On 01/24/2024, patient should be placed in hospital observation services under my care in collaboration with Dr. Malik.           Angie Cee, NP  Department of Hospital Medicine  [facility]

## 2024-01-25 NOTE — ASSESSMENT & PLAN NOTE
Chronic, uncontrolled. Latest blood pressure and vitals reviewed-     Temp:  [97.5 °F (36.4 °C)-97.8 °F (36.6 °C)]   Pulse:  [63-76]   Resp:  [14-19]   BP: (154-161)/(10-97)   SpO2:  [100 %] .   Home meds for hypertension were reviewed and noted below.   Hypertension Medications               amLODIPine (NORVASC) 5 MG tablet Take 5 mg by mouth once daily.    furosemide (LASIX) 40 MG tablet Take 40 mg by mouth 2 (two) times a day.    isosorbide mononitrate (IMDUR) 60 MG 24 hr tablet Take 60 mg by mouth once daily.            While in the hospital, will manage blood pressure as follows; Continue home antihypertensive regimen    Will utilize p.r.n. blood pressure medication only if patient's blood pressure greater than 160/100 and she develops symptoms such as worsening chest pain or shortness of breath.

## 2024-01-25 NOTE — ED NOTES
Attempted to get glucose on pt twice, two different fingers w/ two different lancets. Unsuccessful.

## 2024-01-25 NOTE — ASSESSMENT & PLAN NOTE
Patient's anemia is currently controlled. Has not received any PRBCs to date. Etiology likely d/t  check iron studies   Current CBC reviewed-   Lab Results   Component Value Date    HGB 9.8 (L) 01/24/2024    HCT 28.9 (L) 01/24/2024     Monitor serial CBC and transfuse if patient becomes hemodynamically unstable, symptomatic or H/H drops below 7/21.

## 2024-01-25 NOTE — NURSING
notified nurse of multiple attempts at collecting morning labs. Two lab techs have attempted before patient refused. Nurse arrived at bedside to attempt to collect morning labs. Pt refused morning labs.    ICU End of Shift Summary. See flowsheets for vital signs, I&Os, and detailed assessment.    Changes this shift:   Neuro: A&Ox1-3. Intermittently lethargic. Cooperative, impulsive but redirectable.    CV: SR 70s -120s    Goal SBP <120, HR <70, MAP 65-80. Pt is on Nicardipine and Esmolol gtts, refer to eMAR for dosing.    Pulm: Room air during AM, NC 2 LPM overnight.    GI: BM PTA- bowel reg started.    : Urinal, 24 hour urine collection ongoing as of 01/19 1500.    Plan: Monitor hemodynamic status, monitor electrolytes and replace per protocol. Plan for OR on 01/23.

## 2024-01-25 NOTE — SUBJECTIVE & OBJECTIVE
Review of Systems   All other systems reviewed and are negative.    Objective:     Vital Signs (Most Recent):  Temp: 97.9 °F (36.6 °C) (01/25/24 0803)  Pulse: 68 (01/25/24 0803)  Resp: 20 (01/25/24 0803)  BP: 125/83 (01/25/24 0803)  SpO2: 99 % (01/25/24 0803) Vital Signs (24h Range):  Temp:  [97.5 °F (36.4 °C)-98.6 °F (37 °C)] 97.9 °F (36.6 °C)  Pulse:  [60-76] 68  Resp:  [14-20] 20  SpO2:  [99 %-100 %] 99 %  BP: (125-168)/(10-97) 125/83     Weight: 54.8 kg (120 lb 13 oz)  Body mass index is 18.37 kg/m².    Intake/Output Summary (Last 24 hours) at 1/25/2024 1202  Last data filed at 1/25/2024 0000  Gross per 24 hour   Intake --   Output 1 ml   Net -1 ml         Physical Exam  Vitals and nursing note reviewed.   Constitutional:       General: She is not in acute distress.     Appearance: Normal appearance. She is normal weight.   Cardiovascular:      Rate and Rhythm: Normal rate and regular rhythm.      Heart sounds: No murmur heard.  Pulmonary:      Effort: Pulmonary effort is normal. No respiratory distress.      Breath sounds: Rales present. No wheezing.   Musculoskeletal:      Right lower leg: Edema present.      Left lower leg: Edema present.   Neurological:      General: No focal deficit present.      Mental Status: She is alert and oriented to person, place, and time.   Psychiatric:         Mood and Affect: Mood normal.         Behavior: Behavior normal.             Significant Labs: All pertinent labs within the past 24 hours have been reviewed.  Recent Lab Results  (Last 5 results in the past 24 hours)        01/25/24  0656   01/25/24  0549   01/25/24  0447   01/24/24  2201   01/24/24 2052        aPTT >150.0  Comment: Refer to local heparin nomogram for intensity/dose specific   therapeutic   range.  CONFIRMED RECOLLECT SAMPLE PER ALYSSA ESPINO  PTT  critical result(s) called and verbal readback obtained from   ARMAND RN by CP5 01/25/2024 11:21           27.7  Comment: Refer to local heparin  nomogram for intensity/dose specific   therapeutic   range.         Baso #         0.06       Basophil %         0.8       Differential Method         Automated       Eos #         0.1       Eosinophil %         1.1       Free T4   0.79             Gran # (ANC)         5.1       Gran %         71.6       Hematocrit         28.9       Hemoglobin         9.8       Immature Grans (Abs)         0.02  Comment: Mild elevation in immature granulocytes is non specific and   can be seen in a variety of conditions including stress response,   acute inflammation, trauma and pregnancy. Correlation with other   laboratory and clinical findings is essential.         Immature Granulocytes         0.3       INR         1.2  Comment: Coumadin Therapy:  2.0 - 3.0 for INR for all indicators except mechanical heart valves  and antiphospholipid syndromes which should use 2.5 - 3.5.         Lymph #         1.4       Lymph %         19.8       MCH         31.4       MCHC         33.9       MCV         93       Mono #         0.5       Mono %         6.4       MPV         9.7       nRBC         0       Platelet Count         198       POCT Glucose     139   84         Protime         13.0       RBC         3.12       RDW         14.5       TSH   0.903             WBC         7.08                              Significant Imaging: I have reviewed all pertinent imaging results/findings within the past 24 hours.    US Lower Extremity Arteries Left   Final Result      Monophasic waveforms throughout the majority of the left lower extremity with sharp upstroke.  Findings most suggestive of hyperemia.         Electronically signed by: Zuleyma Landry   Date:    01/25/2024   Time:    10:29      US Lower Extremity Veins Bilateral   Final Result   Abnormal      Positive DVT left lower extremity      This report was flagged in Epic as abnormal.         Electronically signed by: Iesha Romo   Date:    01/24/2024   Time:    18:36      X-Ray  Chest AP Portable   Final Result      No acute abnormality.         Electronically signed by: Humberto Gonzales   Date:    01/24/2024   Time:    17:44

## 2024-01-25 NOTE — ASSESSMENT & PLAN NOTE
Patient is identified as having Diastolic (HFpEF) heart failure that is Acute on chronic. CHF is currently uncontrolled due to Continued edema of extremities. Latest ECHO performed and demonstrates- Results for orders placed during the hospital encounter of 01/21/23    Echo Saline Bubble? Yes    Interpretation Summary  · There is no evidence of intracardiac shunting.  · The left ventricle is normal in size with mild concentric hypertrophy and normal systolic function.  · The estimated ejection fraction is 60%.  · Normal left ventricular diastolic function.  · Normal right ventricular size with normal right ventricular systolic function.  · Mild to moderate tricuspid regurgitation.  · Normal central venous pressure (3 mmHg).  · The estimated PA systolic pressure is 36 mmHg.  . Continue Furosemide and monitor clinical status closely. Monitor on telemetry. Patient is on CHF pathway.  Monitor strict Is&Os and daily weights.  Place on fluid restriction of 1.5 L. Cardiology has not been consulted. Continue to stress to patient importance of self efficacy and  on diet for CHF. Last BNP reviewed- and noted below   Recent Labs   Lab 01/24/24  1708   *       IV Lasix

## 2024-01-25 NOTE — PROGRESS NOTES
"  [facility]) nurse navigator notified of H[facility] candidate in need of education and introduction to 4-6 week program.      PT aao x 3 while lying in bed. Introduced self to pt as HFT[facility] nurse navigator.     Patient given "Home Care Guide for Heart Failure Patients" , "[facility]" flyer and "Daily weight and symptom tracker".  Encouraged pt  to review information.      Reviewed the following key points of HFT[facility] program with pt and family:   1.) Take your medications as directed.    2.) Weight yourself daily   3.) Follow low salt and limited fluid diet.    4.) Stop smoking and start exercising   5.) Go to your appointments and call your team.      Pt reminded to follow Symptom tracker and to call at the onset of symptoms according to tracker.     Reviewed plan for follow up once discharged to include phone calls, in person and virtual visits to assist pt optimizing their heart failure medication regimen and encouraging healthy lifestyle modifications.  Reminded pt that program will assist them over the next 4-6 weeks and then patient will be transferred to long term care provider .  Reminded pt how to contact HFT[facility] navigator via phone and or via [employer].     Pt given appointment or instructed appointment will be printed on hospital discharge paperwork.     Pt also reminded HF nurse will call 48-72 hours after discharge to check on them.     PT verbalize read back of information given.  Encouraged pt and family to read over information often and contact team with any questions or concerns.    "

## 2024-01-25 NOTE — H&P (VIEW-ONLY)
[facility] - Med Surg  Adult Nutrition  Consult Note    SUMMARY     Recommendations  1. Recommend pt continues Low Sodium, 2gm Fluid - 1500mL diet.   2. Recommend pt receives Boost plus BID   3. Recommend pt receives [place] BID to assist wound healing  4. Recommend pt continues Anti-emetics.   5. Weigh daily.    Goals:   1. Pt will continue to consume >75% of EEN/EPN prior to RD follow up.   2. Pt nausea will continue to improve prior to RD follow up.  3. Pt nausea will remain improved prior to RD follow up.  Nutrition Goal Status: new  Communication of RD Recs: other (comment) (POC: Sticky note)    Assessment and Plan    Nutrition Problem  Increased protein needs    Related to (etiology):   Increased demand for protein    Signs and Symptoms (as evidenced by):   Leg Ulceration (Partial thickness)    Interventions(treatment strategy):  1. Commercial Beverage  2. Mineral/Vitamin supplement therapy for wound healing  3. Prescription medication  4.Collaboration with other providers.      Nutrition Diagnosis Status:   New       Malnutrition Assessment     Skin (Micronutrient): dry, wounds unhealed (Tang = 19)  Musculoskeletal/Lower Extremities: edema       Fluid Accumulation (Malnutrition): moderate                         Reason for Assessment    Reason For Assessment: consult  Diagnosis: cardiac disease  Relevant Medical History: DVT, HTN, History of gastric bypass, CVA, CHF, CKD4,  HLD  Interdisciplinary Rounds: did not attend  General Information Comments:   1/25/24: 54 y.o female admitted with active principal problem of Acute deep vein thrombosis (DVT) of left lower extremity. Pt states her appetite is improving however, she states nausea is affecting her PO intake. Recent RN note states pain and nausea under control with PRN meds. NFPE not performed, Dietitian will assess the need for NFPE during RD follow up. Pt currently charted to weigh 120 lb, BMI 18.25 (Protein-Calorie Malnutrition). +28 lb wt gain since last  "nutrition assessment (12/14/23) x 1 month ago, +30% wt change. Of note, Pt currently charted with 3+ edema within (B) leg, and ankle. Dietitian attempted to provide heart failure nutrition education however, pt was not interested in receiving information. Pt states "I`m 54 and know what to eat and not eat". Pt states her diarrhea has begun to improved. She states she had solo diarrhea incident 1/24/24. Pt LBM: 1/24/24: Pt currently on room air. Per wound care note, pt noted with ulceration to the left lower extremity with partial thickness loss.  Nutrition Discharge Planning: Cardiac, Fluid 1500 mL    Nutrition Risk Screen    Nutrition Risk Screen: no indicators present    Nutrition/Diet History    Spiritual, Cultural Beliefs, Religion Practices, Values that Affect Care: no  Food Allergies: NKFA  Factors Affecting Nutritional Intake: nausea/vomiting    Anthropometrics    Temp: 97.9 °F (36.6 °C)  Height Method: Stated  Height: 5' 8" (172.7 cm)  Height (inches): 68 in  Weight Method: Bed Scale  Weight: 54.4 kg (119 lb 14.9 oz)  Weight (lb): 119.93 lb  Ideal Body Weight (IBW), Female: 140 lb  % Ideal Body Weight, Female (lb): 85.66 %  BMI (Calculated): 18.2  BMI Grade: 17 - 18.4 protein-energy malnutrition grade I     Wt Readings from Last 20 Encounters:   01/25/24 54.4 kg (119 lb 14.9 oz)   12/15/23 50.8 kg (111 lb 15.9 oz)   12/04/23 46.3 kg (102 lb)   11/09/23 53.3 kg (117 lb 6.4 oz)   10/05/23 59 kg (130 lb)   10/02/23 61.4 kg (135 lb 7.6 oz)   10/02/23 61.5 kg (135 lb 9.6 oz)   06/22/23 66.3 kg (146 lb 2.6 oz)   06/09/23 68.6 kg (151 lb 2 oz)   03/12/23 80.7 kg (178 lb)   01/31/23 77.5 kg (170 lb 12.8 oz)   01/23/23 80.2 kg (176 lb 12.9 oz)   01/04/23 84.8 kg (187 lb)   08/15/22 85.1 kg (187 lb 9.8 oz)   07/12/22 90.3 kg (199 lb)   06/23/22 89 kg (196 lb 3.4 oz)   06/07/22 92 kg (202 lb 13.2 oz)   06/03/22 96.8 kg (213 lb 6.4 oz)   05/25/22 99.3 kg (218 lb 14.7 oz)   09/17/18 79.7 kg (175 lb 11.3 oz) "   ]  Lab/Procedures/Meds  BMP  Lab Results   Component Value Date     01/24/2024    K 3.2 (L) 01/24/2024     (H) 01/24/2024    CO2 16 (L) 01/24/2024    BUN 28 (H) 01/24/2024    CREATININE 2.6 (H) 01/24/2024    CALCIUM 7.5 (L) 01/24/2024    ANIONGAP 8 01/24/2024    EGFRNORACEVR 21 (A) 01/24/2024     Lab Results   Component Value Date    CALCIUM 7.5 (L) 01/24/2024     Recent Labs   Lab 01/25/24  0447   POCTGLUCOSE 139*     Lab Results   Component Value Date    ALT 36 01/24/2024    AST 34 01/24/2024    ALKPHOS 279 (H) 01/24/2024    BILITOT 0.3 01/24/2024       Pertinent Labs Reviewed: reviewed  Pertinent Medications Reviewed: reviewed  Scheduled Meds:   amLODIPine  5 mg Oral Daily    atorvastatin  40 mg Oral Daily    furosemide (LASIX) injection  40 mg Intravenous Q12H    isosorbide mononitrate  60 mg Oral Daily    OLANZapine  5 mg Oral QHS    sodium bicarbonate  1,300 mg Oral TID    sucralfate  1 g Oral QID (AC & HS)     Continuous Infusions:   heparin (porcine) in D5W 14 Units/kg/hr (01/25/24 1108)     PRN Meds:.acetaminophen, heparin (PORCINE), heparin (PORCINE), HYDROcodone-acetaminophen, hydrOXYzine HCL, ondansetron, sodium chloride 0.9%      Estimated/Assessed Needs    Weight Used For Calorie Calculations: 54.4 kg (119 lb 14.9 oz)  Energy Calorie Requirements (kcal): 9331-2621 (30-35 kcal/kg per Underweight)  Energy Need Method: Kcal/kg  Protein Requirements: 30-33 (0.55-0.6 g/kg per CKD vs wound healing)  Weight Used For Protein Calculations: 54.4 kg (119 lb 14.9 oz)  Fluid Requirements (mL): 1500 (per CHF)     RDA Method (mL): 1632  CHO Requirement: 204-238      Nutrition Prescription Ordered    Current Diet Order: Low Sodium, 2gm Fluid - 1500mL    Evaluation of Received Nutrient/Fluid Intake    I/O: -1 Since admitted  Energy Calories Required: meeting needs  Protein Required: meeting needs  Fluid Required: not meeting needs  Tolerance: tolerating  % Intake of Estimated Energy Needs: 50 - 75 %  %  Meal Intake: 75 - 100 %    Nutrition Risk    Level of Risk/Frequency of Follow-up: low (x1 weekly)       Monitor and Evaluation    Food and Nutrient Intake: energy intake, food and beverage intake  Knowledge/Beliefs/Attitudes: food and nutrition knowledge/skill, beliefs and attitudes  Physical Activity and Function: nutrition-related ADLs and IADLs  Anthropometric Measurements: weight, weight change, body mass index  Biochemical Data, Medical Tests and Procedures: electrolyte and renal panel, glucose/endocrine profile  Nutrition-Focused Physical Findings: overall appearance, extremities, muscles and bones, head and eyes, skin       Nutrition Follow-Up    RD Follow-up?: Yes  Travis Alarcon, Registration Eligible, Provisional LDN

## 2024-01-25 NOTE — ASSESSMENT & PLAN NOTE
Creatine stable for now. BMP reviewed- noted Estimated Creatinine Clearance: 21.6 mL/min (A) (based on SCr of 2.6 mg/dL (H)). according to latest data. Based on current GFR, CKD stage is stage 4 - GFR 15-29.  Monitor UOP and serial BMP and adjust therapy as needed. Renally dose meds. Avoid nephrotoxic medications and procedures.

## 2024-01-25 NOTE — ED NOTES
KAMLESH liriano informed writer that pt complained when she was roomed that it was not private and there was a man on the other side. Tech assured here there is a privacy curtain. Pt was verbally abusive to tech with her complaints

## 2024-01-25 NOTE — SUBJECTIVE & OBJECTIVE
Past Medical History:   Diagnosis Date    Anxiety     CHF (congestive heart failure)     Edema     Hypertension     Initial insomnia     Sickle cell trait     Stroke     Tobacco use        Past Surgical History:   Procedure Laterality Date    BREAST SURGERY       SECTION      x 2    CYST REMOVAL      from left tube    ESOPHAGOGASTRODUODENOSCOPY N/A 3/12/2023    Procedure: EGD (ESOPHAGOGASTRODUODENOSCOPY);  Surgeon: Ray Serrato MD;  Location: [facility];  Service: Gastroenterology;  Laterality: N/A;    gastric sleeve      Raheel-en-Y gastric bypass      TONSILLECTOMY         Review of patient's allergies indicates:   Allergen Reactions    Lisinopril Anaphylaxis     This is only possible agent at the time.  This is only possible agent at the time.  This is only possible agent at the time.    Sulfa (sulfonamide antibiotics) Anaphylaxis and Hives    Beta-blockers (beta-adrenergic blocking agts)      Other reaction(s): Other (See Comments)  Syncope & Collapse       Current Facility-Administered Medications on File Prior to Encounter   Medication    [DISCONTINUED] GENERIC EXTERNAL MEDICATION    [DISCONTINUED] GENERIC EXTERNAL MEDICATION     Current Outpatient Medications on File Prior to Encounter   Medication Sig    atorvastatin (LIPITOR) 80 MG tablet Take 1 tablet (80 mg total) by mouth every evening.    cyclobenzaprine HCl (FLEXERIL ORAL) Take 10 mg by mouth.    furosemide (LASIX) 40 MG tablet Take 40 mg by mouth 2 (two) times a day.    hydrOXYzine HCL (ATARAX) 25 MG tablet Take 1 tablet (25 mg total) by mouth 2 (two) times daily as needed for Anxiety.    hydrOXYzine pamoate (VISTARIL) 25 MG Cap Take 25 mg by mouth.    isosorbide mononitrate (IMDUR) 60 MG 24 hr tablet Take 60 mg by mouth once daily.    omeprazole (PRILOSEC) 40 MG capsule Take 1 capsule (40 mg total) by mouth once daily.    ondansetron (ZOFRAN) 4 MG tablet Take 1 tablet (4 mg total) by mouth every 6 (six) hours.    sucralfate  (CARAFATE) 1 gram tablet Take 1 tablet (1 g total) by mouth 4 (four) times daily before meals and nightly.    traMADoL (ULTRAM) 50 mg tablet Take 1 tablet (50 mg total) by mouth every 6 (six) hours as needed.     Family History       Problem Relation (Age of Onset)    Hypertension Mother, Paternal Grandmother    No Known Problems Father    Thyroid disease Paternal Aunt          Tobacco Use    Smoking status: Former     Types: Cigarettes    Smokeless tobacco: Never   Substance and Sexual Activity    Alcohol use: Yes    Drug use: No    Sexual activity: Not Currently     Partners: Male     Birth control/protection: None     Review of Systems   Constitutional:  Positive for activity change, appetite change and fatigue. Negative for chills, diaphoresis, fever and unexpected weight change.   HENT:  Negative for congestion, nosebleeds, sinus pressure and sore throat.    Eyes:  Negative for pain, discharge and visual disturbance.   Respiratory:  Positive for shortness of breath. Negative for cough, chest tightness, wheezing and stridor.    Cardiovascular:  Positive for leg swelling. Negative for chest pain and palpitations.   Gastrointestinal:  Positive for nausea. Negative for abdominal distention, abdominal pain, blood in stool, constipation, diarrhea and vomiting.   Endocrine: Negative for cold intolerance and heat intolerance.   Genitourinary:  Negative for difficulty urinating, dysuria, flank pain, frequency and urgency.   Musculoskeletal:  Positive for gait problem (mostly in wheel chair). Negative for arthralgias, back pain, joint swelling, myalgias, neck pain and neck stiffness.   Skin:  Positive for wound (LLE). Negative for rash.   Allergic/Immunologic: Negative for food allergies and immunocompromised state.   Neurological:  Positive for weakness (generalized). Negative for dizziness, seizures, syncope, facial asymmetry, speech difficulty, light-headedness, numbness and headaches.   Hematological:  Negative for  adenopathy.   Psychiatric/Behavioral:  Positive for dysphoric mood. Negative for agitation, confusion and hallucinations.      Objective:     Vital Signs (Most Recent):  Temp: 97.8 °F (36.6 °C) (01/24/24 2146)  Pulse: 69 (01/24/24 2220)  Resp: 17 (01/24/24 2146)  BP: (!) 161/10 (01/24/24 2146)  SpO2: 100 % (01/24/24 2146) Vital Signs (24h Range):  Temp:  [97.5 °F (36.4 °C)-97.8 °F (36.6 °C)] 97.8 °F (36.6 °C)  Pulse:  [63-76] 69  Resp:  [14-19] 17  SpO2:  [100 %] 100 %  BP: (154-161)/(10-97) 161/10     Weight: 54.8 kg (120 lb 13 oz)  Body mass index is 18.37 kg/m².     Physical Exam  Vitals and nursing note reviewed.   Constitutional:       General: She is not in acute distress.     Appearance: She is cachectic. She is not diaphoretic.   HENT:      Head: Normocephalic and atraumatic.      Nose: Nose normal.   Eyes:      General: No scleral icterus.     Conjunctiva/sclera: Conjunctivae normal.   Neck:      Trachea: No tracheal deviation.   Cardiovascular:      Rate and Rhythm: Normal rate and regular rhythm.      Heart sounds: Normal heart sounds. No murmur heard.     No friction rub. No gallop.   Pulmonary:      Effort: Pulmonary effort is normal. No respiratory distress.      Breath sounds: Normal breath sounds. No stridor. No wheezing or rales.   Chest:      Chest wall: No tenderness.   Abdominal:      General: Bowel sounds are normal. There is no distension.      Palpations: Abdomen is soft. There is no mass.      Tenderness: There is no abdominal tenderness. There is no guarding or rebound.   Musculoskeletal:         General: No tenderness or deformity. Normal range of motion.      Cervical back: Normal range of motion and neck supple.      Right lower leg: Edema (+3) present.      Left lower leg: Edema (+3) present.   Skin:     General: Skin is warm and dry.      Coloration: Skin is not pale.      Findings: No erythema or rash.      Comments: Wound to LLE   Neurological:      Mental Status: She is alert and  oriented to person, place, and time.      Cranial Nerves: No cranial nerve deficit.      Motor: No abnormal muscle tone.      Coordination: Coordination normal.   Psychiatric:         Behavior: Behavior normal.         Thought Content: Thought content normal.                Significant Labs: All pertinent labs within the past 24 hours have been reviewed.    Significant Imaging: I have reviewed all pertinent imaging results/findings within the past 24 hours.

## 2024-01-25 NOTE — ASSESSMENT & PLAN NOTE
Nutrition consulted. Most recent weight and BMI monitored-     Measurements:  Wt Readings from Last 1 Encounters:   01/24/24 54.8 kg (120 lb 13 oz)   Body mass index is 18.37 kg/m².    Patient has been screened and assessed by RD.    Malnutrition Type:  Context:    Level:      Malnutrition Characteristic Summary:       Interventions/Recommendations (treatment strategy):

## 2024-01-25 NOTE — PROGRESS NOTES
[facility] Medicine  Progress Note    Patient Name: Joe Ceballos  MRN: [id number]  Patient Class: OP- Observation   Admission Date: 1/24/2024  Length of Stay: 0 days  Attending Physician: Tyrese Jensen MD  Primary Care Provider: Daphney Heart MD        Subjective:     Principal Problem:Acute deep vein thrombosis (DVT) of left lower extremity        HPI:  The patient is a 55 yo female with HTN, Diastolic CHF, s/p CVA-mild residual left sided weakness, s/p Gastric bypass, Gastric ulcers- no recent GI bleeding, CKD4, Malnutrition who presented to ED with BLE edema with blisters- onset 6 days ago that progressively worsened. Pt also endorses SOB -onset at the same time. She is unable to ambulate due to pain and swelling to BLE.   Pt states she had a GI bleed 2/2 gastric ulcer 3/2023. She was hospitalized with anemia 12/2023 (hgb 5.5 and received 2 units PRBC) -no active bleeding at that time. Hgb remained stable since then.     In the ED, Afebrile, BP mildly elevated. O2sat 100%. Labs revealed Hgb 9.8, K 3.2, mild hyperchloremic metabolic acidosis. Serum Cr 2.6, . Albumin 1.7. , Troponin normal. Glucose was 34 but improved to 80s with oral intake. EKG showed NSR, ST elevated in inferior leads. CXR-nothing acute. U/S bilateral LE veins showed Acute DVT LLE.     Pt does not know her home medications. Home medications are not reconciled     Overview/Hospital Course:  1/25/24  NAEON, unable to obtain AM labs  Pt c/o pain, swelling in legs, L > R  LLE +DVT, currently on heparin drip  On Lasix 40 mg IV q12h for CHF exacerbation  Continue above treatment  Possibly transition to NOAC and PO Lasix tomorrow        Review of Systems   All other systems reviewed and are negative.    Objective:     Vital Signs (Most Recent):  Temp: 97.9 °F (36.6 °C) (01/25/24 0803)  Pulse: 68 (01/25/24 0803)  Resp: 20 (01/25/24 0803)  BP: 125/83 (01/25/24 0803)  SpO2: 99 % (01/25/24 0803) Vital Signs (24h  Range):  Temp:  [97.5 °F (36.4 °C)-98.6 °F (37 °C)] 97.9 °F (36.6 °C)  Pulse:  [60-76] 68  Resp:  [14-20] 20  SpO2:  [99 %-100 %] 99 %  BP: (125-168)/(10-97) 125/83     Weight: 54.8 kg (120 lb 13 oz)  Body mass index is 18.37 kg/m².    Intake/Output Summary (Last 24 hours) at 1/25/2024 1202  Last data filed at 1/25/2024 0000  Gross per 24 hour   Intake --   Output 1 ml   Net -1 ml         Physical Exam  Vitals and nursing note reviewed.   Constitutional:       General: She is not in acute distress.     Appearance: Normal appearance. She is normal weight.   Cardiovascular:      Rate and Rhythm: Normal rate and regular rhythm.      Heart sounds: No murmur heard.  Pulmonary:      Effort: Pulmonary effort is normal. No respiratory distress.      Breath sounds: Rales present. No wheezing.   Musculoskeletal:      Right lower leg: Edema present.      Left lower leg: Edema present.   Neurological:      General: No focal deficit present.      Mental Status: She is alert and oriented to person, place, and time.   Psychiatric:         Mood and Affect: Mood normal.         Behavior: Behavior normal.             Significant Labs: All pertinent labs within the past 24 hours have been reviewed.  Recent Lab Results  (Last 5 results in the past 24 hours)        01/25/24  0656   01/25/24  0549   01/25/24  0447   01/24/24  2201   01/24/24  2052        aPTT >150.0  Comment: Refer to local heparin nomogram for intensity/dose specific   therapeutic   range.  CONFIRMED RECOLLECT SAMPLE PER ALYSSA ESPINO  PTT  critical result(s) called and verbal readback obtained from   ARMAND RN by CP5 01/25/2024 11:21           27.7  Comment: Refer to local heparin nomogram for intensity/dose specific   therapeutic   range.         Baso #         0.06       Basophil %         0.8       Differential Method         Automated       Eos #         0.1       Eosinophil %         1.1       Free T4   0.79             Gran # (ANC)         5.1        Gran %         71.6       Hematocrit         28.9       Hemoglobin         9.8       Immature Grans (Abs)         0.02  Comment: Mild elevation in immature granulocytes is non specific and   can be seen in a variety of conditions including stress response,   acute inflammation, trauma and pregnancy. Correlation with other   laboratory and clinical findings is essential.         Immature Granulocytes         0.3       INR         1.2  Comment: Coumadin Therapy:  2.0 - 3.0 for INR for all indicators except mechanical heart valves  and antiphospholipid syndromes which should use 2.5 - 3.5.         Lymph #         1.4       Lymph %         19.8       MCH         31.4       MCHC         33.9       MCV         93       Mono #         0.5       Mono %         6.4       MPV         9.7       nRBC         0       Platelet Count         198       POCT Glucose     139   84         Protime         13.0       RBC         3.12       RDW         14.5       TSH   0.903             WBC         7.08                              Significant Imaging: I have reviewed all pertinent imaging results/findings within the past 24 hours.    US Lower Extremity Arteries Left   Final Result      Monophasic waveforms throughout the majority of the left lower extremity with sharp upstroke.  Findings most suggestive of hyperemia.         Electronically signed by: Zuleyma Landry   Date:    01/25/2024   Time:    10:29      US Lower Extremity Veins Bilateral   Final Result   Abnormal      Positive DVT left lower extremity      This report was flagged in Epic as abnormal.         Electronically signed by: Iesha Romo   Date:    01/24/2024   Time:    18:36      X-Ray Chest AP Portable   Final Result      No acute abnormality.         Electronically signed by: Humberto Gonzales   Date:    01/24/2024   Time:    17:44            Assessment/Plan:      * Acute deep vein thrombosis (DVT) of left lower extremity  DVT LLE  Hx recent GI bleed 3/2023 due to  gastric ulcers   IV heparin   Monitor H/H       Acute on chronic heart failure with preserved ejection fraction  Patient is identified as having Diastolic (HFpEF) heart failure that is Acute on chronic. CHF is currently uncontrolled due to Continued edema of extremities. Latest ECHO performed and demonstrates- Results for orders placed during the hospital encounter of 01/21/23    Echo Saline Bubble? Yes    Interpretation Summary  · There is no evidence of intracardiac shunting.  · The left ventricle is normal in size with mild concentric hypertrophy and normal systolic function.  · The estimated ejection fraction is 60%.  · Normal left ventricular diastolic function.  · Normal right ventricular size with normal right ventricular systolic function.  · Mild to moderate tricuspid regurgitation.  · Normal central venous pressure (3 mmHg).  · The estimated PA systolic pressure is 36 mmHg.  . Continue Furosemide and monitor clinical status closely. Monitor on telemetry. Patient is on CHF pathway.  Monitor strict Is&Os and daily weights.  Place on fluid restriction of 1.5 L. Cardiology has not been consulted. Continue to stress to patient importance of self efficacy and  on diet for CHF. Last BNP reviewed- and noted below   Recent Labs   Lab 01/24/24  1708   *       IV Lasix     Hypoglycemia  Improved   Accuchecks every 6 hours       Venous stasis ulcer  Consult wound care      CKD (chronic kidney disease), stage IV  Creatine stable for now. BMP reviewed- noted Estimated Creatinine Clearance: 21.6 mL/min (A) (based on SCr of 2.6 mg/dL (H)). according to latest data. Based on current GFR, CKD stage is stage 4 - GFR 15-29.  Monitor UOP and serial BMP and adjust therapy as needed. Renally dose meds. Avoid nephrotoxic medications and procedures.    Primary hypertension  Chronic, uncontrolled. Latest blood pressure and vitals reviewed-     Temp:  [97.5 °F (36.4 °C)-98.6 °F (37 °C)]   Pulse:  [60-76]   Resp:   [14-20]   BP: (125-168)/(10-97)   SpO2:  [99 %-100 %] .   Home meds for hypertension were reviewed and noted below.   Hypertension Medications               amLODIPine (NORVASC) 5 MG tablet Take 5 mg by mouth once daily.    furosemide (LASIX) 40 MG tablet Take 40 mg by mouth 2 (two) times a day.    isosorbide mononitrate (IMDUR) 60 MG 24 hr tablet Take 60 mg by mouth once daily.            While in the hospital, will manage blood pressure as follows; Continue home antihypertensive regimen    Will utilize p.r.n. blood pressure medication only if patient's blood pressure greater than 160/100 and she develops symptoms such as worsening chest pain or shortness of breath.    Normocytic anemia  Patient's anemia is currently controlled. Has not received any PRBCs to date. Etiology likely d/t  check iron studies   Current CBC reviewed-   Lab Results   Component Value Date    HGB 9.8 (L) 01/24/2024    HCT 28.9 (L) 01/24/2024     Monitor serial CBC and transfuse if patient becomes hemodynamically unstable, symptomatic or H/H drops below 7/21.    History of GI bleed  GI bleed 2/2 gastric ulcer 3/2023. She was hospitalized with anemia 12/2023 (hgb 5.5 and received 2 units PRBC) -no active bleeding at that time. Hgb remained stable since then.         History of CVA (cerebrovascular accident)  Pt reports residual left sided weakness  Hold ASA 2/2 GI bleed/gastric ulcer   Cont Statin       Malnutrition of moderate degree  Nutrition consulted. Most recent weight and BMI monitored-     Measurements:  Wt Readings from Last 1 Encounters:   01/24/24 54.8 kg (120 lb 13 oz)   Body mass index is 18.37 kg/m².    Patient has been screened and assessed by RD.    Malnutrition Type:  Context:    Level:      Malnutrition Characteristic Summary:       Interventions/Recommendations (treatment strategy):           VTE Risk Mitigation (From admission, onward)           Ordered     IP VTE HIGH RISK PATIENT  Once         01/24/24 0084     Place  sequential compression device  Until discontinued         01/24/24 2334     heparin 25,000 units in dextrose 5% (100 units/ml) IV bolus from bag - ADDITIONAL PRN BOLUS - 60 units/kg  As needed (PRN)        Question:  Heparin Infusion Adjustment (DO NOT MODIFY ANSWER)  Answer:  [email or web address]\epic\Images\Pharmacy\HeparinInfusions\heparin HIGH INTENSITY nomogram for [facility] [id number]    01/24/24 2028     heparin 25,000 units in dextrose 5% (100 units/ml) IV bolus from bag - ADDITIONAL PRN BOLUS - 30 units/kg  As needed (PRN)        Question:  Heparin Infusion Adjustment (DO NOT MODIFY ANSWER)  Answer:  [email or web address]\epic\Images\Pharmacy\HeparinInfusions\heparin HIGH INTENSITY nomogram for [facility] [id number]    01/24/24 2028     heparin 25,000 units in dextrose 5% 250 mL (100 units/mL) infusion HIGH INTENSITY nomogram - [facility]  Continuous        Question:  Begin at (units/kg/hr)  Answer:  18    01/24/24 2028                    Discharge Planning   ALEXA:      Code Status: Full Code   Is the patient medically ready for discharge?:     Reason for patient still in hospital (select all that apply): Patient trending condition, Laboratory test, and Treatment  Discharge Plan A: Home Health        Inpatient Upgrade Note    Joe Ceballos has warranted treatment spanning two or more midnights of hospital level care for the management of heart failure and LLE DVT . She continues to require daily labs and medication adjustments. Her condition is also complicated by the following comorbidities: Hypertension.            Tyrese Jensen MD  Department of Hospital Medicine   '[facility]

## 2024-01-25 NOTE — ED NOTES
"Apple juice given to pt per MD order. Pt requested to be scooted up in bed and a ER tech and I did so. She then was very soft spoken (and wearing a mask) and asked me a question I didn't understand. I asked her to repeat it and she asked for more blankets. I told her that when I checked for her not long ago, they we all out on the cart as well as the warmer. She then stated she wants another nurse "because you were talking nicer to the man on the other side of the curtain than you are to me". Charge RN notified   "

## 2024-01-25 NOTE — ASSESSMENT & PLAN NOTE
GI bleed 2/2 gastric ulcer 3/2023. She was hospitalized with anemia 12/2023 (hgb 5.5 and received 2 units PRBC) -no active bleeding at that time. Hgb remained stable since then.

## 2024-01-25 NOTE — HPI
The patient is a 55 yo female with HTN, Diastolic CHF, s/p CVA-mild residual left sided weakness, s/p Gastric bypass, Gastric ulcers- no recent GI bleeding, CKD4, Malnutrition who presented to ED with BLE edema with blisters- onset 6 days ago that progressively worsened. Pt also endorses SOB -onset at the same time. She is unable to ambulate due to pain and swelling to BLE.   Pt states she had a GI bleed 2/2 gastric ulcer 3/2023. She was hospitalized with anemia 12/2023 (hgb 5.5 and received 2 units PRBC) -no active bleeding at that time. Hgb remained stable since then.     In the ED, Afebrile, BP mildly elevated. O2sat 100%. Labs revealed Hgb 9.8, K 3.2, mild hyperchloremic metabolic acidosis. Serum Cr 2.6, . Albumin 1.7. , Troponin normal. Glucose was 34 but improved to 80s with oral intake. EKG showed NSR, ST elevated in inferior leads. CXR-nothing acute. U/S bilateral LE veins showed Acute DVT LLE.     Pt does not know her home medications. Home medications are not reconciled

## 2024-01-26 LAB
ALBUMIN SERPL BCP-MCNC: 1.4 G/DL (ref 3.5–5.2)
ALBUMIN SERPL BCP-MCNC: 1.4 G/DL (ref 3.5–5.2)
ALP SERPL-CCNC: 219 U/L (ref 55–135)
ALP SERPL-CCNC: 223 U/L (ref 55–135)
ALT SERPL W/O P-5'-P-CCNC: 28 U/L (ref 10–44)
ALT SERPL W/O P-5'-P-CCNC: 30 U/L (ref 10–44)
ANION GAP SERPL CALC-SCNC: 11 MMOL/L (ref 8–16)
ANION GAP SERPL CALC-SCNC: 5 MMOL/L (ref 8–16)
ANION GAP SERPL CALC-SCNC: 8 MMOL/L (ref 8–16)
AORTIC ROOT ANNULUS: 2.94 CM
ASCENDING AORTA: 2.68 CM
AST SERPL-CCNC: 24 U/L (ref 10–40)
AST SERPL-CCNC: 29 U/L (ref 10–40)
AV INDEX (PROSTH): 0.68
AV MEAN GRADIENT: 3 MMHG
AV PEAK GRADIENT: 5 MMHG
AV REGURGITATION PRESSURE HALF TIME: 1062.05 MS
AV VALVE AREA BY VELOCITY RATIO: 2.32 CM²
AV VALVE AREA: 1.96 CM²
AV VELOCITY RATIO: 0.81
BASOPHILS # BLD AUTO: 0.05 K/UL (ref 0–0.2)
BASOPHILS # BLD AUTO: 0.05 K/UL (ref 0–0.2)
BASOPHILS NFR BLD: 0.6 % (ref 0–1.9)
BASOPHILS NFR BLD: 0.7 % (ref 0–1.9)
BILIRUB SERPL-MCNC: 0.4 MG/DL (ref 0.1–1)
BILIRUB SERPL-MCNC: 0.5 MG/DL (ref 0.1–1)
BSA FOR ECHO PROCEDURE: 1.62 M2
BUN SERPL-MCNC: 24 MG/DL (ref 6–20)
CALCIUM SERPL-MCNC: 7.3 MG/DL (ref 8.7–10.5)
CALCIUM SERPL-MCNC: 7.4 MG/DL (ref 8.7–10.5)
CALCIUM SERPL-MCNC: 7.7 MG/DL (ref 8.7–10.5)
CHLORIDE SERPL-SCNC: 116 MMOL/L (ref 95–110)
CHLORIDE SERPL-SCNC: 117 MMOL/L (ref 95–110)
CHLORIDE SERPL-SCNC: 117 MMOL/L (ref 95–110)
CO2 SERPL-SCNC: 13 MMOL/L (ref 23–29)
CO2 SERPL-SCNC: 16 MMOL/L (ref 23–29)
CO2 SERPL-SCNC: 19 MMOL/L (ref 23–29)
CREAT SERPL-MCNC: 2.6 MG/DL (ref 0.5–1.4)
CREAT SERPL-MCNC: 2.6 MG/DL (ref 0.5–1.4)
CREAT SERPL-MCNC: 2.7 MG/DL (ref 0.5–1.4)
CV ECHO LV RWT: 0.41 CM
DIFFERENTIAL METHOD BLD: ABNORMAL
DIFFERENTIAL METHOD BLD: ABNORMAL
DOP CALC AO PEAK VEL: 1.15 M/S
DOP CALC AO VTI: 27.3 CM
DOP CALC LVOT AREA: 2.9 CM2
DOP CALC LVOT DIAMETER: 1.91 CM
DOP CALC LVOT PEAK VEL: 0.93 M/S
DOP CALC LVOT STROKE VOLUME: 53.55 CM3
DOP CALC RVOT PEAK VEL: 0.74 M/S
DOP CALC RVOT VTI: 20.8 CM
DOP CALCLVOT PEAK VEL VTI: 18.7 CM
E WAVE DECELERATION TIME: 195.62 MSEC
E/A RATIO: 0.95
E/E' RATIO: 7.5 M/S
ECHO LV POSTERIOR WALL: 0.88 CM (ref 0.6–1.1)
EOSINOPHIL # BLD AUTO: 0.1 K/UL (ref 0–0.5)
EOSINOPHIL # BLD AUTO: 0.1 K/UL (ref 0–0.5)
EOSINOPHIL NFR BLD: 0.7 % (ref 0–8)
EOSINOPHIL NFR BLD: 0.9 % (ref 0–8)
ERYTHROCYTE [DISTWIDTH] IN BLOOD BY AUTOMATED COUNT: 14.2 % (ref 11.5–14.5)
ERYTHROCYTE [DISTWIDTH] IN BLOOD BY AUTOMATED COUNT: 14.3 % (ref 11.5–14.5)
EST. GFR  (NO RACE VARIABLE): 20 ML/MIN/1.73 M^2
EST. GFR  (NO RACE VARIABLE): 21 ML/MIN/1.73 M^2
EST. GFR  (NO RACE VARIABLE): 21 ML/MIN/1.73 M^2
FRACTIONAL SHORTENING: 38 % (ref 28–44)
GLUCOSE SERPL-MCNC: 68 MG/DL (ref 70–110)
GLUCOSE SERPL-MCNC: 81 MG/DL (ref 70–110)
GLUCOSE SERPL-MCNC: 84 MG/DL (ref 70–110)
HCT VFR BLD AUTO: 24.7 % (ref 37–48.5)
HCT VFR BLD AUTO: 25.3 % (ref 37–48.5)
HGB BLD-MCNC: 8.6 G/DL (ref 12–16)
HGB BLD-MCNC: 8.9 G/DL (ref 12–16)
IMM GRANULOCYTES # BLD AUTO: 0.01 K/UL (ref 0–0.04)
IMM GRANULOCYTES # BLD AUTO: 0.03 K/UL (ref 0–0.04)
IMM GRANULOCYTES NFR BLD AUTO: 0.1 % (ref 0–0.5)
IMM GRANULOCYTES NFR BLD AUTO: 0.4 % (ref 0–0.5)
INTERVENTRICULAR SEPTUM: 0.91 CM (ref 0.6–1.1)
IVC DIAMETER: 1.41 CM
IVRT: 83.73 MSEC
LA MAJOR: 4.05 CM
LA MINOR: 4.36 CM
LA WIDTH: 3.5 CM
LEFT ATRIUM SIZE: 2.83 CM
LEFT ATRIUM VOLUME INDEX: 21.4 ML/M2
LEFT ATRIUM VOLUME: 35.35 CM3
LEFT INTERNAL DIMENSION IN SYSTOLE: 2.69 CM (ref 2.1–4)
LEFT VENTRICLE DIASTOLIC VOLUME INDEX: 50.93 ML/M2
LEFT VENTRICLE DIASTOLIC VOLUME: 84.04 ML
LEFT VENTRICLE MASS INDEX: 75 G/M2
LEFT VENTRICLE SYSTOLIC VOLUME INDEX: 16.2 ML/M2
LEFT VENTRICLE SYSTOLIC VOLUME: 26.76 ML
LEFT VENTRICULAR INTERNAL DIMENSION IN DIASTOLE: 4.32 CM (ref 3.5–6)
LEFT VENTRICULAR MASS: 123.3 G
LV LATERAL E/E' RATIO: 7.5 M/S
LV SEPTAL E/E' RATIO: 7.5 M/S
LVOT MG: 2.1 MMHG
LVOT MV: 0.71 CM/S
LYMPHOCYTES # BLD AUTO: 1.3 K/UL (ref 1–4.8)
LYMPHOCYTES # BLD AUTO: 1.6 K/UL (ref 1–4.8)
LYMPHOCYTES NFR BLD: 18.9 % (ref 18–48)
LYMPHOCYTES NFR BLD: 19.1 % (ref 18–48)
MCH RBC QN AUTO: 31.6 PG (ref 27–31)
MCH RBC QN AUTO: 31.9 PG (ref 27–31)
MCHC RBC AUTO-ENTMCNC: 34.8 G/DL (ref 32–36)
MCHC RBC AUTO-ENTMCNC: 35.2 G/DL (ref 32–36)
MCV RBC AUTO: 91 FL (ref 82–98)
MCV RBC AUTO: 91 FL (ref 82–98)
MONOCYTES # BLD AUTO: 0.4 K/UL (ref 0.3–1)
MONOCYTES # BLD AUTO: 0.5 K/UL (ref 0.3–1)
MONOCYTES NFR BLD: 5.5 % (ref 4–15)
MONOCYTES NFR BLD: 6.4 % (ref 4–15)
MV PEAK A VEL: 0.79 M/S
MV PEAK E VEL: 0.75 M/S
MV STENOSIS PRESSURE HALF TIME: 56.73 MS
MV VALVE AREA P 1/2 METHOD: 3.88 CM2
NEUTROPHILS # BLD AUTO: 5 K/UL (ref 1.8–7.7)
NEUTROPHILS # BLD AUTO: 6 K/UL (ref 1.8–7.7)
NEUTROPHILS NFR BLD: 72.7 % (ref 38–73)
NEUTROPHILS NFR BLD: 74 % (ref 38–73)
NRBC BLD-RTO: 0 /100 WBC
NRBC BLD-RTO: 0 /100 WBC
PISA AR MAX VEL: 3.99 M/S
PISA MRMAX VEL: 5.67 M/S
PISA TR MAX VEL: 2.93 M/S
PLATELET # BLD AUTO: 167 K/UL (ref 150–450)
PLATELET # BLD AUTO: 182 K/UL (ref 150–450)
PMV BLD AUTO: 9.6 FL (ref 9.2–12.9)
PMV BLD AUTO: 9.9 FL (ref 9.2–12.9)
POCT GLUCOSE: 113 MG/DL (ref 70–110)
POCT GLUCOSE: 125 MG/DL (ref 70–110)
POCT GLUCOSE: 72 MG/DL (ref 70–110)
POTASSIUM SERPL-SCNC: 4.4 MMOL/L (ref 3.5–5.1)
POTASSIUM SERPL-SCNC: 4.8 MMOL/L (ref 3.5–5.1)
POTASSIUM SERPL-SCNC: 5.6 MMOL/L (ref 3.5–5.1)
PROT SERPL-MCNC: 5 G/DL (ref 6–8.4)
PROT SERPL-MCNC: 5.3 G/DL (ref 6–8.4)
PV MEAN GRADIENT: 2 MMHG
RA MAJOR: 3.56 CM
RA PRESSURE ESTIMATED: 3 MMHG
RA WIDTH: 2.1 CM
RBC # BLD AUTO: 2.72 M/UL (ref 4–5.4)
RBC # BLD AUTO: 2.79 M/UL (ref 4–5.4)
RV TB RVSP: 6 MMHG
SODIUM SERPL-SCNC: 140 MMOL/L (ref 136–145)
SODIUM SERPL-SCNC: 141 MMOL/L (ref 136–145)
SODIUM SERPL-SCNC: 141 MMOL/L (ref 136–145)
STJ: 2.72 CM
TDI LATERAL: 0.1 M/S
TDI SEPTAL: 0.1 M/S
TDI: 0.1 M/S
TR MAX PG: 34 MMHG
TR MEAN GRADIENT: 33 MMHG
TRICUSPID ANNULAR PLANE SYSTOLIC EXCURSION: 1.87 CM
TV REST PULMONARY ARTERY PRESSURE: 37 MMHG
WBC # BLD AUTO: 6.84 K/UL (ref 3.9–12.7)
WBC # BLD AUTO: 8.17 K/UL (ref 3.9–12.7)
Z-SCORE OF LEFT VENTRICULAR DIMENSION IN END DIASTOLE: -0.7
Z-SCORE OF LEFT VENTRICULAR DIMENSION IN END SYSTOLE: -0.52

## 2024-01-26 PROCEDURE — 36415 COLL VENOUS BLD VENIPUNCTURE: CPT | Performed by: NURSE PRACTITIONER

## 2024-01-26 PROCEDURE — 36415 COLL VENOUS BLD VENIPUNCTURE: CPT | Mod: XB | Performed by: HOSPITALIST

## 2024-01-26 PROCEDURE — 85025 COMPLETE CBC W/AUTO DIFF WBC: CPT | Performed by: NURSE PRACTITIONER

## 2024-01-26 PROCEDURE — 21400001 HC TELEMETRY ROOM

## 2024-01-26 PROCEDURE — 80053 COMPREHEN METABOLIC PANEL: CPT | Mod: 91 | Performed by: HOSPITALIST

## 2024-01-26 PROCEDURE — 25000003 PHARM REV CODE 250: Performed by: NURSE PRACTITIONER

## 2024-01-26 PROCEDURE — 63600175 PHARM REV CODE 636 W HCPCS: Performed by: NURSE PRACTITIONER

## 2024-01-26 PROCEDURE — 80053 COMPREHEN METABOLIC PANEL: CPT | Performed by: NURSE PRACTITIONER

## 2024-01-26 PROCEDURE — 80048 BASIC METABOLIC PNL TOTAL CA: CPT | Mod: XB | Performed by: HOSPITALIST

## 2024-01-26 PROCEDURE — 25000003 PHARM REV CODE 250: Performed by: HOSPITALIST

## 2024-01-26 PROCEDURE — 11000001 HC ACUTE MED/SURG PRIVATE ROOM

## 2024-01-26 PROCEDURE — 85025 COMPLETE CBC W/AUTO DIFF WBC: CPT | Mod: 91 | Performed by: HOSPITALIST

## 2024-01-26 PROCEDURE — 93010 ELECTROCARDIOGRAM REPORT: CPT | Mod: ,,, | Performed by: INTERNAL MEDICINE

## 2024-01-26 PROCEDURE — 93005 ELECTROCARDIOGRAM TRACING: CPT

## 2024-01-26 RX ORDER — DILTIAZEM HYDROCHLORIDE 5 MG/ML
0.25 INJECTION INTRAVENOUS ONCE
Status: DISCONTINUED | OUTPATIENT
Start: 2024-01-26 | End: 2024-01-26

## 2024-01-26 RX ADMIN — SUCRALFATE 1 G: 1 TABLET ORAL at 05:01

## 2024-01-26 RX ADMIN — HYDROCODONE BITARTRATE AND ACETAMINOPHEN 1 TABLET: 5; 325 TABLET ORAL at 10:01

## 2024-01-26 RX ADMIN — ONDANSETRON 4 MG: 2 INJECTION INTRAMUSCULAR; INTRAVENOUS at 11:01

## 2024-01-26 RX ADMIN — SODIUM BICARBONATE 650 MG TABLET 1300 MG: at 04:01

## 2024-01-26 RX ADMIN — FUROSEMIDE 40 MG: 10 INJECTION, SOLUTION INTRAMUSCULAR; INTRAVENOUS at 09:01

## 2024-01-26 RX ADMIN — ATORVASTATIN CALCIUM 40 MG: 40 TABLET, FILM COATED ORAL at 10:01

## 2024-01-26 RX ADMIN — AMLODIPINE BESYLATE 5 MG: 10 TABLET ORAL at 10:01

## 2024-01-26 RX ADMIN — FUROSEMIDE 40 MG: 10 INJECTION, SOLUTION INTRAMUSCULAR; INTRAVENOUS at 11:01

## 2024-01-26 RX ADMIN — HYDROXYZINE HYDROCHLORIDE 25 MG: 25 TABLET ORAL at 02:01

## 2024-01-26 RX ADMIN — SUCRALFATE 1 G: 1 TABLET ORAL at 11:01

## 2024-01-26 RX ADMIN — SODIUM BICARBONATE 650 MG TABLET 1300 MG: at 10:01

## 2024-01-26 RX ADMIN — HYDROXYZINE HYDROCHLORIDE 25 MG: 25 TABLET ORAL at 10:01

## 2024-01-26 RX ADMIN — APIXABAN 10 MG: 2.5 TABLET, FILM COATED ORAL at 10:01

## 2024-01-26 RX ADMIN — ISOSORBIDE MONONITRATE 60 MG: 60 TABLET, EXTENDED RELEASE ORAL at 10:01

## 2024-01-26 RX ADMIN — ONDANSETRON 4 MG: 2 INJECTION INTRAMUSCULAR; INTRAVENOUS at 01:01

## 2024-01-26 NOTE — PLAN OF CARE
Patient is in stable condition, no acute distress, remained free from injuries, no pain reported this shift, on cardiac monitoring (NSR), blood glucose checks performed as ordered, VSS, and all active orders reviewed. 24 hr chart check performed.

## 2024-01-26 NOTE — SUBJECTIVE & OBJECTIVE
Review of Systems   All other systems reviewed and are negative.    Objective:     Vital Signs (Most Recent):  Temp: 98.2 °F (36.8 °C) (01/26/24 1720)  Pulse: 68 (01/26/24 1720)  Resp: 20 (01/26/24 1720)  BP: 114/78 (01/26/24 1720)  SpO2: 100 % (01/26/24 1720) Vital Signs (24h Range):  Temp:  [98 °F (36.7 °C)-99.1 °F (37.3 °C)] 98.2 °F (36.8 °C)  Pulse:  [] 68  Resp:  [16-20] 20  SpO2:  [99 %-100 %] 100 %  BP: (114-159)/(61-92) 114/78     Weight: 54.4 kg (119 lb 14.9 oz)  Body mass index is 18.24 kg/m².    Intake/Output Summary (Last 24 hours) at 1/26/2024 1729  Last data filed at 1/26/2024 0931  Gross per 24 hour   Intake 490 ml   Output 600 ml   Net -110 ml           Physical Exam  Vitals and nursing note reviewed.   Constitutional:       General: She is not in acute distress.     Appearance: Normal appearance. She is normal weight.   Cardiovascular:      Rate and Rhythm: Normal rate and regular rhythm.      Heart sounds: No murmur heard.  Pulmonary:      Effort: Pulmonary effort is normal. No respiratory distress.      Breath sounds: Rales present. No wheezing.   Musculoskeletal:      Right lower leg: Edema present.      Left lower leg: Edema present.   Neurological:      General: No focal deficit present.      Mental Status: She is alert and oriented to person, place, and time.   Psychiatric:         Mood and Affect: Mood normal.         Behavior: Behavior normal.             Significant Labs: All pertinent labs within the past 24 hours have been reviewed.  Recent Lab Results  (Last 5 results in the past 24 hours)        01/26/24  1211   01/26/24  1141   01/26/24  0634   01/26/24  0150   01/26/24  0038        Albumin     1.4   1.4         ALP     219   223         ALT     30   28         Anion Gap   11   8   5         AST     29   24         Baso #     0.05   0.05         Basophil %     0.7   0.6         BILIRUBIN TOTAL     0.5  Comment: For infants and newborns, interpretation of results should be  based  on gestational age, weight and in agreement with clinical  observations.    Premature Infant recommended reference ranges:  Up to 24 hours.............<8.0 mg/dL  Up to 48 hours............<12.0 mg/dL  3-5 days..................<15.0 mg/dL  6-29 days.................<15.0 mg/dL     0.4  Comment: For infants and newborns, interpretation of results should be based  on gestational age, weight and in agreement with clinical  observations.    Premature Infant recommended reference ranges:  Up to 24 hours.............<8.0 mg/dL  Up to 48 hours............<12.0 mg/dL  3-5 days..................<15.0 mg/dL  6-29 days.................<15.0 mg/dL           BUN   24   24   24         Calcium   7.7   7.3   7.4         Chloride   117   116   117         CO2   13   16   19         Creatinine   2.7   2.6   2.6         Differential Method     Automated   Automated         eGFR   20   21   21         Eos #     0.1   0.1         Eosinophil %     0.9   0.7         Glucose   81   68   84         Gran # (ANC)     5.0   6.0         Gran %     72.7   74.0         Hematocrit     25.3   24.7         Hemoglobin     8.9   8.6         Immature Grans (Abs)     0.03  Comment: Mild elevation in immature granulocytes is non specific and   can be seen in a variety of conditions including stress response,   acute inflammation, trauma and pregnancy. Correlation with other   laboratory and clinical findings is essential.     0.01  Comment: Mild elevation in immature granulocytes is non specific and   can be seen in a variety of conditions including stress response,   acute inflammation, trauma and pregnancy. Correlation with other   laboratory and clinical findings is essential.           Immature Granulocytes     0.4   0.1         Lymph #     1.3   1.6         Lymph %     18.9   19.1         MCH     31.9   31.6         MCHC     35.2   34.8         MCV     91   91         Mono #     0.4   0.5         Mono %     6.4   5.5         MPV     9.6    9.9         nRBC     0   0         Platelet Count     182   167         POCT Glucose 72         125       Potassium   4.8   5.6   4.4         PROTEIN TOTAL     5.3   5.0         RBC     2.79   2.72         RDW     14.2   14.3         Sodium   141   140   141         WBC     6.84   8.17                                Significant Imaging: I have reviewed all pertinent imaging results/findings within the past 24 hours.    US Lower Extremity Arteries Left   Final Result      Monophasic waveforms throughout the majority of the left lower extremity with sharp upstroke.  Findings most suggestive of hyperemia.         Electronically signed by: Zuleyma Landry   Date:    01/25/2024   Time:    10:29      US Lower Extremity Veins Bilateral   Final Result   Abnormal      Positive DVT left lower extremity      This report was flagged in Epic as abnormal.         Electronically signed by: Iesha Romo   Date:    01/24/2024   Time:    18:36      X-Ray Chest AP Portable   Final Result      No acute abnormality.         Electronically signed by: Humberto Gonzales   Date:    01/24/2024   Time:    17:44

## 2024-01-26 NOTE — PROGRESS NOTES
Patient evaluated with KENZIE [facility] nurse - per chart review, acute DVT to LLE, arterial ultrasound pending. Ulceration treated per Aundrea's consult note. Recommend patient f/u with vascular surgery as OP for ongoing management of BLE vascular disease and fitting for compression stockings if deemed appropriate. No compression applied at this visit due to newly diagnosed acute DVT to LLE.

## 2024-01-26 NOTE — ASSESSMENT & PLAN NOTE
Chronic, uncontrolled. Latest blood pressure and vitals reviewed-     Temp:  [98 °F (36.7 °C)-99.1 °F (37.3 °C)]   Pulse:  []   Resp:  [16-20]   BP: (114-159)/(61-92)   SpO2:  [99 %-100 %] .   Home meds for hypertension were reviewed and noted below.   Hypertension Medications               amLODIPine (NORVASC) 5 MG tablet Take 5 mg by mouth once daily.    furosemide (LASIX) 40 MG tablet Take 40 mg by mouth 2 (two) times a day.    isosorbide mononitrate (IMDUR) 60 MG 24 hr tablet Take 60 mg by mouth once daily.            While in the hospital, will manage blood pressure as follows; Continue home antihypertensive regimen    Will utilize p.r.n. blood pressure medication only if patient's blood pressure greater than 160/100 and she develops symptoms such as worsening chest pain or shortness of breath.

## 2024-01-26 NOTE — ASSESSMENT & PLAN NOTE
Patient's anemia is currently controlled. Has not received any PRBCs to date. Etiology likely d/t  check iron studies   Current CBC reviewed-   Lab Results   Component Value Date    HGB 8.9 (L) 01/26/2024    HCT 25.3 (L) 01/26/2024     Monitor serial CBC and transfuse if patient becomes hemodynamically unstable, symptomatic or H/H drops below 7/21.

## 2024-01-26 NOTE — PROGRESS NOTES
[facility] Medicine  Progress Note    Patient Name: Joe Ceballos  MRN: [id number]  Patient Class: IP- Inpatient   Admission Date: 1/24/2024  Length of Stay: 1 days  Attending Physician: Tyrese Jensen MD  Primary Care Provider: Daphney Heart MD        Subjective:     Principal Problem:Acute deep vein thrombosis (DVT) of left lower extremity        HPI:  The patient is a 55 yo female with HTN, Diastolic CHF, s/p CVA-mild residual left sided weakness, s/p Gastric bypass, Gastric ulcers- no recent GI bleeding, CKD4, Malnutrition who presented to ED with BLE edema with blisters- onset 6 days ago that progressively worsened. Pt also endorses SOB -onset at the same time. She is unable to ambulate due to pain and swelling to BLE.   Pt states she had a GI bleed 2/2 gastric ulcer 3/2023. She was hospitalized with anemia 12/2023 (hgb 5.5 and received 2 units PRBC) -no active bleeding at that time. Hgb remained stable since then.     In the ED, Afebrile, BP mildly elevated. O2sat 100%. Labs revealed Hgb 9.8, K 3.2, mild hyperchloremic metabolic acidosis. Serum Cr 2.6, . Albumin 1.7. , Troponin normal. Glucose was 34 but improved to 80s with oral intake. EKG showed NSR, ST elevated in inferior leads. CXR-nothing acute. U/S bilateral LE veins showed Acute DVT LLE.     Pt does not know her home medications. Home medications are not reconciled     Overview/Hospital Course:  1/25/24  NAEON, unable to obtain AM labs  Pt c/o pain, swelling in legs, L > R  LLE +DVT, currently on heparin drip  On Lasix 40 mg IV q12h for CHF exacerbation  Continue above treatment  Possibly transition to NOAC and PO Lasix tomorrow    1/26/24  NAEON, patient reports some improvement in BLE edema  States she feels weak and unable to care for herself, consult PT/OT  Transition heparin to apixaban today  Requires continued IV diuresis, continue lasix      Review of Systems   All other systems reviewed and are  negative.    Objective:     Vital Signs (Most Recent):  Temp: 98.2 °F (36.8 °C) (01/26/24 1720)  Pulse: 68 (01/26/24 1720)  Resp: 20 (01/26/24 1720)  BP: 114/78 (01/26/24 1720)  SpO2: 100 % (01/26/24 1720) Vital Signs (24h Range):  Temp:  [98 °F (36.7 °C)-99.1 °F (37.3 °C)] 98.2 °F (36.8 °C)  Pulse:  [] 68  Resp:  [16-20] 20  SpO2:  [99 %-100 %] 100 %  BP: (114-159)/(61-92) 114/78     Weight: 54.4 kg (119 lb 14.9 oz)  Body mass index is 18.24 kg/m².    Intake/Output Summary (Last 24 hours) at 1/26/2024 1729  Last data filed at 1/26/2024 0931  Gross per 24 hour   Intake 490 ml   Output 600 ml   Net -110 ml           Physical Exam  Vitals and nursing note reviewed.   Constitutional:       General: She is not in acute distress.     Appearance: Normal appearance. She is normal weight.   Cardiovascular:      Rate and Rhythm: Normal rate and regular rhythm.      Heart sounds: No murmur heard.  Pulmonary:      Effort: Pulmonary effort is normal. No respiratory distress.      Breath sounds: Rales present. No wheezing.   Musculoskeletal:      Right lower leg: Edema present.      Left lower leg: Edema present.   Neurological:      General: No focal deficit present.      Mental Status: She is alert and oriented to person, place, and time.   Psychiatric:         Mood and Affect: Mood normal.         Behavior: Behavior normal.             Significant Labs: All pertinent labs within the past 24 hours have been reviewed.  Recent Lab Results  (Last 5 results in the past 24 hours)        01/26/24  1211   01/26/24  1141   01/26/24  0634   01/26/24  0150   01/26/24  0038        Albumin     1.4   1.4         ALP     219   223         ALT     30   28         Anion Gap   11   8   5         AST     29   24         Baso #     0.05   0.05         Basophil %     0.7   0.6         BILIRUBIN TOTAL     0.5  Comment: For infants and newborns, interpretation of results should be based  on gestational age, weight and in agreement with  clinical  observations.    Premature Infant recommended reference ranges:  Up to 24 hours.............<8.0 mg/dL  Up to 48 hours............<12.0 mg/dL  3-5 days..................<15.0 mg/dL  6-29 days.................<15.0 mg/dL     0.4  Comment: For infants and newborns, interpretation of results should be based  on gestational age, weight and in agreement with clinical  observations.    Premature Infant recommended reference ranges:  Up to 24 hours.............<8.0 mg/dL  Up to 48 hours............<12.0 mg/dL  3-5 days..................<15.0 mg/dL  6-29 days.................<15.0 mg/dL           BUN   24   24   24         Calcium   7.7   7.3   7.4         Chloride   117   116   117         CO2   13   16   19         Creatinine   2.7   2.6   2.6         Differential Method     Automated   Automated         eGFR   20   21   21         Eos #     0.1   0.1         Eosinophil %     0.9   0.7         Glucose   81   68   84         Gran # (ANC)     5.0   6.0         Gran %     72.7   74.0         Hematocrit     25.3   24.7         Hemoglobin     8.9   8.6         Immature Grans (Abs)     0.03  Comment: Mild elevation in immature granulocytes is non specific and   can be seen in a variety of conditions including stress response,   acute inflammation, trauma and pregnancy. Correlation with other   laboratory and clinical findings is essential.     0.01  Comment: Mild elevation in immature granulocytes is non specific and   can be seen in a variety of conditions including stress response,   acute inflammation, trauma and pregnancy. Correlation with other   laboratory and clinical findings is essential.           Immature Granulocytes     0.4   0.1         Lymph #     1.3   1.6         Lymph %     18.9   19.1         MCH     31.9   31.6         MCHC     35.2   34.8         MCV     91   91         Mono #     0.4   0.5         Mono %     6.4   5.5         MPV     9.6   9.9         nRBC     0   0         Platelet Count      182   167         POCT Glucose 72         125       Potassium   4.8   5.6   4.4         PROTEIN TOTAL     5.3   5.0         RBC     2.79   2.72         RDW     14.2   14.3         Sodium   141   140   141         WBC     6.84   8.17                                Significant Imaging: I have reviewed all pertinent imaging results/findings within the past 24 hours.    US Lower Extremity Arteries Left   Final Result      Monophasic waveforms throughout the majority of the left lower extremity with sharp upstroke.  Findings most suggestive of hyperemia.         Electronically signed by: Zuleyma Landry   Date:    01/25/2024   Time:    10:29      US Lower Extremity Veins Bilateral   Final Result   Abnormal      Positive DVT left lower extremity      This report was flagged in Epic as abnormal.         Electronically signed by: Iesha Romo   Date:    01/24/2024   Time:    18:36      X-Ray Chest AP Portable   Final Result      No acute abnormality.         Electronically signed by: Humberto Gonzales   Date:    01/24/2024   Time:    17:44            Assessment/Plan:      * Acute deep vein thrombosis (DVT) of left lower extremity  DVT LLE  Hx recent GI bleed 3/2023 due to gastric ulcers   IV heparin   Monitor H/H     1/26  No bleeding reported, H/H stable  Transition heparin to apixaban today      Acute on chronic heart failure with preserved ejection fraction  Patient is identified as having Diastolic (HFpEF) heart failure that is Acute on chronic. CHF is currently uncontrolled due to Continued edema of extremities. Latest ECHO performed and demonstrates- Results for orders placed during the hospital encounter of 01/21/23    Echo Saline Bubble? Yes    Interpretation Summary  · There is no evidence of intracardiac shunting.  · The left ventricle is normal in size with mild concentric hypertrophy and normal systolic function.  · The estimated ejection fraction is 60%.  · Normal left ventricular diastolic function.  ·  Normal right ventricular size with normal right ventricular systolic function.  · Mild to moderate tricuspid regurgitation.  · Normal central venous pressure (3 mmHg).  · The estimated PA systolic pressure is 36 mmHg.  . Continue Furosemide and monitor clinical status closely. Monitor on telemetry. Patient is on CHF pathway.  Monitor strict Is&Os and daily weights.  Place on fluid restriction of 1.5 L. Cardiology has not been consulted. Continue to stress to patient importance of self efficacy and  on diet for CHF. Last BNP reviewed- and noted below   Recent Labs   Lab 01/24/24  1708   *       IV Lasix     Hypoglycemia  Improved   Accuchecks every 6 hours       Venous stasis ulcer  Consult wound care    CKD (chronic kidney disease), stage IV  Creatine stable for now. BMP reviewed- noted Estimated Creatinine Clearance: 20.7 mL/min (A) (based on SCr of 2.7 mg/dL (H)). according to latest data. Based on current GFR, CKD stage is stage 4 - GFR 15-29.  Monitor UOP and serial BMP and adjust therapy as needed. Renally dose meds. Avoid nephrotoxic medications and procedures    Primary hypertension  Chronic, uncontrolled. Latest blood pressure and vitals reviewed-     Temp:  [98 °F (36.7 °C)-99.1 °F (37.3 °C)]   Pulse:  []   Resp:  [16-20]   BP: (114-159)/(61-92)   SpO2:  [99 %-100 %] .   Home meds for hypertension were reviewed and noted below.   Hypertension Medications               amLODIPine (NORVASC) 5 MG tablet Take 5 mg by mouth once daily.    furosemide (LASIX) 40 MG tablet Take 40 mg by mouth 2 (two) times a day.    isosorbide mononitrate (IMDUR) 60 MG 24 hr tablet Take 60 mg by mouth once daily.            While in the hospital, will manage blood pressure as follows; Continue home antihypertensive regimen    Will utilize p.r.n. blood pressure medication only if patient's blood pressure greater than 160/100 and she develops symptoms such as worsening chest pain or shortness of  breath.    Normocytic anemia  Patient's anemia is currently controlled. Has not received any PRBCs to date. Etiology likely d/t  check iron studies   Current CBC reviewed-   Lab Results   Component Value Date    HGB 8.9 (L) 01/26/2024    HCT 25.3 (L) 01/26/2024     Monitor serial CBC and transfuse if patient becomes hemodynamically unstable, symptomatic or H/H drops below 7/21.    History of GI bleed  GI bleed 2/2 gastric ulcer 3/2023. She was hospitalized with anemia 12/2023 (hgb 5.5 and received 2 units PRBC) -no active bleeding at that time. Hgb remained stable since then.     History of CVA (cerebrovascular accident)  Pt reports residual left sided weakness  Hold ASA 2/2 GI bleed/gastric ulcer   Cont Statin    Malnutrition of moderate degree  Nutrition consulted. Most recent weight and BMI monitored-     Measurements:  Wt Readings from Last 1 Encounters:   01/25/24 54.4 kg (119 lb 14.9 oz)   Body mass index is 18.24 kg/m².    Patient has been screened and assessed by RD.    Malnutrition Type:  Context:    Level:      Malnutrition Characteristic Summary:  Fluid Accumulation (Malnutrition): moderate    Interventions/Recommendations (treatment strategy):  1. Recommend pt continues Low Sodium, 2gm Fluid - 1500mL diet. 2. Recommend pt receives Boost plus BID 3. Recommend pt continues Anti-emetics. 4.  Weigh daily        VTE Risk Mitigation (From admission, onward)           Ordered     apixaban tablet 5 mg  2 times daily         01/26/24 1014     apixaban tablet 10 mg  2 times daily         01/25/24 1924     IP VTE HIGH RISK PATIENT  Once         01/24/24 2334     Place sequential compression device  Until discontinued         01/24/24 2334                    Discharge Planning   ALEXA:      Code Status: Full Code   Is the patient medically ready for discharge?:     Reason for patient still in hospital (select all that apply): Patient trending condition, Laboratory test, Treatment, PT / OT recommendations, and Pending  disposition  Discharge Plan A: Home Health                  Tyrese Jensen MD  Department of Hospital Medicine   [facility]

## 2024-01-26 NOTE — ASSESSMENT & PLAN NOTE
Creatine stable for now. BMP reviewed- noted Estimated Creatinine Clearance: 20.7 mL/min (A) (based on SCr of 2.7 mg/dL (H)). according to latest data. Based on current GFR, CKD stage is stage 4 - GFR 15-29.  Monitor UOP and serial BMP and adjust therapy as needed. Renally dose meds. Avoid nephrotoxic medications and procedures

## 2024-01-26 NOTE — PLAN OF CARE
Care plan reviewed with patient. Nausea and anxiety managed by PRN med. Free from falls. No other complaints made. All orders checked and reviewed.

## 2024-01-26 NOTE — ASSESSMENT & PLAN NOTE
Nutrition consulted. Most recent weight and BMI monitored-     Measurements:  Wt Readings from Last 1 Encounters:   01/25/24 54.4 kg (119 lb 14.9 oz)   Body mass index is 18.24 kg/m².    Patient has been screened and assessed by RD.    Malnutrition Type:  Context:    Level:      Malnutrition Characteristic Summary:  Fluid Accumulation (Malnutrition): moderate    Interventions/Recommendations (treatment strategy):  1. Recommend pt continues Low Sodium, 2gm Fluid - 1500mL diet. 2. Recommend pt receives Boost plus BID 3. Recommend pt continues Anti-emetics. 4.  Weigh daily

## 2024-01-26 NOTE — ASSESSMENT & PLAN NOTE
DVT LLE  Hx recent GI bleed 3/2023 due to gastric ulcers   IV heparin   Monitor H/H     1/26  No bleeding reported, H/H stable  Transition heparin to apixaban today

## 2024-01-26 NOTE — NURSING
Patient HR fluctuating from 180's to 220's. She currently has no complaints and just laying in the bed, the heart monitor, leads and even the stickers were changed due to possible malfunction. Charge nurse and HM notified and STAT EKG and labs ordered.

## 2024-01-26 NOTE — NURSING
Lab notified nurse that pt refused blood draw. Explained to pt importance of blood draw d/t Heparin infusing and to monitor for risks of bleeding. Pt refused x multiple attempts. MD notified, orders received.

## 2024-01-27 LAB
ANION GAP SERPL CALC-SCNC: 6 MMOL/L (ref 8–16)
BASOPHILS # BLD AUTO: 0.05 K/UL (ref 0–0.2)
BASOPHILS NFR BLD: 0.7 % (ref 0–1.9)
BUN SERPL-MCNC: 29 MG/DL (ref 6–20)
CALCIUM SERPL-MCNC: 7.7 MG/DL (ref 8.7–10.5)
CHLORIDE SERPL-SCNC: 116 MMOL/L (ref 95–110)
CO2 SERPL-SCNC: 19 MMOL/L (ref 23–29)
CREAT SERPL-MCNC: 2.8 MG/DL (ref 0.5–1.4)
DIFFERENTIAL METHOD BLD: ABNORMAL
EOSINOPHIL # BLD AUTO: 0.1 K/UL (ref 0–0.5)
EOSINOPHIL NFR BLD: 0.7 % (ref 0–8)
ERYTHROCYTE [DISTWIDTH] IN BLOOD BY AUTOMATED COUNT: 14.3 % (ref 11.5–14.5)
EST. GFR  (NO RACE VARIABLE): 20 ML/MIN/1.73 M^2
GLUCOSE SERPL-MCNC: 77 MG/DL (ref 70–110)
HCT VFR BLD AUTO: 22.2 % (ref 37–48.5)
HGB BLD-MCNC: 7.7 G/DL (ref 12–16)
IMM GRANULOCYTES # BLD AUTO: 0.03 K/UL (ref 0–0.04)
IMM GRANULOCYTES NFR BLD AUTO: 0.4 % (ref 0–0.5)
LYMPHOCYTES # BLD AUTO: 1.5 K/UL (ref 1–4.8)
LYMPHOCYTES NFR BLD: 21.5 % (ref 18–48)
MAGNESIUM SERPL-MCNC: 1.5 MG/DL (ref 1.6–2.6)
MCH RBC QN AUTO: 32.1 PG (ref 27–31)
MCHC RBC AUTO-ENTMCNC: 34.7 G/DL (ref 32–36)
MCV RBC AUTO: 93 FL (ref 82–98)
MONOCYTES # BLD AUTO: 0.4 K/UL (ref 0.3–1)
MONOCYTES NFR BLD: 5.8 % (ref 4–15)
NEUTROPHILS # BLD AUTO: 4.9 K/UL (ref 1.8–7.7)
NEUTROPHILS NFR BLD: 70.9 % (ref 38–73)
NRBC BLD-RTO: 0 /100 WBC
OB PNL STL: POSITIVE
PLATELET # BLD AUTO: 188 K/UL (ref 150–450)
PMV BLD AUTO: 10.1 FL (ref 9.2–12.9)
POCT GLUCOSE: 84 MG/DL (ref 70–110)
POCT GLUCOSE: 93 MG/DL (ref 70–110)
POTASSIUM SERPL-SCNC: 4.6 MMOL/L (ref 3.5–5.1)
RBC # BLD AUTO: 2.4 M/UL (ref 4–5.4)
SODIUM SERPL-SCNC: 141 MMOL/L (ref 136–145)
WBC # BLD AUTO: 6.87 K/UL (ref 3.9–12.7)

## 2024-01-27 PROCEDURE — 83735 ASSAY OF MAGNESIUM: CPT | Performed by: HOSPITALIST

## 2024-01-27 PROCEDURE — 25000003 PHARM REV CODE 250: Performed by: NURSE PRACTITIONER

## 2024-01-27 PROCEDURE — 80048 BASIC METABOLIC PNL TOTAL CA: CPT | Performed by: HOSPITALIST

## 2024-01-27 PROCEDURE — 21400001 HC TELEMETRY ROOM

## 2024-01-27 PROCEDURE — 11000001 HC ACUTE MED/SURG PRIVATE ROOM

## 2024-01-27 PROCEDURE — 25000003 PHARM REV CODE 250: Performed by: HOSPITALIST

## 2024-01-27 PROCEDURE — 63600175 PHARM REV CODE 636 W HCPCS: Performed by: HOSPITALIST

## 2024-01-27 PROCEDURE — 36415 COLL VENOUS BLD VENIPUNCTURE: CPT | Performed by: HOSPITALIST

## 2024-01-27 PROCEDURE — 82272 OCCULT BLD FECES 1-3 TESTS: CPT | Performed by: HOSPITALIST

## 2024-01-27 PROCEDURE — 63600175 PHARM REV CODE 636 W HCPCS: Performed by: NURSE PRACTITIONER

## 2024-01-27 PROCEDURE — 85025 COMPLETE CBC W/AUTO DIFF WBC: CPT | Performed by: HOSPITALIST

## 2024-01-27 RX ORDER — PROCHLORPERAZINE EDISYLATE 5 MG/ML
5 INJECTION INTRAMUSCULAR; INTRAVENOUS EVERY 6 HOURS PRN
Status: DISCONTINUED | OUTPATIENT
Start: 2024-01-27 | End: 2024-01-30 | Stop reason: HOSPADM

## 2024-01-27 RX ORDER — FAMOTIDINE 20 MG/1
20 TABLET, FILM COATED ORAL DAILY
Status: DISCONTINUED | OUTPATIENT
Start: 2024-01-27 | End: 2024-01-28

## 2024-01-27 RX ORDER — MAGNESIUM SULFATE HEPTAHYDRATE 40 MG/ML
2 INJECTION, SOLUTION INTRAVENOUS ONCE
Status: COMPLETED | OUTPATIENT
Start: 2024-01-27 | End: 2024-01-27

## 2024-01-27 RX ORDER — SODIUM CHLORIDE 9 MG/ML
INJECTION, SOLUTION INTRAVENOUS
Status: DISCONTINUED | OUTPATIENT
Start: 2024-01-27 | End: 2024-01-30 | Stop reason: HOSPADM

## 2024-01-27 RX ADMIN — SODIUM BICARBONATE 650 MG TABLET 1300 MG: at 08:01

## 2024-01-27 RX ADMIN — ATORVASTATIN CALCIUM 40 MG: 40 TABLET, FILM COATED ORAL at 09:01

## 2024-01-27 RX ADMIN — SUCRALFATE 1 G: 1 TABLET ORAL at 03:01

## 2024-01-27 RX ADMIN — AMLODIPINE BESYLATE 5 MG: 10 TABLET ORAL at 09:01

## 2024-01-27 RX ADMIN — SUCRALFATE 1 G: 1 TABLET ORAL at 12:01

## 2024-01-27 RX ADMIN — MAGNESIUM SULFATE HEPTAHYDRATE 2 G: 40 INJECTION, SOLUTION INTRAVENOUS at 12:01

## 2024-01-27 RX ADMIN — APIXABAN 10 MG: 2.5 TABLET, FILM COATED ORAL at 09:01

## 2024-01-27 RX ADMIN — PROCHLORPERAZINE EDISYLATE 5 MG: 5 INJECTION INTRAMUSCULAR; INTRAVENOUS at 05:01

## 2024-01-27 RX ADMIN — APIXABAN 10 MG: 2.5 TABLET, FILM COATED ORAL at 08:01

## 2024-01-27 RX ADMIN — HYDROCODONE BITARTRATE AND ACETAMINOPHEN 1 TABLET: 5; 325 TABLET ORAL at 09:01

## 2024-01-27 RX ADMIN — SODIUM BICARBONATE 650 MG TABLET 1300 MG: at 03:01

## 2024-01-27 RX ADMIN — HYDROCODONE BITARTRATE AND ACETAMINOPHEN 1 TABLET: 5; 325 TABLET ORAL at 03:01

## 2024-01-27 RX ADMIN — ISOSORBIDE MONONITRATE 60 MG: 60 TABLET, EXTENDED RELEASE ORAL at 09:01

## 2024-01-27 RX ADMIN — SUCRALFATE 1 G: 1 TABLET ORAL at 08:01

## 2024-01-27 RX ADMIN — SODIUM BICARBONATE 650 MG TABLET 1300 MG: at 09:01

## 2024-01-27 RX ADMIN — FAMOTIDINE 20 MG: 20 TABLET ORAL at 12:01

## 2024-01-27 RX ADMIN — ONDANSETRON 4 MG: 2 INJECTION INTRAMUSCULAR; INTRAVENOUS at 09:01

## 2024-01-27 RX ADMIN — OLANZAPINE 5 MG: 5 TABLET, FILM COATED ORAL at 08:01

## 2024-01-27 RX ADMIN — SODIUM CHLORIDE: 9 INJECTION, SOLUTION INTRAVENOUS at 12:01

## 2024-01-27 RX ADMIN — HYDROXYZINE HYDROCHLORIDE 25 MG: 25 TABLET ORAL at 09:01

## 2024-01-27 RX ADMIN — FUROSEMIDE 40 MG: 10 INJECTION, SOLUTION INTRAMUSCULAR; INTRAVENOUS at 09:01

## 2024-01-27 NOTE — NURSING
Patient refused 1900 vitals and nightly medications. She requested a pain medication only. I explained that I have to take her vitals prior to pain medications. She has asked for another nurse to take over her care. Charge nurse notified.

## 2024-01-27 NOTE — SUBJECTIVE & OBJECTIVE
Review of Systems   All other systems reviewed and are negative.    Objective:     Vital Signs (Most Recent):  Temp: 98.9 °F (37.2 °C) (01/27/24 1144)  Pulse: 88 (01/27/24 1310)  Resp: 10 (01/27/24 1554)  BP: 133/85 (01/27/24 1237)  SpO2: 100 % (01/27/24 1144) Vital Signs (24h Range):  Temp:  [98.2 °F (36.8 °C)-98.9 °F (37.2 °C)] 98.9 °F (37.2 °C)  Pulse:  [66-96] 88  Resp:  [10-18] 10  SpO2:  [99 %-100 %] 100 %  BP: ()/(69-92) 133/85     Weight: 54.4 kg (119 lb 14.9 oz)  Body mass index is 18.24 kg/m².  No intake or output data in the 24 hours ending 01/27/24 1730        Physical Exam  Vitals and nursing note reviewed.   Constitutional:       General: She is not in acute distress.     Appearance: Normal appearance. She is normal weight.   Cardiovascular:      Rate and Rhythm: Normal rate and regular rhythm.      Heart sounds: No murmur heard.  Pulmonary:      Effort: Pulmonary effort is normal. No respiratory distress.      Breath sounds: Rales present. No wheezing.   Musculoskeletal:      Right lower leg: Edema present.      Left lower leg: Edema present.   Neurological:      General: No focal deficit present.      Mental Status: She is alert and oriented to person, place, and time.   Psychiatric:         Mood and Affect: Mood normal.         Behavior: Behavior normal.             Significant Labs: All pertinent labs within the past 24 hours have been reviewed.  Recent Lab Results         01/27/24  1720   01/27/24  1224   01/27/24  0555   01/26/24  2344        Anion Gap     6         Baso #     0.05         Basophil %     0.7         BUN     29         Calcium     7.7         Chloride     116         CO2     19         Creatinine     2.8         Differential Method     Automated         eGFR     20         Eos #     0.1         Eosinophil %     0.7         Glucose     77         Gran # (ANC)     4.9         Gran %     70.9         Hematocrit     22.2         Hemoglobin     7.7         Immature Grans (Abs)      0.03  Comment: Mild elevation in immature granulocytes is non specific and   can be seen in a variety of conditions including stress response,   acute inflammation, trauma and pregnancy. Correlation with other   laboratory and clinical findings is essential.           Immature Granulocytes     0.4         Lymph #     1.5         Lymph %     21.5         Magnesium      1.5         MCH     32.1         MCHC     34.7         MCV     93         Mono #     0.4         Mono %     5.8         MPV     10.1         nRBC     0         Platelet Count     188         POCT Glucose 93   84     113       Potassium     4.6         RBC     2.40         RDW     14.3         Sodium     141         WBC     6.87                 Significant Imaging: I have reviewed all pertinent imaging results/findings within the past 24 hours.    US Lower Extremity Arteries Left   Final Result      Monophasic waveforms throughout the majority of the left lower extremity with sharp upstroke.  Findings most suggestive of hyperemia.         Electronically signed by: Zuleyma Landry   Date:    01/25/2024   Time:    10:29      US Lower Extremity Veins Bilateral   Final Result   Abnormal      Positive DVT left lower extremity      This report was flagged in Epic as abnormal.         Electronically signed by: Iesha Romo   Date:    01/24/2024   Time:    18:36      X-Ray Chest AP Portable   Final Result      No acute abnormality.         Electronically signed by: Humberto Gonzales   Date:    01/24/2024   Time:    17:44

## 2024-01-27 NOTE — PT/OT/SLP PROGRESS
Occupational Therapy      Patient Name:  Joe Ceballos   MRN:  [id number]    Patient not seen today secondary to Patient unwilling to participate. Will follow-up at next opportunity.    1/27/2024

## 2024-01-27 NOTE — PROGRESS NOTES
[facility] Medicine  Progress Note    Patient Name: Joe Ceballos  MRN: [id number]  Patient Class: IP- Inpatient   Admission Date: 1/24/2024  Length of Stay: 2 days  Attending Physician: Tyrese Jensen MD  Primary Care Provider: Daphney Heart MD        Subjective:     Principal Problem:Acute deep vein thrombosis (DVT) of left lower extremity        HPI:  The patient is a 55 yo female with HTN, Diastolic CHF, s/p CVA-mild residual left sided weakness, s/p Gastric bypass, Gastric ulcers- no recent GI bleeding, CKD4, Malnutrition who presented to ED with BLE edema with blisters- onset 6 days ago that progressively worsened. Pt also endorses SOB -onset at the same time. She is unable to ambulate due to pain and swelling to BLE.   Pt states she had a GI bleed 2/2 gastric ulcer 3/2023. She was hospitalized with anemia 12/2023 (hgb 5.5 and received 2 units PRBC) -no active bleeding at that time. Hgb remained stable since then.     In the ED, Afebrile, BP mildly elevated. O2sat 100%. Labs revealed Hgb 9.8, K 3.2, mild hyperchloremic metabolic acidosis. Serum Cr 2.6, . Albumin 1.7. , Troponin normal. Glucose was 34 but improved to 80s with oral intake. EKG showed NSR, ST elevated in inferior leads. CXR-nothing acute. U/S bilateral LE veins showed Acute DVT LLE.     Pt does not know her home medications. Home medications are not reconciled     Overview/Hospital Course:  1/25/24  NAEON, unable to obtain AM labs  Pt c/o pain, swelling in legs, L > R  LLE +DVT, currently on heparin drip  On Lasix 40 mg IV q12h for CHF exacerbation  Continue above treatment  Possibly transition to NOAC and PO Lasix tomorrow    1/26/24  NAEON, patient reports some improvement in BLE edema  States she feels weak and unable to care for herself, consult PT/OT  Transition heparin to apixaban today  Requires continued IV diuresis, continue lasix    1/27/24  NAEON, patient reports pain in BLE, swelling improving,  continue IV diuresis  Hg 7.7, check fecal occult blood  Replete Mg IV  PT/OT notes reviewed, patient not participating      Review of Systems   All other systems reviewed and are negative.    Objective:     Vital Signs (Most Recent):  Temp: 98.9 °F (37.2 °C) (01/27/24 1144)  Pulse: 88 (01/27/24 1310)  Resp: 10 (01/27/24 1554)  BP: 133/85 (01/27/24 1237)  SpO2: 100 % (01/27/24 1144) Vital Signs (24h Range):  Temp:  [98.2 °F (36.8 °C)-98.9 °F (37.2 °C)] 98.9 °F (37.2 °C)  Pulse:  [66-96] 88  Resp:  [10-18] 10  SpO2:  [99 %-100 %] 100 %  BP: ()/(69-92) 133/85     Weight: 54.4 kg (119 lb 14.9 oz)  Body mass index is 18.24 kg/m².  No intake or output data in the 24 hours ending 01/27/24 1730        Physical Exam  Vitals and nursing note reviewed.   Constitutional:       General: She is not in acute distress.     Appearance: Normal appearance. She is normal weight.   Cardiovascular:      Rate and Rhythm: Normal rate and regular rhythm.      Heart sounds: No murmur heard.  Pulmonary:      Effort: Pulmonary effort is normal. No respiratory distress.      Breath sounds: Rales present. No wheezing.   Musculoskeletal:      Right lower leg: Edema present.      Left lower leg: Edema present.   Neurological:      General: No focal deficit present.      Mental Status: She is alert and oriented to person, place, and time.   Psychiatric:         Mood and Affect: Mood normal.         Behavior: Behavior normal.             Significant Labs: All pertinent labs within the past 24 hours have been reviewed.  Recent Lab Results         01/27/24  1720   01/27/24  1224   01/27/24  0555   01/26/24  2344        Anion Gap     6         Baso #     0.05         Basophil %     0.7         BUN     29         Calcium     7.7         Chloride     116         CO2     19         Creatinine     2.8         Differential Method     Automated         eGFR     20         Eos #     0.1         Eosinophil %     0.7         Glucose     77         Gran #  (ANC)     4.9         Gran %     70.9         Hematocrit     22.2         Hemoglobin     7.7         Immature Grans (Abs)     0.03  Comment: Mild elevation in immature granulocytes is non specific and   can be seen in a variety of conditions including stress response,   acute inflammation, trauma and pregnancy. Correlation with other   laboratory and clinical findings is essential.           Immature Granulocytes     0.4         Lymph #     1.5         Lymph %     21.5         Magnesium      1.5         MCH     32.1         MCHC     34.7         MCV     93         Mono #     0.4         Mono %     5.8         MPV     10.1         nRBC     0         Platelet Count     188         POCT Glucose 93   84     113       Potassium     4.6         RBC     2.40         RDW     14.3         Sodium     141         WBC     6.87                 Significant Imaging: I have reviewed all pertinent imaging results/findings within the past 24 hours.    US Lower Extremity Arteries Left   Final Result      Monophasic waveforms throughout the majority of the left lower extremity with sharp upstroke.  Findings most suggestive of hyperemia.         Electronically signed by: Zuleyma Landry   Date:    01/25/2024   Time:    10:29      US Lower Extremity Veins Bilateral   Final Result   Abnormal      Positive DVT left lower extremity      This report was flagged in Epic as abnormal.         Electronically signed by: Iesha Romo   Date:    01/24/2024   Time:    18:36      X-Ray Chest AP Portable   Final Result      No acute abnormality.         Electronically signed by: Humberto Gonzales   Date:    01/24/2024   Time:    17:44            Assessment/Plan:      * Acute deep vein thrombosis (DVT) of left lower extremity  DVT LLE  Hx recent GI bleed 3/2023 due to gastric ulcers   IV heparin   Monitor H/H     1/26  No bleeding reported, H/H stable  Transition heparin to apixaban today      Acute on chronic heart failure with preserved ejection  fraction  Patient is identified as having Diastolic (HFpEF) heart failure that is Acute on chronic. CHF is currently uncontrolled due to Continued edema of extremities. Latest ECHO performed and demonstrates- Results for orders placed during the hospital encounter of 01/21/23    Echo Saline Bubble? Yes    Interpretation Summary  · There is no evidence of intracardiac shunting.  · The left ventricle is normal in size with mild concentric hypertrophy and normal systolic function.  · The estimated ejection fraction is 60%.  · Normal left ventricular diastolic function.  · Normal right ventricular size with normal right ventricular systolic function.  · Mild to moderate tricuspid regurgitation.  · Normal central venous pressure (3 mmHg).  · The estimated PA systolic pressure is 36 mmHg.  . Continue Furosemide and monitor clinical status closely. Monitor on telemetry. Patient is on CHF pathway.  Monitor strict Is&Os and daily weights.  Place on fluid restriction of 1.5 L. Cardiology has not been consulted. Continue to stress to patient importance of self efficacy and  on diet for CHF. Last BNP reviewed- and noted below   Recent Labs   Lab 01/24/24  1708   *       IV Lasix     Hypoglycemia  Improved   Accuchecks every 6 hours       Venous stasis ulcer  Consult wound care    CKD (chronic kidney disease), stage IV  Creatine stable for now. BMP reviewed- noted Estimated Creatinine Clearance: 20.7 mL/min (A) (based on SCr of 2.7 mg/dL (H)). according to latest data. Based on current GFR, CKD stage is stage 4 - GFR 15-29.  Monitor UOP and serial BMP and adjust therapy as needed. Renally dose meds. Avoid nephrotoxic medications and procedures    Primary hypertension  Chronic, uncontrolled. Latest blood pressure and vitals reviewed-     Temp:  [98 °F (36.7 °C)-99.1 °F (37.3 °C)]   Pulse:  []   Resp:  [16-20]   BP: (114-159)/(61-92)   SpO2:  [99 %-100 %] .   Home meds for hypertension were reviewed and noted  below.   Hypertension Medications               amLODIPine (NORVASC) 5 MG tablet Take 5 mg by mouth once daily.    furosemide (LASIX) 40 MG tablet Take 40 mg by mouth 2 (two) times a day.    isosorbide mononitrate (IMDUR) 60 MG 24 hr tablet Take 60 mg by mouth once daily.            While in the hospital, will manage blood pressure as follows; Continue home antihypertensive regimen    Will utilize p.r.n. blood pressure medication only if patient's blood pressure greater than 160/100 and she develops symptoms such as worsening chest pain or shortness of breath.    Normocytic anemia  Patient's anemia is currently controlled. Has not received any PRBCs to date. Etiology likely d/t  check iron studies   Current CBC reviewed-   Lab Results   Component Value Date    HGB 8.9 (L) 01/26/2024    HCT 25.3 (L) 01/26/2024     Monitor serial CBC and transfuse if patient becomes hemodynamically unstable, symptomatic or H/H drops below 7/21.    History of GI bleed  GI bleed 2/2 gastric ulcer 3/2023. She was hospitalized with anemia 12/2023 (hgb 5.5 and received 2 units PRBC) -no active bleeding at that time. Hgb remained stable since then.     History of CVA (cerebrovascular accident)  Pt reports residual left sided weakness  Hold ASA 2/2 GI bleed/gastric ulcer   Cont Statin    Malnutrition of moderate degree  Nutrition consulted. Most recent weight and BMI monitored-     Measurements:  Wt Readings from Last 1 Encounters:   01/25/24 54.4 kg (119 lb 14.9 oz)   Body mass index is 18.24 kg/m².    Patient has been screened and assessed by RD.    Malnutrition Type:  Context:    Level:      Malnutrition Characteristic Summary:  Fluid Accumulation (Malnutrition): moderate    Interventions/Recommendations (treatment strategy):  1. Recommend pt continues Low Sodium, 2gm Fluid - 1500mL diet. 2. Recommend pt receives Boost plus BID 3. Recommend pt continues Anti-emetics. 4.  Weigh daily        VTE Risk Mitigation (From admission, onward)            Ordered     apixaban tablet 5 mg  2 times daily         01/26/24 1014     apixaban tablet 10 mg  2 times daily         01/25/24 1924     IP VTE HIGH RISK PATIENT  Once         01/24/24 2334     Place sequential compression device  Until discontinued         01/24/24 2334                    Discharge Planning   ALEXA:      Code Status: Full Code   Is the patient medically ready for discharge?:     Reason for patient still in hospital (select all that apply): Patient trending condition, Laboratory test, and Treatment  Discharge Plan A: Home Health                  Tyrese Jensen MD  Department of Hospital Medicine   [facility] - Med Surg

## 2024-01-27 NOTE — PROGRESS NOTES
Pharmacist Renal Dose Adjustment Note    Joe Ceballos is a 54 y.o. female being treated with the medication famotidine.     Patient Data:    Vital Signs (Most Recent):  Temp: 98.9 °F (37.2 °C) (01/27/24 1144)  Pulse: 76 (01/27/24 1237)  Resp: 18 (01/27/24 1144)  BP: 133/85 (01/27/24 1237)  SpO2: 100 % (01/27/24 1144) Vital Signs (72h Range):  Temp:  [97.5 °F (36.4 °C)-99.1 °F (37.3 °C)]   Pulse:  []   Resp:  [14-20]   BP: ()/()   SpO2:  [99 %-100 %]      Recent Labs   Lab 01/26/24  0634 01/26/24  1141 01/27/24  0555   CREATININE 2.6* 2.7* 2.8*     Serum creatinine: 2.8 mg/dL (H) 01/27/24 0555  Estimated creatinine clearance: 20 mL/min (A)    Famotidine 20 mg oral twice daily will be changed to famotidine 20 mg oral once daily per renal dose protocol for CrCl < 50 ml/min.     Thank you,   Pharmacist's Name: Paresh Chery

## 2024-01-27 NOTE — PT/OT/SLP PROGRESS
"Physical Therapy      Patient Name:  Joe Ceballos   MRN:  [id number]    PT orders received, EVAL initiated via chart review. Presented to pt's room at 0755. Pt reported "I just woke up" and reported needing to go to the restroom. Therapist offered to assist pt in task, pt reported "I can do it by myself." Educated pt on "call don't fall" policy and instructed to call nurse for assistance for all mobility and transfers. Nurse notified. Pt refused all aspects of PT EVAL at this time. Will continue efforts.    Mandi Montes, PT, DPT  1/27/2024   0755  "

## 2024-01-28 PROBLEM — E16.2 HYPOGLYCEMIA: Status: RESOLVED | Noted: 2024-01-24 | Resolved: 2024-01-28

## 2024-01-28 LAB
ABO + RH BLD: NORMAL
BASOPHILS # BLD AUTO: 0.05 K/UL (ref 0–0.2)
BASOPHILS NFR BLD: 0.7 % (ref 0–1.9)
BLD GP AB SCN CELLS X3 SERPL QL: NORMAL
BLD PROD TYP BPU: NORMAL
BLD PROD TYP BPU: NORMAL
BLOOD UNIT EXPIRATION DATE: NORMAL
BLOOD UNIT EXPIRATION DATE: NORMAL
BLOOD UNIT TYPE CODE: 6200
BLOOD UNIT TYPE CODE: 6200
BLOOD UNIT TYPE: NORMAL
BLOOD UNIT TYPE: NORMAL
CODING SYSTEM: NORMAL
CODING SYSTEM: NORMAL
CROSSMATCH INTERPRETATION: NORMAL
CROSSMATCH INTERPRETATION: NORMAL
DIFFERENTIAL METHOD BLD: ABNORMAL
DISPENSE STATUS: NORMAL
DISPENSE STATUS: NORMAL
EOSINOPHIL # BLD AUTO: 0.1 K/UL (ref 0–0.5)
EOSINOPHIL NFR BLD: 1.1 % (ref 0–8)
ERYTHROCYTE [DISTWIDTH] IN BLOOD BY AUTOMATED COUNT: 14.4 % (ref 11.5–14.5)
HCT VFR BLD AUTO: 19.8 % (ref 37–48.5)
HGB BLD-MCNC: 6.9 G/DL (ref 12–16)
IMM GRANULOCYTES # BLD AUTO: 0.02 K/UL (ref 0–0.04)
IMM GRANULOCYTES NFR BLD AUTO: 0.3 % (ref 0–0.5)
LYMPHOCYTES # BLD AUTO: 2.1 K/UL (ref 1–4.8)
LYMPHOCYTES NFR BLD: 30 % (ref 18–48)
MCH RBC QN AUTO: 32.1 PG (ref 27–31)
MCHC RBC AUTO-ENTMCNC: 34.8 G/DL (ref 32–36)
MCV RBC AUTO: 92 FL (ref 82–98)
MONOCYTES # BLD AUTO: 0.3 K/UL (ref 0.3–1)
MONOCYTES NFR BLD: 4.5 % (ref 4–15)
NEUTROPHILS # BLD AUTO: 4.5 K/UL (ref 1.8–7.7)
NEUTROPHILS NFR BLD: 63.4 % (ref 38–73)
NRBC BLD-RTO: 0 /100 WBC
NUM UNITS TRANS PACKED RBC: NORMAL
NUM UNITS TRANS PACKED RBC: NORMAL
PLATELET # BLD AUTO: 187 K/UL (ref 150–450)
PMV BLD AUTO: 9.9 FL (ref 9.2–12.9)
POCT GLUCOSE: 100 MG/DL (ref 70–110)
POCT GLUCOSE: 71 MG/DL (ref 70–110)
POCT GLUCOSE: 83 MG/DL (ref 70–110)
RBC # BLD AUTO: 2.15 M/UL (ref 4–5.4)
SPECIMEN OUTDATE: NORMAL
WBC # BLD AUTO: 7.09 K/UL (ref 3.9–12.7)

## 2024-01-28 PROCEDURE — 21400001 HC TELEMETRY ROOM

## 2024-01-28 PROCEDURE — 63600175 PHARM REV CODE 636 W HCPCS: Performed by: NURSE PRACTITIONER

## 2024-01-28 PROCEDURE — 86920 COMPATIBILITY TEST SPIN: CPT | Performed by: NURSE PRACTITIONER

## 2024-01-28 PROCEDURE — C9113 INJ PANTOPRAZOLE SODIUM, VIA: HCPCS | Performed by: HOSPITALIST

## 2024-01-28 PROCEDURE — 97165 OT EVAL LOW COMPLEX 30 MIN: CPT

## 2024-01-28 PROCEDURE — P9016 RBC LEUKOCYTES REDUCED: HCPCS | Performed by: NURSE PRACTITIONER

## 2024-01-28 PROCEDURE — 36415 COLL VENOUS BLD VENIPUNCTURE: CPT | Performed by: HOSPITALIST

## 2024-01-28 PROCEDURE — 30233N1 TRANSFUSION OF NONAUTOLOGOUS RED BLOOD CELLS INTO PERIPHERAL VEIN, PERCUTANEOUS APPROACH: ICD-10-PCS | Performed by: HOSPITALIST

## 2024-01-28 PROCEDURE — 63600175 PHARM REV CODE 636 W HCPCS: Performed by: HOSPITALIST

## 2024-01-28 PROCEDURE — 25000003 PHARM REV CODE 250: Performed by: HOSPITALIST

## 2024-01-28 PROCEDURE — 25000003 PHARM REV CODE 250: Performed by: NURSE PRACTITIONER

## 2024-01-28 PROCEDURE — 85025 COMPLETE CBC W/AUTO DIFF WBC: CPT | Performed by: HOSPITALIST

## 2024-01-28 PROCEDURE — C1751 CATH, INF, PER/CENT/MIDLINE: HCPCS

## 2024-01-28 PROCEDURE — 36410 VNPNXR 3YR/> PHY/QHP DX/THER: CPT

## 2024-01-28 PROCEDURE — P9016 RBC LEUKOCYTES REDUCED: HCPCS | Performed by: HOSPITALIST

## 2024-01-28 PROCEDURE — 97162 PT EVAL MOD COMPLEX 30 MIN: CPT

## 2024-01-28 PROCEDURE — 11000001 HC ACUTE MED/SURG PRIVATE ROOM

## 2024-01-28 PROCEDURE — 36430 TRANSFUSION BLD/BLD COMPNT: CPT

## 2024-01-28 PROCEDURE — 86920 COMPATIBILITY TEST SPIN: CPT | Performed by: HOSPITALIST

## 2024-01-28 PROCEDURE — 97116 GAIT TRAINING THERAPY: CPT

## 2024-01-28 PROCEDURE — 86901 BLOOD TYPING SEROLOGIC RH(D): CPT | Performed by: HOSPITALIST

## 2024-01-28 RX ORDER — OMEPRAZOLE 40 MG/1
40 CAPSULE, DELAYED RELEASE ORAL DAILY
Qty: 30 CAPSULE | Refills: 0 | Status: CANCELLED | OUTPATIENT
Start: 2024-01-28 | End: 2024-02-27

## 2024-01-28 RX ORDER — ATORVASTATIN CALCIUM 40 MG/1
80 TABLET, FILM COATED ORAL NIGHTLY
Status: DISCONTINUED | OUTPATIENT
Start: 2024-01-29 | End: 2024-01-30 | Stop reason: HOSPADM

## 2024-01-28 RX ORDER — PANTOPRAZOLE SODIUM 40 MG/1
40 TABLET, DELAYED RELEASE ORAL DAILY
Status: DISCONTINUED | OUTPATIENT
Start: 2024-01-28 | End: 2024-01-28

## 2024-01-28 RX ORDER — PANTOPRAZOLE SODIUM 40 MG/10ML
40 INJECTION, POWDER, LYOPHILIZED, FOR SOLUTION INTRAVENOUS 2 TIMES DAILY
Status: DISCONTINUED | OUTPATIENT
Start: 2024-01-28 | End: 2024-01-30 | Stop reason: HOSPADM

## 2024-01-28 RX ORDER — HYDROCODONE BITARTRATE AND ACETAMINOPHEN 500; 5 MG/1; MG/1
TABLET ORAL
Status: DISCONTINUED | OUTPATIENT
Start: 2024-01-28 | End: 2024-01-30 | Stop reason: HOSPADM

## 2024-01-28 RX ORDER — ATORVASTATIN CALCIUM 40 MG/1
40 TABLET, FILM COATED ORAL NIGHTLY
Status: COMPLETED | OUTPATIENT
Start: 2024-01-28 | End: 2024-01-28

## 2024-01-28 RX ORDER — ATORVASTATIN CALCIUM 80 MG/1
80 TABLET, FILM COATED ORAL NIGHTLY
Qty: 90 TABLET | Refills: 0 | Status: CANCELLED | OUTPATIENT
Start: 2024-01-28

## 2024-01-28 RX ADMIN — SODIUM BICARBONATE 650 MG TABLET 1300 MG: at 04:01

## 2024-01-28 RX ADMIN — SODIUM BICARBONATE 650 MG TABLET 1300 MG: at 08:01

## 2024-01-28 RX ADMIN — SUCRALFATE 1 G: 1 TABLET ORAL at 08:01

## 2024-01-28 RX ADMIN — ATORVASTATIN CALCIUM 40 MG: 40 TABLET, FILM COATED ORAL at 08:01

## 2024-01-28 RX ADMIN — FUROSEMIDE 40 MG: 10 INJECTION, SOLUTION INTRAMUSCULAR; INTRAVENOUS at 08:01

## 2024-01-28 RX ADMIN — AMLODIPINE BESYLATE 5 MG: 10 TABLET ORAL at 08:01

## 2024-01-28 RX ADMIN — ISOSORBIDE MONONITRATE 60 MG: 60 TABLET, EXTENDED RELEASE ORAL at 08:01

## 2024-01-28 RX ADMIN — SUCRALFATE 1 G: 1 TABLET ORAL at 12:01

## 2024-01-28 RX ADMIN — SUCRALFATE 1 G: 1 TABLET ORAL at 04:01

## 2024-01-28 RX ADMIN — FUROSEMIDE 40 MG: 10 INJECTION, SOLUTION INTRAMUSCULAR; INTRAVENOUS at 12:01

## 2024-01-28 RX ADMIN — ONDANSETRON 4 MG: 2 INJECTION INTRAMUSCULAR; INTRAVENOUS at 09:01

## 2024-01-28 RX ADMIN — OLANZAPINE 5 MG: 5 TABLET, FILM COATED ORAL at 08:01

## 2024-01-28 RX ADMIN — PANTOPRAZOLE SODIUM 40 MG: 40 INJECTION, POWDER, FOR SOLUTION INTRAVENOUS at 09:01

## 2024-01-28 RX ADMIN — FUROSEMIDE 40 MG: 10 INJECTION, SOLUTION INTRAMUSCULAR; INTRAVENOUS at 09:01

## 2024-01-28 RX ADMIN — APIXABAN 10 MG: 2.5 TABLET, FILM COATED ORAL at 08:01

## 2024-01-28 RX ADMIN — ONDANSETRON 4 MG: 2 INJECTION INTRAMUSCULAR; INTRAVENOUS at 12:01

## 2024-01-28 RX ADMIN — HYDROXYZINE HYDROCHLORIDE 25 MG: 25 TABLET ORAL at 12:01

## 2024-01-28 RX ADMIN — FAMOTIDINE 20 MG: 20 TABLET ORAL at 08:01

## 2024-01-28 NOTE — PLAN OF CARE
Discussed plan of care with patient and this patient , verbalized understanding.  Patient remains free from injury.  Safety and comfort  precautions maintained this shift.   Call light and personal belongings within reach, bed in low position with bed wheels locked.  No s/s of acute distress.   Purposeful rounding continued this shift.  Pain levels are controlled per MD order. IVF infusing.  Cardiac monitoring in place,  Diet orders continued,   Vital signs continued per order.  Q2 repositioning if needed  Patient is independent   Chart and orders review completed.   Patient education about care completed.     Problem: Adult Inpatient Plan of Care  Goal: Plan of Care Review  Outcome: Ongoing, Progressing  Goal: Patient-Specific Goal (Individualized)  Outcome: Ongoing, Progressing  Flowsheets (Taken 1/28/2024 0230)  Anxieties, Fears or Concerns: none  Individualized Care Needs: none  Goal: Absence of Hospital-Acquired Illness or Injury  Outcome: Ongoing, Progressing  Goal: Optimal Comfort and Wellbeing  Outcome: Ongoing, Progressing  Goal: Readiness for Transition of Care  Outcome: Ongoing, Progressing     Problem: Fluid and Electrolyte Imbalance (Acute Kidney Injury/Impairment)  Goal: Fluid and Electrolyte Balance  Outcome: Ongoing, Progressing     Problem: Oral Intake Inadequate (Acute Kidney Injury/Impairment)  Goal: Optimal Nutrition Intake  Outcome: Ongoing, Progressing     Problem: Renal Function Impairment (Acute Kidney Injury/Impairment)  Goal: Effective Renal Function  Outcome: Ongoing, Progressing

## 2024-01-28 NOTE — NURSING
How Severe Are Your Spot(S)?: moderate Unable to get IV access for the patient, asked MD for a midline order. Blood was never transfused. Stopped at 1245. Will transfuse once new line

## 2024-01-28 NOTE — ASSESSMENT & PLAN NOTE
DVT LLE  Hx recent GI bleed 3/2023 due to gastric ulcers   IV heparin   Monitor H/H     1/26  No bleeding reported, H/H stable  Transition heparin to apixaban today    1/28  H/H trending down, stop apixaban  Fecal occult blood+, known chronic gastric ulcer  Previous history of DVTs, unable to safely anticoagulate  Consult IR for IVC filter, thrombectomy consideration    1/29  Not safe to resume AC given recurrent GI bleed  IVC filter placement pending today

## 2024-01-28 NOTE — PLAN OF CARE
Problem: Adult Inpatient Plan of Care  Goal: Plan of Care Review  Outcome: Ongoing, Progressing  Goal: Patient-Specific Goal (Individualized)  Outcome: Ongoing, Progressing  Goal: Absence of Hospital-Acquired Illness or Injury  Outcome: Ongoing, Progressing  Goal: Optimal Comfort and Wellbeing  Outcome: Ongoing, Progressing  Goal: Readiness for Transition of Care  Outcome: Ongoing, Progressing     Problem: Fluid and Electrolyte Imbalance (Acute Kidney Injury/Impairment)  Goal: Fluid and Electrolyte Balance  Outcome: Ongoing, Progressing     Problem: Oral Intake Inadequate (Acute Kidney Injury/Impairment)  Goal: Optimal Nutrition Intake  Outcome: Ongoing, Progressing     Problem: Renal Function Impairment (Acute Kidney Injury/Impairment)  Goal: Effective Renal Function  Outcome: Ongoing, Progressing     Problem: Pain Acute  Goal: Acceptable Pain Control and Functional Ability  Outcome: Ongoing, Progressing     Problem: Fall Injury Risk  Goal: Absence of Fall and Fall-Related Injury  Outcome: Ongoing, Progressing     Problem: Impaired Wound Healing  Goal: Optimal Wound Healing  Outcome: Ongoing, Progressing     Problem: Skin Injury Risk Increased  Goal: Skin Health and Integrity  Outcome: Ongoing, Progressing     Problem: VTE (Venous Thromboembolism)  Goal: VTE (Venous Thromboembolism) Symptom Resolution  Outcome: Ongoing, Progressing     Problem: Anxiety  Goal: Anxiety Reduction or Resolution  Outcome: Ongoing, Progressing

## 2024-01-28 NOTE — PLAN OF CARE
Discussed poc with pt, pt verbalized understanding    Purposeful rounding every 2hours    VS wnl  Cardiac monitoring in use, pt is NSR, tele monitor # [id number]  Blood glucose monitoring   Fall precautions in place, remains injury free  Pt denies c/o nausea and vomiting at this time and will continue to be monitored .Pain and nausea under control with PRN meds    IVFs  Accurate I&Os  Abx given as prescribed  Bed locked at lowest position  Call light within reach    Chart check complete  Will cont with POC

## 2024-01-28 NOTE — ASSESSMENT & PLAN NOTE
GI bleed 2/2 gastric ulcer 3/2023. S  She was hospitalized with anemia 12/2023 (hgb 5.5 and received 2 units PRBC)    1/28  H/H down trending this admission while on AC\  Transfuse 1 unit PRBC  Fecal occult blood+

## 2024-01-28 NOTE — NURSING
Patient iv access began leaking during blood transfusion. Blood stopped, will obtain another line.

## 2024-01-28 NOTE — PT/OT/SLP EVAL
Occupational Therapy   Evaluation and Discharge Note    Name: Joe Ceballos  MRN: [id number]  Admitting Diagnosis: Acute deep vein thrombosis (DVT) of left lower extremity  Recent Surgery: * No surgery found *      Recommendations:     Discharge Recommendations: No Therapy Indicated  Discharge Equipment Recommendations: walker, rolling  Barriers to discharge:  None    Assessment:     Joe Ceballos is a 54 y.o. female with a medical diagnosis of Acute deep vein thrombosis (DVT) of left lower extremity. At this time, patient is functioning at their prior level of function and does not require further acute OT services.     Plan:     During this hospitalization, patient does not require further acute OT services.  Please re-consult if situation changes.    Plan of Care Reviewed with: patient    Subjective     Chief Complaint: severe LE pain  Patient/Family Comments/goals: Pt had been taking herself to the bathroom without assistance and without non-skid footwear.    Occupational Profile:  Living Environment: Pt lives alone in a single story home.  Previous level of function: independent ADL/IADL  Roles and Routines: not working, driving  Equipment Used at home: none  Assistance upon Discharge: unknown    Pain/Comfort:  Pain Rating 1: 9/10  Location - Side 1: Bilateral  Location - Orientation 1: lower  Location 1: leg  Pain Addressed 1: Nurse notified, Reposition    Patients cultural, spiritual, Uatsdin conflicts given the current situation: no    Objective:     Communicated with: nurse prior to session.  Patient found supine with peripheral IV upon OT entry to room.    General Precautions: Standard, fall  Orthopedic Precautions: N/A  Braces: N/A  Respiratory Status: Room air     Occupational Performance:    Bed Mobility:    Patient completed Rolling/Turning to Left with  independence  Patient completed Supine to Sit with independence  Patient completed Sit to Supine with independence    Functional  Mobility/Transfers:  Patient completed Sit <> Stand Transfer with supervision  with  no assistive device   Patient completed Toilet Transfer Step Transfer technique with supervision with  no AD    Activities of Daily Living:  Grooming: supervision    Lower Body Dressing: stand by assistance    Toileting: stand by assistance      Cognitive/Visual Perceptual:  Cognitive/Psychosocial Skills:     -       Oriented to: Person, Place, Time, and Situation   -       Follows Commands/attention:Follows multistep  commands  -       Safety awareness/insight to disability: intact   -       Mood/Affect/Coping skills/emotional control: Appropriate to situation  Visual/Perceptual:      -Intact      Physical Exam:  Balance:    -       good sitting and standing balances  Upper Extremity Range of Motion:     -       Right Upper Extremity: WNL  -       Left Upper Extremity: WNL  Upper Extremity Strength:    -       Right Upper Extremity: WNL  -       Left Upper Extremity: WNL  Neurological:    -       no deficits noted    AMPAC 6 Click ADL:  AMPAC Total Score: 24    Treatment & Education:  OT role  Use of call light and non skid footwear    Patient left HOB elevated with all lines intact and call button in reach    GOALS:   Multidisciplinary Problems       Occupational Therapy Goals          Problem: Occupational Therapy    Goal Priority Disciplines Outcome Interventions   Occupational Therapy Goal     OT, PT/OT     Description: Initial evaluation provided. Pt is at prior level of function for self care skills and ADL transfers. No OT services recommended at this time.                       History:     Past Medical History:   Diagnosis Date    Anxiety     CHF (congestive heart failure)     Edema     Hypertension     Initial insomnia     Sickle cell trait     Stroke     Tobacco use          Past Surgical History:   Procedure Laterality Date    BREAST SURGERY       SECTION      x 2    CYST REMOVAL      from left tube     ESOPHAGOGASTRODUODENOSCOPY N/A 3/12/2023    Procedure: EGD (ESOPHAGOGASTRODUODENOSCOPY);  Surgeon: Ray Serrato MD;  Location: [facility];  Service: Gastroenterology;  Laterality: N/A;    gastric sleeve      Raheel-en-Y gastric bypass  02/022017    TONSILLECTOMY         Time Tracking:     OT Date of Treatment: 01/28/24  OT Start Time: 0920  OT Stop Time: 0935  OT Total Time (min): 15 min    Billable Minutes:Evaluation 15    1/28/2024

## 2024-01-28 NOTE — PLAN OF CARE
Initial PT eval completed. Patient Mod I with bed mobility, SBA with gait x20 feet with no AD. Patient will not require skilled therapy services at this time. Recommend low intensity therapy and RW upon DC.

## 2024-01-28 NOTE — ASSESSMENT & PLAN NOTE
Chronic, controlled. Latest blood pressure and vitals reviewed-     Temp:  [98 °F (36.7 °C)-98.9 °F (37.2 °C)]   Pulse:  []   Resp:  [10-19]   BP: ()/(63-94)   SpO2:  [97 %-100 %] .   Home meds for hypertension were reviewed and noted below.   Hypertension Medications               amLODIPine (NORVASC) 5 MG tablet Take 5 mg by mouth once daily.    furosemide (LASIX) 40 MG tablet Take 40 mg by mouth 2 (two) times a day.    isosorbide mononitrate (IMDUR) 60 MG 24 hr tablet Take 60 mg by mouth once daily.            While in the hospital, will manage blood pressure as follows; Continue home antihypertensive regimen    Will utilize p.r.n. blood pressure medication only if patient's blood pressure greater than 160/100 and she develops symptoms such as worsening chest pain or shortness of breath.

## 2024-01-28 NOTE — PT/OT/SLP EVAL
"Physical Therapy Evaluation and Discharge Note    Patient Name:  Joe Ceballos   MRN:  [id number]    Recommendations:     Discharge Recommendations: Low Intensity therapy  Discharge Equipment Recommendations: walker, rolling   Barriers to discharge: Decreased caregiver support    Assessment:     Joe Ceballos is a 54 y.o. female admitted with a medical diagnosis of Acute deep vein thrombosis (DVT) of left lower extremity. .  At this time, patient is functioning at their prior level of function and does not require further acute PT services.     Recent Surgery: * No surgery found *      Plan:     During this hospitalization, patient does not require further acute PT services.  Please re-consult if situation changes.      Subjective     Chief Complaint: "I am not a morning person."  Patient/Family Comments/goals: Get better and return home.  Pain/Comfort:  Pain Rating 1: 9/10  Location - Side 1: Bilateral  Location 1: leg  Pain Addressed 1: Cessation of Activity, Distraction  Pain Rating Post-Intervention 1: 9/10    Patients cultural, spiritual, [employer] conflicts given the current situation: no    Living Environment:    Patient lives alone in a single level home.   Prior to admission, patients level of function was independent, driving, does not work.  Equipment used at home: none.  DME owned (not currently used): none.  Upon discharge, patient will have assistance from unknown.    Objective:     Communicated with KENZIE Huang prior to session.  Patient found supine with peripheral IV upon PT entry to room.    General Precautions: Standard, fall    Orthopedic Precautions:N/A   Braces: N/A  Respiratory Status: Room air    Exams:  Cognitive Exam:  Patient is oriented to Person, Place, Time, and Situation  RLE ROM: WFL  RLE Strength: WFL  LLE ROM: WFL  LLE Strength: WFL    Functional Mobility:  Bed Mobility:     Supine to Sit: modified independence  Transfers:     Sit to Stand:  stand by assistance with no " AD  Toilet Transfer: stand by assistance with  no AD  using  Step Transfer  Gait: 20 feet, SBA with no AD. Patient ambulated with no LOB.    AM-PAC 6 CLICK MOBILITY  Total Score:21       Treatment and Education:    Initial PT eval completed. Patient Mod I with bed mobility, SBA with gait x20 feet with no AD. Patient will not require skilled therapy services at this time. Recommend HHPT and RW upon DC.     AM-PAC 6 CLICK MOBILITY  Total Score:21     Patient left sitting edge of bed with all lines intact and call button in reach.    GOALS:   Multidisciplinary Problems       Physical Therapy Goals          Problem: Physical Therapy    Goal Priority Disciplines Outcome Goal Variances Interventions   Physical Therapy Goal     PT, PT/OT                          History:     Past Medical History:   Diagnosis Date    Anxiety     CHF (congestive heart failure)     Edema     Hypertension     Initial insomnia     Sickle cell trait     Stroke     Tobacco use        Past Surgical History:   Procedure Laterality Date    BREAST SURGERY       SECTION      x 2    CYST REMOVAL      from left tube    ESOPHAGOGASTRODUODENOSCOPY N/A 3/12/2023    Procedure: EGD (ESOPHAGOGASTRODUODENOSCOPY);  Surgeon: Ray Serrato MD;  [facility];  Service: Gastroenterology;  Laterality: N/A;    gastric sleeve      Raheel-en-Y gastric bypass      TONSILLECTOMY         Time Tracking:     PT Received On: 24  PT Start Time: 0830     PT Stop Time: 0854  PT Total Time (min): 24 min     Billable Minutes: Evaluation 12 and Gait Training 12      2024

## 2024-01-28 NOTE — PROGRESS NOTES
[facility] Medicine  Progress Note    Patient Name: Joe Ceballos  MRN: [id number]  Patient Class: IP- Inpatient   Admission Date: 1/24/2024  Length of Stay: 3 days  Attending Physician: Tyrese Jensen MD  Primary Care Provider: Daphney Heart MD        Subjective:     Principal Problem:Acute deep vein thrombosis (DVT) of left lower extremity        HPI:  The patient is a 53 yo female with HTN, Diastolic CHF, s/p CVA-mild residual left sided weakness, s/p Gastric bypass, Gastric ulcers- no recent GI bleeding, CKD4, Malnutrition who presented to ED with BLE edema with blisters- onset 6 days ago that progressively worsened. Pt also endorses SOB -onset at the same time. She is unable to ambulate due to pain and swelling to BLE.   Pt states she had a GI bleed 2/2 gastric ulcer 3/2023. She was hospitalized with anemia 12/2023 (hgb 5.5 and received 2 units PRBC) -no active bleeding at that time. Hgb remained stable since then.     In the ED, Afebrile, BP mildly elevated. O2sat 100%. Labs revealed Hgb 9.8, K 3.2, mild hyperchloremic metabolic acidosis. Serum Cr 2.6, . Albumin 1.7. , Troponin normal. Glucose was 34 but improved to 80s with oral intake. EKG showed NSR, ST elevated in inferior leads. CXR-nothing acute. U/S bilateral LE veins showed Acute DVT LLE.     Pt does not know her home medications. Home medications are not reconciled     Overview/Hospital Course:  1/25/24  NAEON, unable to obtain AM labs  Pt c/o pain, swelling in legs, L > R  LLE +DVT, currently on heparin drip  On Lasix 40 mg IV q12h for CHF exacerbation  Continue above treatment  Possibly transition to NOAC and PO Lasix tomorrow    1/26/24  NAEON, patient reports some improvement in BLE edema  States she feels weak and unable to care for herself, consult PT/OT  Transition heparin to apixaban today  Requires continued IV diuresis, continue lasix    1/27/24  NAEON, patient reports pain in BLE, swelling improving,  continue IV diuresis  Hg 7.7, check fecal occult blood  Replete Mg IV  PT/OT notes reviewed, patient not participating    1/28/24  Patient refusing PT/OT  H/H 6.9/19.8, transfuse 1 unit PRBC, fecal occult blood+, in setting of known chronic gastric ulcer  Monitor serial H/H, stop Eliquis  Consult IR for IVC filter placement      Review of Systems   All other systems reviewed and are negative.    Objective:     Vital Signs (Most Recent):  Temp: 98 °F (36.7 °C) (01/28/24 1240)  Pulse: 82 (01/28/24 1240)  Resp: 16 (01/28/24 1240)  BP: 118/64 (01/28/24 1240)  SpO2: 100 % (01/28/24 1240) Vital Signs (24h Range):  Temp:  [98 °F (36.7 °C)-98.9 °F (37.2 °C)] 98 °F (36.7 °C)  Pulse:  [] 82  Resp:  [10-19] 16  SpO2:  [97 %-100 %] 100 %  BP: ()/(63-94) 118/64     Weight: 54.4 kg (119 lb 14.9 oz)  Body mass index is 18.24 kg/m².    Intake/Output Summary (Last 24 hours) at 1/28/2024 1346  Last data filed at 1/28/2024 0200  Gross per 24 hour   Intake 317.08 ml   Output --   Net 317.08 ml           Physical Exam  Vitals and nursing note reviewed.   Constitutional:       General: She is not in acute distress.     Appearance: Normal appearance. She is normal weight.   Cardiovascular:      Rate and Rhythm: Normal rate and regular rhythm.      Heart sounds: No murmur heard.  Pulmonary:      Effort: Pulmonary effort is normal. No respiratory distress.      Breath sounds: Rales present. No wheezing.   Musculoskeletal:      Right lower leg: Edema present.      Left lower leg: Edema present.   Neurological:      General: No focal deficit present.      Mental Status: She is alert and oriented to person, place, and time.   Psychiatric:         Mood and Affect: Mood normal.         Behavior: Behavior normal.             Significant Labs: All pertinent labs within the past 24 hours have been reviewed.  Recent Lab Results  (Last 5 results in the past 24 hours)        01/28/24  1018   01/28/24  0935   01/28/24  0518   01/28/24  0042    01/27/24  1859        Unit Blood Type Code 6200  [P]               Unit Expiration [id number]  [P]               Unit Blood Type A POS  [P]               Baso #   0.05             Basophil %   0.7             CODING SYSTEM QYEU127  [P]               Crossmatch Interpretation Compatible  [P]               Differential Method   Automated             DISPENSE STATUS ISSUED  [P]               Eos #   0.1             Eosinophil %   1.1             Gran # (ANC)   4.5             Gran %   63.4             Group & Rh A POS               Hematocrit   19.8  Comment: hct  critical result(s) called and verbal readback obtained from   desh pradeep rn by [email or web address] 01/28/2024 09:52               Hemoglobin   6.9             Immature Grans (Abs)   0.02  Comment: Mild elevation in immature granulocytes is non specific and   can be seen in a variety of conditions including stress response,   acute inflammation, trauma and pregnancy. Correlation with other   laboratory and clinical findings is essential.               Immature Granulocytes   0.3             INDIRECT KEN NEG               Lymph #   2.1             Lymph %   30.0             MCH   32.1             MCHC   34.8             MCV   92             Mono #   0.3             Mono %   4.5             MPV   9.9             nRBC   0             Occult Blood         Positive       Platelet Count   187             POCT Glucose     71   100         Product Code [id number]  [P]               RBC   2.15             RDW   14.4             Specimen Outdate 01/31/2024 23:59               UNIT NUMBER [id number]  [P]               WBC   7.09                                     [P] - Preliminary Result               Significant Imaging: I have reviewed all pertinent imaging results/findings within the past 24 hours.    US Lower Extremity Arteries Left   Final Result      Monophasic waveforms throughout the majority of the left lower extremity with sharp upstroke.  Findings most suggestive of  hyperemia.         Electronically signed by: Zuleyma Landry   Date:    01/25/2024   Time:    10:29      US Lower Extremity Veins Bilateral   Final Result   Abnormal      Positive DVT left lower extremity      This report was flagged in Epic as abnormal.         Electronically signed by: Iesha Romo   Date:    01/24/2024   Time:    18:36      X-Ray Chest AP Portable   Final Result      No acute abnormality.         Electronically signed by: Humberto Gonzales   Date:    01/24/2024   Time:    17:44            Assessment/Plan:      * Acute deep vein thrombosis (DVT) of left lower extremity  DVT LLE  Hx recent GI bleed 3/2023 due to gastric ulcers   IV heparin   Monitor H/H     1/26  No bleeding reported, H/H stable  Transition heparin to apixaban today    1/28  H/H trending down, stop apixaban  Fecal occult blood+, known chronic gastric ulcer  Previous history of DVTs, unable to safely anticoagulate  Consult IR for IVC filter, thrombectomy consideration    History of GI bleed  GI bleed 2/2 gastric ulcer 3/2023. S  She was hospitalized with anemia 12/2023 (hgb 5.5 and received 2 units PRBC)    1/28  H/H down trending this admission while on AC  Transfuse 1 unit PRBC  Fecal occult blood+  PPI IV BID  GI consult    Normocytic anemia  Patient's anemia is currently uncontrolled. Has received 1 units of PRBCs on 1/28/24 . Etiology likely d/t acute blood loss which was from GI bleeding.  Current CBC reviewed-   Lab Results   Component Value Date    HGB 6.9 (L) 01/28/2024    HCT 19.8 (LL) 01/28/2024     Monitor serial CBC and transfuse if patient becomes hemodynamically unstable, symptomatic or H/H drops below 7/21.    Acute on chronic heart failure with preserved ejection fraction  Patient is identified as having Diastolic (HFpEF) heart failure that is Acute on chronic. CHF is currently uncontrolled due to Continued edema of extremities. Latest ECHO performed and demonstrates- Results for orders placed during the  hospital encounter of 01/21/23    Echo Saline Bubble? Yes    Interpretation Summary  · There is no evidence of intracardiac shunting.  · The left ventricle is normal in size with mild concentric hypertrophy and normal systolic function.  · The estimated ejection fraction is 60%.  · Normal left ventricular diastolic function.  · Normal right ventricular size with normal right ventricular systolic function.  · Mild to moderate tricuspid regurgitation.  · Normal central venous pressure (3 mmHg).  · The estimated PA systolic pressure is 36 mmHg.  . Continue Furosemide and monitor clinical status closely. Monitor on telemetry. Patient is on CHF pathway.  Monitor strict Is&Os and daily weights.  Place on fluid restriction of 1.5 L. Cardiology has not been consulted. Continue to stress to patient importance of self efficacy and  on diet for CHF. Last BNP reviewed- and noted below   Recent Labs   Lab 01/24/24  1708   *       Continue IV Lasix   Strict I/O's    CKD (chronic kidney disease), stage IV  Creatine stable for now. BMP reviewed- noted Estimated Creatinine Clearance: 20 mL/min (A) (based on SCr of 2.8 mg/dL (H)). according to latest data. Based on current GFR, CKD stage is stage 4 - GFR 15-29.  Monitor UOP and serial BMP and adjust therapy as needed. Renally dose meds. Avoid nephrotoxic medications and procedures    Primary hypertension  Chronic, controlled. Latest blood pressure and vitals reviewed-     Temp:  [98 °F (36.7 °C)-98.9 °F (37.2 °C)]   Pulse:  []   Resp:  [10-19]   BP: ()/(63-94)   SpO2:  [97 %-100 %] .   Home meds for hypertension were reviewed and noted below.   Hypertension Medications               amLODIPine (NORVASC) 5 MG tablet Take 5 mg by mouth once daily.    furosemide (LASIX) 40 MG tablet Take 40 mg by mouth 2 (two) times a day.    isosorbide mononitrate (IMDUR) 60 MG 24 hr tablet Take 60 mg by mouth once daily.            While in the hospital, will manage blood  pressure as follows; Continue home antihypertensive regimen    Will utilize p.r.n. blood pressure medication only if patient's blood pressure greater than 160/100 and she develops symptoms such as worsening chest pain or shortness of breath.    History of CVA (cerebrovascular accident)  Pt reports residual left sided weakness  Hold ASA 2/2 GI bleed/gastric ulcer   Cont Statin    Malnutrition of moderate degree  Nutrition consulted. Most recent weight and BMI monitored-     Measurements:  Wt Readings from Last 1 Encounters:   01/25/24 54.4 kg (119 lb 14.9 oz)   Body mass index is 18.24 kg/m².    Patient has been screened and assessed by RD.    Malnutrition Type:  Context:    Level:      Malnutrition Characteristic Summary:  Fluid Accumulation (Malnutrition): moderate    Interventions/Recommendations (treatment strategy):  1. Recommend pt continues Low Sodium, 2gm Fluid - 1500mL diet. 2. Recommend pt receives Boost plus BID 3. Recommend pt continues Anti-emetics. 4.  Weigh daily      Venous stasis ulcer  Chronic  Wound care following      VTE Risk Mitigation (From admission, onward)           Ordered     IP VTE HIGH RISK PATIENT  Once         01/24/24 2334     Place sequential compression device  Until discontinued         01/24/24 2334                    Discharge Planning   ALEXA: 1/28/2024     Code Status: Full Code   Is the patient medically ready for discharge?:     Reason for patient still in hospital (select all that apply): Patient new problem, Patient trending condition, Laboratory test, Treatment, Imaging, and Consult recommendations  Discharge Plan A: Home Health          Critical care time spent on the evaluation and treatment of severe organ dysfunction, review of pertinent labs and imaging studies, discussions with consulting providers and discussions with patient/family: 35 minutes.          Tyrese Jensen MD  Department of Hospital Medicine   '[place] [facility]

## 2024-01-28 NOTE — ASSESSMENT & PLAN NOTE
Creatine stable for now. BMP reviewed- noted Estimated Creatinine Clearance: 20 mL/min (A) (based on SCr of 2.8 mg/dL (H)). according to latest data. Based on current GFR, CKD stage is stage 4 - GFR 15-29.  Monitor UOP and serial BMP and adjust therapy as needed. Renally dose meds. Avoid nephrotoxic medications and procedures

## 2024-01-28 NOTE — ASSESSMENT & PLAN NOTE
GI bleed 2/2 gastric ulcer 3/2023. S  She was hospitalized with anemia 12/2023 (hgb 5.5 and received 2 units PRBC)    1/28  H/H down trending this admission while on AC  Transfuse 1 unit PRBC  Fecal occult blood+   PPI IV BID, sucralfate   GI following

## 2024-01-28 NOTE — ASSESSMENT & PLAN NOTE
Patient is identified as having Diastolic (HFpEF) heart failure that is Acute on chronic. CHF is currently uncontrolled due to Continued edema of extremities. Latest ECHO performed and demonstrates- Results for orders placed during the hospital encounter of 01/21/23    Echo Saline Bubble? Yes    Interpretation Summary  · There is no evidence of intracardiac shunting.  · The left ventricle is normal in size with mild concentric hypertrophy and normal systolic function.  · The estimated ejection fraction is 60%.  · Normal left ventricular diastolic function.  · Normal right ventricular size with normal right ventricular systolic function.  · Mild to moderate tricuspid regurgitation.  · Normal central venous pressure (3 mmHg).  · The estimated PA systolic pressure is 36 mmHg.  . Continue Furosemide and monitor clinical status closely. Monitor on telemetry. Patient is on CHF pathway.  Monitor strict Is&Os and daily weights.  Place on fluid restriction of 1.5 L. Cardiology has not been consulted. Continue to stress to patient importance of self efficacy and  on diet for CHF. Last BNP reviewed- and noted below   Recent Labs   Lab 01/24/24  1708   *       Continue IV Lasix   Strict I/O's

## 2024-01-28 NOTE — PLAN OF CARE
Initial evaluation provided. Pt is at prior level of function for self care skills and ADL transfers. No OT services recommended at this time.

## 2024-01-28 NOTE — ASSESSMENT & PLAN NOTE
Patient's anemia is currently uncontrolled. Has received 1 units of PRBCs on 1/28/24 . Etiology likely d/t acute blood loss which was from GI bleeding.  Current CBC reviewed-   Lab Results   Component Value Date    HGB 6.9 (L) 01/28/2024    HCT 19.8 (LL) 01/28/2024     Monitor serial CBC and transfuse if patient becomes hemodynamically unstable, symptomatic or H/H drops below 7/21.

## 2024-01-29 ENCOUNTER — ANESTHESIA (OUTPATIENT)
Dept: ENDOSCOPY | Facility: HOSPITAL | Age: 54
DRG: 291 | End: 2024-01-29
Payer: COMMERCIAL

## 2024-01-29 ENCOUNTER — ANESTHESIA EVENT (OUTPATIENT)
Dept: ENDOSCOPY | Facility: HOSPITAL | Age: 54
DRG: 291 | End: 2024-01-29
Payer: COMMERCIAL

## 2024-01-29 PROBLEM — K28.9 ANASTOMOTIC ULCER S/P GASTRIC BYPASS: Status: ACTIVE | Noted: 2024-01-29

## 2024-01-29 PROBLEM — T85.898A ANASTOMOTIC ULCER S/P GASTRIC BYPASS: Status: RESOLVED | Noted: 2024-01-29 | Resolved: 2024-01-29

## 2024-01-29 PROBLEM — K28.9 ANASTOMOTIC ULCER S/P GASTRIC BYPASS: Status: RESOLVED | Noted: 2024-01-29 | Resolved: 2024-01-29

## 2024-01-29 PROBLEM — K28.9 ANASTOMOTIC ULCER S/P GASTRIC BYPASS: Status: ACTIVE | Noted: 2017-01-11

## 2024-01-29 PROBLEM — T85.898A ANASTOMOTIC ULCER S/P GASTRIC BYPASS: Status: ACTIVE | Noted: 2024-01-29

## 2024-01-29 PROBLEM — T85.898A ANASTOMOTIC ULCER S/P GASTRIC BYPASS: Status: ACTIVE | Noted: 2017-01-11

## 2024-01-29 LAB
ANION GAP SERPL CALC-SCNC: 10 MMOL/L (ref 8–16)
BASOPHILS # BLD AUTO: 0.06 K/UL (ref 0–0.2)
BASOPHILS NFR BLD: 1 % (ref 0–1.9)
BNP SERPL-MCNC: 152 PG/ML (ref 0–99)
BUN SERPL-MCNC: 35 MG/DL (ref 6–20)
CALCIUM SERPL-MCNC: 7.4 MG/DL (ref 8.7–10.5)
CHLORIDE SERPL-SCNC: 113 MMOL/L (ref 95–110)
CO2 SERPL-SCNC: 17 MMOL/L (ref 23–29)
CREAT SERPL-MCNC: 3.1 MG/DL (ref 0.5–1.4)
DIFFERENTIAL METHOD BLD: ABNORMAL
EOSINOPHIL # BLD AUTO: 0.1 K/UL (ref 0–0.5)
EOSINOPHIL NFR BLD: 1.2 % (ref 0–8)
ERYTHROCYTE [DISTWIDTH] IN BLOOD BY AUTOMATED COUNT: 15.8 % (ref 11.5–14.5)
EST. GFR  (NO RACE VARIABLE): 17 ML/MIN/1.73 M^2
GLUCOSE SERPL-MCNC: 66 MG/DL (ref 70–110)
HCT VFR BLD AUTO: 24.5 % (ref 37–48.5)
HGB BLD-MCNC: 8.3 G/DL (ref 12–16)
IMM GRANULOCYTES # BLD AUTO: 0.01 K/UL (ref 0–0.04)
IMM GRANULOCYTES NFR BLD AUTO: 0.2 % (ref 0–0.5)
LYMPHOCYTES # BLD AUTO: 1.5 K/UL (ref 1–4.8)
LYMPHOCYTES NFR BLD: 25.8 % (ref 18–48)
MCH RBC QN AUTO: 30.4 PG (ref 27–31)
MCHC RBC AUTO-ENTMCNC: 33.9 G/DL (ref 32–36)
MCV RBC AUTO: 90 FL (ref 82–98)
MONOCYTES # BLD AUTO: 0.3 K/UL (ref 0.3–1)
MONOCYTES NFR BLD: 5.9 % (ref 4–15)
NEUTROPHILS # BLD AUTO: 3.8 K/UL (ref 1.8–7.7)
NEUTROPHILS NFR BLD: 65.9 % (ref 38–73)
NRBC BLD-RTO: 0 /100 WBC
PLATELET # BLD AUTO: 136 K/UL (ref 150–450)
PMV BLD AUTO: 10 FL (ref 9.2–12.9)
POCT GLUCOSE: 70 MG/DL (ref 70–110)
POCT GLUCOSE: 78 MG/DL (ref 70–110)
POTASSIUM SERPL-SCNC: 4.4 MMOL/L (ref 3.5–5.1)
RBC # BLD AUTO: 2.73 M/UL (ref 4–5.4)
SODIUM SERPL-SCNC: 140 MMOL/L (ref 136–145)
WBC # BLD AUTO: 5.73 K/UL (ref 3.9–12.7)

## 2024-01-29 PROCEDURE — 36415 COLL VENOUS BLD VENIPUNCTURE: CPT | Performed by: HOSPITALIST

## 2024-01-29 PROCEDURE — 37000008 HC ANESTHESIA 1ST 15 MINUTES: Performed by: INTERNAL MEDICINE

## 2024-01-29 PROCEDURE — 25000003 PHARM REV CODE 250: Performed by: NURSE ANESTHETIST, CERTIFIED REGISTERED

## 2024-01-29 PROCEDURE — 94761 N-INVAS EAR/PLS OXIMETRY MLT: CPT

## 2024-01-29 PROCEDURE — 25500020 PHARM REV CODE 255: Performed by: RADIOLOGY

## 2024-01-29 PROCEDURE — 83880 ASSAY OF NATRIURETIC PEPTIDE: CPT | Performed by: HOSPITALIST

## 2024-01-29 PROCEDURE — 63600175 PHARM REV CODE 636 W HCPCS: Performed by: HOSPITALIST

## 2024-01-29 PROCEDURE — C9113 INJ PANTOPRAZOLE SODIUM, VIA: HCPCS | Performed by: HOSPITALIST

## 2024-01-29 PROCEDURE — 21400001 HC TELEMETRY ROOM

## 2024-01-29 PROCEDURE — 43235 EGD DIAGNOSTIC BRUSH WASH: CPT | Performed by: INTERNAL MEDICINE

## 2024-01-29 PROCEDURE — 25000003 PHARM REV CODE 250: Performed by: HOSPITALIST

## 2024-01-29 PROCEDURE — 25000003 PHARM REV CODE 250: Performed by: RADIOLOGY

## 2024-01-29 PROCEDURE — 85025 COMPLETE CBC W/AUTO DIFF WBC: CPT | Performed by: HOSPITALIST

## 2024-01-29 PROCEDURE — 06H03DZ INSERTION OF INTRALUMINAL DEVICE INTO INFERIOR VENA CAVA, PERCUTANEOUS APPROACH: ICD-10-PCS | Performed by: RADIOLOGY

## 2024-01-29 PROCEDURE — 63600175 PHARM REV CODE 636 W HCPCS: Performed by: RADIOLOGY

## 2024-01-29 PROCEDURE — 80048 BASIC METABOLIC PNL TOTAL CA: CPT | Performed by: HOSPITALIST

## 2024-01-29 PROCEDURE — 63600175 PHARM REV CODE 636 W HCPCS: Performed by: NURSE ANESTHETIST, CERTIFIED REGISTERED

## 2024-01-29 PROCEDURE — 37000009 HC ANESTHESIA EA ADD 15 MINS: Performed by: INTERNAL MEDICINE

## 2024-01-29 PROCEDURE — 43235 EGD DIAGNOSTIC BRUSH WASH: CPT | Mod: ,,, | Performed by: INTERNAL MEDICINE

## 2024-01-29 PROCEDURE — 25000003 PHARM REV CODE 250: Performed by: NURSE PRACTITIONER

## 2024-01-29 PROCEDURE — 0DJ08ZZ INSPECTION OF UPPER INTESTINAL TRACT, VIA NATURAL OR ARTIFICIAL OPENING ENDOSCOPIC: ICD-10-PCS | Performed by: INTERNAL MEDICINE

## 2024-01-29 PROCEDURE — 63600175 PHARM REV CODE 636 W HCPCS: Performed by: NURSE PRACTITIONER

## 2024-01-29 PROCEDURE — 99223 1ST HOSP IP/OBS HIGH 75: CPT | Mod: 25,,, | Performed by: INTERNAL MEDICINE

## 2024-01-29 RX ORDER — FENTANYL CITRATE 50 UG/ML
INJECTION, SOLUTION INTRAMUSCULAR; INTRAVENOUS CODE/TRAUMA/SEDATION MEDICATION
Status: COMPLETED | OUTPATIENT
Start: 2024-01-29 | End: 2024-01-29

## 2024-01-29 RX ORDER — MIDAZOLAM HYDROCHLORIDE 1 MG/ML
INJECTION INTRAMUSCULAR; INTRAVENOUS CODE/TRAUMA/SEDATION MEDICATION
Status: COMPLETED | OUTPATIENT
Start: 2024-01-29 | End: 2024-01-29

## 2024-01-29 RX ORDER — LIDOCAINE HYDROCHLORIDE 20 MG/ML
INJECTION INTRAVENOUS
Status: DISCONTINUED | OUTPATIENT
Start: 2024-01-29 | End: 2024-01-29

## 2024-01-29 RX ORDER — PROPOFOL 10 MG/ML
VIAL (ML) INTRAVENOUS
Status: DISCONTINUED | OUTPATIENT
Start: 2024-01-29 | End: 2024-01-29

## 2024-01-29 RX ORDER — SODIUM CHLORIDE, SODIUM LACTATE, POTASSIUM CHLORIDE, CALCIUM CHLORIDE 600; 310; 30; 20 MG/100ML; MG/100ML; MG/100ML; MG/100ML
INJECTION, SOLUTION INTRAVENOUS CONTINUOUS PRN
Status: DISCONTINUED | OUTPATIENT
Start: 2024-01-29 | End: 2024-01-29

## 2024-01-29 RX ORDER — LIDOCAINE HYDROCHLORIDE 10 MG/ML
INJECTION INFILTRATION; PERINEURAL CODE/TRAUMA/SEDATION MEDICATION
Status: COMPLETED | OUTPATIENT
Start: 2024-01-29 | End: 2024-01-29

## 2024-01-29 RX ADMIN — AMLODIPINE BESYLATE 5 MG: 10 TABLET ORAL at 05:01

## 2024-01-29 RX ADMIN — SODIUM BICARBONATE 650 MG TABLET 1300 MG: at 05:01

## 2024-01-29 RX ADMIN — SUCRALFATE 1 G: 1 TABLET ORAL at 06:01

## 2024-01-29 RX ADMIN — FUROSEMIDE 40 MG: 10 INJECTION, SOLUTION INTRAMUSCULAR; INTRAVENOUS at 09:01

## 2024-01-29 RX ADMIN — LIDOCAINE HYDROCHLORIDE 5 ML: 10 INJECTION, SOLUTION INFILTRATION; PERINEURAL at 12:01

## 2024-01-29 RX ADMIN — LIDOCAINE HYDROCHLORIDE 50 MG: 20 INJECTION INTRAVENOUS at 01:01

## 2024-01-29 RX ADMIN — SUCRALFATE 1 G: 1 TABLET ORAL at 09:01

## 2024-01-29 RX ADMIN — PROPOFOL 25 MG: 10 INJECTION, EMULSION INTRAVENOUS at 01:01

## 2024-01-29 RX ADMIN — FENTANYL CITRATE 50 MCG: 0.05 INJECTION, SOLUTION INTRAMUSCULAR; INTRAVENOUS at 12:01

## 2024-01-29 RX ADMIN — IOHEXOL 40 ML: 300 INJECTION, SOLUTION INTRAVENOUS at 12:01

## 2024-01-29 RX ADMIN — ISOSORBIDE MONONITRATE 60 MG: 60 TABLET, EXTENDED RELEASE ORAL at 05:01

## 2024-01-29 RX ADMIN — SODIUM CHLORIDE, SODIUM LACTATE, POTASSIUM CHLORIDE, AND CALCIUM CHLORIDE: 600; 310; 30; 20 INJECTION, SOLUTION INTRAVENOUS at 01:01

## 2024-01-29 RX ADMIN — ATORVASTATIN CALCIUM 80 MG: 40 TABLET, FILM COATED ORAL at 09:01

## 2024-01-29 RX ADMIN — PANTOPRAZOLE SODIUM 40 MG: 40 INJECTION, POWDER, FOR SOLUTION INTRAVENOUS at 09:01

## 2024-01-29 RX ADMIN — MIDAZOLAM HYDROCHLORIDE 1 MG: 1 INJECTION, SOLUTION INTRAMUSCULAR; INTRAVENOUS at 12:01

## 2024-01-29 RX ADMIN — SODIUM BICARBONATE 650 MG TABLET 1300 MG: at 09:01

## 2024-01-29 RX ADMIN — SUCRALFATE 1 G: 1 TABLET ORAL at 05:01

## 2024-01-29 RX ADMIN — OLANZAPINE 5 MG: 5 TABLET, FILM COATED ORAL at 09:01

## 2024-01-29 NOTE — PLAN OF CARE
01/29/24 1059   Discharge Reassessment   Assessment Type Discharge Planning Reassessment   Did the patient's condition or plan change since previous assessment? No   Discharge Plan discussed with:   (Attending MD, Dr. Jensen)   Discharge Plan A Home Health   DME Needed Upon Discharge  none   Transition of Care Barriers None   Why the patient remains in the hospital Requires continued medical care   Post-Acute Status   Post-Acute Authorization Home Health   Home Health Status Referrals Sent   Discharge Delays None known at this time       Assessment/Plan:      * Acute deep vein thrombosis (DVT) of left lower extremity  DVT LLE  Hx recent GI bleed 3/2023 due to gastric ulcers   IV heparin   Monitor H/H      1/26  No bleeding reported, H/H stable  Transition heparin to apixaban today     1/28  H/H trending down, stop apixaban  Fecal occult blood+, known chronic gastric ulcer  Previous history of DVTs, unable to safely anticoagulate  Consult IR for IVC filter, thrombectomy consideration     1/29  Not safe to resume AC given recurrent GI bleed  IVC filter placement pending today     History of GI bleed  GI bleed 2/2 gastric ulcer 3/2023. S  She was hospitalized with anemia 12/2023 (hgb 5.5 and received 2 units PRBC)     1/28  H/H down trending this admission while on AC  Transfuse 1 unit PRBC  Fecal occult blood+   PPI IV BID, sucralfate   GI following     Normocytic anemia  Patient's anemia is currently uncontrolled. Has received 1 units of PRBCs on 1/28/24 . Etiology likely d/t acute blood loss which was from GI bleeding.  Current CBC reviewed-         Lab Results   Component Value Date     HGB 8.3 (L) 01/29/2024     HCT 24.5 (L) 01/29/2024      Monitor serial CBC and transfuse if patient becomes hemodynamically unstable, symptomatic or H/H drops below 7/21.     Acute on chronic heart failure with preserved ejection fraction  Patient is identified as having Diastolic (HFpEF) heart failure that is Acute on chronic.  CHF is currently uncontrolled due to Continued edema of extremities. Latest ECHO performed and demonstrates- Results for orders placed during the hospital encounter of 01/21/23     Echo Saline Bubble? Yes     Interpretation Summary  · There is no evidence of intracardiac shunting.  · The left ventricle is normal in size with mild concentric hypertrophy and normal systolic function.  · The estimated ejection fraction is 60%.  · Normal left ventricular diastolic function.  · Normal right ventricular size with normal right ventricular systolic function.  · Mild to moderate tricuspid regurgitation.  · Normal central venous pressure (3 mmHg).  · The estimated PA systolic pressure is 36 mmHg.  . Continue Furosemide and monitor clinical status closely. Monitor on telemetry. Patient is on CHF pathway.  Monitor strict Is&Os and daily weights.  Place on fluid restriction of 1.5 L. Cardiology has not been consulted. Continue to stress to patient importance of self efficacy and  on diet for CHF. Last BNP reviewed- and noted below       Recent Labs   Lab 01/29/24  0530   *         Continue IV Lasix   Strict I/O's     CKD (chronic kidney disease), stage IV  Creatine stable for now. BMP reviewed- noted Estimated Creatinine Clearance: 17.6 mL/min (A) (based on SCr of 3.1 mg/dL (H)). according to latest data. Based on current GFR, CKD stage is stage 4 - GFR 15-29.  Monitor UOP and serial BMP and adjust therapy as needed. Renally dose meds. Avoid nephrotoxic medications and procedures     Primary hypertension  Chronic, controlled. Latest blood pressure and vitals reviewed-      Temp:  [98 °F (36.7 °C)-98.6 °F (37 °C)]   Pulse:  [75-99]   Resp:  [15-18]   BP: ()/(64-92)   SpO2:  [95 %-100 %] .   Home meds for hypertension were reviewed and noted below.   Hypertension Medications                    amLODIPine (NORVASC) 5 MG tablet Take 5 mg by mouth once daily.     furosemide (LASIX) 40 MG tablet Take 40 mg by mouth  2 (two) times a day.     isosorbide mononitrate (IMDUR) 60 MG 24 hr tablet Take 60 mg by mouth once daily.                While in the hospital, will manage blood pressure as follows; Continue home antihypertensive regimen     Will utilize p.r.n. blood pressure medication only if patient's blood pressure greater than 160/100 and she develops symptoms such as worsening chest pain or shortness of breath.     History of CVA (cerebrovascular accident)  Pt reports residual left sided weakness  Hold ASA 2/2 GI bleed/gastric ulcer   Cont Statin     Malnutrition of moderate degree  Nutrition consulted. Most recent weight and BMI monitored-      Measurements:      Wt Readings from Last 1 Encounters:   01/29/24 53.2 kg (117 lb 4.6 oz)   Body mass index is 17.83 kg/m².     Patient has been screened and assessed by RD.     Malnutrition Type:  Context:    Level:       Malnutrition Characteristic Summary:  Fluid Accumulation (Malnutrition): moderate     Interventions/Recommendations (treatment strategy):  1. Recommend pt continues Low Sodium, 2gm Fluid - 1500mL diet. 2. Recommend pt receives Boost plus BID 3. Recommend pt continues Anti-emetics. 4.  Weigh daily        Venous stasis ulcer  Chronic  Wound care following        VTE Risk Mitigation (From admission, onward)              Ordered       IP VTE HIGH RISK PATIENT  Once         01/24/24 2334       Place sequential compression device  Until discontinued         01/24/24 2334                          Discharge Planning   ALEXA: 1/28/2024     Code Status: Full Code   Is the patient medically ready for discharge?:     Reason for patient still in hospital (select all that apply): Patient trending condition, Laboratory test, Treatment, Imaging, Consult recommendations, PT / OT recommendations, and Pending disposition  Discharge Plan A: Home Health

## 2024-01-29 NOTE — PROCEDURES
Radiology Post-Procedure Note    Pre Op Diagnosis: Deep venous thrombosis with contraindication to anticoagulation    Post Op Diagnosis: : Deep venous thrombosis    Procedure: IVC filter placement    Procedure performed by: Aris KWONG, Salas    Written Informed Consent Obtained: Yes    Specimen Removed: NO    Estimated Blood Loss: Minimal    Findings: After written informed consent and sterile prep and drape, the right femoral vein was cannulated and a pigtail catheter was placed into the IVC.  Contrast injection under fluoroscopy revealed anatomy suitable for placement of IVC filter immediately inferior to the renal veins.  The argon filter was placed at this level under fluoroscopic surveillance and post placement venogram demonstrated adequate position and function.  Please see full procedural report in Imaging for further details.       Patient tolerated procedure well.    Salas Hurst MD  Staff Radiologist  Department of Radiology  Pager: [phone]

## 2024-01-29 NOTE — PLAN OF CARE
Free from falls. Transfused 1 unit of blood. Tolerated well. IV lasix administered. Up to bedside commode. No s/s of acute distress. 12hr chart check complete.

## 2024-01-29 NOTE — SUBJECTIVE & OBJECTIVE
Review of Systems   All other systems reviewed and are negative.    Objective:     Vital Signs (Most Recent):  Temp: 98.2 °F (36.8 °C) (01/29/24 0811)  Pulse: 76 (01/29/24 0816)  Resp: 16 (01/29/24 0811)  BP: (!) 126/90 (01/29/24 0811)  SpO2: 99 % (01/29/24 0811) Vital Signs (24h Range):  Temp:  [98 °F (36.7 °C)-98.6 °F (37 °C)] 98.2 °F (36.8 °C)  Pulse:  [75-99] 76  Resp:  [15-18] 16  SpO2:  [95 %-100 %] 99 %  BP: ()/(64-92) 126/90     Weight: 53.2 kg (117 lb 4.6 oz)  Body mass index is 17.83 kg/m².    Intake/Output Summary (Last 24 hours) at 1/29/2024 1025  Last data filed at 1/29/2024 0924  Gross per 24 hour   Intake 1245.83 ml   Output --   Net 1245.83 ml           Physical Exam  Vitals and nursing note reviewed.   Constitutional:       General: She is not in acute distress.     Appearance: Normal appearance. She is normal weight.   Cardiovascular:      Rate and Rhythm: Normal rate and regular rhythm.      Heart sounds: No murmur heard.  Pulmonary:      Effort: Pulmonary effort is normal. No respiratory distress.      Breath sounds: Rales present. No wheezing.   Musculoskeletal:      Right lower leg: Edema present.      Left lower leg: Edema present.   Neurological:      General: No focal deficit present.      Mental Status: She is alert and oriented to person, place, and time.   Psychiatric:         Mood and Affect: Mood normal.         Behavior: Behavior normal.             Significant Labs: All pertinent labs within the past 24 hours have been reviewed.  Recent Lab Results         01/29/24  0530   01/29/24  0434   01/28/24  2352        Anion Gap 10           Baso # 0.06           Basophil % 1.0             Comment: Values of less than 100 pg/ml are consistent with non-CHF populations.           BUN 35           Calcium 7.4           Chloride 113           CO2 17           Creatinine 3.1           Differential Method Automated           eGFR 17           Eos # 0.1           Eosinophil % 1.2            Glucose 66           Gran # (ANC) 3.8           Gran % 65.9           Hematocrit 24.5           Hemoglobin 8.3           Immature Grans (Abs) 0.01  Comment: Mild elevation in immature granulocytes is non specific and   can be seen in a variety of conditions including stress response,   acute inflammation, trauma and pregnancy. Correlation with other   laboratory and clinical findings is essential.             Immature Granulocytes 0.2           Lymph # 1.5           Lymph % 25.8           MCH 30.4           MCHC 33.9           MCV 90           Mono # 0.3           Mono % 5.9           MPV 10.0           nRBC 0           Platelet Count 136           POCT Glucose   70   83       Potassium 4.4           RBC 2.73           RDW 15.8           Sodium 140           WBC 5.73                   Significant Imaging: I have reviewed all pertinent imaging results/findings within the past 24 hours.    US Lower Extremity Arteries Left   Final Result      Monophasic waveforms throughout the majority of the left lower extremity with sharp upstroke.  Findings most suggestive of hyperemia.         Electronically signed by: Zuleyma Landry   Date:    01/25/2024   Time:    10:29      US Lower Extremity Veins Bilateral   Final Result   Abnormal      Positive DVT left lower extremity      This report was flagged in Epic as abnormal.         Electronically signed by: Iesha Romo   Date:    01/24/2024   Time:    18:36      X-Ray Chest AP Portable   Final Result      No acute abnormality.         Electronically signed by: Humberto Gonzales   Date:    01/24/2024   Time:    17:44      IR IVC Filter Insertion    (Results Pending)

## 2024-01-29 NOTE — ANESTHESIA PREPROCEDURE EVALUATION
01/29/2024  Joe Ceballos is a 54 y.o., female.    Patient Active Problem List   Diagnosis    Primary hypertension    Metrorrhagia    Overweight (BMI 25.0-29.9)    Anastomotic ulcer S/P gastric bypass    Dysmenorrhea    Esophageal dysphagia    Benign esophageal stricture    Avascular necrosis of bone of hip, right    Mastodynia    Generalized anxiety disorder with panic attacks    Normocytic anemia    Syncope    HLD (hyperlipidemia)    History of CVA (cerebrovascular accident)    Anxiety    Sickle cell trait    Acute on chronic heart failure with preserved ejection fraction    Tobacco abuse    Acute blood loss anemia    Acute kidney injury superimposed on chronic kidney disease    Diarrhea    Generalized abdominal pain    Cachexia    Malnutrition of moderate degree    Acute deep vein thrombosis (DVT) of left lower extremity    History of GI bleed    Venous stasis ulcer    CKD (chronic kidney disease), stage IV          Pre-op Assessment    I have reviewed the Patient Summary Reports.     I have reviewed the Nursing Notes. I have reviewed the NPO Status.   I have reviewed the Medications.     Review of Systems  Cardiovascular:     Hypertension       CHF              Congestive Heart Failure (CHF)                Hypertension         Renal/:  Chronic Renal Disease        Kidney Function/Disease             Hepatic/GI:  Bowel Prep.                Neurological:   CVA                       CVA - Cerebrovasular Accident                 Psych:  Psychiatric History                  Physical Exam  General: Well nourished, Cooperative, Alert and Oriented    Airway:  Mallampati: II   Mouth Opening: Normal  TM Distance: Normal  Tongue: Normal  Neck ROM: Normal ROM    Dental:  Intact        Anesthesia Plan  Type of Anesthesia, risks & benefits discussed:    Anesthesia Type: MAC, Gen ETT  Intra-op Monitoring Plan:  Standard ASA Monitors  Post Op Pain Control Plan: multimodal analgesia  Induction:  IV  Informed Consent: Informed consent signed with the Patient and all parties understand the risks and agree with anesthesia plan.  All questions answered.   ASA Score: 3  Day of Surgery Review of History & Physical: H&P Update referred to the surgeon/provider.    Ready For Surgery From Anesthesia Perspective.     .

## 2024-01-29 NOTE — INTERVAL H&P NOTE
The patient has been examined and the H&P has been reviewed:    I concur with the findings and no changes have occurred since H&P was written.    Surgery risks, benefits and alternative options discussed and understood by patient/family.    The indications, risks, benefits, and alternatives to this procedure and moderate sedation have been fully reviewed with the patient and written informed consent has been obtained.            Active Hospital Problems    Diagnosis  POA    *Acute deep vein thrombosis (DVT) of left lower extremity [I82.402]  Yes    History of GI bleed [Z87.19]  Not Applicable    Venous stasis ulcer [I83.009, L97.909]  Yes    CKD (chronic kidney disease), stage IV [N18.4]  Yes    Malnutrition of moderate degree [E44.0]  Yes    History of CVA (cerebrovascular accident) [Z86.73]  Not Applicable    Acute on chronic heart failure with preserved ejection fraction [I50.33]  Yes    Normocytic anemia [D64.9]  Yes    Anastomotic ulcer S/P gastric bypass [T85.898A, K28.9]  Yes    Primary hypertension [I10]  Yes      Resolved Hospital Problems    Diagnosis Date Resolved POA    Hypoglycemia [E16.2] 01/28/2024 Yes

## 2024-01-29 NOTE — PROGRESS NOTES
[facility] Medicine  Progress Note    Patient Name: Joe Ceballos  MRN: [id number]  Patient Class: IP- Inpatient   Admission Date: 1/24/2024  Length of Stay: 4 days  Attending Physician: Tyrese Jensen MD  Primary Care Provider: Daphney Heart MD        Subjective:     Principal Problem:Acute deep vein thrombosis (DVT) of left lower extremity        HPI:  The patient is a 55 yo female with HTN, Diastolic CHF, s/p CVA-mild residual left sided weakness, s/p Gastric bypass, Gastric ulcers- no recent GI bleeding, CKD4, Malnutrition who presented to ED with BLE edema with blisters- onset 6 days ago that progressively worsened. Pt also endorses SOB -onset at the same time. She is unable to ambulate due to pain and swelling to BLE.   Pt states she had a GI bleed 2/2 gastric ulcer 3/2023. She was hospitalized with anemia 12/2023 (hgb 5.5 and received 2 units PRBC) -no active bleeding at that time. Hgb remained stable since then.     In the ED, Afebrile, BP mildly elevated. O2sat 100%. Labs revealed Hgb 9.8, K 3.2, mild hyperchloremic metabolic acidosis. Serum Cr 2.6, . Albumin 1.7. , Troponin normal. Glucose was 34 but improved to 80s with oral intake. EKG showed NSR, ST elevated in inferior leads. CXR-nothing acute. U/S bilateral LE veins showed Acute DVT LLE.     Pt does not know her home medications. Home medications are not reconciled     Overview/Hospital Course:  1/25/24  NAEON, unable to obtain AM labs  Pt c/o pain, swelling in legs, L > R  LLE +DVT, currently on heparin drip  On Lasix 40 mg IV q12h for CHF exacerbation  Continue above treatment  Possibly transition to NOAC and PO Lasix tomorrow    1/26/24  NAEON, patient reports some improvement in BLE edema  States she feels weak and unable to care for herself, consult PT/OT  Transition heparin to apixaban today  Requires continued IV diuresis, continue lasix    1/27/24  NAEON, patient reports pain in BLE, swelling improving,  continue IV diuresis  Hg 7.7, check fecal occult blood  Replete Mg IV  PT/OT notes reviewed, patient not participating    1/28/24  Patient refusing PT/OT  H/H 6.9/19.8, transfuse 1 unit PRBC, fecal occult blood+, in setting of known chronic gastric ulcer  Monitor serial H/H, stop Eliquis  Consult IR for IVC filter placement    1/29/24  ALMA DELIA, plans for IVC filter placement today  GI and IR following, appreciate assistance  PT/OT with reccs for LIT, HH ordered  Updated patient's mother over phone      Review of Systems   All other systems reviewed and are negative.    Objective:     Vital Signs (Most Recent):  Temp: 98.2 °F (36.8 °C) (01/29/24 0811)  Pulse: 76 (01/29/24 0816)  Resp: 16 (01/29/24 0811)  BP: (!) 126/90 (01/29/24 0811)  SpO2: 99 % (01/29/24 0811) Vital Signs (24h Range):  Temp:  [98 °F (36.7 °C)-98.6 °F (37 °C)] 98.2 °F (36.8 °C)  Pulse:  [75-99] 76  Resp:  [15-18] 16  SpO2:  [95 %-100 %] 99 %  BP: ()/(64-92) 126/90     Weight: 53.2 kg (117 lb 4.6 oz)  Body mass index is 17.83 kg/m².    Intake/Output Summary (Last 24 hours) at 1/29/2024 1025  Last data filed at 1/29/2024 0924  Gross per 24 hour   Intake 1245.83 ml   Output --   Net 1245.83 ml           Physical Exam  Vitals and nursing note reviewed.   Constitutional:       General: She is not in acute distress.     Appearance: Normal appearance. She is normal weight.   Cardiovascular:      Rate and Rhythm: Normal rate and regular rhythm.      Heart sounds: No murmur heard.  Pulmonary:      Effort: Pulmonary effort is normal. No respiratory distress.      Breath sounds: Rales present. No wheezing.   Musculoskeletal:      Right lower leg: Edema present.      Left lower leg: Edema present.   Neurological:      General: No focal deficit present.      Mental Status: She is alert and oriented to person, place, and time.   Psychiatric:         Mood and Affect: Mood normal.         Behavior: Behavior normal.             Significant Labs: All pertinent  labs within the past 24 hours have been reviewed.  Recent Lab Results         01/29/24  0530   01/29/24  0434   01/28/24  2352        Anion Gap 10           Baso # 0.06           Basophil % 1.0             Comment: Values of less than 100 pg/ml are consistent with non-CHF populations.           BUN 35           Calcium 7.4           Chloride 113           CO2 17           Creatinine 3.1           Differential Method Automated           eGFR 17           Eos # 0.1           Eosinophil % 1.2           Glucose 66           Gran # (ANC) 3.8           Gran % 65.9           Hematocrit 24.5           Hemoglobin 8.3           Immature Grans (Abs) 0.01  Comment: Mild elevation in immature granulocytes is non specific and   can be seen in a variety of conditions including stress response,   acute inflammation, trauma and pregnancy. Correlation with other   laboratory and clinical findings is essential.             Immature Granulocytes 0.2           Lymph # 1.5           Lymph % 25.8           MCH 30.4           MCHC 33.9           MCV 90           Mono # 0.3           Mono % 5.9           MPV 10.0           nRBC 0           Platelet Count 136           POCT Glucose   70   83       Potassium 4.4           RBC 2.73           RDW 15.8           Sodium 140           WBC 5.73                   Significant Imaging: I have reviewed all pertinent imaging results/findings within the past 24 hours.    US Lower Extremity Arteries Left   Final Result      Monophasic waveforms throughout the majority of the left lower extremity with sharp upstroke.  Findings most suggestive of hyperemia.         Electronically signed by: Zuleyma Landry   Date:    01/25/2024   Time:    10:29      US Lower Extremity Veins Bilateral   Final Result   Abnormal      Positive DVT left lower extremity      This report was flagged in Epic as abnormal.         Electronically signed by: Iesha Romo   Date:    01/24/2024   Time:    18:36      X-Ray  Chest AP Portable   Final Result      No acute abnormality.         Electronically signed by: Humberto Gonzales   Date:    01/24/2024   Time:    17:44      IR IVC Filter Insertion    (Results Pending)         Assessment/Plan:      * Acute deep vein thrombosis (DVT) of left lower extremity  DVT LLE  Hx recent GI bleed 3/2023 due to gastric ulcers   IV heparin   Monitor H/H     1/26  No bleeding reported, H/H stable  Transition heparin to apixaban today    1/28  H/H trending down, stop apixaban  Fecal occult blood+, known chronic gastric ulcer  Previous history of DVTs, unable to safely anticoagulate  Consult IR for IVC filter, thrombectomy consideration    1/29  Not safe to resume AC given recurrent GI bleed  IVC filter placement pending today    History of GI bleed  GI bleed 2/2 gastric ulcer 3/2023. S  She was hospitalized with anemia 12/2023 (hgb 5.5 and received 2 units PRBC)    1/28  H/H down trending this admission while on AC  Transfuse 1 unit PRBC  Fecal occult blood+   PPI IV BID, sucralfate   GI following    Normocytic anemia  Patient's anemia is currently uncontrolled. Has received 1 units of PRBCs on 1/28/24 . Etiology likely d/t acute blood loss which was from GI bleeding.  Current CBC reviewed-   Lab Results   Component Value Date    HGB 8.3 (L) 01/29/2024    HCT 24.5 (L) 01/29/2024     Monitor serial CBC and transfuse if patient becomes hemodynamically unstable, symptomatic or H/H drops below 7/21.    Acute on chronic heart failure with preserved ejection fraction  Patient is identified as having Diastolic (HFpEF) heart failure that is Acute on chronic. CHF is currently uncontrolled due to Continued edema of extremities. Latest ECHO performed and demonstrates- Results for orders placed during the hospital encounter of 01/21/23    Echo Saline Bubble? Yes    Interpretation Summary  · There is no evidence of intracardiac shunting.  · The left ventricle is normal in size with mild concentric hypertrophy and  normal systolic function.  · The estimated ejection fraction is 60%.  · Normal left ventricular diastolic function.  · Normal right ventricular size with normal right ventricular systolic function.  · Mild to moderate tricuspid regurgitation.  · Normal central venous pressure (3 mmHg).  · The estimated PA systolic pressure is 36 mmHg.  . Continue Furosemide and monitor clinical status closely. Monitor on telemetry. Patient is on CHF pathway.  Monitor strict Is&Os and daily weights.  Place on fluid restriction of 1.5 L. Cardiology has not been consulted. Continue to stress to patient importance of self efficacy and  on diet for CHF. Last BNP reviewed- and noted below   Recent Labs   Lab 01/29/24  0530   *       Continue IV Lasix   Strict I/O's    CKD (chronic kidney disease), stage IV  Creatine stable for now. BMP reviewed- noted Estimated Creatinine Clearance: 17.6 mL/min (A) (based on SCr of 3.1 mg/dL (H)). according to latest data. Based on current GFR, CKD stage is stage 4 - GFR 15-29.  Monitor UOP and serial BMP and adjust therapy as needed. Renally dose meds. Avoid nephrotoxic medications and procedures    Primary hypertension  Chronic, controlled. Latest blood pressure and vitals reviewed-     Temp:  [98 °F (36.7 °C)-98.6 °F (37 °C)]   Pulse:  [75-99]   Resp:  [15-18]   BP: ()/(64-92)   SpO2:  [95 %-100 %] .   Home meds for hypertension were reviewed and noted below.   Hypertension Medications               amLODIPine (NORVASC) 5 MG tablet Take 5 mg by mouth once daily.    furosemide (LASIX) 40 MG tablet Take 40 mg by mouth 2 (two) times a day.    isosorbide mononitrate (IMDUR) 60 MG 24 hr tablet Take 60 mg by mouth once daily.            While in the hospital, will manage blood pressure as follows; Continue home antihypertensive regimen    Will utilize p.r.n. blood pressure medication only if patient's blood pressure greater than 160/100 and she develops symptoms such as worsening chest  pain or shortness of breath.    History of CVA (cerebrovascular accident)  Pt reports residual left sided weakness  Hold ASA 2/2 GI bleed/gastric ulcer   Cont Statin    Malnutrition of moderate degree  Nutrition consulted. Most recent weight and BMI monitored-     Measurements:  Wt Readings from Last 1 Encounters:   01/29/24 53.2 kg (117 lb 4.6 oz)   Body mass index is 17.83 kg/m².    Patient has been screened and assessed by RD.    Malnutrition Type:  Context:    Level:      Malnutrition Characteristic Summary:  Fluid Accumulation (Malnutrition): moderate    Interventions/Recommendations (treatment strategy):  1. Recommend pt continues Low Sodium, 2gm Fluid - 1500mL diet. 2. Recommend pt receives Boost plus BID 3. Recommend pt continues Anti-emetics. 4.  Weigh daily      Venous stasis ulcer  Chronic  Wound care following      VTE Risk Mitigation (From admission, onward)           Ordered     IP VTE HIGH RISK PATIENT  Once         01/24/24 2334     Place sequential compression device  Until discontinued         01/24/24 2334                    Discharge Planning   ALEXA: 1/28/2024     Code Status: Full Code   Is the patient medically ready for discharge?:     Reason for patient still in hospital (select all that apply): Patient trending condition, Laboratory test, Treatment, Imaging, Consult recommendations, PT / OT recommendations, and Pending disposition  Discharge Plan A: Home Health                  Tyrese Jensen MD  Department of Hospital Medicine   [facility] Med [facility]

## 2024-01-29 NOTE — H&P (VIEW-ONLY)
[facility]  Gastroenterology  Consult Note    Patient Name: Joe Ceballos  MRN: [id number]  Admission Date: 1/24/2024  Hospital Length of Stay: 4 days  Code Status: Full Code   Attending Provider: Tyrese Jensen MD   Consulting Provider: Leon Bruner PA-C  Primary Care Physician: Daphney Heart MD  Principal Problem:Acute deep vein thrombosis (DVT) of left lower extremity    Inpatient consult to Gastroenterology  Consult performed by: Leon Bruner PA-C  Consult ordered by: Tyrese Jensen MD  Reason for consult: GI bleed        Subjective:     HPI:  The patient presented to the ER five days ago for leg swelling with drainage from her toe and SOB. She was admitted with acute on chronic heart failure. She was also found to have a DVT in the left lower extremity. She was started on Heparin drip.     We have been consulted for decreasing Hgb with history of chronic ulcer. The patient's history is significant for gastric sleeve in 2016, converted to gastric bypass in 2018 with paraesophageal hernia repair 2022. EGD done here in March 2023 showed clean base anastomotic ulcer. She was admitted to [facility] in July 2023 for syncope with hematochezia. EGD showed oozing from the anastomotic ulcer. Colonoscopy was normal but cecum not seen due to looping. Bariatric surgeon, Dr. Hernández, saw her that admit and recommended high protein diet, Carafate and Protonix. The patient says she was admitted in the meantime at [facility] and had an EGD by a bariatric surgeon. This report has been requested.     Her Hgb this admit was 9.4. It gradually decreased to 6.9 yesterday. She was transfused 2 u PRBC. Repeat Hgb today is 8.3. She last received Heparin on the 25th. Eliquis was not restarted. She was off Protonix until yesterday. She's been on Carafate since the 25th. BUN and creatinine trending up. She is hemodynamically stable. She reports nausea, vomiting and generalized abdominal pain. She says she had bright red blood in the  her stool but now it is black.           Past Medical History:   Diagnosis Date    Anxiety     CHF (congestive heart failure)     Edema     Hypertension     Initial insomnia     Sickle cell trait     Stroke     Tobacco use        Past Surgical History:   Procedure Laterality Date    BREAST SURGERY       SECTION      x 2    CYST REMOVAL      from left tube    ESOPHAGOGASTRODUODENOSCOPY N/A 3/12/2023    Procedure: EGD (ESOPHAGOGASTRODUODENOSCOPY);  Surgeon: Ray Serrato MD;  Location: [facility];  Service: Gastroenterology;  Laterality: N/A;    gastric sleeve      Raheel-en-Y gastric bypass      TONSILLECTOMY         Review of patient's allergies indicates:   Allergen Reactions    Lisinopril Anaphylaxis     This is only possible agent at the time.  This is only possible agent at the time.  This is only possible agent at the time.    Sulfa (sulfonamide antibiotics) Anaphylaxis and Hives    Beta-blockers (beta-adrenergic blocking agts)      Other reaction(s): Other (See Comments)  Syncope & Collapse     Family History       Problem Relation (Age of Onset)    Hypertension Mother, Paternal Grandmother    No Known Problems Father    Thyroid disease Paternal Aunt          Tobacco Use    Smoking status: Former     Types: Cigarettes    Smokeless tobacco: Never   Substance and Sexual Activity    Alcohol use: Yes    Drug use: No    Sexual activity: Not Currently     Partners: Male     Birth control/protection: None     Review of Systems   Constitutional:  Positive for appetite change. Negative for fever.   HENT:  Negative for hearing loss.    Eyes:  Negative for visual disturbance.   Respiratory:  Negative for cough and shortness of breath.    Cardiovascular:  Positive for leg swelling (she says it has decreased). Negative for chest pain.   Gastrointestinal:         As per HPI.   Genitourinary:  Negative for dysuria, frequency and hematuria.   Musculoskeletal:  Negative for arthralgias and back pain.    Skin:  Negative for rash.   Neurological:  Negative for seizures, numbness and headaches.   Hematological:  Does not bruise/bleed easily.   Psychiatric/Behavioral:  The patient is not nervous/anxious.      Objective:     Vital Signs (Most Recent):  Temp: 98.2 °F (36.8 °C) (01/29/24 0811)  Pulse: 76 (01/29/24 0816)  Resp: 16 (01/29/24 0811)  BP: (!) 126/90 (01/29/24 0811)  SpO2: 99 % (01/29/24 0811) Vital Signs (24h Range):  Temp:  [98 °F (36.7 °C)-98.6 °F (37 °C)] 98.2 °F (36.8 °C)  Pulse:  [75-99] 76  Resp:  [15-18] 16  SpO2:  [95 %-100 %] 99 %  BP: ()/(64-92) 126/90     Weight: 53.2 kg (117 lb 4.6 oz) (01/29/24 0600)  Body mass index is 17.83 kg/m².      Intake/Output Summary (Last 24 hours) at 1/29/2024 0845  Last data filed at 1/29/2024 0434  Gross per 24 hour   Intake 905.83 ml   Output --   Net 905.83 ml       Lines/Drains/Airways       Peripheral Intravenous Line  Duration                  Midline Catheter Insertion/Assessment  - Single Lumen 01/28/24 1915 Left basilic vein (medial side of arm) other (see comments) <1 day                     Physical Exam  Constitutional:       General: She is not in acute distress.  HENT:      Head: Normocephalic and atraumatic.   Eyes:      Extraocular Movements: Extraocular movements intact.   Cardiovascular:      Rate and Rhythm: Normal rate and regular rhythm.      Heart sounds: No murmur heard.  Pulmonary:      Effort: Pulmonary effort is normal. No respiratory distress.      Breath sounds: Normal breath sounds. No wheezing.   Abdominal:      General: Bowel sounds are normal. There is no distension.      Palpations: Abdomen is soft. There is no mass.      Tenderness: There is generalized abdominal tenderness. There is no guarding or rebound.   Musculoskeletal:      Cervical back: Normal range of motion and neck supple.      Right lower leg: 3+ edema.      Left lower leg: 3+ edema.   Skin:     General: Skin is warm and dry.      Findings: No rash.   Neurological:  "     Mental Status: She is alert and oriented to person, place, and time.      Cranial Nerves: No cranial nerve deficit.   Psychiatric:         Behavior: Behavior is agitated.          Significant Labs:  CBC:   Recent Labs   Lab 01/28/24  0935 01/29/24  0530   WBC 7.09 5.73   HGB 6.9* 8.3*   HCT 19.8* 24.5*    136*     CMP:   Recent Labs   Lab 01/29/24  0530   GLU 66*   CALCIUM 7.4*      K 4.4   CO2 17*   *   BUN 35*   CREATININE 3.1*     Coagulation: No results for input(s): "PT", "INR", "APTT" in the last 48 hours.    Significant Imaging:  Imaging results within the past 24 hours have been reviewed.  Assessment/Plan:     Cardiac/Vascular  Acute on chronic heart failure with preserved ejection fraction  Swelling improving per patient report. Continued treatment per HM team.     Hematology  * Acute deep vein thrombosis (DVT) of left lower extremity  Anticoagulation on hold due to recurrent GI bleeding.     Oncology  Normocytic anemia  Patient with known anastomotic ulcer from prior gastric bypass.   Awaiting outside records from most recent EGD.   Had prior EGD and colonoscopy in June at [facility] for symptomatic anemia and hematochezia.   Hgb responded to transfusion yesterday. Continue to monitor and transfuse additional units as indicated.    Continue to hold anticoagulation.   Continue Carafate four times a day and Protonix twice a day.   Follow up with her bariatric surgeon as an outpatient.     Endocrine  Malnutrition of moderate degree  Albumin 1.4. This will make ulcer healing difficult.   Nutrition was consulted.   Most recent weight and BMI monitored-     Measurements:  Wt Readings from Last 1 Encounters:   01/29/24 53.2 kg (117 lb 4.6 oz)   Body mass index is 17.83 kg/m².    Patient has been screened and assessed by RD.    GI  Anastomotic ulcer S/P gastric bypass  See plan above.       Thank you for your consult. I will follow-up with patient. Please contact us if you have any additional " questions.    Leon Bruner PA-C  Gastroenterology  [facility]

## 2024-01-29 NOTE — CONSULTS
Chart reviewed by Dr. Hurst.       ASSESSMENT/PLAN:    Left DVT    Anticoagulation on hold due to recurrent GI bleeding.  IVC filter order placed and will be performed asap.         Thank you for the consult.

## 2024-01-29 NOTE — PLAN OF CARE
01/29/24 1056   Post-Acute Status   Post-Acute Authorization Home Health   Home Health Status Referrals Sent   Discharge Plan   Discharge Plan A Home Health     Pt current with [facility] in [place]; referral sent via [employer].

## 2024-01-29 NOTE — ASSESSMENT & PLAN NOTE
Patient with known anastomotic ulcer from prior gastric bypass.   Awaiting outside records from most recent EGD.   Had prior EGD and colonoscopy in June at [facility] for symptomatic anemia and hematochezia.   Hgb responded to transfusion yesterday. Continue to monitor and transfuse additional units as indicated.    Continue to hold anticoagulation.   Continue Carafate four times a day and Protonix twice a day.   Follow up with her bariatric surgeon as an outpatient.

## 2024-01-29 NOTE — ASSESSMENT & PLAN NOTE
Patient's anemia is currently uncontrolled. Has received 1 units of PRBCs on 1/28/24 . Etiology likely d/t acute blood loss which was from GI bleeding.  Current CBC reviewed-   Lab Results   Component Value Date    HGB 8.3 (L) 01/29/2024    HCT 24.5 (L) 01/29/2024     Monitor serial CBC and transfuse if patient becomes hemodynamically unstable, symptomatic or H/H drops below 7/21.

## 2024-01-29 NOTE — PROVATION PATIENT INSTRUCTIONS
Discharge Summary/Instructions after an Endoscopic Procedure  Patient Name: Joe Ceballos  Patient MRN: [id number]  Patient YOB: 1970 Monday, January 29, 2024 Lisa Christian MD  Dear patient,  As a result of recent federal legislation (The Federal Cures Act), you may   receive lab or pathology results from your procedure in your [facility]   account before your physician is able to contact you. Your physician or   their representative will relay the results to you with their   recommendations at their soonest availability.  Thank you,  RESTRICTIONS:  During your procedure today, you received medications for sedation.  These   medications may affect your judgment, balance and coordination.  Therefore,   for 24 hours, you have the following restrictions:   - DO NOT drive a car, operate machinery, make legal/financial decisions,   sign important papers or drink alcohol.    ACTIVITY:  Today: no heavy lifting, straining or running due to procedural   sedation/anesthesia.  The following day: return to full activity including work.  DIET:  Eat and drink normally unless instructed otherwise.     TREATMENT FOR COMMON SIDE EFFECTS:  - Mild abdominal pain, nausea, belching, bloating or excessive gas:  rest,   eat lightly and use a heating pad.  - Sore Throat: treat with throat lozenges and/or gargle with warm salt   water.  - Because air was used during the procedure, expelling large amounts of air   from your rectum or belching is normal.  - If a bowel prep was taken, you may not have a bowel movement for 1-3 days.    This is normal.  SYMPTOMS TO WATCH FOR AND REPORT TO YOUR PHYSICIAN:  1. Abdominal pain or bloating, other than gas cramps.  2. Chest pain.  3. Back pain.  4. Signs of infection such as: chills or fever occurring within 24 hours   after the procedure.  5. Rectal bleeding, which would show as bright red, maroon, or black stools.   (A tablespoon of blood from the rectum is not serious, especially if    hemorrhoids are present.)  6. Vomiting.  7. Weakness or dizziness.  GO DIRECTLY TO THE NEAREST EMERGENCY ROOM IF YOU HAVE ANY OF THE FOLLOWING:      Difficulty breathing              Chills and/or fever over 101 F   Persistent vomiting and/or vomiting blood   Severe abdominal pain   Severe chest pain   Black, tarry stools   Bleeding- more than one tablespoon   Any other symptom or condition that you feel may need urgent attention  Your doctor recommends these additional instructions:  If any biopsies were taken, your doctors clinic will contact you in 1 to 2   weeks with any results.  - Return patient to [facility] for ongoing care.   - Bariatric diet.   - Continue present medications.   - Continue Pantoprazole at current dose.  - Continue Carafate at current dose.  - The findings and recommendations were discussed with the patient.   - Refer to a surgeon for chronic anastomotic ulcer despite longterm high   dose acid suppression therapy.   - Communicated with hospital medicine service regarding findings.  For questions, problems or results please call your physician Lisa Christian MD at Work:  [phone]  If you have any questions about the above instructions, call the GI   department at [phone] or call the endoscopy unit at [phone]   from 7am until 3 pm.  [facility], EMERGENCY ROOM PHONE NUMBER:   [phone]  IF A COMPLICATION OR EMERGENCY SITUATION ARISES AND YOU ARE UNABLE TO REACH   YOUR PHYSICIAN - GO DIRECTLY TO THE EMERGENCY ROOM.  I have read or have had read to me these discharge instructions for my   procedure and have received a written copy.  I understand these   instructions and will follow-up with my physician if I have any questions.     __________________________________       _____________________________________  Nurse Signature                                          Patient/Designated   Responsible Party Signature  MD Lisa Corcoran  MD Gino  1/29/2024 2:04:03 PM  This report has been verified and signed electronically.  Dear patient,  As a result of recent federal legislation (The Federal Cures Act), you may   receive lab or pathology results from your procedure in your [facility]   account before your physician is able to contact you. Your physician or   their representative will relay the results to you with their   recommendations at their soonest availability.  Thank you,  PROVATION

## 2024-01-29 NOTE — SUBJECTIVE & OBJECTIVE
Past Medical History:   Diagnosis Date    Anxiety     CHF (congestive heart failure)     Edema     Hypertension     Initial insomnia     Sickle cell trait     Stroke     Tobacco use        Past Surgical History:   Procedure Laterality Date    BREAST SURGERY       SECTION      x 2    CYST REMOVAL      from left tube    ESOPHAGOGASTRODUODENOSCOPY N/A 3/12/2023    Procedure: EGD (ESOPHAGOGASTRODUODENOSCOPY);  Surgeon: Ray Serrato MD;  Location: [facility];  Service: Gastroenterology;  Laterality: N/A;    gastric sleeve      Raheel-en-Y gastric bypass      TONSILLECTOMY         Review of patient's allergies indicates:   Allergen Reactions    Lisinopril Anaphylaxis     This is only possible agent at the time.  This is only possible agent at the time.  This is only possible agent at the time.    Sulfa (sulfonamide antibiotics) Anaphylaxis and Hives    Beta-blockers (beta-adrenergic blocking agts)      Other reaction(s): Other (See Comments)  Syncope & Collapse     Family History       Problem Relation (Age of Onset)    Hypertension Mother, Paternal Grandmother    No Known Problems Father    Thyroid disease Paternal Aunt          Tobacco Use    Smoking status: Former     Types: Cigarettes    Smokeless tobacco: Never   Substance and Sexual Activity    Alcohol use: Yes    Drug use: No    Sexual activity: Not Currently     Partners: Male     Birth control/protection: None     Review of Systems   Constitutional:  Positive for appetite change. Negative for fever.   HENT:  Negative for hearing loss.    Eyes:  Negative for visual disturbance.   Respiratory:  Negative for cough and shortness of breath.    Cardiovascular:  Positive for leg swelling (she says it has decreased). Negative for chest pain.   Gastrointestinal:         As per HPI.   Genitourinary:  Negative for dysuria, frequency and hematuria.   Musculoskeletal:  Negative for arthralgias and back pain.   Skin:  Negative for rash.   Neurological:   Negative for seizures, numbness and headaches.   Hematological:  Does not bruise/bleed easily.   Psychiatric/Behavioral:  The patient is not nervous/anxious.      Objective:     Vital Signs (Most Recent):  Temp: 98.2 °F (36.8 °C) (01/29/24 0811)  Pulse: 76 (01/29/24 0816)  Resp: 16 (01/29/24 0811)  BP: (!) 126/90 (01/29/24 0811)  SpO2: 99 % (01/29/24 0811) Vital Signs (24h Range):  Temp:  [98 °F (36.7 °C)-98.6 °F (37 °C)] 98.2 °F (36.8 °C)  Pulse:  [75-99] 76  Resp:  [15-18] 16  SpO2:  [95 %-100 %] 99 %  BP: ()/(64-92) 126/90     Weight: 53.2 kg (117 lb 4.6 oz) (01/29/24 0600)  Body mass index is 17.83 kg/m².      Intake/Output Summary (Last 24 hours) at 1/29/2024 0845  Last data filed at 1/29/2024 0434  Gross per 24 hour   Intake 905.83 ml   Output --   Net 905.83 ml       Lines/Drains/Airways       Peripheral Intravenous Line  Duration                  Midline Catheter Insertion/Assessment  - Single Lumen 01/28/24 1915 Left basilic vein (medial side of arm) other (see comments) <1 day                     Physical Exam  Constitutional:       General: She is not in acute distress.  HENT:      Head: Normocephalic and atraumatic.   Eyes:      Extraocular Movements: Extraocular movements intact.   Cardiovascular:      Rate and Rhythm: Normal rate and regular rhythm.      Heart sounds: No murmur heard.  Pulmonary:      Effort: Pulmonary effort is normal. No respiratory distress.      Breath sounds: Normal breath sounds. No wheezing.   Abdominal:      General: Bowel sounds are normal. There is no distension.      Palpations: Abdomen is soft. There is no mass.      Tenderness: There is generalized abdominal tenderness. There is no guarding or rebound.   Musculoskeletal:      Cervical back: Normal range of motion and neck supple.      Right lower leg: No edema.      Left lower leg: No edema.   Skin:     General: Skin is warm and dry.      Findings: No rash.   Neurological:      Mental Status: She is alert and  "oriented to person, place, and time.      Cranial Nerves: No cranial nerve deficit.   Psychiatric:         Behavior: Behavior is agitated.          Significant Labs:  CBC:   Recent Labs   Lab 01/28/24  0935 01/29/24  0530   WBC 7.09 5.73   HGB 6.9* 8.3*   HCT 19.8* 24.5*    136*     CMP:   Recent Labs   Lab 01/29/24  0530   GLU 66*   CALCIUM 7.4*      K 4.4   CO2 17*   *   BUN 35*   CREATININE 3.1*     Coagulation: No results for input(s): "PT", "INR", "APTT" in the last 48 hours.    Significant Imaging:  Imaging results within the past 24 hours have been reviewed.  "

## 2024-01-29 NOTE — PLAN OF CARE
Care plan reviewed with patient. No vomiting or bleeding noted.  Free from falls. No other complaints made. All orders checked and reviewed.

## 2024-01-29 NOTE — TRANSFER OF CARE
"Anesthesia Transfer of Care Note    Patient: Joe Ceballos    Procedure(s) Performed: Procedure(s) (LRB):  EGD (ESOPHAGOGASTRODUODENOSCOPY) (N/A)    Patient location: GI    Anesthesia Type: MAC    Transport from OR: Transported from OR on room air with adequate spontaneous ventilation    Post pain: adequate analgesia    Post assessment: no apparent anesthetic complications and tolerated procedure well    Post vital signs: stable    Level of consciousness: responds to stimulation    Nausea/Vomiting: no nausea/vomiting    Complications: none    Transfer of care protocol was followed      Last vitals: Visit Vitals  /72 (BP Location: Right arm, Patient Position: Sitting)   Pulse 87   Temp 36.6 °C (97.9 °F) (Oral)   Resp 16   Ht 5' 8" (1.727 m)   Wt 53.2 kg (117 lb 4.6 oz)   SpO2 100%   Breastfeeding No   BMI 17.83 kg/m²     "

## 2024-01-29 NOTE — ANESTHESIA POSTPROCEDURE EVALUATION
Anesthesia Post Evaluation    Patient: Joe Ceballos    Procedure(s) Performed: Procedure(s) (LRB):  EGD (ESOPHAGOGASTRODUODENOSCOPY) (N/A)    Final Anesthesia Type: MAC      Patient location during evaluation: GI PACU  Patient participation: Yes- Able to Participate  Level of consciousness: awake and alert  Post-procedure vital signs: reviewed and stable  Pain management: adequate  Airway patency: patent    PONV status at discharge: No PONV  Anesthetic complications: no      Cardiovascular status: blood pressure returned to baseline, hemodynamically stable and stable  Respiratory status: unassisted, room air and spontaneous ventilation  Hydration status: euvolemic  Follow-up not needed.              Vitals Value Taken Time   /82 01/29/24 1415   Temp 36.7 °C (98 °F) 01/29/24 1415   Pulse 64 01/29/24 1415   Resp 20 01/29/24 1415   SpO2 100 % 01/29/24 1415         Event Time   Out of Recovery 01/29/2024 14:16:03         Pain/Roxy Score: Roxy Score: 10 (1/29/2024  2:15 PM)

## 2024-01-29 NOTE — ASSESSMENT & PLAN NOTE
Nutrition consulted. Most recent weight and BMI monitored-     Measurements:  Wt Readings from Last 1 Encounters:   01/29/24 53.2 kg (117 lb 4.6 oz)   Body mass index is 17.83 kg/m².    Patient has been screened and assessed by RD.    Malnutrition Type:  Context:    Level:      Malnutrition Characteristic Summary:  Fluid Accumulation (Malnutrition): moderate    Interventions/Recommendations (treatment strategy):  1. Recommend pt continues Low Sodium, 2gm Fluid - 1500mL diet. 2. Recommend pt receives Boost plus BID 3. Recommend pt continues Anti-emetics. 4.  Weigh daily

## 2024-01-29 NOTE — ASSESSMENT & PLAN NOTE
Chronic, controlled. Latest blood pressure and vitals reviewed-     Temp:  [98 °F (36.7 °C)-98.6 °F (37 °C)]   Pulse:  [75-99]   Resp:  [15-18]   BP: ()/(64-92)   SpO2:  [95 %-100 %] .   Home meds for hypertension were reviewed and noted below.   Hypertension Medications               amLODIPine (NORVASC) 5 MG tablet Take 5 mg by mouth once daily.    furosemide (LASIX) 40 MG tablet Take 40 mg by mouth 2 (two) times a day.    isosorbide mononitrate (IMDUR) 60 MG 24 hr tablet Take 60 mg by mouth once daily.            While in the hospital, will manage blood pressure as follows; Continue home antihypertensive regimen    Will utilize p.r.n. blood pressure medication only if patient's blood pressure greater than 160/100 and she develops symptoms such as worsening chest pain or shortness of breath.

## 2024-01-29 NOTE — ASSESSMENT & PLAN NOTE
Creatine stable for now. BMP reviewed- noted Estimated Creatinine Clearance: 17.6 mL/min (A) (based on SCr of 3.1 mg/dL (H)). according to latest data. Based on current GFR, CKD stage is stage 4 - GFR 15-29.  Monitor UOP and serial BMP and adjust therapy as needed. Renally dose meds. Avoid nephrotoxic medications and procedures

## 2024-01-29 NOTE — PLAN OF CARE
01/29/24 1057   Post-Acute Status   Post-Acute Authorization Home Health   Home Health Status Referrals Sent   Discharge Plan   Discharge Plan A Home Health     Pt current with [facility] [place]; referral sent via [facility]

## 2024-01-29 NOTE — ASSESSMENT & PLAN NOTE
Patient is identified as having Diastolic (HFpEF) heart failure that is Acute on chronic. CHF is currently uncontrolled due to Continued edema of extremities. Latest ECHO performed and demonstrates- Results for orders placed during the hospital encounter of 01/21/23    Echo Saline Bubble? Yes    Interpretation Summary  · There is no evidence of intracardiac shunting.  · The left ventricle is normal in size with mild concentric hypertrophy and normal systolic function.  · The estimated ejection fraction is 60%.  · Normal left ventricular diastolic function.  · Normal right ventricular size with normal right ventricular systolic function.  · Mild to moderate tricuspid regurgitation.  · Normal central venous pressure (3 mmHg).  · The estimated PA systolic pressure is 36 mmHg.  . Continue Furosemide and monitor clinical status closely. Monitor on telemetry. Patient is on CHF pathway.  Monitor strict Is&Os and daily weights.  Place on fluid restriction of 1.5 L. Cardiology has not been consulted. Continue to stress to patient importance of self efficacy and  on diet for CHF. Last BNP reviewed- and noted below   Recent Labs   Lab 01/29/24  0530   *       Continue IV Lasix   Strict I/O's

## 2024-01-29 NOTE — CONSULTS
[facility]  Gastroenterology  Consult Note    Patient Name: Joe Ceballos  MRN: [id number]  Admission Date: 1/24/2024  Hospital Length of Stay: 4 days  Code Status: Full Code   Attending Provider: Tyrese Jensen MD   Consulting Provider: Leon Bruner PA-C  Primary Care Physician: Daphney Heart MD  Principal Problem:Acute deep vein thrombosis (DVT) of left lower extremity    Inpatient consult to Gastroenterology  Consult performed by: Leon Bruner PA-C  Consult ordered by: Tyrese Jensen MD  Reason for consult: GI bleed        Subjective:     HPI:  The patient presented to the ER five days ago for leg swelling with drainage from her toe and SOB. She was admitted with acute on chronic heart failure. She was also found to have a DVT in the left lower extremity. She was started on Heparin drip.     We have been consulted for decreasing Hgb with history of chronic ulcer. The patient's history is significant for gastric sleeve in 2016, converted to gastric bypass in 2018 with paraesophageal hernia repair 2022. EGD done here in March 2023 showed clean base anastomotic ulcer. She was admitted to [facility] in July 2023 for syncope with hematochezia. EGD showed oozing from the anastomotic ulcer. Colonoscopy was normal but cecum not seen due to looping. Bariatric surgeon, Dr. Hernández, saw her that admit and recommended high protein diet, Carafate and Protonix. The patient says she was admitted in the meantime at [facility] and had an EGD by a bariatric surgeon. This report has been requested.     Her Hgb this admit was 9.4. It gradually decreased to 6.9 yesterday. She was transfused 2 u PRBC. Repeat Hgb today is 8.3. She last received Heparin on the 25th. Eliquis was not restarted. She was off Protonix until yesterday. She's been on Carafate since the 25th. BUN and creatinine trending up. She is hemodynamically stable. She reports nausea, vomiting and generalized abdominal pain. She says she had bright red blood in the  her stool but now it is black.           Past Medical History:   Diagnosis Date    Anxiety     CHF (congestive heart failure)     Edema     Hypertension     Initial insomnia     Sickle cell trait     Stroke     Tobacco use        Past Surgical History:   Procedure Laterality Date    BREAST SURGERY       SECTION      x 2    CYST REMOVAL      from left tube    ESOPHAGOGASTRODUODENOSCOPY N/A 3/12/2023    Procedure: EGD (ESOPHAGOGASTRODUODENOSCOPY);  Surgeon: Ray Serrato MD;  Location: [facility];  Service: Gastroenterology;  Laterality: N/A;    gastric sleeve      Raheel-en-Y gastric bypass      TONSILLECTOMY         Review of patient's allergies indicates:   Allergen Reactions    Lisinopril Anaphylaxis     This is only possible agent at the time.  This is only possible agent at the time.  This is only possible agent at the time.    Sulfa (sulfonamide antibiotics) Anaphylaxis and Hives    Beta-blockers (beta-adrenergic blocking agts)      Other reaction(s): Other (See Comments)  Syncope & Collapse     Family History       Problem Relation (Age of Onset)    Hypertension Mother, Paternal Grandmother    No Known Problems Father    Thyroid disease Paternal Aunt          Tobacco Use    Smoking status: Former     Types: Cigarettes    Smokeless tobacco: Never   Substance and Sexual Activity    Alcohol use: Yes    Drug use: No    Sexual activity: Not Currently     Partners: Male     Birth control/protection: None     Review of Systems   Constitutional:  Positive for appetite change. Negative for fever.   HENT:  Negative for hearing loss.    Eyes:  Negative for visual disturbance.   Respiratory:  Negative for cough and shortness of breath.    Cardiovascular:  Positive for leg swelling (she says it has decreased). Negative for chest pain.   Gastrointestinal:         As per HPI.   Genitourinary:  Negative for dysuria, frequency and hematuria.   Musculoskeletal:  Negative for arthralgias and back pain.    Skin:  Negative for rash.   Neurological:  Negative for seizures, numbness and headaches.   Hematological:  Does not bruise/bleed easily.   Psychiatric/Behavioral:  The patient is not nervous/anxious.      Objective:     Vital Signs (Most Recent):  Temp: 98.2 °F (36.8 °C) (01/29/24 0811)  Pulse: 76 (01/29/24 0816)  Resp: 16 (01/29/24 0811)  BP: (!) 126/90 (01/29/24 0811)  SpO2: 99 % (01/29/24 0811) Vital Signs (24h Range):  Temp:  [98 °F (36.7 °C)-98.6 °F (37 °C)] 98.2 °F (36.8 °C)  Pulse:  [75-99] 76  Resp:  [15-18] 16  SpO2:  [95 %-100 %] 99 %  BP: ()/(64-92) 126/90     Weight: 53.2 kg (117 lb 4.6 oz) (01/29/24 0600)  Body mass index is 17.83 kg/m².      Intake/Output Summary (Last 24 hours) at 1/29/2024 0845  Last data filed at 1/29/2024 0434  Gross per 24 hour   Intake 905.83 ml   Output --   Net 905.83 ml       Lines/Drains/Airways       Peripheral Intravenous Line  Duration                  Midline Catheter Insertion/Assessment  - Single Lumen 01/28/24 1915 Left basilic vein (medial side of arm) other (see comments) <1 day                     Physical Exam  Constitutional:       General: She is not in acute distress.  HENT:      Head: Normocephalic and atraumatic.   Eyes:      Extraocular Movements: Extraocular movements intact.   Cardiovascular:      Rate and Rhythm: Normal rate and regular rhythm.      Heart sounds: No murmur heard.  Pulmonary:      Effort: Pulmonary effort is normal. No respiratory distress.      Breath sounds: Normal breath sounds. No wheezing.   Abdominal:      General: Bowel sounds are normal. There is no distension.      Palpations: Abdomen is soft. There is no mass.      Tenderness: There is generalized abdominal tenderness. There is no guarding or rebound.   Musculoskeletal:      Cervical back: Normal range of motion and neck supple.      Right lower leg: 3+ edema.      Left lower leg: 3+ edema.   Skin:     General: Skin is warm and dry.      Findings: No rash.   Neurological:  "     Mental Status: She is alert and oriented to person, place, and time.      Cranial Nerves: No cranial nerve deficit.   Psychiatric:         Behavior: Behavior is agitated.          Significant Labs:  CBC:   Recent Labs   Lab 01/28/24  0935 01/29/24  0530   WBC 7.09 5.73   HGB 6.9* 8.3*   HCT 19.8* 24.5*    136*     CMP:   Recent Labs   Lab 01/29/24  0530   GLU 66*   CALCIUM 7.4*      K 4.4   CO2 17*   *   BUN 35*   CREATININE 3.1*     Coagulation: No results for input(s): "PT", "INR", "APTT" in the last 48 hours.    Significant Imaging:  Imaging results within the past 24 hours have been reviewed.  Assessment/Plan:     Cardiac/Vascular  Acute on chronic heart failure with preserved ejection fraction  Swelling improving per patient report. Continued treatment per HM team.     Hematology  * Acute deep vein thrombosis (DVT) of left lower extremity  Anticoagulation on hold due to recurrent GI bleeding.     Oncology  Normocytic anemia  Patient with known anastomotic ulcer from prior gastric bypass.   Awaiting outside records from most recent EGD.   Had prior EGD and colonoscopy in June at [facility] for symptomatic anemia and hematochezia.   Hgb responded to transfusion yesterday. Continue to monitor and transfuse additional units as indicated.    Continue to hold anticoagulation.   Continue Carafate four times a day and Protonix twice a day.   Follow up with her bariatric surgeon as an outpatient.     Endocrine  Malnutrition of moderate degree  Albumin 1.4. This will make ulcer healing difficult.   Nutrition was consulted.   Most recent weight and BMI monitored-     Measurements:  Wt Readings from Last 1 Encounters:   01/29/24 53.2 kg (117 lb 4.6 oz)   Body mass index is 17.83 kg/m².    Patient has been screened and assessed by RD.    GI  Anastomotic ulcer S/P gastric bypass  See plan above.       Thank you for your consult. I will follow-up with patient. Please contact us if you have any additional " questions.    Leon Bruner PA-C  Gastroenterology  [facility]

## 2024-01-29 NOTE — ASSESSMENT & PLAN NOTE
Albumin 1.4. This will make ulcer healing difficult.   Nutrition was consulted.   Most recent weight and BMI monitored-     Measurements:  Wt Readings from Last 1 Encounters:   01/29/24 53.2 kg (117 lb 4.6 oz)   Body mass index is 17.83 kg/m².    Patient has been screened and assessed by RD.

## 2024-01-29 NOTE — INTERVAL H&P NOTE
The patient has been examined and the H&P has been reviewed:    I concur with the findings and no changes have occurred since H&P was written.Tolerated IVC filter placement without events.    Anesthesia/Surgery risks, benefits and alternative options discussed and understood by patient/family.          Active Hospital Problems    Diagnosis  POA    *Acute deep vein thrombosis (DVT) of left lower extremity [I82.402]  Yes    Normocytic anemia [D64.9]  Yes     Priority: 1 - High    Anastomotic ulcer S/P gastric bypass [T85.898A, K28.9]  Yes     Priority: 2     Acute on chronic heart failure with preserved ejection fraction [I50.33]  Yes     Priority: 3     History of CVA (cerebrovascular accident) [Z86.73]  Not Applicable     Priority: 4     Primary hypertension [I10]  Yes     Priority: 5     History of GI bleed [Z87.19]  Not Applicable    Venous stasis ulcer [I83.009, L97.909]  Yes    CKD (chronic kidney disease), stage IV [N18.4]  Yes    Malnutrition of moderate degree [E44.0]  Yes      Resolved Hospital Problems    Diagnosis Date Resolved POA    Hypoglycemia [E16.2] 01/28/2024 Yes

## 2024-01-29 NOTE — HPI
The patient presented to the ER five days ago for leg swelling with drainage from her toe and SOB. She was admitted with acute on chronic heart failure. She was also found to have a DVT in the left lower extremity. She was started on Heparin drip.     We have been consulted for decreasing Hgb with history of chronic ulcer. The patient's history is significant for gastric sleeve in 2016, converted to gastric bypass in 2018 with paraesophageal hernia repair 2022. EGD done here in March 2023 showed clean base anastomotic ulcer. She was admitted to [facility] in July 2023 for syncope with hematochezia. EGD showed oozing from the anastomotic ulcer. Colonoscopy was normal but cecum not seen due to looping. Bariatric surgeon, Dr. Hernández, saw her that admit and recommended high protein diet, Carafate and Protonix. The patient says she was admitted in the meantime at [facility] and had an EGD by a bariatric surgeon. This report has been requested.     Her Hgb this admit was 9.4. It gradually decreased to 6.9 yesterday. She was transfused 2 u PRBC. Repeat Hgb today is 8.3. She last received Heparin on the 25th. Eliquis was not restarted. She was off Protonix until yesterday. She's been on Carafate since the 25th. BUN and creatinine trending up. She is hemodynamically stable. She reports nausea, vomiting and generalized abdominal pain. She says she had bright red blood in the her stool but now it is black.

## 2024-01-30 ENCOUNTER — TELEPHONE (OUTPATIENT)
Dept: PRIMARY CARE CLINIC | Facility: CLINIC | Age: 54
End: 2024-01-30
Payer: COMMERCIAL

## 2024-01-30 VITALS
HEIGHT: 68 IN | RESPIRATION RATE: 19 BRPM | SYSTOLIC BLOOD PRESSURE: 116 MMHG | BODY MASS INDEX: 17.78 KG/M2 | TEMPERATURE: 98 F | WEIGHT: 117.31 LBS | DIASTOLIC BLOOD PRESSURE: 87 MMHG | OXYGEN SATURATION: 99 % | HEART RATE: 104 BPM

## 2024-01-30 LAB
ALBUMIN SERPL BCP-MCNC: 1.4 G/DL (ref 3.5–5.2)
ALP SERPL-CCNC: 158 U/L (ref 55–135)
ALT SERPL W/O P-5'-P-CCNC: 18 U/L (ref 10–44)
ANION GAP SERPL CALC-SCNC: 8 MMOL/L (ref 8–16)
ANION GAP SERPL CALC-SCNC: 8 MMOL/L (ref 8–16)
AST SERPL-CCNC: 18 U/L (ref 10–40)
BASOPHILS # BLD AUTO: 0.08 K/UL (ref 0–0.2)
BASOPHILS NFR BLD: 0.9 % (ref 0–1.9)
BILIRUB SERPL-MCNC: 0.3 MG/DL (ref 0.1–1)
BUN SERPL-MCNC: 35 MG/DL (ref 6–20)
BUN SERPL-MCNC: 35 MG/DL (ref 6–20)
CALCIUM SERPL-MCNC: 7.6 MG/DL (ref 8.7–10.5)
CALCIUM SERPL-MCNC: 7.6 MG/DL (ref 8.7–10.5)
CHLORIDE SERPL-SCNC: 115 MMOL/L (ref 95–110)
CHLORIDE SERPL-SCNC: 115 MMOL/L (ref 95–110)
CO2 SERPL-SCNC: 17 MMOL/L (ref 23–29)
CO2 SERPL-SCNC: 17 MMOL/L (ref 23–29)
CREAT SERPL-MCNC: 3.3 MG/DL (ref 0.5–1.4)
CREAT SERPL-MCNC: 3.3 MG/DL (ref 0.5–1.4)
DIFFERENTIAL METHOD BLD: ABNORMAL
EOSINOPHIL # BLD AUTO: 0.1 K/UL (ref 0–0.5)
EOSINOPHIL NFR BLD: 1 % (ref 0–8)
ERYTHROCYTE [DISTWIDTH] IN BLOOD BY AUTOMATED COUNT: 16.1 % (ref 11.5–14.5)
EST. GFR  (NO RACE VARIABLE): 16 ML/MIN/1.73 M^2
EST. GFR  (NO RACE VARIABLE): 16 ML/MIN/1.73 M^2
GLUCOSE SERPL-MCNC: 67 MG/DL (ref 70–110)
GLUCOSE SERPL-MCNC: 67 MG/DL (ref 70–110)
HCT VFR BLD AUTO: 30 % (ref 37–48.5)
HGB BLD-MCNC: 10.2 G/DL (ref 12–16)
IMM GRANULOCYTES # BLD AUTO: 0.03 K/UL (ref 0–0.04)
IMM GRANULOCYTES NFR BLD AUTO: 0.3 % (ref 0–0.5)
LYMPHOCYTES # BLD AUTO: 1.6 K/UL (ref 1–4.8)
LYMPHOCYTES NFR BLD: 17.6 % (ref 18–48)
MAGNESIUM SERPL-MCNC: 1.8 MG/DL (ref 1.6–2.6)
MCH RBC QN AUTO: 30.7 PG (ref 27–31)
MCHC RBC AUTO-ENTMCNC: 34 G/DL (ref 32–36)
MCV RBC AUTO: 90 FL (ref 82–98)
MONOCYTES # BLD AUTO: 0.5 K/UL (ref 0.3–1)
MONOCYTES NFR BLD: 5.5 % (ref 4–15)
NEUTROPHILS # BLD AUTO: 6.7 K/UL (ref 1.8–7.7)
NEUTROPHILS NFR BLD: 74.7 % (ref 38–73)
NRBC BLD-RTO: 0 /100 WBC
PLATELET # BLD AUTO: 160 K/UL (ref 150–450)
PMV BLD AUTO: 10.1 FL (ref 9.2–12.9)
POCT GLUCOSE: 110 MG/DL (ref 70–110)
POCT GLUCOSE: 71 MG/DL (ref 70–110)
POCT GLUCOSE: 87 MG/DL (ref 70–110)
POTASSIUM SERPL-SCNC: 4.1 MMOL/L (ref 3.5–5.1)
POTASSIUM SERPL-SCNC: 4.1 MMOL/L (ref 3.5–5.1)
PROT SERPL-MCNC: 4.9 G/DL (ref 6–8.4)
RBC # BLD AUTO: 3.32 M/UL (ref 4–5.4)
SODIUM SERPL-SCNC: 140 MMOL/L (ref 136–145)
SODIUM SERPL-SCNC: 140 MMOL/L (ref 136–145)
WBC # BLD AUTO: 8.91 K/UL (ref 3.9–12.7)

## 2024-01-30 PROCEDURE — 63600175 PHARM REV CODE 636 W HCPCS: Performed by: NURSE PRACTITIONER

## 2024-01-30 PROCEDURE — 83735 ASSAY OF MAGNESIUM: CPT | Performed by: HOSPITALIST

## 2024-01-30 PROCEDURE — 85025 COMPLETE CBC W/AUTO DIFF WBC: CPT | Performed by: PHYSICIAN ASSISTANT

## 2024-01-30 PROCEDURE — 80053 COMPREHEN METABOLIC PANEL: CPT | Performed by: HOSPITALIST

## 2024-01-30 PROCEDURE — C9113 INJ PANTOPRAZOLE SODIUM, VIA: HCPCS | Performed by: HOSPITALIST

## 2024-01-30 PROCEDURE — 63600175 PHARM REV CODE 636 W HCPCS: Performed by: HOSPITALIST

## 2024-01-30 PROCEDURE — 36415 COLL VENOUS BLD VENIPUNCTURE: CPT | Mod: XB | Performed by: PHYSICIAN ASSISTANT

## 2024-01-30 PROCEDURE — 25000003 PHARM REV CODE 250: Performed by: NURSE PRACTITIONER

## 2024-01-30 PROCEDURE — 36415 COLL VENOUS BLD VENIPUNCTURE: CPT | Performed by: HOSPITALIST

## 2024-01-30 PROCEDURE — 99232 SBSQ HOSP IP/OBS MODERATE 35: CPT | Mod: ,,, | Performed by: PHYSICIAN ASSISTANT

## 2024-01-30 RX ORDER — OMEPRAZOLE 40 MG/1
40 CAPSULE, DELAYED RELEASE ORAL
Qty: 60 CAPSULE | Refills: 2 | Status: SHIPPED | OUTPATIENT
Start: 2024-01-30

## 2024-01-30 RX ORDER — HYDROCODONE BITARTRATE AND ACETAMINOPHEN 5; 325 MG/1; MG/1
1 TABLET ORAL EVERY 8 HOURS PRN
Qty: 21 TABLET | Refills: 0 | Status: SHIPPED | OUTPATIENT
Start: 2024-01-30 | End: 2024-02-06

## 2024-01-30 RX ORDER — SODIUM BICARBONATE 650 MG/1
1300 TABLET ORAL 3 TIMES DAILY
Qty: 180 TABLET | Refills: 0 | Status: SHIPPED | OUTPATIENT
Start: 2024-01-30

## 2024-01-30 RX ORDER — SUCRALFATE 1 G/1
1 TABLET ORAL
Qty: 120 TABLET | Refills: 3 | Status: SHIPPED | OUTPATIENT
Start: 2024-01-30 | End: 2024-05-29

## 2024-01-30 RX ADMIN — SODIUM BICARBONATE 650 MG TABLET 1300 MG: at 09:01

## 2024-01-30 RX ADMIN — ONDANSETRON 4 MG: 2 INJECTION INTRAMUSCULAR; INTRAVENOUS at 12:01

## 2024-01-30 RX ADMIN — PANTOPRAZOLE SODIUM 40 MG: 40 INJECTION, POWDER, FOR SOLUTION INTRAVENOUS at 09:01

## 2024-01-30 RX ADMIN — SUCRALFATE 1 G: 1 TABLET ORAL at 11:01

## 2024-01-30 RX ADMIN — ISOSORBIDE MONONITRATE 60 MG: 60 TABLET, EXTENDED RELEASE ORAL at 09:01

## 2024-01-30 RX ADMIN — SUCRALFATE 1 G: 1 TABLET ORAL at 06:01

## 2024-01-30 RX ADMIN — FUROSEMIDE 40 MG: 10 INJECTION, SOLUTION INTRAMUSCULAR; INTRAVENOUS at 09:01

## 2024-01-30 NOTE — ASSESSMENT & PLAN NOTE
S/p EGD yesterday with anastomotic ulcer noted, no active bleeding.   Recommend Carafate qid and PPI bid.   Continue to hold anticoagulation.   Outpatient follow up with her bariatric surgeon, Dr. Genny Zhang.

## 2024-01-30 NOTE — SUBJECTIVE & OBJECTIVE
Subjective:     Interval History: Patient reports feeling much better today. She woke hungry, and says she hasn't had anymore black stools. Renal fxn worse.     Review of Systems   Constitutional:         See Interval History for daily ROS.      Objective:     Vital Signs (Most Recent):  Temp: 98.1 °F (36.7 °C) (01/30/24 0507)  Pulse: 104 (01/30/24 0903)  Resp: 20 (01/30/24 0507)  BP: 112/75 (01/30/24 0507)  SpO2: 100 % (01/30/24 0507) Vital Signs (24h Range):  Temp:  [97.6 °F (36.4 °C)-98.6 °F (37 °C)] 98.1 °F (36.7 °C)  Pulse:  [] 104  Resp:  [16-22] 20  SpO2:  [98 %-100 %] 100 %  BP: ()/(56-82) 112/75     Weight: 53.2 kg (117 lb 4.6 oz) (01/29/24 0600)  Body mass index is 17.83 kg/m².      Intake/Output Summary (Last 24 hours) at 1/30/2024 0904  Last data filed at 1/29/2024 1817  Gross per 24 hour   Intake 1040 ml   Output 2 ml   Net 1038 ml       Lines/Drains/Airways       Drain  Duration             Male External Urinary Catheter 01/29/24 1300 <1 day              Peripheral Intravenous Line  Duration                  Midline Catheter Insertion/Assessment  - Single Lumen 01/28/24 1915 Left basilic vein (medial side of arm) other (see comments) 1 day                     Physical Exam  Constitutional:       General: She is not in acute distress.     Appearance: She is underweight.   HENT:      Head: Normocephalic and atraumatic.   Eyes:      Extraocular Movements: Extraocular movements intact.   Cardiovascular:      Rate and Rhythm: Normal rate and regular rhythm.   Pulmonary:      Effort: Pulmonary effort is normal. No respiratory distress.      Breath sounds: Normal breath sounds. No wheezing.   Abdominal:      General: Bowel sounds are normal. There is no distension.      Palpations: Abdomen is soft.      Tenderness: There is no abdominal tenderness.   Neurological:      Mental Status: She is alert and oriented to person, place, and time.      Cranial Nerves: No cranial nerve deficit.  "  Psychiatric:         Behavior: Behavior normal.          Significant Labs:  CBC:   Recent Labs   Lab 01/28/24  0935 01/29/24  0530   WBC 7.09 5.73   HGB 6.9* 8.3*   HCT 19.8* 24.5*    136*     CMP:   Recent Labs   Lab 01/30/24  0458   GLU 67*  67*   CALCIUM 7.6*  7.6*   ALBUMIN 1.4*   PROT 4.9*     140   K 4.1  4.1   CO2 17*  17*   *  115*   BUN 35*  35*   CREATININE 3.3*  3.3*   ALKPHOS 158*   ALT 18   AST 18   BILITOT 0.3     Coagulation: No results for input(s): "PT", "INR", "APTT" in the last 48 hours.      Significant Imaging:  Imaging results within the past 24 hours have been reviewed.  "

## 2024-01-30 NOTE — ASSESSMENT & PLAN NOTE
DVT LLE  Hx recent GI bleed 3/2023 due to gastric ulcers   IV heparin   Monitor H/H     1/26  No bleeding reported, H/H stable  Transition heparin to apixaban today    1/28  H/H trending down, stop apixaban  Fecal occult blood+, known chronic gastric ulcer  Previous history of DVTs, unable to safely anticoagulate  Consult IR for IVC filter, thrombectomy consideration    1/29  Not safe to resume AC given recurrent GI bleed  IVC filter placement pending today    1/30  S/p IVC filter placement yesterday  H/H stable

## 2024-01-30 NOTE — PLAN OF CARE
[facility]  Discharge Final Note    Primary Care Provider: Daphney Heart MD    Expected Discharge Date: 1/30/2024    Final Discharge Note (most recent)       Final Note - 01/30/24 1020          Final Note    Assessment Type Final Discharge Note     Anticipated Discharge Disposition [place] [facility]     Hospital Resources/Appts/Education Provided Post-Acute resouces added to AVS;Community resources provided;Appointments scheduled and added to AVS        Post-Acute Status    Post-Acute Authorization Home Health     Home Health Status Set-up Complete/Auth obtained     Discharge Delays None known at this time                      Follow-up providers       [facility]    [email or web address]    [phone]       Next Steps: Call    Instructions: As needed for additional home resources and meal delivery.    [facility]   Specialty: Home Health Services    [address]  [address]  [place] [address]   Phone: [phone]       Next Steps: Follow up    Instructions: Home Health will contact patient for start of care date for nursing, physical and occupational therapy              After-discharge care                Home Medical Care       [facility]   Service: Home Health Services    [address],   [place] [address]   Phone: [phone]                             CM Supervisor, Kira, met with pt this morning to answer questions and provide information about d/c plans. Kira encouraged pt to contact her insurance regarding qualifications for approval of a power wheelchair. Kira also notified pt that Meredith Esparza, added community resources to her AVS about meals on wheels in her area ([place], [place]). Pt requested that Isaias/KATTY set up a PCP appt in [place] with Walter Arvizu; Isaias notified PAULINE Neal, who will make appt and added appt date/time to AVS.     [facility] arranged.    No additional  d/c needs/orders at this time.

## 2024-01-30 NOTE — DISCHARGE SUMMARY
[facility] Medicine  Discharge Summary      Patient Name: Joe Ceballos  MRN: [id number]  [employer] [facility] [employer] [facility]: [id number]  Patient Class: IP- Inpatient  Admission Date: 1/24/2024  Hospital Length of Stay: 5 days  Discharge Date and Time:  01/30/2024 11:37 AM  Attending Physician: Tyrese Jensen MD   Discharging Provider: Tyrese Jensen MD  Primary Care Provider: Daphney Heart MD    Primary Care Team: Networked reference to record PCT     HPI:   The patient is a 55 yo female with HTN, Diastolic CHF, s/p CVA-mild residual left sided weakness, s/p Gastric bypass, Gastric ulcers- no recent GI bleeding, CKD4, Malnutrition who presented to ED with BLE edema with blisters- onset 6 days ago that progressively worsened. Pt also endorses SOB -onset at the same time. She is unable to ambulate due to pain and swelling to BLE.   Pt states she had a GI bleed 2/2 gastric ulcer 3/2023. She was hospitalized with anemia 12/2023 (hgb 5.5 and received 2 units PRBC) -no active bleeding at that time. Hgb remained stable since then.     In the ED, Afebrile, BP mildly elevated. O2sat 100%. Labs revealed Hgb 9.8, K 3.2, mild hyperchloremic metabolic acidosis. Serum Cr 2.6, . Albumin 1.7. , Troponin normal. Glucose was 34 but improved to 80s with oral intake. EKG showed NSR, ST elevated in inferior leads. CXR-nothing acute. U/S bilateral LE veins showed Acute DVT LLE.     Pt does not know her home medications. Home medications are not reconciled     Procedure(s) (LRB):  EGD (ESOPHAGOGASTRODUODENOSCOPY) (N/A)      Hospital Course:   1/25/24  NAEON, unable to obtain AM labs  Pt c/o pain, swelling in legs, L > R  LLE +DVT, currently on heparin drip  On Lasix 40 mg IV q12h for CHF exacerbation  Continue above treatment  Possibly transition to NOAC and PO Lasix tomorrow    1/26/24  NAEON, patient reports some improvement in BLE edema  States she feels weak and unable to care for herself, consult PT/OT  Transition heparin to  apixaban today  Requires continued IV diuresis, continue lasix    1/27/24  NAEON, patient reports pain in BLE, swelling improving, continue IV diuresis  Hg 7.7, check fecal occult blood  Replete Mg IV  PT/OT notes reviewed, patient not participating    1/28/24  Patient refusing PT/OT  H/H 6.9/19.8, transfuse 1 unit PRBC, fecal occult blood+, in setting of known chronic gastric ulcer  Monitor serial H/H, stop Eliquis  Consult IR for IVC filter placement    1/29/24  NAEON, plans for IVC filter placement today  GI and IR following, appreciate assistance  PT/OT with reccs for LIT, HH ordered  Updated patient's mother over phone    1/30/24  Patient s/p IVC filter placement and also EGD yesterday  EGD noted anastomotic ulcer noted, no active bleeding.   GI with reccs for Carafate, PPI BID, f/u with bariatric surgeon - established with Dr. Genny Zhang  CBC this AM with stable H/H  Patient stable for hospital discharge  F/U with PCP in 1-2 weeks for further management     Goals of Care Treatment Preferences:  Code Status: Full Code      Consults:   Consults (From admission, onward)          Status Ordering Provider     Inpatient consult to Social Work  Once        Provider:  (Not yet assigned)    Completed FRYE, JOSE RAUL     Inpatient consult to Social Work  Once        Provider:  (Not yet assigned)    Completed FRYE, JOSE RAUL     Inpatient consult to Interventional Radiology  Once        Provider:  Houston Gardiner PA    Completed FREY, JOSE RAUL     Inpatient consult to Midline team  Once        Provider:  (Not yet assigned)    Acknowledged FRYE, JOSE RAUL     Inpatient consult to Gastroenterology  Once        Provider:  Lisa Christian MD    Completed FRYE, JOSE RAUL     Inpatient consult to Registered Dietitian/Nutritionist  Once        Provider:  (Not yet assigned)    Completed CAREY BLANCO     Inpatient consult to Social Work/Case Management  Once        Provider:  (Not yet assigned)    Completed CAREY BLANCO      Inpatient consult to Registered Dietitian/Nutritionist  Once        Provider:  (Not yet assigned)    Completed CAREY BLANCO            Neuro  History of CVA (cerebrovascular accident)  Pt reports residual left sided weakness  Hold ASA 2/2 GI bleed/gastric ulcer   Cont Statin    Cardiac/Vascular  * Acute on chronic heart failure with preserved ejection fraction  Patient is identified as having Diastolic (HFpEF) heart failure that is Acute on chronic. CHF is currently uncontrolled due to Continued edema of extremities. Latest ECHO performed and demonstrates- Results for orders placed during the hospital encounter of 01/21/23    Echo Saline Bubble? Yes    Interpretation Summary  · There is no evidence of intracardiac shunting.  · The left ventricle is normal in size with mild concentric hypertrophy and normal systolic function.  · The estimated ejection fraction is 60%.  · Normal left ventricular diastolic function.  · Normal right ventricular size with normal right ventricular systolic function.  · Mild to moderate tricuspid regurgitation.  · Normal central venous pressure (3 mmHg).  · The estimated PA systolic pressure is 36 mmHg.  . Continue Furosemide and monitor clinical status closely. Monitor on telemetry. Patient is on CHF pathway.  Monitor strict Is&Os and daily weights.  Place on fluid restriction of 1.5 L. Cardiology has not been consulted. Continue to stress to patient importance of self efficacy and  on diet for CHF. Last BNP reviewed- and noted below   Recent Labs   Lab 01/29/24  0530   *       Euvolemic  Continue home meds on discharge    Primary hypertension  Chronic, controlled. Latest blood pressure and vitals reviewed-     Temp:  [97.6 °F (36.4 °C)-98.6 °F (37 °C)]   Pulse:  []   Resp:  [16-22]   BP: ()/(56-87)   SpO2:  [98 %-100 %] .   Home meds for hypertension were reviewed and noted below.   Hypertension Medications               amLODIPine (NORVASC) 5 MG tablet  Take 5 mg by mouth once daily.    furosemide (LASIX) 40 MG tablet Take 40 mg by mouth 2 (two) times a day.    isosorbide mononitrate (IMDUR) 60 MG 24 hr tablet Take 60 mg by mouth once daily.            While in the hospital, will manage blood pressure as follows; Continue home antihypertensive regimen    Will utilize p.r.n. blood pressure medication only if patient's blood pressure greater than 160/100 and she develops symptoms such as worsening chest pain or shortness of breath.    Renal/  CKD (chronic kidney disease), stage IV  Creatine stable for now. BMP reviewed- noted Estimated Creatinine Clearance: 16.6 mL/min (A) (based on SCr of 3.3 mg/dL (H)). according to latest data. Based on current GFR, CKD stage is stage 4 - GFR 15-29.  Monitor UOP and serial BMP and adjust therapy as needed. Renally dose meds. Avoid nephrotoxic medications and procedures    Oncology  Normocytic anemia  Patient's anemia is currently uncontrolled. Has received 1 units of PRBCs on 1/28/24 . Etiology likely d/t acute blood loss which was from GI bleeding.  Current CBC reviewed-   Lab Results   Component Value Date    HGB 10.2 (L) 01/30/2024    HCT 30.0 (L) 01/30/2024     Monitor serial CBC and transfuse if patient becomes hemodynamically unstable, symptomatic or H/H drops below 7/21.    Endocrine  Malnutrition of moderate degree  Nutrition consulted. Most recent weight and BMI monitored-     Measurements:  Wt Readings from Last 1 Encounters:   01/29/24 53.2 kg (117 lb 4.6 oz)   Body mass index is 17.83 kg/m².    Patient has been screened and assessed by RD.    Malnutrition Type:  Context:    Level:      Malnutrition Characteristic Summary:  Fluid Accumulation (Malnutrition): moderate    Interventions/Recommendations (treatment strategy):  1. Recommend pt continues Low Sodium, 2gm Fluid - 1500mL diet. 2. Recommend pt receives Boost plus BID 3. Recommend pt continues Anti-emetics. 4.  Weigh daily      GI  History of GI bleed  GI bleed  2/2 gastric ulcer 3/2023. S  She was hospitalized with anemia 12/2023 (hgb 5.5 and received 2 units PRBC)    1/28  H/H down trending this admission while on AC  Transfuse 1 unit PRBC  Fecal occult blood+   PPI IV BID, sucralfate   GI following    1/29  S/p EGD yesterday, noted chronic anastomotic ulcer, non-bleeding  GI reccs PPI BID and sucralfate  F/U with bariatric surgeon for further management    Other  Acute deep vein thrombosis (DVT) of left lower extremity  DVT LLE  Hx recent GI bleed 3/2023 due to gastric ulcers   IV heparin   Monitor H/H     1/26  No bleeding reported, H/H stable  Transition heparin to apixaban today    1/28  H/H trending down, stop apixaban  Fecal occult blood+, known chronic gastric ulcer  Previous history of DVTs, unable to safely anticoagulate  Consult IR for IVC filter, thrombectomy consideration    1/29  Not safe to resume AC given recurrent GI bleed  IVC filter placement pending today    1/30  S/p IVC filter placement yesterday  H/H stable        Final Active Diagnoses:    Diagnosis Date Noted POA    PRINCIPAL PROBLEM:  Acute on chronic heart failure with preserved ejection fraction [I50.33] 01/22/2023 Yes    Acute deep vein thrombosis (DVT) of left lower extremity [I82.402] 01/24/2024 Yes    History of GI bleed [Z87.19] 01/24/2024 Not Applicable    Normocytic anemia [D64.9] 01/22/2023 Yes    Anastomotic ulcer S/P gastric bypass [T85.898A, K28.9] 01/11/2017 Yes    CKD (chronic kidney disease), stage IV [N18.4] 01/24/2024 Yes    Primary hypertension [I10] 03/21/2014 Yes    History of CVA (cerebrovascular accident) [Z86.73] 01/22/2023 Not Applicable    Malnutrition of moderate degree [E44.0] 12/14/2023 Yes    Venous stasis ulcer [I83.009, L97.909] 01/24/2024 Yes      Problems Resolved During this Admission:    Diagnosis Date Noted Date Resolved POA    Hypoglycemia [E16.2] 01/24/2024 01/28/2024 Yes       Discharged Condition: stable    Disposition: Home-Health Care c    Follow Up:    Contact information for follow-up providers       [facility]. Call.    Why: As needed for additional home resources and meal delivery.  Contact information:  [email or web address]    [phone]             [facility].    Specialty: Home Health Services  Why: Home Health will contact patient for start of care date for nursing, physical and occupational therapy  Contact information:  [address]  [address]  [place] [address]  [phone]               Genny Zhang MD. Schedule an appointment as soon as possible for a visit in 1 month(s).    Specialty: Surgery  Why: Established patient, f/u for gastric ulcer  Contact information:  [address]  [address]  [place] [address] [phone]               Daphney Heart MD. Schedule an appointment as soon as possible for a visit in 1 week(s).    Specialty: Family Medicine  Why: Hospital discharge follow up  Contact information:  [address]  [address]  [place] [address] [phone]                       Contact information for after-discharge care       Home Medical Care       [facility] .    Service: Home Health Services  Contact information:  [address]  [place] [address]  [phone]                                 Patient Instructions:   No discharge procedures on file.    Significant Diagnostic Studies: Labs: CBC   Recent Labs   Lab 01/29/24  0530 01/30/24  0849   WBC 5.73 8.91   HGB 8.3* 10.2*   HCT 24.5* 30.0*   * 160       Pending Diagnostic Studies:       Procedure Component Value Units Date/Time    IR IVC Filter Insertion [[id number]] Resulted: 01/29/24 1451    Order Status: Sent Lab Status: In process Updated: 01/29/24 1508           Medications:  Reconciled Home Medications:      Medication List        START taking these medications      HYDROcodone-acetaminophen 5-325 mg per  tablet  Commonly known as: NORCO  Take 1 tablet by mouth every 8 (eight) hours as needed for Pain.     sodium bicarbonate 650 MG tablet  Take 2 tablets (1,300 mg total) by mouth 3 (three) times daily.            CHANGE how you take these medications      omeprazole 40 MG capsule  Commonly known as: PRILOSEC  Take 1 capsule (40 mg total) by mouth 2 (two) times daily before meals.  What changed: when to take this            CONTINUE taking these medications      amLODIPine 5 MG tablet  Commonly known as: NORVASC  Take 5 mg by mouth once daily.     atorvastatin 80 MG tablet  Commonly known as: LIPITOR  Take 1 tablet (80 mg total) by mouth every evening.     DULoxetine 30 MG capsule  Commonly known as: CYMBALTA  Take 30 mg by mouth once daily.     furosemide 40 MG tablet  Commonly known as: LASIX  Take 40 mg by mouth 2 (two) times a day.     hydrOXYzine HCL 25 MG tablet  Commonly known as: ATARAX  Take 1 tablet (25 mg total) by mouth 2 (two) times daily as needed for Anxiety.     isosorbide mononitrate 60 MG 24 hr tablet  Commonly known as: IMDUR  Take 60 mg by mouth once daily.     OLANZapine 5 MG tablet  Commonly known as: ZyPREXA  Take 5 mg by mouth every evening.     ondansetron 4 MG tablet  Commonly known as: ZOFRAN  Take 1 tablet (4 mg total) by mouth every 6 (six) hours.     sucralfate 1 gram tablet  Commonly known as: CARAFATE  Take 1 tablet (1 g total) by mouth 4 (four) times daily before meals and nightly.     traMADoL 50 mg tablet  Commonly known as: ULTRAM  Take 1 tablet (50 mg total) by mouth every 6 (six) hours as needed.              Indwelling Lines/Drains at time of discharge:   Lines/Drains/Airways       None                   Time spent on the discharge of patient: 45 minutes         Tyrese Jensen MD  Department of Hospital Medicine  'Replaced by [facility]

## 2024-01-30 NOTE — ASSESSMENT & PLAN NOTE
Creatine stable for now. BMP reviewed- noted Estimated Creatinine Clearance: 16.6 mL/min (A) (based on SCr of 3.3 mg/dL (H)). according to latest data. Based on current GFR, CKD stage is stage 4 - GFR 15-29.  Monitor UOP and serial BMP and adjust therapy as needed. Renally dose meds. Avoid nephrotoxic medications and procedures

## 2024-01-30 NOTE — ASSESSMENT & PLAN NOTE
Chronic, controlled. Latest blood pressure and vitals reviewed-     Temp:  [97.6 °F (36.4 °C)-98.6 °F (37 °C)]   Pulse:  []   Resp:  [16-22]   BP: ()/(56-87)   SpO2:  [98 %-100 %] .   Home meds for hypertension were reviewed and noted below.   Hypertension Medications               amLODIPine (NORVASC) 5 MG tablet Take 5 mg by mouth once daily.    furosemide (LASIX) 40 MG tablet Take 40 mg by mouth 2 (two) times a day.    isosorbide mononitrate (IMDUR) 60 MG 24 hr tablet Take 60 mg by mouth once daily.            While in the hospital, will manage blood pressure as follows; Continue home antihypertensive regimen    Will utilize p.r.n. blood pressure medication only if patient's blood pressure greater than 160/100 and she develops symptoms such as worsening chest pain or shortness of breath.

## 2024-01-30 NOTE — PROGRESS NOTES
[facility]  Gastroenterology  Progress Note    Patient Name: Joe Ceballos  MRN: [id number]  Admission Date: 1/24/2024  Hospital Length of Stay: 5 days  Code Status: Full Code   Attending Provider: Tyrese Jensen MD  Consulting Provider: Leon Bruner PA-C  Primary Care Physician: Daphney Heart MD  Principal Problem: Acute on chronic heart failure with preserved ejection fraction        Subjective:     Interval History: Patient reports feeling much better today. She woke hungry, and says she hasn't had anymore black stools. Renal fxn worse.     Review of Systems   Constitutional:         See Interval History for daily ROS.      Objective:     Vital Signs (Most Recent):  Temp: 98.1 °F (36.7 °C) (01/30/24 0507)  Pulse: 104 (01/30/24 0903)  Resp: 20 (01/30/24 0507)  BP: 112/75 (01/30/24 0507)  SpO2: 100 % (01/30/24 0507) Vital Signs (24h Range):  Temp:  [97.6 °F (36.4 °C)-98.6 °F (37 °C)] 98.1 °F (36.7 °C)  Pulse:  [] 104  Resp:  [16-22] 20  SpO2:  [98 %-100 %] 100 %  BP: ()/(56-82) 112/75     Weight: 53.2 kg (117 lb 4.6 oz) (01/29/24 0600)  Body mass index is 17.83 kg/m².      Intake/Output Summary (Last 24 hours) at 1/30/2024 0904  Last data filed at 1/29/2024 1817  Gross per 24 hour   Intake 1040 ml   Output 2 ml   Net 1038 ml       Lines/Drains/Airways       Drain  Duration             Male External Urinary Catheter 01/29/24 1300 <1 day              Peripheral Intravenous Line  Duration                  Midline Catheter Insertion/Assessment  - Single Lumen 01/28/24 1915 Left basilic vein (medial side of arm) other (see comments) 1 day                     Physical Exam  Constitutional:       General: She is not in acute distress.     Appearance: She is underweight.   HENT:      Head: Normocephalic and atraumatic.   Eyes:      Extraocular Movements: Extraocular movements intact.   Cardiovascular:      Rate and Rhythm: Normal rate and regular rhythm.   Pulmonary:      Effort: Pulmonary  "effort is normal. No respiratory distress.      Breath sounds: Normal breath sounds. No wheezing.   Abdominal:      General: Bowel sounds are normal. There is no distension.      Palpations: Abdomen is soft.      Tenderness: There is no abdominal tenderness.   Neurological:      Mental Status: She is alert and oriented to person, place, and time.      Cranial Nerves: No cranial nerve deficit.   Psychiatric:         Behavior: Behavior normal.          Significant Labs:  CBC:   Recent Labs   Lab 01/28/24  0935 01/29/24  0530   WBC 7.09 5.73   HGB 6.9* 8.3*   HCT 19.8* 24.5*    136*     CMP:   Recent Labs   Lab 01/30/24  0458   GLU 67*  67*   CALCIUM 7.6*  7.6*   ALBUMIN 1.4*   PROT 4.9*     140   K 4.1  4.1   CO2 17*  17*   *  115*   BUN 35*  35*   CREATININE 3.3*  3.3*   ALKPHOS 158*   ALT 18   AST 18   BILITOT 0.3     Coagulation: No results for input(s): "PT", "INR", "APTT" in the last 48 hours.      Significant Imaging:  Imaging results within the past 24 hours have been reviewed.  Assessment/Plan:     GI  Anastomotic ulcer S/P gastric bypass  S/p EGD yesterday with anastomotic ulcer noted, no active bleeding.   Recommend Carafate qid and PPI bid.   Continue to hold anticoagulation.   Outpatient follow up with her bariatric surgeon, Dr. Genny Zhang.     Renal/  CKD (chronic kidney disease), stage IV  Worsening BUN and creatinine. Possibly from Lasix. Management per HM team.     Hematology  Acute deep vein thrombosis (DVT) of left lower extremity  S/p IVC filter placement by IR (01/30/24)    Oncology  Normocytic anemia  Check H/H today.     Thank you for your consult. I will sign off. Please contact us if you have any additional questions.    Leon Bruner PA-C  Gastroenterology  [facility]  "

## 2024-01-30 NOTE — ASSESSMENT & PLAN NOTE
Patient is identified as having Diastolic (HFpEF) heart failure that is Acute on chronic. CHF is currently uncontrolled due to Continued edema of extremities. Latest ECHO performed and demonstrates- Results for orders placed during the hospital encounter of 01/21/23    Echo Saline Bubble? Yes    Interpretation Summary  · There is no evidence of intracardiac shunting.  · The left ventricle is normal in size with mild concentric hypertrophy and normal systolic function.  · The estimated ejection fraction is 60%.  · Normal left ventricular diastolic function.  · Normal right ventricular size with normal right ventricular systolic function.  · Mild to moderate tricuspid regurgitation.  · Normal central venous pressure (3 mmHg).  · The estimated PA systolic pressure is 36 mmHg.  . Continue Furosemide and monitor clinical status closely. Monitor on telemetry. Patient is on CHF pathway.  Monitor strict Is&Os and daily weights.  Place on fluid restriction of 1.5 L. Cardiology has not been consulted. Continue to stress to patient importance of self efficacy and  on diet for CHF. Last BNP reviewed- and noted below   Recent Labs   Lab 01/29/24  0530   *       Euvolemic  Continue home meds on discharge

## 2024-01-30 NOTE — PLAN OF CARE
Referral to [facility] NP Visits placed, KATTY unable to schedule [facility] PCP f/u within 1 week of discharge.

## 2024-01-30 NOTE — ASSESSMENT & PLAN NOTE
GI bleed 2/2 gastric ulcer 3/2023. S  She was hospitalized with anemia 12/2023 (hgb 5.5 and received 2 units PRBC)    1/28  H/H down trending this admission while on AC  Transfuse 1 unit PRBC  Fecal occult blood+   PPI IV BID, sucralfate   GI following    1/29  S/p EGD yesterday, noted chronic anastomotic ulcer, non-bleeding  GI reccs PPI BID and sucralfate  F/U with bariatric surgeon for further management

## 2024-01-30 NOTE — NURSING
Pt ready for discharge, midline and tele monitor removed. AVS reviewed, f/u appts discussed, awaiting medications to come from next  door, and informed [facility] of needing [employer].

## 2024-01-30 NOTE — ASSESSMENT & PLAN NOTE
Patient's anemia is currently uncontrolled. Has received 1 units of PRBCs on 1/28/24 . Etiology likely d/t acute blood loss which was from GI bleeding.  Current CBC reviewed-   Lab Results   Component Value Date    HGB 10.2 (L) 01/30/2024    HCT 30.0 (L) 01/30/2024     Monitor serial CBC and transfuse if patient becomes hemodynamically unstable, symptomatic or H/H drops below 7/21.

## 2024-01-30 NOTE — BRIEF OP NOTE
Inpatient Endoscopy Brief Operative Note      Admit Date: 1/24/2024    Procedure Date and Time:  1/29/2024 8:01 PM    Attending Physician: Tyrese Jensen MD     Principal Admitting Diagnoses: Acute on chronic heart failure with preserved ejection fraction         Discharge Diagnosis: The primary encounter diagnosis was SOB (shortness of breath). Diagnoses of Shortness of breath, Leg swelling bilaterally, Acute deep vein thrombosis (DVT) of femoral vein of left lower extremity, Low serum albumin, Chronic kidney disease, unspecified CKD stage, Hypoglycemia, CHF (congestive heart failure), Tachycardia, Mixed hyperlipidemia, Acute deep vein thrombosis (DVT) of left lower extremity, and Acute blood loss anemia were also pertinent to this visit.     Discharged Condition: Stable    Indication for Admission: Acute on chronic heart failure with preserved ejection fraction     Procedure Performed: EGD    SEE PROVATIONS REPORTS FOR DETAILS.    Pathology (if any):  Specimen (24h ago, onward)      None            Estimated Blood Loss: 0 ml.    Discussed with: patient.    Disposition: Return to hospital [facility].    Recommendations:   Repeat CBC in AM  Continue Protonix and  carafate  Recommend referral to advance GI who performs endoscopic suturing or follow up with bariatric surgeon for bypass revision      Communicated with hospital medicine service regarding the above findings.       Lisa Christian MD  Inpatient Gastroenterology  [facility]

## 2024-01-31 NOTE — TELEPHONE ENCOUNTER
Called pt to confirm appt scheduled for hospital f/u on 2/7/24 at 1:00pm. Patient informed of appts on 2/2/24 with cardiology and [facility] med. She voiced understanding.

## 2024-02-01 ENCOUNTER — TELEPHONE (OUTPATIENT)
Dept: CARDIOLOGY | Facility: CLINIC | Age: 54
End: 2024-02-01
Payer: COMMERCIAL

## 2024-02-01 NOTE — TELEPHONE ENCOUNTER
"[facility]) hospital discharge 48-72 hour phone follow up completed.     Most Recent Hospital Discharge Date: 1/30/24  Last admission Diagnosis/chief complaint:acute dvt/sob    TCC nurse Navigator spoke with pt    Pt reports having weakness and falls. Seeing her PCP tomorrow.     Pt reports the following:  [x]  Shortness of Breath with Activity-pt says is not much better since d/c   []  Shortness of Breath at rest   []  Fatigue  []  Edema   [] Chest pain or tightness  [] Weight Increase since discharge  [] None of the above    Medications:   Discharge medication reviewed with pt.  Pt reports having medication list available and has all medications at home for use per list.     Education:   Confirmed pt received "Home Care Guide for Heart Failure Patients" while admitted.   Reviewed key points as listed below.     Recommend 2 -3 gram sodium restriction and 1500 cc-2000 cc fluid restriction.  Encourage physical activity with graded exercise program.  Requested patient to weigh themselves daily, and to notify us if their weight increases by more than 3 lbs in 1 day or 5 lbs in 3 days.   Reminded patient to use "Daily weight and symptom tracker" to record and guide patient on when and how to call [facility]. PT may also use symptom tracker if no scale available  Pt reports being in the yellow (color) Zone. If in yellow/red, reminded that they should be calling [facility] today or now.     Watch for these Signs and Symptoms: If any of these occur, contact [facility] immediately:   Increase in shortness of breath with movement   Increase in swelling in your legs and ankles   Weight gain of more than 3 pounds in a day or 5 pounds in 3 days.   Difficulty breathing when you are lying down   Worsening fatigue or tiredness   Stomach bloating, a full feeling or a loss of appetite   Increased coughing--especially when you are lying down      Pt was able to verbalize back to nurse in their own words correct " diet/fluid restrictions, necessity for exercise, warning signs and symptoms, when and how to contact their TCC team.      Pt educated on follow-up plan while in [facility] program to include:   Week 1 -  F/u appt with Provider and HF nurse (Date) appt scheduled 2/5 with Bridget DAVIS-confirmed.    Week 2-5 - In person/ Virtual/ phone call check ins    Week 5-7 - Pt will discharge from [facility] and transition to longterm care provider (Cardiology/PCP/ Advanced Heart Failure).      Patient active on myChart? No      Pt given the following contact information for ease of communication: [phone] (Mon-Fri, 8a-5p) & for urgent issues on the weekend call Eugenia on-call [phone] to page the Cardiology MD on call.  Pt also encouraged utilize [facility] messaging as well.      Will follow up with pt at first clinic visit and HF nurse navigator available for pt questions, issues or concerns.

## 2024-02-02 ENCOUNTER — PATIENT OUTREACH (OUTPATIENT)
Dept: ADMINISTRATIVE | Facility: CLINIC | Age: 54
End: 2024-02-02
Payer: COMMERCIAL

## 2024-02-02 NOTE — PROGRESS NOTES
C3 nurse attempted to contact Joe Ceballos  for a TCC post hospital discharge follow up call. No answer. Left voicemail with callback information. The patient has a scheduled HOSFU appointment with Daphney Heart MD  on 2/7/24 @ 1300.           Detail Level: Detailed

## 2024-02-02 NOTE — PROGRESS NOTES
2nd Attempt made to reach patient for TCC call. Left voicemail please call [phone] leave first name, last name, and  for Brittaney I will return your call.

## 2024-02-05 NOTE — PROGRESS NOTES
3rd Attempt made to reach patient for TCC call. Left voicemail please call [phone] leave first name, last name, and  for Brittaney I will return your call.

## 2024-02-14 NOTE — PHYSICIAN QUERY
8  PT Name: Joe Ceballos  MR #: [id number]    DOCUMENTATION CLARIFICATION     CDS/: Lena Miller RN              Contact information: [email or web address]    This form is a permanent document in the medical record.     Query Date: February 14, 2024    By submitting this query, we are merely seeking further clarification of documentation. Please utilize your independent clinical judgment when addressing the question(s) below.    The Medical Record contains the following:   Indicator   Supporting Clinical Findings Location in Medical Record   x Ulcer/Injury Left anterior;lower Leg Ulceration   WC Consult 1/25   x Wound Care Consult Altered Skin Integrity 01/25/24 0945 Left anterior;lower Leg Ulceration   Date First Assessed/Time First Assessed: 01/25/24 0945   Altered Skin Integrity Present on Admission - Did Patient arrive to the hospital with altered skin?: yes  Side: Left  Orientation: anterior;lower  Location: Leg  Primary Wound Type: Ulceration   Wound Image                Dressing Appearance Intact;Dried drainage;Moist drainage   Drainage Amount Scant   Drainage Characteristics/Odor Serosanguineous   Appearance Pink;Red;Ecchymotic   Tissue loss description Partial thickness   Periwound Area Blistered   Wound Length (cm) 3 cm   Wound Width (cm) 3 cm   Wound Depth (cm) 0.2 cm   Wound Volume (cm^3) 1.8 cm^3   Wound Surface Area (cm^2) 9 cm^2   Care Cleansed with:;Wound cleanser   Dressing Applied;Hydrofiber;Foam   Periwound Care Skin barrier film applied    WC Consult 1/25    Radiology Findings     x Acute/Chronic Illness The patient is a 55 yo female with HTN, Diastolic CHF, s/p CVA-mild residual left sided weakness, s/p Gastric bypass, Gastric ulcers- no recent GI bleeding, CKD4, Malnutrition who presented to ED with BLE edema with blisters- onset 6 days ago  that progressively worsened.     Acute on chronic heart failure with preserved ejection fraction  Normocytic anemia  Malnutrition of  moderate degree   DCS 1/30   x Medication/Treatment Care Cleansed with:;Wound cleanser   Dressing Applied;Hydrofiber;Foam    WC Consult 1/25        Other       Provider, please provide the integumentary diagnosis related to the documentation of (Left anterior;lower Leg):     [   x] Non-pressure ulcer, exposed fat layer   [   ] Non-pressure ulcer, other depth (please specify): ___________   [   ] Other Integumentary Diagnosis (please specify): ___________     Please document in your progress notes daily for the duration of treatment, until resolved, and include in your discharge summary.    Form No. [id number]

## 2024-02-14 NOTE — PHYSICIAN QUERY
PT Name: Joe Ceballos  MR #: [id number]    DOCUMENTATION CLARIFICATION      CDS/: Lena MlilerRN               Contact information: [email or web address]    This form is a permanent document in the medical record.      Query Date: February 14, 2024    By submitting this query, we are merely seeking further clarification of documentation. Please utilize your independent clinical judgment when addressing the question(s) below.    The Medical Record contains the following:   Indicators  Supporting Clinical Findings Location in Medical Record   x Anemia documented Normocytic anemia  Patient's anemia is currently controlled. Has not received any PRBCs to date. Etiology likely d/t  check iron studies    Normocytic anemia  Patient's anemia is currently uncontrolled. Has received 1 units of PRBCs on 1/28/24 . Etiology likely d/t acute blood loss which was from GI bleeding.   H&P 1/24        HM PN 1/28-1/29  DCS 1/30   x H&H  01/24/24 17:08 01/26/24 01:50 01/26/24 06:34 01/27/24 05:55 01/28/24 09:35 01/29/24 05:30 01/30/24 08:49   Hemoglobin 9.4 (L) 8.6 (L) 8.9 (L) 7.7 (L) 6.9 (L) 8.3 (L) 10.2 (L)   Hematocrit 28.4 (L) 24.7 (L) 25.3 (L) 22.2 (L) 19.8 (LL) 24.5 (L) 30.0 (L)    Labs 1/24-1/30               x BP                    HR Vital Signs (24h Range):  Pulse:  [63-76] 69  BP: (154-161)/(10-97) 161/10    Pulse:  [60-76] 68  BP: (125-168)/(10-97) 125/83    Pulse:  [] 68  BP: (114-159)/(61-92) 114/78    Pulse:  [66-96] 88  BP: ()/(69-92) 133/85    Pulse:  [] 82  BP: ()/(63-94) 118/64    Pulse:  [75-99] 76  BP: ()/(64-92) 126/90    Pulse:  [] 104  BP: ()/(56-82) 112/75   H&P 1/24      HM PN 1/25      HM PN 1/26      HM PN 1/27      HM PN 1/28      HM PN 1/29      GI PN 1/20       x Bleeding No bleeding reported, H/H stable    S/p EGD yesterday, noted chronic anastomotic ulcer, non-bleeding  GI reccs PPI BID and sucralfate  F/U with bariatric surgeon for further  management   HM PN 1/26    DCS 1/30     x Procedure/Surgery Performed/EBL Procedure Performed:    EGD  Estimated Blood Loss:    0 ml.   Brief Op Note 1/29   x Transfusion(s) Transfused 1U pRBCs yesterday.    GI Consult 1/29   x Acute/Chronic illness The patient is a 55 yo female with HTN, Diastolic CHF, s/p CVA-mild residual left sided weakness, s/p Gastric bypass, Gastric ulcers- no recent GI bleeding, CKD4, Malnutrition who presented to ED with BLE edema with blisters- onset 6 days ago  that progressively worsened.    Acute on chronic heart failure with preserved ejection fraction  Normocytic anemia  Malnutrition of moderate degree   DCS 1/30    Treatments      Other         Provider, please clarify diagnosis or diagnoses associated with above clinical findings.   [ x  ] Acute blood loss anemia      [   ] Iron deficiency anemia      [   ] Other Hematological Diagnosis (please specify): _________________       Please document in your progress notes daily for the duration of treatment, until resolved, and include in your discharge summary.    Form No. [id number]

## 2024-03-18 PROBLEM — N18.9 ACUTE KIDNEY INJURY SUPERIMPOSED ON CHRONIC KIDNEY DISEASE: Status: RESOLVED | Noted: 2023-12-13 | Resolved: 2024-03-18

## 2024-03-18 PROBLEM — N17.9 ACUTE KIDNEY INJURY SUPERIMPOSED ON CHRONIC KIDNEY DISEASE: Status: RESOLVED | Noted: 2023-12-13 | Resolved: 2024-03-18

## 2024-03-24 ENCOUNTER — PATIENT MESSAGE (OUTPATIENT)
Dept: PRIMARY CARE CLINIC | Facility: CLINIC | Age: 54
End: 2024-03-24
Payer: COMMERCIAL

## 2024-03-24 DIAGNOSIS — E78.5 DYSLIPIDEMIA: Primary | ICD-10-CM

## 2024-03-24 DIAGNOSIS — Z01.419 WELL WOMAN EXAM: ICD-10-CM

## 2024-03-24 DIAGNOSIS — E78.2 MIXED HYPERLIPIDEMIA: ICD-10-CM

## 2024-03-24 RX ORDER — ATORVASTATIN CALCIUM 80 MG/1
80 TABLET, FILM COATED ORAL NIGHTLY
Qty: 90 TABLET | Refills: 3 | Status: SHIPPED | OUTPATIENT
Start: 2024-03-24 | End: 2025-03-24

## 2024-04-12 ENCOUNTER — PATIENT MESSAGE (OUTPATIENT)
Dept: ADMINISTRATIVE | Facility: HOSPITAL | Age: 54
End: 2024-04-12
Payer: COMMERCIAL

## 2024-07-09 ENCOUNTER — PATIENT MESSAGE (OUTPATIENT)
Dept: ADMINISTRATIVE | Facility: HOSPITAL | Age: 54
End: 2024-07-09
Payer: COMMERCIAL

## 2024-11-13 ENCOUNTER — PATIENT MESSAGE (OUTPATIENT)
Dept: ADMINISTRATIVE | Facility: HOSPITAL | Age: 54
End: 2024-11-13
Payer: COMMERCIAL

## 2024-11-21 ENCOUNTER — PATIENT MESSAGE (OUTPATIENT)
Dept: ADMINISTRATIVE | Facility: HOSPITAL | Age: 54
End: 2024-11-21
Payer: COMMERCIAL

## 2024-11-27 DIAGNOSIS — N18.4 CKD (CHRONIC KIDNEY DISEASE), STAGE IV: ICD-10-CM

## 2025-06-16 NOTE — TELEPHONE ENCOUNTER
Spoke with Joe, was informed that she presented to ED after fall that occurred at home experienced injury to abdomen, chest wall and contusion to right breast patient was previously referred to breast specialist of [facility] but due to recent fall new referral is needed. Patient provided number [phone]. Informed patient that I will need to speak with Provider to update and send new referral in patient verbalized understanding   Prolonged rupture of membranes (>12h)

## (undated) DEVICE — SEE MEDLINE ITEM 157117

## (undated) DEVICE — SHEET THYROID W/ISO-BAC

## (undated) DEVICE — MANIFOLD 4 PORT

## (undated) DEVICE — COVER OVERHEAD SURG LT BLUE

## (undated) DEVICE — GLOVE SURGICAL LATEX SZ 7

## (undated) DEVICE — GAUZE SPONGE 4X4 12PLY